# Patient Record
Sex: FEMALE | Race: WHITE | NOT HISPANIC OR LATINO | Employment: OTHER | URBAN - METROPOLITAN AREA
[De-identification: names, ages, dates, MRNs, and addresses within clinical notes are randomized per-mention and may not be internally consistent; named-entity substitution may affect disease eponyms.]

---

## 2023-07-24 LAB
ALANINE AMINOTRANSFERASE (SGPT) (U/L) IN SER/PLAS: 17 U/L (ref 7–45)
ALBUMIN (G/DL) IN SER/PLAS: 4.4 G/DL (ref 3.4–5)
ALKALINE PHOSPHATASE (U/L) IN SER/PLAS: 123 U/L (ref 33–136)
ANION GAP IN SER/PLAS: 15 MMOL/L (ref 10–20)
ASPARTATE AMINOTRANSFERASE (SGOT) (U/L) IN SER/PLAS: 30 U/L (ref 9–39)
BILIRUBIN TOTAL (MG/DL) IN SER/PLAS: 0.7 MG/DL (ref 0–1.2)
CALCIUM (MG/DL) IN SER/PLAS: 10.1 MG/DL (ref 8.6–10.6)
CARBON DIOXIDE, TOTAL (MMOL/L) IN SER/PLAS: 30 MMOL/L (ref 21–32)
CHLORIDE (MMOL/L) IN SER/PLAS: 87 MMOL/L (ref 98–107)
CREATININE (MG/DL) IN SER/PLAS: 0.77 MG/DL (ref 0.5–1.05)
ERYTHROCYTE DISTRIBUTION WIDTH (RATIO) BY AUTOMATED COUNT: 12.8 % (ref 11.5–14.5)
ERYTHROCYTE MEAN CORPUSCULAR HEMOGLOBIN CONCENTRATION (G/DL) BY AUTOMATED: 34 G/DL (ref 32–36)
ERYTHROCYTE MEAN CORPUSCULAR VOLUME (FL) BY AUTOMATED COUNT: 94 FL (ref 80–100)
ERYTHROCYTES (10*6/UL) IN BLOOD BY AUTOMATED COUNT: 4.26 X10E12/L (ref 4–5.2)
GFR FEMALE: 80 ML/MIN/1.73M2
GLUCOSE (MG/DL) IN SER/PLAS: 83 MG/DL (ref 74–99)
HEMATOCRIT (%) IN BLOOD BY AUTOMATED COUNT: 40 % (ref 36–46)
HEMOGLOBIN (G/DL) IN BLOOD: 13.6 G/DL (ref 12–16)
LEUKOCYTES (10*3/UL) IN BLOOD BY AUTOMATED COUNT: 10.2 X10E9/L (ref 4.4–11.3)
NRBC (PER 100 WBCS) BY AUTOMATED COUNT: 0 /100 WBC (ref 0–0)
PLATELETS (10*3/UL) IN BLOOD AUTOMATED COUNT: 249 X10E9/L (ref 150–450)
POTASSIUM (MMOL/L) IN SER/PLAS: 3.8 MMOL/L (ref 3.5–5.3)
PROTEIN TOTAL: 6.7 G/DL (ref 6.4–8.2)
SODIUM (MMOL/L) IN SER/PLAS: 128 MMOL/L (ref 136–145)
URATE (MG/DL) IN SER/PLAS: 3.9 MG/DL (ref 2.3–6.7)
UREA NITROGEN (MG/DL) IN SER/PLAS: 15 MG/DL (ref 6–23)

## 2023-10-18 DIAGNOSIS — E78.5 HYPERLIPIDEMIA, UNSPECIFIED HYPERLIPIDEMIA TYPE: ICD-10-CM

## 2023-10-19 PROBLEM — E78.5 HYPERLIPIDEMIA: Status: ACTIVE | Noted: 2023-10-19

## 2023-10-20 RX ORDER — SIMVASTATIN 40 MG/1
TABLET, FILM COATED ORAL
Qty: 90 TABLET | Refills: 0 | Status: SHIPPED | OUTPATIENT
Start: 2023-10-20 | End: 2024-01-04

## 2023-11-01 DIAGNOSIS — I15.9 SECONDARY HYPERTENSION: ICD-10-CM

## 2023-11-02 RX ORDER — ATENOLOL 50 MG/1
50 TABLET ORAL DAILY
Qty: 90 TABLET | Refills: 0 | Status: SHIPPED | OUTPATIENT
Start: 2023-11-02 | End: 2024-01-25 | Stop reason: SDUPTHER

## 2023-12-18 DIAGNOSIS — I10 HYPERTENSION, UNSPECIFIED TYPE: ICD-10-CM

## 2023-12-19 RX ORDER — TRIAMTERENE AND HYDROCHLOROTHIAZIDE 75; 50 MG/1; MG/1
TABLET ORAL
Qty: 90 TABLET | Refills: 1 | Status: SHIPPED | OUTPATIENT
Start: 2023-12-19 | End: 2024-01-25 | Stop reason: SDUPTHER

## 2023-12-31 DIAGNOSIS — E78.5 HYPERLIPIDEMIA, UNSPECIFIED HYPERLIPIDEMIA TYPE: ICD-10-CM

## 2024-01-04 RX ORDER — SIMVASTATIN 40 MG/1
TABLET, FILM COATED ORAL
Qty: 90 TABLET | Refills: 0 | Status: SHIPPED | OUTPATIENT
Start: 2024-01-04 | End: 2024-01-25 | Stop reason: SDUPTHER

## 2024-01-25 DIAGNOSIS — E78.5 HYPERLIPIDEMIA, UNSPECIFIED HYPERLIPIDEMIA TYPE: ICD-10-CM

## 2024-01-25 DIAGNOSIS — I10 HYPERTENSION, UNSPECIFIED TYPE: ICD-10-CM

## 2024-01-25 DIAGNOSIS — I15.9 SECONDARY HYPERTENSION: ICD-10-CM

## 2024-01-25 RX ORDER — TRIAMTERENE AND HYDROCHLOROTHIAZIDE 75; 50 MG/1; MG/1
1 TABLET ORAL DAILY
Qty: 90 TABLET | Refills: 0 | Status: SHIPPED | OUTPATIENT
Start: 2024-01-25 | End: 2024-06-11

## 2024-01-25 RX ORDER — ATENOLOL 50 MG/1
50 TABLET ORAL DAILY
Qty: 90 TABLET | Refills: 0 | Status: SHIPPED | OUTPATIENT
Start: 2024-01-25 | End: 2024-02-12 | Stop reason: SDUPTHER

## 2024-01-25 RX ORDER — SIMVASTATIN 40 MG/1
40 TABLET, FILM COATED ORAL NIGHTLY
Qty: 90 TABLET | Refills: 0 | Status: SHIPPED | OUTPATIENT
Start: 2024-01-25 | End: 2024-02-12 | Stop reason: SDUPTHER

## 2024-01-29 DIAGNOSIS — M10.9 GOUT, UNSPECIFIED CAUSE, UNSPECIFIED CHRONICITY, UNSPECIFIED SITE: Primary | ICD-10-CM

## 2024-01-29 RX ORDER — MELOXICAM 15 MG/1
15 TABLET ORAL DAILY
Qty: 90 TABLET | Refills: 1 | Status: SHIPPED | OUTPATIENT
Start: 2024-01-29

## 2024-01-29 RX ORDER — MELOXICAM 15 MG/1
15 TABLET ORAL DAILY
COMMUNITY
Start: 2013-10-08 | End: 2024-01-29 | Stop reason: SDUPTHER

## 2024-01-29 RX ORDER — ALLOPURINOL 300 MG/1
300 TABLET ORAL DAILY
Qty: 90 TABLET | Refills: 1 | Status: SHIPPED | OUTPATIENT
Start: 2024-01-29

## 2024-01-29 RX ORDER — ALLOPURINOL 300 MG/1
300 TABLET ORAL DAILY
COMMUNITY
Start: 2013-10-08 | End: 2024-01-29 | Stop reason: SDUPTHER

## 2024-01-30 DIAGNOSIS — I10 HYPERTENSION, UNSPECIFIED TYPE: ICD-10-CM

## 2024-01-30 DIAGNOSIS — I15.9 SECONDARY HYPERTENSION: ICD-10-CM

## 2024-01-30 DIAGNOSIS — E78.5 HYPERLIPIDEMIA, UNSPECIFIED HYPERLIPIDEMIA TYPE: ICD-10-CM

## 2024-01-30 RX ORDER — SIMVASTATIN 40 MG/1
40 TABLET, FILM COATED ORAL NIGHTLY
Qty: 90 TABLET | Refills: 0 | OUTPATIENT
Start: 2024-01-30 | End: 2024-04-29

## 2024-01-30 RX ORDER — TRIAMTERENE AND HYDROCHLOROTHIAZIDE 75; 50 MG/1; MG/1
1 TABLET ORAL DAILY
Qty: 90 TABLET | Refills: 0 | OUTPATIENT
Start: 2024-01-30 | End: 2024-04-29

## 2024-01-30 RX ORDER — ATENOLOL 50 MG/1
50 TABLET ORAL DAILY
Qty: 90 TABLET | Refills: 0 | OUTPATIENT
Start: 2024-01-30

## 2024-01-31 RX ORDER — SIMVASTATIN 40 MG/1
TABLET, FILM COATED ORAL
Refills: 0 | OUTPATIENT
Start: 2024-01-31

## 2024-01-31 RX ORDER — ATENOLOL 50 MG/1
TABLET ORAL
Refills: 0 | OUTPATIENT
Start: 2024-01-31

## 2024-02-12 DIAGNOSIS — I15.9 SECONDARY HYPERTENSION: ICD-10-CM

## 2024-02-12 DIAGNOSIS — E78.5 HYPERLIPIDEMIA, UNSPECIFIED HYPERLIPIDEMIA TYPE: ICD-10-CM

## 2024-02-12 RX ORDER — SIMVASTATIN 40 MG/1
40 TABLET, FILM COATED ORAL NIGHTLY
Qty: 90 TABLET | Refills: 0 | Status: SHIPPED | OUTPATIENT
Start: 2024-02-12 | End: 2024-04-22

## 2024-02-12 RX ORDER — ATENOLOL 50 MG/1
50 TABLET ORAL DAILY
Qty: 90 TABLET | Refills: 0 | Status: SHIPPED | OUTPATIENT
Start: 2024-02-12 | End: 2024-04-22

## 2024-04-22 DIAGNOSIS — E78.5 HYPERLIPIDEMIA, UNSPECIFIED HYPERLIPIDEMIA TYPE: Primary | ICD-10-CM

## 2024-04-22 DIAGNOSIS — M19.019 SHOULDER ARTHRITIS: ICD-10-CM

## 2024-04-22 DIAGNOSIS — I15.9 SECONDARY HYPERTENSION: ICD-10-CM

## 2024-04-22 PROBLEM — I10 ESSENTIAL HYPERTENSION: Status: ACTIVE | Noted: 2024-04-22

## 2024-04-22 RX ORDER — ATENOLOL 50 MG/1
50 TABLET ORAL DAILY
Qty: 90 TABLET | Refills: 1 | Status: SHIPPED | OUTPATIENT
Start: 2024-04-22 | End: 2024-05-14

## 2024-04-22 RX ORDER — GABAPENTIN 300 MG/1
300 CAPSULE ORAL DAILY
Qty: 90 CAPSULE | Refills: 1 | Status: SHIPPED | OUTPATIENT
Start: 2024-04-22

## 2024-04-22 RX ORDER — SIMVASTATIN 40 MG/1
40 TABLET, FILM COATED ORAL NIGHTLY
Qty: 90 TABLET | Refills: 1 | Status: SHIPPED | OUTPATIENT
Start: 2024-04-22 | End: 2024-05-14

## 2024-05-06 ENCOUNTER — OFFICE VISIT (OUTPATIENT)
Dept: ORTHOPEDIC SURGERY | Facility: CLINIC | Age: 74
End: 2024-05-06
Payer: MEDICARE

## 2024-05-06 ENCOUNTER — HOSPITAL ENCOUNTER (OUTPATIENT)
Dept: RADIOLOGY | Facility: CLINIC | Age: 74
Discharge: HOME | End: 2024-05-06
Payer: MEDICARE

## 2024-05-06 DIAGNOSIS — M25.511 BILATERAL SHOULDER PAIN, UNSPECIFIED CHRONICITY: ICD-10-CM

## 2024-05-06 DIAGNOSIS — M25.512 BILATERAL SHOULDER PAIN, UNSPECIFIED CHRONICITY: ICD-10-CM

## 2024-05-06 DIAGNOSIS — M19.012 PRIMARY OSTEOARTHRITIS OF SHOULDERS, BILATERAL: Primary | ICD-10-CM

## 2024-05-06 DIAGNOSIS — M19.011 PRIMARY OSTEOARTHRITIS OF SHOULDERS, BILATERAL: Primary | ICD-10-CM

## 2024-05-06 PROCEDURE — 1159F MED LIST DOCD IN RCRD: CPT | Performed by: ORTHOPAEDIC SURGERY

## 2024-05-06 PROCEDURE — 73030 X-RAY EXAM OF SHOULDER: CPT | Mod: 50

## 2024-05-06 PROCEDURE — 99203 OFFICE O/P NEW LOW 30 MIN: CPT | Performed by: ORTHOPAEDIC SURGERY

## 2024-05-06 PROCEDURE — 1160F RVW MEDS BY RX/DR IN RCRD: CPT | Performed by: ORTHOPAEDIC SURGERY

## 2024-05-06 PROCEDURE — 1157F ADVNC CARE PLAN IN RCRD: CPT | Performed by: ORTHOPAEDIC SURGERY

## 2024-05-06 PROCEDURE — 1125F AMNT PAIN NOTED PAIN PRSNT: CPT | Performed by: ORTHOPAEDIC SURGERY

## 2024-05-06 PROCEDURE — 73030 X-RAY EXAM OF SHOULDER: CPT | Mod: BILATERAL PROCEDURE | Performed by: RADIOLOGY

## 2024-05-06 PROCEDURE — 1036F TOBACCO NON-USER: CPT | Performed by: ORTHOPAEDIC SURGERY

## 2024-05-06 ASSESSMENT — ENCOUNTER SYMPTOMS
JOINT SWELLING: 1
EYE DISCHARGE: 0
SHORTNESS OF BREATH: 0
FEVER: 0
CHILLS: 0
WHEEZING: 0
TROUBLE SWALLOWING: 0

## 2024-05-06 ASSESSMENT — PAIN SCALES - GENERAL: PAINLEVEL_OUTOF10: 7

## 2024-05-06 ASSESSMENT — PAIN - FUNCTIONAL ASSESSMENT: PAIN_FUNCTIONAL_ASSESSMENT: 0-10

## 2024-05-06 NOTE — PROGRESS NOTES
Reason for Appointment  Chief Complaint   Patient presents with    Right Shoulder - Pain    Left Shoulder - Pain     History of Present Illness  New patient is a 73 y.o. female here today for evaluation of bilateral shoulder pain.  She has had chronic severe bilateral shoulder pain and poor function for many years.  She has a history of a previous left total knee replacement and has had multiple infections in the knee.  She uses a walker for weightbearing.  X-rays taken today of both shoulders are reviewed and show severe erosive glenohumeral joint arthritis left greater than right.  She has noticed a bump over the left AC joint as well.    Past Medical History:   Diagnosis Date    Elevation of levels of liver transaminase levels     ALT (SGPT) level raised    Other conditions influencing health status     Osteoarthritis    Personal history of other complications of pregnancy, childbirth and the puerperium     History of ectopic pregnancy    Personal history of other diseases of the circulatory system     History of hypertension    Personal history of other diseases of the musculoskeletal system and connective tissue     History of bursitis    Personal history of other diseases of the musculoskeletal system and connective tissue     Personal history of gout    Personal history of other diseases of the nervous system and sense organs     History of migraine headaches       Past Surgical History:   Procedure Laterality Date    CARPAL TUNNEL RELEASE  04/09/2013    Neuroplasty Median Nerve At Carpal Tunnel    CHOLECYSTECTOMY  04/09/2013    Cholecystectomy    KNEE ARTHROPLASTY  04/09/2013    Knee Arthroplasty       Medication Documentation Review Audit       Reviewed by Belinda Noel PA-C (Physician Assistant) on 05/06/24 at 1402      Medication Order Taking? Sig Documenting Provider Last Dose Status   allopurinol (Zyloprim) 300 mg tablet [id number] Yes Take 1 tablet (300 mg) by mouth once daily. Millie Hernandez,  MD Taking Active   atenolol (Tenormin) 50 mg tablet [id number] Yes Take 1 tablet (50 mg) by mouth once daily. Danielito Roach DO Taking Active   gabapentin (Neurontin) 300 mg capsule [id number] Yes Take 1 capsule (300 mg) by mouth once daily. Danielito Roach DO Taking Active   meloxicam (Mobic) 15 mg tablet [id number] Yes Take 1 tablet (15 mg) by mouth once daily. Millie Hernandez MD Taking Active   simvastatin (Zocor) 40 mg tablet [id number] Yes Take 1 tablet (40 mg) by mouth once daily at bedtime. Danielito Roach DO Taking Active   triamterene-hydrochlorothiazid (Maxzide) 75-50 mg tablet [id number]  Take 1 tablet by mouth once daily. Danielito Roach DO   [id number]                    Allergies   Allergen Reactions    Ciprofloxacin Anaphylaxis, Unknown and Swelling    Lisinopril Anaphylaxis, Angioedema, Swelling, Unknown and Other    Cefazolin Angioedema and Swelling       Review of Systems   Constitutional:  Negative for chills and fever.   HENT:  Negative for nosebleeds and trouble swallowing.    Eyes:  Negative for discharge.   Respiratory:  Negative for shortness of breath and wheezing.    Cardiovascular:  Negative for chest pain.   Musculoskeletal:  Positive for joint swelling.   Skin:  Negative for pallor and rash.   All other systems reviewed and are negative.    Exam   On exam patient is alert, awake, and in no acute distress.  Has no cephalic, no JVD, no auditory wheezes.  She has about 90 degrees of active forward flexion on the right side, 70 degrees of active forward flexion on the left.  Deltoids are functional bilaterally weakness with resisted external rotation.  She has a likely ganglion cyst over the left AC joint, this is compressible and minimally tender to palpation.  Decent elbow, wrist, and digital motion.  Mild DJD in the hands and CMC joints, but no atrophy.  Good pulses and sensation in the upper extremities.    Assessment   Encounter Diagnosis   Name Primary?     Bilateral shoulder pain, unspecified chronicity    Bilateral shoulder osteoarthritis    Plan   She has severe bilateral shoulder arthritis, with her history of a severe infection in her total knee joint, she is at a high risk for complications and infection in the shoulders and shoulder replacements are very high risk.  We did discuss possible occasional shots of cortisone for flareups but again with her history of infection, he would like to avoid those if possible.  She can follow-up with us as needed.    Written by Belinda Peters saw, evaluated, and treated the patient with the PA

## 2024-05-10 DIAGNOSIS — I15.9 SECONDARY HYPERTENSION: ICD-10-CM

## 2024-05-10 DIAGNOSIS — E78.5 HYPERLIPIDEMIA, UNSPECIFIED HYPERLIPIDEMIA TYPE: ICD-10-CM

## 2024-05-14 RX ORDER — SIMVASTATIN 40 MG/1
40 TABLET, FILM COATED ORAL NIGHTLY
Qty: 90 TABLET | Refills: 1 | Status: SHIPPED | OUTPATIENT
Start: 2024-05-14

## 2024-05-14 RX ORDER — ATENOLOL 50 MG/1
50 TABLET ORAL DAILY
Qty: 90 TABLET | Refills: 1 | Status: SHIPPED | OUTPATIENT
Start: 2024-05-14

## 2024-06-10 DIAGNOSIS — I10 HYPERTENSION, UNSPECIFIED TYPE: ICD-10-CM

## 2024-06-11 RX ORDER — TRIAMTERENE AND HYDROCHLOROTHIAZIDE 75; 50 MG/1; MG/1
TABLET ORAL
Qty: 90 TABLET | Refills: 3 | Status: SHIPPED | OUTPATIENT
Start: 2024-06-11

## 2024-06-19 DIAGNOSIS — M81.0 OSTEOPOROSIS, UNSPECIFIED OSTEOPOROSIS TYPE, UNSPECIFIED PATHOLOGICAL FRACTURE PRESENCE: Primary | ICD-10-CM

## 2024-07-05 ENCOUNTER — HOSPITAL ENCOUNTER (OUTPATIENT)
Dept: RADIOLOGY | Facility: CLINIC | Age: 74
Discharge: HOME | End: 2024-07-05
Payer: MEDICARE

## 2024-07-05 DIAGNOSIS — K21.9 GASTROESOPHAGEAL REFLUX DISEASE, UNSPECIFIED WHETHER ESOPHAGITIS PRESENT: ICD-10-CM

## 2024-07-05 DIAGNOSIS — M81.0 OSTEOPOROSIS, UNSPECIFIED OSTEOPOROSIS TYPE, UNSPECIFIED PATHOLOGICAL FRACTURE PRESENCE: ICD-10-CM

## 2024-07-05 PROCEDURE — 77080 DXA BONE DENSITY AXIAL: CPT | Performed by: RADIOLOGY

## 2024-07-05 PROCEDURE — 77080 DXA BONE DENSITY AXIAL: CPT

## 2024-07-05 RX ORDER — OMEPRAZOLE 20 MG/1
CAPSULE, DELAYED RELEASE ORAL
Qty: 90 CAPSULE | Refills: 0 | Status: SHIPPED | OUTPATIENT
Start: 2024-07-05

## 2024-07-05 ASSESSMENT — LIFESTYLE VARIABLES
3_OR_MORE_DRINKS_PER_DAY: N
CURRENT_SMOKER: N

## 2024-07-16 ENCOUNTER — DOCUMENTATION (OUTPATIENT)
Dept: RHEUMATOLOGY | Facility: CLINIC | Age: 74
End: 2024-07-16
Payer: MEDICARE

## 2024-07-17 DIAGNOSIS — M10.9 GOUT, UNSPECIFIED CAUSE, UNSPECIFIED CHRONICITY, UNSPECIFIED SITE: ICD-10-CM

## 2024-07-17 RX ORDER — MELOXICAM 15 MG/1
15 TABLET ORAL DAILY
Qty: 90 TABLET | Refills: 0 | Status: SHIPPED | OUTPATIENT
Start: 2024-07-17

## 2024-07-29 ENCOUNTER — OFFICE VISIT (OUTPATIENT)
Dept: RHEUMATOLOGY | Facility: CLINIC | Age: 74
End: 2024-07-29
Payer: MEDICARE

## 2024-07-29 ENCOUNTER — LAB (OUTPATIENT)
Dept: LAB | Facility: LAB | Age: 74
End: 2024-07-29
Payer: MEDICARE

## 2024-07-29 VITALS — BODY MASS INDEX: 44.79 KG/M2 | DIASTOLIC BLOOD PRESSURE: 86 MMHG | WEIGHT: 263 LBS | SYSTOLIC BLOOD PRESSURE: 136 MMHG

## 2024-07-29 DIAGNOSIS — M10.9 GOUT, UNSPECIFIED CAUSE, UNSPECIFIED CHRONICITY, UNSPECIFIED SITE: ICD-10-CM

## 2024-07-29 DIAGNOSIS — M15.9 OSTEOARTHRITIS OF MULTIPLE JOINTS, UNSPECIFIED OSTEOARTHRITIS TYPE: ICD-10-CM

## 2024-07-29 DIAGNOSIS — M15.9 OSTEOARTHRITIS OF MULTIPLE JOINTS, UNSPECIFIED OSTEOARTHRITIS TYPE: Primary | ICD-10-CM

## 2024-07-29 LAB
ALBUMIN SERPL BCP-MCNC: 4.3 G/DL (ref 3.4–5)
ALP SERPL-CCNC: 116 U/L (ref 33–136)
ALT SERPL W P-5'-P-CCNC: 15 U/L (ref 7–45)
ANION GAP SERPL CALC-SCNC: 16 MMOL/L
AST SERPL W P-5'-P-CCNC: 25 U/L (ref 9–39)
BASOPHILS # BLD AUTO: 0.04 X10*3/UL (ref 0–0.1)
BASOPHILS NFR BLD AUTO: 0.5 %
BILIRUB SERPL-MCNC: 0.6 MG/DL (ref 0–1.2)
BUN SERPL-MCNC: 26 MG/DL (ref 6–23)
CALCIUM SERPL-MCNC: 10 MG/DL (ref 8.6–10.6)
CHLORIDE SERPL-SCNC: 89 MMOL/L (ref 98–107)
CO2 SERPL-SCNC: 27 MMOL/L (ref 21–32)
CREAT SERPL-MCNC: 1.16 MG/DL (ref 0.5–1.05)
EGFRCR SERPLBLD CKD-EPI 2021: 49 ML/MIN/1.73M*2
EOSINOPHIL # BLD AUTO: 0.29 X10*3/UL (ref 0–0.4)
EOSINOPHIL NFR BLD AUTO: 3.5 %
ERYTHROCYTE [DISTWIDTH] IN BLOOD BY AUTOMATED COUNT: 12.5 % (ref 11.5–14.5)
GLUCOSE SERPL-MCNC: 78 MG/DL (ref 74–99)
HCT VFR BLD AUTO: 38.7 % (ref 36–46)
HGB BLD-MCNC: 13.1 G/DL (ref 12–16)
IMM GRANULOCYTES # BLD AUTO: 0.03 X10*3/UL (ref 0–0.5)
IMM GRANULOCYTES NFR BLD AUTO: 0.4 % (ref 0–0.9)
LYMPHOCYTES # BLD AUTO: 0.99 X10*3/UL (ref 0.8–3)
LYMPHOCYTES NFR BLD AUTO: 12 %
MCH RBC QN AUTO: 31.8 PG (ref 26–34)
MCHC RBC AUTO-ENTMCNC: 33.9 G/DL (ref 32–36)
MCV RBC AUTO: 94 FL (ref 80–100)
MONOCYTES # BLD AUTO: 0.65 X10*3/UL (ref 0.05–0.8)
MONOCYTES NFR BLD AUTO: 7.9 %
NEUTROPHILS # BLD AUTO: 6.25 X10*3/UL (ref 1.6–5.5)
NEUTROPHILS NFR BLD AUTO: 75.7 %
NRBC BLD-RTO: 0 /100 WBCS (ref 0–0)
PLATELET # BLD AUTO: 229 X10*3/UL (ref 150–450)
POTASSIUM SERPL-SCNC: 4.2 MMOL/L (ref 3.5–5.3)
PROT SERPL-MCNC: 6.7 G/DL (ref 6.4–8.2)
RBC # BLD AUTO: 4.12 X10*6/UL (ref 4–5.2)
SODIUM SERPL-SCNC: 128 MMOL/L (ref 136–145)
URATE SERPL-MCNC: 4.4 MG/DL (ref 2.3–6.7)
WBC # BLD AUTO: 8.3 X10*3/UL (ref 4.4–11.3)

## 2024-07-29 PROCEDURE — 3079F DIAST BP 80-89 MM HG: CPT | Performed by: INTERNAL MEDICINE

## 2024-07-29 PROCEDURE — 84550 ASSAY OF BLOOD/URIC ACID: CPT

## 2024-07-29 PROCEDURE — 99214 OFFICE O/P EST MOD 30 MIN: CPT | Performed by: INTERNAL MEDICINE

## 2024-07-29 PROCEDURE — 3075F SYST BP GE 130 - 139MM HG: CPT | Performed by: INTERNAL MEDICINE

## 2024-07-29 PROCEDURE — 1159F MED LIST DOCD IN RCRD: CPT | Performed by: INTERNAL MEDICINE

## 2024-07-29 PROCEDURE — 1157F ADVNC CARE PLAN IN RCRD: CPT | Performed by: INTERNAL MEDICINE

## 2024-07-29 PROCEDURE — 85025 COMPLETE CBC W/AUTO DIFF WBC: CPT

## 2024-07-29 PROCEDURE — 36415 COLL VENOUS BLD VENIPUNCTURE: CPT

## 2024-07-29 PROCEDURE — 80053 COMPREHEN METABOLIC PANEL: CPT

## 2024-07-29 NOTE — PROGRESS NOTES
"Recheck  OA  /  Gout   ? DEXA results  C/O  bilat hand pain along with multiple nodules on fingers.     HPI - she has worsening heberdons nodes and has hand pain.  She said that a friend told her she might have RA.  She gets some toe pain as well at times.  She has a cyst on her L shoulder - she saw shoulder ortho (x-ray showed severe OA with cystic changes).  They told her she shouldn't get injections.   She says that she gets gout flares throughout the year.  She gets \"pain\" but it hasn't been as bad as she used to get.  It's unclear if she has typical gout flare sx.    She has neuropathy in her ft as well.  No CP.  +SOB \"the pain and the heaviness\"   Intermittent GI upset and her stomach feels \"strained\" when she has a BM    PE  NAD  RRR no r/m/g  CTA  1+ BLE edema  No synovitis  +heberdons, some tender    A/P - OA and gout, no evidence of RA and ?if the pain that she thinks are little gout flares truly are flares vs OA and/or neuropathic pain  7/24 DEXA does not meet FRAX criteria  Offered to try a different NSAID - she wants to finish the meloxicam that she has a lot of but will call if she wants to change at next refill.  Check labs  Follow up yearly or sooner PRN  "

## 2024-08-13 ENCOUNTER — OFFICE VISIT (OUTPATIENT)
Dept: PRIMARY CARE | Facility: CLINIC | Age: 74
End: 2024-08-13
Payer: MEDICARE

## 2024-08-13 VITALS
SYSTOLIC BLOOD PRESSURE: 118 MMHG | RESPIRATION RATE: 18 BRPM | HEIGHT: 64 IN | TEMPERATURE: 97.2 F | DIASTOLIC BLOOD PRESSURE: 68 MMHG | BODY MASS INDEX: 45.07 KG/M2 | OXYGEN SATURATION: 97 % | WEIGHT: 264 LBS | HEART RATE: 55 BPM

## 2024-08-13 DIAGNOSIS — N28.9 KIDNEY FUNCTION ABNORMAL: Primary | ICD-10-CM

## 2024-08-13 PROBLEM — Q66.89 CLAW TOE: Status: ACTIVE | Noted: 2024-08-13

## 2024-08-13 PROBLEM — K59.00 CONSTIPATION: Status: ACTIVE | Noted: 2024-08-13

## 2024-08-13 PROBLEM — K21.9 GASTROESOPHAGEAL REFLUX DISEASE: Status: ACTIVE | Noted: 2024-08-13

## 2024-08-13 PROBLEM — K21.9 ESOPHAGEAL REFLUX: Status: ACTIVE | Noted: 2024-08-13

## 2024-08-13 PROBLEM — M1A.9XX0 CHRONIC GOUT WITHOUT TOPHUS: Status: ACTIVE | Noted: 2024-08-13

## 2024-08-13 PROBLEM — M10.9 GOUT: Status: ACTIVE | Noted: 2018-09-27

## 2024-08-13 PROBLEM — R11.0 NAUSEA: Status: ACTIVE | Noted: 2024-08-13

## 2024-08-13 PROBLEM — I82.409 DEEP VENOUS THROMBOSIS (MULTI): Status: ACTIVE | Noted: 2024-08-13

## 2024-08-13 PROBLEM — N85.00 ENDOMETRIAL HYPERPLASIA: Status: ACTIVE | Noted: 2024-08-13

## 2024-08-13 PROCEDURE — 1126F AMNT PAIN NOTED NONE PRSNT: CPT | Performed by: FAMILY MEDICINE

## 2024-08-13 PROCEDURE — 3078F DIAST BP <80 MM HG: CPT | Performed by: FAMILY MEDICINE

## 2024-08-13 PROCEDURE — 99213 OFFICE O/P EST LOW 20 MIN: CPT | Performed by: FAMILY MEDICINE

## 2024-08-13 PROCEDURE — 3074F SYST BP LT 130 MM HG: CPT | Performed by: FAMILY MEDICINE

## 2024-08-13 PROCEDURE — 1157F ADVNC CARE PLAN IN RCRD: CPT | Performed by: FAMILY MEDICINE

## 2024-08-13 PROCEDURE — 1036F TOBACCO NON-USER: CPT | Performed by: FAMILY MEDICINE

## 2024-08-13 PROCEDURE — 1159F MED LIST DOCD IN RCRD: CPT | Performed by: FAMILY MEDICINE

## 2024-08-13 ASSESSMENT — PATIENT HEALTH QUESTIONNAIRE - PHQ9
1. LITTLE INTEREST OR PLEASURE IN DOING THINGS: NOT AT ALL
SUM OF ALL RESPONSES TO PHQ9 QUESTIONS 1 AND 2: 0
2. FEELING DOWN, DEPRESSED OR HOPELESS: NOT AT ALL

## 2024-08-13 ASSESSMENT — PAIN SCALES - GENERAL: PAINLEVEL: 0-NO PAIN

## 2024-08-13 ASSESSMENT — LIFESTYLE VARIABLES
HOW OFTEN DO YOU HAVE SIX OR MORE DRINKS ON ONE OCCASION: NEVER
HOW MANY STANDARD DRINKS CONTAINING ALCOHOL DO YOU HAVE ON A TYPICAL DAY: PATIENT DOES NOT DRINK

## 2024-08-13 NOTE — PROGRESS NOTES
"Subjective   Patient ID: Angela Montelongo is a 74 y.o. female who presents for Follow-up (Pt here for follow up from Dr Hernandez's blood work) and upper abdominal pain (Pt c/o upper abdominal pain x 6 weeks).    HPI     Review of Systems    Objective   /68   Pulse 55   Temp 36.2 °C (97.2 °F)   Resp 18   Ht 1.632 m (5' 4.25\")   Wt 120 kg (264 lb) Comment: pt reported  SpO2 97%   BMI 44.96 kg/m²     Physical Exam  Constitutional:       General: She is not in acute distress.     Appearance: Normal appearance.   Cardiovascular:      Rate and Rhythm: Normal rate and regular rhythm.      Heart sounds: No murmur heard.  Pulmonary:      Breath sounds: Normal breath sounds. No wheezing.   Neurological:      Mental Status: She is alert.         Assessment/Plan   Problem List Items Addressed This Visit    None  Visit Diagnoses         Codes    Kidney function abnormal    -  Primary N28.9          Will redraw as pt was dehydrated at last draw. Will do in 2- 3 wks     "

## 2024-08-29 ENCOUNTER — APPOINTMENT (OUTPATIENT)
Dept: CARDIOLOGY | Facility: HOSPITAL | Age: 74
DRG: 641 | End: 2024-08-29
Payer: MEDICARE

## 2024-08-29 ENCOUNTER — HOSPITAL ENCOUNTER (INPATIENT)
Facility: HOSPITAL | Age: 74
DRG: 641 | End: 2024-08-29
Attending: STUDENT IN AN ORGANIZED HEALTH CARE EDUCATION/TRAINING PROGRAM | Admitting: INTERNAL MEDICINE
Payer: MEDICARE

## 2024-08-29 ENCOUNTER — APPOINTMENT (OUTPATIENT)
Dept: RADIOLOGY | Facility: HOSPITAL | Age: 74
DRG: 641 | End: 2024-08-29
Payer: MEDICARE

## 2024-08-29 DIAGNOSIS — M19.019 SHOULDER ARTHRITIS: ICD-10-CM

## 2024-08-29 DIAGNOSIS — M10.9 GOUT, UNSPECIFIED CAUSE, UNSPECIFIED CHRONICITY, UNSPECIFIED SITE: ICD-10-CM

## 2024-08-29 DIAGNOSIS — E87.1 HYPONATREMIA: Primary | ICD-10-CM

## 2024-08-29 DIAGNOSIS — I10 PRIMARY HYPERTENSION: ICD-10-CM

## 2024-08-29 DIAGNOSIS — N30.01 ACUTE CYSTITIS WITH HEMATURIA: ICD-10-CM

## 2024-08-29 LAB
ALBUMIN SERPL-MCNC: 3.5 G/DL (ref 3.5–5)
ALP BLD-CCNC: 126 U/L (ref 35–125)
ALT SERPL-CCNC: 20 U/L (ref 5–40)
ANION GAP SERPL CALC-SCNC: 10 MMOL/L
ANION GAP SERPL CALC-SCNC: 10 MMOL/L
APPEARANCE UR: ABNORMAL
AST SERPL-CCNC: 41 U/L (ref 5–40)
BACTERIA #/AREA URNS AUTO: ABNORMAL /HPF
BASOPHILS # BLD MANUAL: 0 X10*3/UL (ref 0–0.1)
BASOPHILS NFR BLD MANUAL: 0 %
BILIRUB SERPL-MCNC: 0.8 MG/DL (ref 0.1–1.2)
BILIRUB UR STRIP.AUTO-MCNC: NEGATIVE MG/DL
BUN SERPL-MCNC: 44 MG/DL (ref 8–25)
BUN SERPL-MCNC: 46 MG/DL (ref 8–25)
CALCIUM SERPL-MCNC: 9 MG/DL (ref 8.5–10.4)
CALCIUM SERPL-MCNC: 9.1 MG/DL (ref 8.5–10.4)
CHLORIDE SERPL-SCNC: 82 MMOL/L (ref 97–107)
CHLORIDE SERPL-SCNC: 84 MMOL/L (ref 97–107)
CO2 SERPL-SCNC: 25 MMOL/L (ref 24–31)
CO2 SERPL-SCNC: 26 MMOL/L (ref 24–31)
COLOR UR: YELLOW
CREAT SERPL-MCNC: 1.5 MG/DL (ref 0.4–1.6)
CREAT SERPL-MCNC: 1.7 MG/DL (ref 0.4–1.6)
EGFRCR SERPLBLD CKD-EPI 2021: 31 ML/MIN/1.73M*2
EGFRCR SERPLBLD CKD-EPI 2021: 36 ML/MIN/1.73M*2
EOSINOPHIL # BLD MANUAL: 0 X10*3/UL (ref 0–0.4)
EOSINOPHIL NFR BLD MANUAL: 0 %
ERYTHROCYTE [DISTWIDTH] IN BLOOD BY AUTOMATED COUNT: 13.2 % (ref 11.5–14.5)
GLUCOSE SERPL-MCNC: 80 MG/DL (ref 65–99)
GLUCOSE SERPL-MCNC: 81 MG/DL (ref 65–99)
GLUCOSE UR STRIP.AUTO-MCNC: NORMAL MG/DL
HCT VFR BLD AUTO: 36.8 % (ref 36–46)
HGB BLD-MCNC: 12.4 G/DL (ref 12–16)
IMM GRANULOCYTES # BLD AUTO: 0.31 X10*3/UL (ref 0–0.5)
IMM GRANULOCYTES NFR BLD AUTO: 1.5 % (ref 0–0.9)
KETONES UR STRIP.AUTO-MCNC: NEGATIVE MG/DL
LEUKOCYTE ESTERASE UR QL STRIP.AUTO: ABNORMAL
LYMPHOCYTES # BLD MANUAL: 0.21 X10*3/UL (ref 0.8–3)
LYMPHOCYTES NFR BLD MANUAL: 1 %
MAGNESIUM SERPL-MCNC: 1.4 MG/DL (ref 1.6–3.1)
MCH RBC QN AUTO: 31.4 PG (ref 26–34)
MCHC RBC AUTO-ENTMCNC: 33.7 G/DL (ref 32–36)
MCV RBC AUTO: 93 FL (ref 80–100)
MONOCYTES # BLD MANUAL: 1.66 X10*3/UL (ref 0.05–0.8)
MONOCYTES NFR BLD MANUAL: 8 %
MUCOUS THREADS #/AREA URNS AUTO: ABNORMAL /LPF
NEUTROPHILS # BLD MANUAL: 18.84 X10*3/UL (ref 1.6–5.5)
NEUTS BAND # BLD MANUAL: 0.83 X10*3/UL (ref 0–0.5)
NEUTS BAND NFR BLD MANUAL: 4 %
NEUTS SEG # BLD MANUAL: 18.01 X10*3/UL (ref 1.6–5)
NEUTS SEG NFR BLD MANUAL: 87 %
NITRITE UR QL STRIP.AUTO: NEGATIVE
NRBC BLD-RTO: 0 /100 WBCS (ref 0–0)
PH UR STRIP.AUTO: 6 [PH]
PLATELET # BLD AUTO: 100 X10*3/UL (ref 150–450)
POTASSIUM SERPL-SCNC: 3.8 MMOL/L (ref 3.4–5.1)
POTASSIUM SERPL-SCNC: 3.9 MMOL/L (ref 3.4–5.1)
PROT SERPL-MCNC: 6.2 G/DL (ref 5.9–7.9)
PROT UR STRIP.AUTO-MCNC: ABNORMAL MG/DL
RBC # BLD AUTO: 3.95 X10*6/UL (ref 4–5.2)
RBC # UR STRIP.AUTO: ABNORMAL /UL
RBC #/AREA URNS AUTO: ABNORMAL /HPF
RBC MORPH BLD: ABNORMAL
SARS-COV-2 RNA RESP QL NAA+PROBE: NOT DETECTED
SODIUM SERPL-SCNC: 118 MMOL/L (ref 133–145)
SODIUM SERPL-SCNC: 119 MMOL/L (ref 133–145)
SP GR UR STRIP.AUTO: 1.01
TOTAL CELLS COUNTED BLD: 100
TSH SERPL DL<=0.05 MIU/L-ACNC: 2.31 MIU/L (ref 0.27–4.2)
UROBILINOGEN UR STRIP.AUTO-MCNC: NORMAL MG/DL
WBC # BLD AUTO: 20.7 X10*3/UL (ref 4.4–11.3)
WBC #/AREA URNS AUTO: >50 /HPF
WBC CLUMPS #/AREA URNS AUTO: ABNORMAL /HPF

## 2024-08-29 PROCEDURE — 93010 ELECTROCARDIOGRAM REPORT: CPT | Performed by: INTERNAL MEDICINE

## 2024-08-29 PROCEDURE — 2500000004 HC RX 250 GENERAL PHARMACY W/ HCPCS (ALT 636 FOR OP/ED): Performed by: INTERNAL MEDICINE

## 2024-08-29 PROCEDURE — 96375 TX/PRO/DX INJ NEW DRUG ADDON: CPT

## 2024-08-29 PROCEDURE — 96361 HYDRATE IV INFUSION ADD-ON: CPT

## 2024-08-29 PROCEDURE — 85027 COMPLETE CBC AUTOMATED: CPT | Performed by: STUDENT IN AN ORGANIZED HEALTH CARE EDUCATION/TRAINING PROGRAM

## 2024-08-29 PROCEDURE — 80053 COMPREHEN METABOLIC PANEL: CPT | Performed by: STUDENT IN AN ORGANIZED HEALTH CARE EDUCATION/TRAINING PROGRAM

## 2024-08-29 PROCEDURE — 36415 COLL VENOUS BLD VENIPUNCTURE: CPT | Performed by: STUDENT IN AN ORGANIZED HEALTH CARE EDUCATION/TRAINING PROGRAM

## 2024-08-29 PROCEDURE — 80048 BASIC METABOLIC PNL TOTAL CA: CPT | Mod: CCI | Performed by: INTERNAL MEDICINE

## 2024-08-29 PROCEDURE — 96374 THER/PROPH/DIAG INJ IV PUSH: CPT | Mod: 59

## 2024-08-29 PROCEDURE — 36415 COLL VENOUS BLD VENIPUNCTURE: CPT | Performed by: INTERNAL MEDICINE

## 2024-08-29 PROCEDURE — 83735 ASSAY OF MAGNESIUM: CPT | Performed by: STUDENT IN AN ORGANIZED HEALTH CARE EDUCATION/TRAINING PROGRAM

## 2024-08-29 PROCEDURE — 81003 URINALYSIS AUTO W/O SCOPE: CPT | Performed by: STUDENT IN AN ORGANIZED HEALTH CARE EDUCATION/TRAINING PROGRAM

## 2024-08-29 PROCEDURE — 87635 SARS-COV-2 COVID-19 AMP PRB: CPT | Performed by: STUDENT IN AN ORGANIZED HEALTH CARE EDUCATION/TRAINING PROGRAM

## 2024-08-29 PROCEDURE — 2500000001 HC RX 250 WO HCPCS SELF ADMINISTERED DRUGS (ALT 637 FOR MEDICARE OP): Performed by: INTERNAL MEDICINE

## 2024-08-29 PROCEDURE — 83930 ASSAY OF BLOOD OSMOLALITY: CPT | Mod: TRILAB,WESLAB

## 2024-08-29 PROCEDURE — 99285 EMERGENCY DEPT VISIT HI MDM: CPT

## 2024-08-29 PROCEDURE — 2500000004 HC RX 250 GENERAL PHARMACY W/ HCPCS (ALT 636 FOR OP/ED): Performed by: STUDENT IN AN ORGANIZED HEALTH CARE EDUCATION/TRAINING PROGRAM

## 2024-08-29 PROCEDURE — 83935 ASSAY OF URINE OSMOLALITY: CPT | Mod: TRILAB,WESLAB

## 2024-08-29 PROCEDURE — 70450 CT HEAD/BRAIN W/O DYE: CPT

## 2024-08-29 PROCEDURE — 93005 ELECTROCARDIOGRAM TRACING: CPT

## 2024-08-29 PROCEDURE — 70450 CT HEAD/BRAIN W/O DYE: CPT | Performed by: RADIOLOGY

## 2024-08-29 PROCEDURE — 2020000001 HC ICU ROOM DAILY

## 2024-08-29 PROCEDURE — 84443 ASSAY THYROID STIM HORMONE: CPT | Performed by: STUDENT IN AN ORGANIZED HEALTH CARE EDUCATION/TRAINING PROGRAM

## 2024-08-29 PROCEDURE — 99223 1ST HOSP IP/OBS HIGH 75: CPT | Performed by: INTERNAL MEDICINE

## 2024-08-29 PROCEDURE — 85007 BL SMEAR W/DIFF WBC COUNT: CPT | Performed by: STUDENT IN AN ORGANIZED HEALTH CARE EDUCATION/TRAINING PROGRAM

## 2024-08-29 PROCEDURE — 87086 URINE CULTURE/COLONY COUNT: CPT | Mod: TRILAB,WESLAB | Performed by: STUDENT IN AN ORGANIZED HEALTH CARE EDUCATION/TRAINING PROGRAM

## 2024-08-29 PROCEDURE — 2500000002 HC RX 250 W HCPCS SELF ADMINISTERED DRUGS (ALT 637 FOR MEDICARE OP, ALT 636 FOR OP/ED): Performed by: INTERNAL MEDICINE

## 2024-08-29 PROCEDURE — 2500000004 HC RX 250 GENERAL PHARMACY W/ HCPCS (ALT 636 FOR OP/ED)

## 2024-08-29 RX ORDER — GABAPENTIN 300 MG/1
300 CAPSULE ORAL DAILY
Status: DISCONTINUED | OUTPATIENT
Start: 2024-08-29 | End: 2024-09-04 | Stop reason: HOSPADM

## 2024-08-29 RX ORDER — IBUPROFEN 400 MG/1
400 TABLET ORAL EVERY 6 HOURS PRN
Status: DISCONTINUED | OUTPATIENT
Start: 2024-08-29 | End: 2024-08-29

## 2024-08-29 RX ORDER — CEFTRIAXONE 1 G/50ML
1 INJECTION, SOLUTION INTRAVENOUS ONCE
Status: COMPLETED | OUTPATIENT
Start: 2024-08-29 | End: 2024-08-29

## 2024-08-29 RX ORDER — CEFTRIAXONE 1 G/50ML
1 INJECTION, SOLUTION INTRAVENOUS EVERY 24 HOURS
Status: COMPLETED | OUTPATIENT
Start: 2024-08-30 | End: 2024-09-03

## 2024-08-29 RX ORDER — ACETAMINOPHEN 325 MG/1
650 TABLET ORAL EVERY 8 HOURS PRN
Status: DISCONTINUED | OUTPATIENT
Start: 2024-08-29 | End: 2024-08-30

## 2024-08-29 RX ORDER — SIMVASTATIN 40 MG/1
40 TABLET, FILM COATED ORAL NIGHTLY
Status: DISCONTINUED | OUTPATIENT
Start: 2024-08-29 | End: 2024-09-04 | Stop reason: HOSPADM

## 2024-08-29 RX ORDER — PANTOPRAZOLE SODIUM 20 MG/1
20 TABLET, DELAYED RELEASE ORAL
Status: DISCONTINUED | OUTPATIENT
Start: 2024-08-30 | End: 2024-09-04 | Stop reason: HOSPADM

## 2024-08-29 RX ORDER — ATENOLOL 50 MG/1
50 TABLET ORAL DAILY
Status: DISCONTINUED | OUTPATIENT
Start: 2024-08-29 | End: 2024-09-04 | Stop reason: HOSPADM

## 2024-08-29 RX ORDER — MAGNESIUM SULFATE HEPTAHYDRATE 40 MG/ML
2 INJECTION, SOLUTION INTRAVENOUS ONCE
Status: COMPLETED | OUTPATIENT
Start: 2024-08-29 | End: 2024-08-29

## 2024-08-29 RX ORDER — HEPARIN SODIUM 5000 [USP'U]/ML
5000 INJECTION, SOLUTION INTRAVENOUS; SUBCUTANEOUS EVERY 12 HOURS
Status: DISCONTINUED | OUTPATIENT
Start: 2024-08-29 | End: 2024-09-04 | Stop reason: HOSPADM

## 2024-08-29 SDOH — SOCIAL STABILITY: SOCIAL INSECURITY: DO YOU FEEL UNSAFE GOING BACK TO THE PLACE WHERE YOU ARE LIVING?: NO

## 2024-08-29 SDOH — SOCIAL STABILITY: SOCIAL INSECURITY: HAVE YOU HAD THOUGHTS OF HARMING ANYONE ELSE?: NO

## 2024-08-29 SDOH — SOCIAL STABILITY: SOCIAL INSECURITY: DOES ANYONE TRY TO KEEP YOU FROM HAVING/CONTACTING OTHER FRIENDS OR DOING THINGS OUTSIDE YOUR HOME?: NO

## 2024-08-29 SDOH — SOCIAL STABILITY: SOCIAL INSECURITY: HAVE YOU HAD ANY THOUGHTS OF HARMING ANYONE ELSE?: NO

## 2024-08-29 SDOH — SOCIAL STABILITY: SOCIAL INSECURITY: DO YOU FEEL ANYONE HAS EXPLOITED OR TAKEN ADVANTAGE OF YOU FINANCIALLY OR OF YOUR PERSONAL PROPERTY?: NO

## 2024-08-29 SDOH — SOCIAL STABILITY: SOCIAL INSECURITY: ARE YOU OR HAVE YOU BEEN THREATENED OR ABUSED PHYSICALLY, EMOTIONALLY, OR SEXUALLY BY ANYONE?: NO

## 2024-08-29 SDOH — SOCIAL STABILITY: SOCIAL INSECURITY: ABUSE: ADULT

## 2024-08-29 SDOH — SOCIAL STABILITY: SOCIAL INSECURITY: ARE THERE ANY APPARENT SIGNS OF INJURIES/BEHAVIORS THAT COULD BE RELATED TO ABUSE/NEGLECT?: NO

## 2024-08-29 SDOH — SOCIAL STABILITY: SOCIAL INSECURITY: HAS ANYONE EVER THREATENED TO HURT YOUR FAMILY OR YOUR PETS?: NO

## 2024-08-29 SDOH — SOCIAL STABILITY: SOCIAL INSECURITY: WERE YOU ABLE TO COMPLETE ALL THE BEHAVIORAL HEALTH SCREENINGS?: YES

## 2024-08-29 ASSESSMENT — COGNITIVE AND FUNCTIONAL STATUS - GENERAL
DAILY ACTIVITIY SCORE: 24
PATIENT BASELINE BEDBOUND: NO
WALKING IN HOSPITAL ROOM: A LITTLE
CLIMB 3 TO 5 STEPS WITH RAILING: A LOT
MOBILITY SCORE: 21

## 2024-08-29 ASSESSMENT — PAIN SCALES - GENERAL
PAINLEVEL_OUTOF10: 0 - NO PAIN
PAINLEVEL_OUTOF10: 0 - NO PAIN
PAINLEVEL_OUTOF10: 7
PAINLEVEL_OUTOF10: 0 - NO PAIN

## 2024-08-29 ASSESSMENT — LIFESTYLE VARIABLES
HOW MANY STANDARD DRINKS CONTAINING ALCOHOL DO YOU HAVE ON A TYPICAL DAY: 1 OR 2
PRESCIPTION_ABUSE_PAST_12_MONTHS: NO
SKIP TO QUESTIONS 9-10: 1
AUDIT-C TOTAL SCORE: 2
AUDIT-C TOTAL SCORE: 2
HOW OFTEN DO YOU HAVE 6 OR MORE DRINKS ON ONE OCCASION: NEVER
HOW OFTEN DO YOU HAVE A DRINK CONTAINING ALCOHOL: 2-4 TIMES A MONTH
SUBSTANCE_ABUSE_PAST_12_MONTHS: NO

## 2024-08-29 ASSESSMENT — ACTIVITIES OF DAILY LIVING (ADL)
DRESSING YOURSELF: INDEPENDENT
WALKS IN HOME: NEEDS ASSISTANCE
BATHING: INDEPENDENT
HEARING - RIGHT EAR: FUNCTIONAL
FEEDING YOURSELF: INDEPENDENT
LACK_OF_TRANSPORTATION: NO
TOILETING: INDEPENDENT
HEARING - LEFT EAR: FUNCTIONAL
GROOMING: INDEPENDENT
ADEQUATE_TO_COMPLETE_ADL: YES
PATIENT'S MEMORY ADEQUATE TO SAFELY COMPLETE DAILY ACTIVITIES?: YES
JUDGMENT_ADEQUATE_SAFELY_COMPLETE_DAILY_ACTIVITIES: YES
ASSISTIVE_DEVICE: WALKER

## 2024-08-29 ASSESSMENT — PAIN SCALES - PAIN ASSESSMENT IN ADVANCED DEMENTIA (PAINAD): BREATHING: NORMAL

## 2024-08-29 ASSESSMENT — COLUMBIA-SUICIDE SEVERITY RATING SCALE - C-SSRS
6. HAVE YOU EVER DONE ANYTHING, STARTED TO DO ANYTHING, OR PREPARED TO DO ANYTHING TO END YOUR LIFE?: NO
1. IN THE PAST MONTH, HAVE YOU WISHED YOU WERE DEAD OR WISHED YOU COULD GO TO SLEEP AND NOT WAKE UP?: NO
2. HAVE YOU ACTUALLY HAD ANY THOUGHTS OF KILLING YOURSELF?: NO

## 2024-08-29 ASSESSMENT — PATIENT HEALTH QUESTIONNAIRE - PHQ9
2. FEELING DOWN, DEPRESSED OR HOPELESS: SEVERAL DAYS
SUM OF ALL RESPONSES TO PHQ9 QUESTIONS 1 & 2: 1
1. LITTLE INTEREST OR PLEASURE IN DOING THINGS: NOT AT ALL

## 2024-08-29 ASSESSMENT — PAIN - FUNCTIONAL ASSESSMENT
PAIN_FUNCTIONAL_ASSESSMENT: 0-10

## 2024-08-29 ASSESSMENT — PAIN DESCRIPTION - LOCATION: LOCATION: BACK

## 2024-08-29 ASSESSMENT — PAIN DESCRIPTION - DESCRIPTORS
DESCRIPTORS: ACHING;POUNDING
DESCRIPTORS: ACHING;POUNDING

## 2024-08-29 NOTE — ED PROVIDER NOTES
HPI   Chief Complaint   Patient presents with    Weakness, Gen     Pt presents to the ED via ems for c/o generalized weakness.  Pt states over the past few days she has felt shaky and unsteady on her feet.  Pt states she fell today as well.       Patient is a 74-year-old female presenting to the emergency department for evaluation of generalized weakness.  Patient states over the past few days she has been falling more and has been feeling unsteady on her feet.  She states she will take a few steps and then fall to the ground.  She states today she fell and hit her head.  She states she did not lose consciousness and is not on any blood thinners.  She has had no recent medication changes.  She does admit to being on a diuretic.  She states she has been drinking a lot of water however has not had much of an appetite over the past few days.  She denies chest pain, shortness of breath, fevers, chills, nausea, vomiting, abdominal pain, cough, congestion, headaches, numbness, tingling, hematuria, dysuria, constipation, diarrhea.              Patient History   Past Medical History:   Diagnosis Date    Elevation of levels of liver transaminase levels     ALT (SGPT) level raised    Other conditions influencing health status     Osteoarthritis    Personal history of other complications of pregnancy, childbirth and the puerperium     History of ectopic pregnancy    Personal history of other diseases of the circulatory system     History of hypertension    Personal history of other diseases of the musculoskeletal system and connective tissue     History of bursitis    Personal history of other diseases of the musculoskeletal system and connective tissue     Personal history of gout    Personal history of other diseases of the nervous system and sense organs     History of migraine headaches     Past Surgical History:   Procedure Laterality Date    CARPAL TUNNEL RELEASE  04/09/2013    Neuroplasty Median Nerve At Carpal Tunnel     CHOLECYSTECTOMY  04/09/2013    Cholecystectomy    KNEE ARTHROPLASTY  04/09/2013    Knee Arthroplasty     No family history on file.  Social History     Tobacco Use    Smoking status: Never    Smokeless tobacco: Never   Substance Use Topics    Alcohol use: Not Currently    Drug use: Defer       Physical Exam   ED Triage Vitals [08/29/24 1451]   Temperature Heart Rate Respirations BP   36.6 °C (97.9 °F) 74 18 127/57      Pulse Ox Temp src Heart Rate Source Patient Position   96 % -- -- --      BP Location FiO2 (%)     -- --       Physical Exam  Vitals and nursing note reviewed.   Constitutional:       General: She is not in acute distress.     Appearance: Normal appearance. She is not ill-appearing or toxic-appearing.   HENT:      Head: Normocephalic and atraumatic.      Nose: Nose normal.   Eyes:      Extraocular Movements: Extraocular movements intact.      Pupils: Pupils are equal, round, and reactive to light.   Cardiovascular:      Rate and Rhythm: Normal rate and regular rhythm.      Pulses: Normal pulses.      Heart sounds: Normal heart sounds. No murmur heard.     No friction rub. No gallop.   Pulmonary:      Effort: Pulmonary effort is normal.      Breath sounds: Normal breath sounds. No wheezing, rhonchi or rales.   Abdominal:      Palpations: Abdomen is soft.      Tenderness: There is no abdominal tenderness. There is no guarding or rebound.   Musculoskeletal:         General: Normal range of motion.      Cervical back: Normal range of motion.   Skin:     General: Skin is warm and dry.   Neurological:      General: No focal deficit present.      Mental Status: She is alert and oriented to person, place, and time.      Sensory: No sensory deficit.      Motor: No weakness.   Psychiatric:         Mood and Affect: Mood normal.         Behavior: Behavior normal.           ED Course & MDM   ED Course as of 08/29/24 1827   Thu Aug 29, 2024   1452 EKG interpretation shows sinus rhythm with PACs present, rate is  69 beats minute.  Left axis deviation.  There is right bundle branch block.  QTc is 443 ms, .  There is no ST elevation or depression, no acute ischemic pain.  No STEMI. [NT]      ED Course User Index  [NT] Giovanni ASHLEY DO Hien         Diagnoses as of 08/29/24 1827   Hyponatremia   Acute cystitis with hematuria                 No data recorded     [place] Coma Scale Score: 15 (08/29/24 1454 : Steve Benites RN)                           Medical Decision Making  **Disclaimer parts of this chart have been completed using voice recognition software. Please excuse any errors of transcription.     Patient seen in conjunction with attending physician .     HPI: Detailed above.    Exam: A medically appropriate exam performed, outlined above, given the known history and presentation.    History obtained from: Patient    EKG: Reviewed and interpreted by my attending physician    Labs/Diagnostics:  Labs Reviewed   CBC WITH AUTO DIFFERENTIAL - Abnormal       Result Value    WBC 20.7 (*)     nRBC 0.0      RBC 3.95 (*)     Hemoglobin 12.4      Hematocrit 36.8      MCV 93      MCH 31.4      MCHC 33.7      RDW 13.2      Platelets 100 (*)     Immature Granulocytes %, Automated 1.5 (*)     Immature Granulocytes Absolute, Automated 0.31     COMPREHENSIVE METABOLIC PANEL - Abnormal    Glucose 80      Sodium 118 (*)     Potassium 3.9      Chloride 82 (*)     Bicarbonate 26      Urea Nitrogen 46 (*)     Creatinine 1.70 (*)     eGFR 31 (*)     Calcium 9.1      Albumin 3.5      Alkaline Phosphatase 126 (*)     Total Protein 6.2      AST 41 (*)     Bilirubin, Total 0.8      ALT 20      Anion Gap 10     MAGNESIUM - Abnormal    Magnesium 1.40 (*)    URINALYSIS WITH REFLEX CULTURE AND MICROSCOPIC - Abnormal    Color, Urine Yellow      Appearance, Urine Turbid (*)     Specific Gravity, Urine 1.013      pH, Urine 6.0      Protein, Urine 100 (2+) (*)     Glucose, Urine Normal      Blood, Urine 0.2 (2+) (*)     Ketones,  Urine NEGATIVE      Bilirubin, Urine NEGATIVE      Urobilinogen, Urine Normal      Nitrite, Urine NEGATIVE      Leukocyte Esterase, Urine 500 Chanel/µL (*)    MICROSCOPIC ONLY, URINE - Abnormal    WBC, Urine >50 (*)     WBC Clumps, Urine MANY      RBC, Urine 3-5      Bacteria, Urine 4+ (*)     Mucus, Urine FEW     MANUAL DIFFERENTIAL - Abnormal    Neutrophils %, Manual 87.0      Bands %, Manual 4.0      Lymphocytes %, Manual 1.0      Monocytes %, Manual 8.0      Eosinophils %, Manual 0.0      Basophils %, Manual 0.0      Seg Neutrophils Absolute, Manual 18.01 (*)     Bands Absolute, Manual 0.83 (*)     Lymphocytes Absolute, Manual 0.21 (*)     Monocytes Absolute, Manual 1.66 (*)     Eosinophils Absolute, Manual 0.00      Basophils Absolute, Manual 0.00      Total Cells Counted 100      Neutrophils Absolute, Manual 18.84 (*)     RBC Morphology No significant RBC morphology present     TSH WITH REFLEX TO FREE T4 IF ABNORMAL - Normal    Thyroid Stimulating Hormone 2.31      Narrative:     TSH testing is performed using different testing methodology at [facility] than at other [facility]. Direct result comparisons should only be made within the same method.     SARS-COV-2 PCR - Normal    Coronavirus 2019, PCR Not Detected      Narrative:     This assay has received FDA Emergency Use Authorization (EUA) and is only authorized for the duration of time that circumstances exist to justify the authorization of the emergency use of in vitro diagnostic tests for the detection of SARS-CoV-2 virus and/or diagnosis of COVID-19 infection under section 564(b)(1) of the Act, 21 U.S.C. 360bbb-3(b)(1). This assay is an in vitro diagnostic nucleic acid amplification test for the qualitative detection of SARS-CoV-2 from nasopharyngeal specimens and has been validated for use at [facility]. Negative results do not preclude COVID-19 infections and should not be used as the sole basis for diagnosis,  treatment, or other management decisions.     URINE CULTURE   URINALYSIS WITH REFLEX CULTURE AND MICROSCOPIC    Narrative:     The following orders were created for panel order Urinalysis with Reflex Culture and Microscopic.  Procedure                               Abnormality         Status                     ---------                               -----------         ------                     Urinalysis with Reflex C...[[id number]]  Abnormal            Final result               Extra Urine Gray Tube[[id number]]                                                         Please view results for these tests on the individual orders.   EXTRA URINE GRAY TUBE   OSMOLALITY   OSMOLALITY, URINE   BASIC METABOLIC PANEL     CT head wo IV contrast   Final Result   No acute intracranial pathology.        MACRO:   None        Signed by: Lacie Ibanez 8/29/2024 4:08 PM   Dictation workstation:   [id number]        EMERGENCY DEPARTMENT COURSE and DIFFERENTIAL DIAGNOSIS/MDM:  Patient is a 74-year-old female presenting to the emergency department for evaluation of generalized weakness and fall from standing and head injury.  On physical exam vital signs remarkable for systolic hypertension but otherwise stable and patient is in no acute distress.  Patient's lung sounds clear to auscultation bilaterally.  Physical exam relatively benign.  Diagnostic labs and imaging ordered.  CT of the head without IV contrast showed no acute intracranial pathology.  Urine showed evidence of infection with 500 leukocyte esterase and 4+ bacteria.  CMP remarkable for a sodium of 118, chloride of 82, and an CELINA with a creatinine of 1.7 and EGFR of 31 with patient's baseline being a creatinine of 1.  Patient on IV fluids and therefore these fluids were stopped once the sodium results came back.  Patient received about a quarter of the liter of fluids.  TSH normal.  CBC remarkable for leukocytosis of 20.7 but no anemia.  Patient does negative for COVID.   "Magnesium decreased at 1.4.  At this time suspect patient's sodium could be low due to dehydration versus the hydrochlorothiazide.  Patient started on ceftriaxone for her UTI.  She was admitted to ICU for further management of hyponatremia.  I spoke with hospitalist Dr. Hernandez who agreed to admission to ICU.    Patient had a high probability of life-threatening deterioration due to injuries or symptoms concerning for hyponatremia requiring my direct attention, intervention and management. I spent 31 minutes of critical care time with this patient involving bedside care, chart review, interpreting labs and diagnostics, and consultations.  Excluding separately billable procedures.    Vitals:    Vitals:    08/29/24 1451 08/29/24 1736 08/29/24 1803   BP: 127/57  153/78   Pulse: 74 87    Resp: 18 18    Temp: 36.6 °C (97.9 °F) 36.6 °C (97.9 °F)    SpO2: 96% 95%    Weight: 125 kg (275 lb)     Height: 1.61 m (5' 3.39\")       History Limited by:    None    Independent history obtained from:    None    External records reviewed:    None    Diagnostics interpreted by me:    CT Scan(s) see MDM    Discussions with other clinicians:    Hospitalist/Admitting Team Dr. Hernandez    Chronic conditions impacting care:    None    Social determinants of health affecting care:    None    Diagnostic tests considered but not performed: None    ED Medications managed:    Medications   magnesium sulfate 2 g in sterile water for injection 50 mL (2 g intravenous New Bag 8/29/24 1750)   cefTRIAXone (Rocephin) 1 g in dextrose (iso) IV 50 mL (has no administration in time range)   sodium chloride 0.9 % bolus 1,000 mL (0 mL intravenous Stopped 8/29/24 1731)   cefTRIAXone (Rocephin) 1 g in dextrose (iso) IV 50 mL (0 g intravenous Stopped 8/29/24 1803)       Prescription drugs considered:    None    Screenings:              Procedure  Procedures     Meseret Mike PA-C  08/29/24 1827    "

## 2024-08-29 NOTE — H&P
History Of Present Illness  Angela Montelongo is a 74 y.o. female presenting with weakness over the past several days and most recently a fall the ground.  She was brought to the ED by EMS.  She was evaluated Emergency Department found to be hyponatremic with a UTI.  Patient endorses weakness, no chest pain or shortness of breath, no fever.  States she has been urinating a little bit more frequently.  She does get Botox her bladder she had a recent Botox bladder shot a couple weeks ago.  She sees Dr. Lindo for this    No presyncopal symptoms, no palpitations.  Denies fever.  Otherwise she has been in her usual state of health.      Past Medical History  She has a past medical history of Elevation of levels of liver transaminase levels, Other conditions influencing health status, Personal history of other complications of pregnancy, childbirth and the puerperium, Personal history of other diseases of the circulatory system, Personal history of other diseases of the musculoskeletal system and connective tissue, Personal history of other diseases of the musculoskeletal system and connective tissue, and Personal history of other diseases of the nervous system and sense organs.    Surgical History  She has a past surgical history that includes Carpal tunnel release (04/09/2013); Knee Arthroplasty (04/09/2013); and Cholecystectomy (04/09/2013).     Social History  She reports that she has never smoked. She has never used smokeless tobacco. She reports that she does not currently use alcohol. Drug use questions deferred to the physician.    Family History  No family history on file.     Allergies  Ciprofloxacin; Lisinopril; Cefazolin; Cephalexin; and Tetanus toxoid, adsorbed    Review of Systems  As per HPI otherwise 14 point review of systems unremarkable  Physical Exam  General No acute distress  HEENT PERRL EOMI  Cardiovascular S1-S2 regular rate rhythm  Lungs anterior clear but diminished  Abdomen obese nontender bowel  sounds present  Extremities no clubbing cyanosis or edema  Last Recorded Vitals  /78   Pulse 87   Temp 36.6 °C (97.9 °F)   Resp 18   Wt 125 kg (275 lb)   SpO2 95%     Relevant Results  Scheduled medications  [START ON 8/30/2024] cefTRIAXone, 1 g, intravenous, q24h  magnesium sulfate, 2 g, intravenous, Once      Continuous medications     PRN medications    Results for orders placed or performed during the hospital encounter of 08/29/24 (from the past 24 hour(s))   CBC and Auto Differential   Result Value Ref Range    WBC 20.7 (H) 4.4 - 11.3 x10*3/uL    nRBC 0.0 0.0 - 0.0 /100 WBCs    RBC 3.95 (L) 4.00 - 5.20 x10*6/uL    Hemoglobin 12.4 12.0 - 16.0 g/dL    Hematocrit 36.8 36.0 - 46.0 %    MCV 93 80 - 100 fL    MCH 31.4 26.0 - 34.0 pg    MCHC 33.7 32.0 - 36.0 g/dL    RDW 13.2 11.5 - 14.5 %    Platelets 100 (L) 150 - 450 x10*3/uL    Immature Granulocytes %, Automated 1.5 (H) 0.0 - 0.9 %    Immature Granulocytes Absolute, Automated 0.31 0.00 - 0.50 x10*3/uL   Comprehensive metabolic panel   Result Value Ref Range    Glucose 80 65 - 99 mg/dL    Sodium 118 (LL) 133 - 145 mmol/L    Potassium 3.9 3.4 - 5.1 mmol/L    Chloride 82 (L) 97 - 107 mmol/L    Bicarbonate 26 24 - 31 mmol/L    Urea Nitrogen 46 (H) 8 - 25 mg/dL    Creatinine 1.70 (H) 0.40 - 1.60 mg/dL    eGFR 31 (L) >60 mL/min/1.73m*2    Calcium 9.1 8.5 - 10.4 mg/dL    Albumin 3.5 3.5 - 5.0 g/dL    Alkaline Phosphatase 126 (H) 35 - 125 U/L    Total Protein 6.2 5.9 - 7.9 g/dL    AST 41 (H) 5 - 40 U/L    Bilirubin, Total 0.8 0.1 - 1.2 mg/dL    ALT 20 5 - 40 U/L    Anion Gap 10 <=19 mmol/L   Magnesium   Result Value Ref Range    Magnesium 1.40 (L) 1.60 - 3.10 mg/dL   TSH with reflex to Free T4 if abnormal   Result Value Ref Range    Thyroid Stimulating Hormone 2.31 0.27 - 4.20 mIU/L   Manual Differential   Result Value Ref Range    Neutrophils %, Manual 87.0 40.0 - 80.0 %    Bands %, Manual 4.0 0.0 - 5.0 %    Lymphocytes %, Manual 1.0 13.0 - 44.0 %    Monocytes  %, Manual 8.0 2.0 - 10.0 %    Eosinophils %, Manual 0.0 0.0 - 6.0 %    Basophils %, Manual 0.0 0.0 - 2.0 %    Seg Neutrophils Absolute, Manual 18.01 (H) 1.60 - 5.00 x10*3/uL    Bands Absolute, Manual 0.83 (H) 0.00 - 0.50 x10*3/uL    Lymphocytes Absolute, Manual 0.21 (L) 0.80 - 3.00 x10*3/uL    Monocytes Absolute, Manual 1.66 (H) 0.05 - 0.80 x10*3/uL    Eosinophils Absolute, Manual 0.00 0.00 - 0.40 x10*3/uL    Basophils Absolute, Manual 0.00 0.00 - 0.10 x10*3/uL    Total Cells Counted 100     Neutrophils Absolute, Manual 18.84 (H) 1.60 - 5.50 x10*3/uL    RBC Morphology No significant RBC morphology present    Sars-CoV-2 PCR   Result Value Ref Range    Coronavirus 2019, PCR Not Detected Not Detected   Urinalysis with Reflex Culture and Microscopic   Result Value Ref Range    Color, Urine Yellow Light-Yellow, Yellow, Dark-Yellow    Appearance, Urine Turbid (N) Clear    Specific Gravity, Urine 1.013 1.005 - 1.035    pH, Urine 6.0 5.0, 5.5, 6.0, 6.5, 7.0, 7.5, 8.0    Protein, Urine 100 (2+) (A) NEGATIVE, 10 (TRACE), 20 (TRACE) mg/dL    Glucose, Urine Normal Normal mg/dL    Blood, Urine 0.2 (2+) (A) NEGATIVE    Ketones, Urine NEGATIVE NEGATIVE mg/dL    Bilirubin, Urine NEGATIVE NEGATIVE    Urobilinogen, Urine Normal Normal mg/dL    Nitrite, Urine NEGATIVE NEGATIVE    Leukocyte Esterase, Urine 500 Chanel/µL (A) NEGATIVE   Microscopic Only, Urine   Result Value Ref Range    WBC, Urine >50 (A) 1-5, NONE /HPF    WBC Clumps, Urine MANY Reference range not established. /HPF    RBC, Urine 3-5 NONE, 1-2, 3-5 /HPF    Bacteria, Urine 4+ (A) NONE SEEN /HPF    Mucus, Urine FEW Reference range not established. /LPF     Impression: 74-year-old woman being admitted with hyponatremia and urinary tract infection.    Plan:    1.  Hyponatremia and acute renal failure  -Fluid restriction for now but consider IV fluids if creatinine continues to to rise, serum osmolality urine osmolality all pending at this time, nephrology consulted.  Every 4  BMPs, holding triamterene-HCTZ.    2.  UTI  -Urine cultures and blood cultures pending  -Continue with ceftriaxone    3.  Hypertension  -Continue beta-blocker, holding triamterene-HCTZ    DVT prophylaxis  Full code         Assessment/Plan   Assessment & Plan  Hyponatremia             Oscar Hernandez MD

## 2024-08-30 ENCOUNTER — APPOINTMENT (OUTPATIENT)
Dept: CARDIOLOGY | Facility: HOSPITAL | Age: 74
DRG: 641 | End: 2024-08-30
Payer: MEDICARE

## 2024-08-30 LAB
ALBUMIN SERPL-MCNC: 2.7 G/DL (ref 3.5–5)
ANION GAP SERPL CALC-SCNC: 10 MMOL/L
ANION GAP SERPL CALC-SCNC: 11 MMOL/L
ANION GAP SERPL CALC-SCNC: 12 MMOL/L
ANION GAP SERPL CALC-SCNC: 12 MMOL/L
ANION GAP SERPL CALC-SCNC: 13 MMOL/L
ANION GAP SERPL CALC-SCNC: 13 MMOL/L
BUN SERPL-MCNC: 46 MG/DL (ref 8–25)
BUN SERPL-MCNC: 48 MG/DL (ref 8–25)
BUN SERPL-MCNC: 49 MG/DL (ref 8–25)
BUN SERPL-MCNC: 54 MG/DL (ref 8–25)
BUN SERPL-MCNC: 54 MG/DL (ref 8–25)
BUN SERPL-MCNC: 60 MG/DL (ref 8–25)
CALCIUM SERPL-MCNC: 8.3 MG/DL (ref 8.5–10.4)
CALCIUM SERPL-MCNC: 8.6 MG/DL (ref 8.5–10.4)
CALCIUM SERPL-MCNC: 8.6 MG/DL (ref 8.5–10.4)
CALCIUM SERPL-MCNC: 8.8 MG/DL (ref 8.5–10.4)
CALCIUM SERPL-MCNC: 8.9 MG/DL (ref 8.5–10.4)
CALCIUM SERPL-MCNC: 9 MG/DL (ref 8.5–10.4)
CHLORIDE SERPL-SCNC: 86 MMOL/L (ref 97–107)
CHLORIDE SERPL-SCNC: 86 MMOL/L (ref 97–107)
CHLORIDE SERPL-SCNC: 87 MMOL/L (ref 97–107)
CHLORIDE SERPL-SCNC: 88 MMOL/L (ref 97–107)
CHLORIDE SERPL-SCNC: 88 MMOL/L (ref 97–107)
CHLORIDE SERPL-SCNC: 89 MMOL/L (ref 97–107)
CO2 SERPL-SCNC: 22 MMOL/L (ref 24–31)
CO2 SERPL-SCNC: 23 MMOL/L (ref 24–31)
CO2 SERPL-SCNC: 24 MMOL/L (ref 24–31)
CO2 SERPL-SCNC: 24 MMOL/L (ref 24–31)
CREAT SERPL-MCNC: 1.8 MG/DL (ref 0.4–1.6)
CREAT SERPL-MCNC: 1.8 MG/DL (ref 0.4–1.6)
CREAT SERPL-MCNC: 1.9 MG/DL (ref 0.4–1.6)
CREAT SERPL-MCNC: 2 MG/DL (ref 0.4–1.6)
CREAT SERPL-MCNC: 2.1 MG/DL (ref 0.4–1.6)
CREAT SERPL-MCNC: 2.6 MG/DL (ref 0.4–1.6)
EGFRCR SERPLBLD CKD-EPI 2021: 19 ML/MIN/1.73M*2
EGFRCR SERPLBLD CKD-EPI 2021: 24 ML/MIN/1.73M*2
EGFRCR SERPLBLD CKD-EPI 2021: 25 ML/MIN/1.73M*2
EGFRCR SERPLBLD CKD-EPI 2021: 27 ML/MIN/1.73M*2
EGFRCR SERPLBLD CKD-EPI 2021: 29 ML/MIN/1.73M*2
EGFRCR SERPLBLD CKD-EPI 2021: 29 ML/MIN/1.73M*2
GLUCOSE SERPL-MCNC: 101 MG/DL (ref 65–99)
GLUCOSE SERPL-MCNC: 103 MG/DL (ref 65–99)
GLUCOSE SERPL-MCNC: 116 MG/DL (ref 65–99)
GLUCOSE SERPL-MCNC: 86 MG/DL (ref 65–99)
GLUCOSE SERPL-MCNC: 87 MG/DL (ref 65–99)
GLUCOSE SERPL-MCNC: 89 MG/DL (ref 65–99)
HOLD SPECIMEN: NORMAL
MAGNESIUM SERPL-MCNC: 1.8 MG/DL (ref 1.6–3.1)
OSMOLALITY SERPL: 261 MOSM/KG (ref 280–300)
OSMOLALITY UR: 382 MOSM/KG (ref 200–1200)
PHOSPHATE SERPL-MCNC: 2.2 MG/DL (ref 2.5–4.5)
POTASSIUM SERPL-SCNC: 3.5 MMOL/L (ref 3.4–5.1)
POTASSIUM SERPL-SCNC: 3.5 MMOL/L (ref 3.4–5.1)
POTASSIUM SERPL-SCNC: 3.6 MMOL/L (ref 3.4–5.1)
POTASSIUM SERPL-SCNC: 3.6 MMOL/L (ref 3.4–5.1)
POTASSIUM SERPL-SCNC: 3.7 MMOL/L (ref 3.4–5.1)
POTASSIUM SERPL-SCNC: 3.7 MMOL/L (ref 3.4–5.1)
SODIUM SERPL-SCNC: 121 MMOL/L (ref 133–145)
SODIUM SERPL-SCNC: 122 MMOL/L (ref 133–145)
SODIUM SERPL-SCNC: 122 MMOL/L (ref 133–145)
SODIUM SERPL-SCNC: 123 MMOL/L (ref 133–145)

## 2024-08-30 PROCEDURE — 2500000001 HC RX 250 WO HCPCS SELF ADMINISTERED DRUGS (ALT 637 FOR MEDICARE OP): Performed by: INTERNAL MEDICINE

## 2024-08-30 PROCEDURE — 80048 BASIC METABOLIC PNL TOTAL CA: CPT | Mod: CCI | Performed by: INTERNAL MEDICINE

## 2024-08-30 PROCEDURE — 80048 BASIC METABOLIC PNL TOTAL CA: CPT | Performed by: INTERNAL MEDICINE

## 2024-08-30 PROCEDURE — 2500000002 HC RX 250 W HCPCS SELF ADMINISTERED DRUGS (ALT 637 FOR MEDICARE OP, ALT 636 FOR OP/ED): Performed by: INTERNAL MEDICINE

## 2024-08-30 PROCEDURE — 93005 ELECTROCARDIOGRAM TRACING: CPT

## 2024-08-30 PROCEDURE — 36415 COLL VENOUS BLD VENIPUNCTURE: CPT | Performed by: INTERNAL MEDICINE

## 2024-08-30 PROCEDURE — 83735 ASSAY OF MAGNESIUM: CPT | Performed by: INTERNAL MEDICINE

## 2024-08-30 PROCEDURE — 80069 RENAL FUNCTION PANEL: CPT | Performed by: INTERNAL MEDICINE

## 2024-08-30 PROCEDURE — 2020000001 HC ICU ROOM DAILY

## 2024-08-30 PROCEDURE — 2500000004 HC RX 250 GENERAL PHARMACY W/ HCPCS (ALT 636 FOR OP/ED): Performed by: INTERNAL MEDICINE

## 2024-08-30 RX ORDER — ONDANSETRON HYDROCHLORIDE 2 MG/ML
4 INJECTION, SOLUTION INTRAVENOUS EVERY 6 HOURS PRN
Status: DISCONTINUED | OUTPATIENT
Start: 2024-08-30 | End: 2024-09-04 | Stop reason: HOSPADM

## 2024-08-30 RX ORDER — ACETAMINOPHEN 325 MG/1
650 TABLET ORAL EVERY 6 HOURS PRN
Status: DISCONTINUED | OUTPATIENT
Start: 2024-08-30 | End: 2024-09-04 | Stop reason: HOSPADM

## 2024-08-30 RX ORDER — SODIUM CHLORIDE 9 MG/ML
75 INJECTION, SOLUTION INTRAVENOUS CONTINUOUS
Status: DISCONTINUED | OUTPATIENT
Start: 2024-08-30 | End: 2024-09-01

## 2024-08-30 ASSESSMENT — PAIN DESCRIPTION - LOCATION
LOCATION: BACK
LOCATION: HEAD

## 2024-08-30 ASSESSMENT — PAIN - FUNCTIONAL ASSESSMENT
PAIN_FUNCTIONAL_ASSESSMENT: 0-10
PAIN_FUNCTIONAL_ASSESSMENT: 0-10

## 2024-08-30 ASSESSMENT — PAIN SCALES - GENERAL
PAINLEVEL_OUTOF10: 0 - NO PAIN
PAINLEVEL_OUTOF10: 0 - NO PAIN
PAINLEVEL_OUTOF10: 3
PAINLEVEL_OUTOF10: 0 - NO PAIN
PAINLEVEL_OUTOF10: 0 - NO PAIN
PAINLEVEL_OUTOF10: 3

## 2024-08-30 NOTE — CARE PLAN
The patient's goals for the shift include get some sleep    The clinical goals for the shift include monitor safety, vitals, labs    Over the shift, the patient did not make progress toward the following goals. Barriers to progression include . Recommendations to address these barriers include   Problem: Skin  Goal: Participates in plan/prevention/treatment measures  Outcome: Progressing  Flowsheets (Taken 8/30/2024 0119)  Participates in plan/prevention/treatment measures:   Elevate heels   Increase activity/out of bed for meals     Problem: Pain - Adult  Goal: Verbalizes/displays adequate comfort level or baseline comfort level  Outcome: Progressing     Problem: Safety - Adult  Goal: Free from fall injury  Outcome: Progressing     Problem: Discharge Planning  Goal: Discharge to home or other facility with appropriate resources  Outcome: Progressing     Problem: Chronic Conditions and Co-morbidities  Goal: Patient's chronic conditions and co-morbidity symptoms are monitored and maintained or improved  Outcome: Progressing

## 2024-08-30 NOTE — PROGRESS NOTES
Patient with 2nd degree AV block. Dr JOO Tesfaye looked at ECG and recommended stopping B blocker and monitor

## 2024-08-30 NOTE — CARE PLAN
The patient's goals for the shift include get some sleep    The clinical goals for the shift include monitor safety, vitals, labs    Over the shift, the patient did not make progress toward the following goals. Barriers to progression include  Recommendations to address these barriers include  Problem: Skin  Goal: Participates in plan/prevention/treatment measures  Outcome: Progressing  Flowsheets (Taken 8/30/2024 0119)  Participates in plan/prevention/treatment measures:   Elevate heels   Increase activity/out of bed for meals     Problem: Pain - Adult  Goal: Verbalizes/displays adequate comfort level or baseline comfort level  Outcome: Progressing     Problem: Safety - Adult  Goal: Free from fall injury  Outcome: Progressing     Problem: Discharge Planning  Goal: Discharge to home or other facility with appropriate resources  Outcome: Progressing     Problem: Chronic Conditions and Co-morbidities  Goal: Patient's chronic conditions and co-morbidity symptoms are monitored and maintained or improved  Outcome: Progressing   .

## 2024-08-30 NOTE — CONSULTS
CONSULT: Nephrology SERVICE    SERVICE DATE: 8/30/2024   SERVICE TIME:  12:11 PM    REASON FOR CONSULT: Hyponatremia and acute renal failure  REQUESTING PHYSICIAN: Dr. Hernandez  PRIMARY CARE PHYSICIAN: Danielito Roach, DO    ASSESSMENT AND PLAN  74-year-old woman with gout and osteoarthritis admitted with hyponatremia and acute renal failure.  1.  Acute renal failure  2.  Hyponatremia  3.  Hypokalemia  4.  Essential hypertension    I think the acute renal failure from a combination of triamterene, hydrochlorothiazide, and meloxicam.  I would hold all of these medicines.  She is currently on IV fluids, but we will need to see what the sodium is doing with the IV fluids.  The hyponatremia is from hydrochlorothiazide.  This is an acute on chronic picture.  I would have her permanently discontinue hydrochlorothiazide in the outpatient setting.  It sounds like she had angioedema from lisinopril.  We can manage her blood pressure with a calcium channel blocker or beta-blocker if needed.  Right now her blood pressure is stable.  The potassium borders on being low and she may need replacement.  She requires daily labs and supportive care.  I will follow this patient.  Thank you for the consultation.   Case discussed with Dr. Hernandez.    Addendum: An ECG was done while I was evaluating her which demonstrated a high degree AV block.  I discussed this with the hospitalist.  She is hemodynamically stable.    SUBJECTIVE  Ms. Montelongo is a 74 y.o. woman with a medical history significant for gout and osteoarthritis admitted with weakness and falls, consulted for hyponatremia and acute renal failure.  This patient has a baseline serum creatinine around 0.8.  The creatinine was elevated to 1.2 about 1 month ago.  She came into the hospital with a creatinine of 1.7 and a sodium of 118.  It appears that her sodium chronically runs low; I reviewed several years of laboratory data.  Her most recent outpatient sodium was 128.  She  chronically maintains on triamterene and hydrochlorothiazide.  There was no dose adjustment to this medicine.  She also chronically maintains on meloxicam, also no recent dose adjustment.  She takes meloxicam for gout and osteoarthritis.  She drinks between 60 and 70 ounces of fluid in a day, mostly water.  She has been fairly inactive lately, gets around with a walker, increased falls, increased weakness.  She has been nauseated, but no specific vomiting.  She has loose stools if she takes fiber supplements, but no diarrhea.  She does not get much swelling in her legs and states no major changes in her weight.    PAST MEDICAL HISTORY:   Past Medical History:   Diagnosis Date    Elevation of levels of liver transaminase levels     ALT (SGPT) level raised    Other conditions influencing health status     Osteoarthritis    Personal history of other complications of pregnancy, childbirth and the puerperium     History of ectopic pregnancy    Personal history of other diseases of the circulatory system     History of hypertension    Personal history of other diseases of the musculoskeletal system and connective tissue     History of bursitis    Personal history of other diseases of the musculoskeletal system and connective tissue     Personal history of gout    Personal history of other diseases of the nervous system and sense organs     History of migraine headaches     PAST SURGICAL HISTORY:   Past Surgical History:   Procedure Laterality Date    CARPAL TUNNEL RELEASE  04/09/2013    Neuroplasty Median Nerve At Carpal Tunnel    CHOLECYSTECTOMY  04/09/2013    Cholecystectomy    KNEE ARTHROPLASTY  04/09/2013    Knee Arthroplasty     FAMILY HISTORY: No family history on file.  SOCIAL HISTORY:   Social History     Tobacco Use    Smoking status: Never    Smokeless tobacco: Never   Substance Use Topics    Alcohol use: Not Currently    Drug use: Defer     MEDICATIONS:  atenolol, 50 mg, oral, Daily  cefTRIAXone, 1 g,  "intravenous, q24h  gabapentin, 300 mg, oral, Daily  heparin, 5,000 Units, subcutaneous, q12h  pantoprazole, 20 mg, oral, Daily before breakfast  simvastatin, 40 mg, oral, Nightly       sodium chloride 0.9%, 75 mL/hr, Last Rate: 75 mL/hr (08/30/24 1028)       PRN medications: acetaminophen, ondansetron   CURRENT ALLERGIES:   Allergies as of 08/29/2024 - Reviewed 08/29/2024   Allergen Reaction Noted    Ciprofloxacin Anaphylaxis, Unknown, and Swelling 04/05/2023    Lisinopril Anaphylaxis, Angioedema, Swelling, Unknown, and Other 09/27/2018    Cefazolin Angioedema and Swelling 05/06/2024    Cephalexin Unknown 08/13/2024    Tetanus toxoid, adsorbed Unknown 08/13/2024       COMPLETE REVIEW OF SYSTEMS:    Full ROS was negative unless mentioned above    OBJECTIVE  PHYSICAL EXAM  Heart Rate:  [69-87]   Temp:  [36.6 °C (97.9 °F)-38.2 °C (100.8 °F)]   Resp:  [15-28]   BP: (111-153)/(36-78)   Height:  [161 cm (5' 3.39\")]   Weight:  [120 kg (265 lb)-125 kg (275 lb 9.2 oz)]   SpO2:  [90 %-97 %]    Body mass index is 46.37 kg/m².  This is a morbidly obese white woman who appears in no acute distress  No obvious skin rashes  Hearing intact  Phonation intact  Moist mucosa  Irregular heart rate  Lungs are clear to auscultation bilaterally  Abdomen is soft, nondistended, nontender, positive bowel sounds  No Dee catheter in place, no suprapubic tenderness to palpation  No extremity edema  Moves 4 limbs spontaneously  No obvious joint deformities  No lymphadenopathy    DATA:   Labs:  Results for orders placed or performed during the hospital encounter of 08/29/24 (from the past 96 hour(s))   CBC and Auto Differential   Result Value Ref Range    WBC 20.7 (H) 4.4 - 11.3 x10*3/uL    nRBC 0.0 0.0 - 0.0 /100 WBCs    RBC 3.95 (L) 4.00 - 5.20 x10*6/uL    Hemoglobin 12.4 12.0 - 16.0 g/dL    Hematocrit 36.8 36.0 - 46.0 %    MCV 93 80 - 100 fL    MCH 31.4 26.0 - 34.0 pg    MCHC 33.7 32.0 - 36.0 g/dL    RDW 13.2 11.5 - 14.5 %    Platelets 100 " (L) 150 - 450 x10*3/uL    Immature Granulocytes %, Automated 1.5 (H) 0.0 - 0.9 %    Immature Granulocytes Absolute, Automated 0.31 0.00 - 0.50 x10*3/uL   Comprehensive metabolic panel   Result Value Ref Range    Glucose 80 65 - 99 mg/dL    Sodium 118 (LL) 133 - 145 mmol/L    Potassium 3.9 3.4 - 5.1 mmol/L    Chloride 82 (L) 97 - 107 mmol/L    Bicarbonate 26 24 - 31 mmol/L    Urea Nitrogen 46 (H) 8 - 25 mg/dL    Creatinine 1.70 (H) 0.40 - 1.60 mg/dL    eGFR 31 (L) >60 mL/min/1.73m*2    Calcium 9.1 8.5 - 10.4 mg/dL    Albumin 3.5 3.5 - 5.0 g/dL    Alkaline Phosphatase 126 (H) 35 - 125 U/L    Total Protein 6.2 5.9 - 7.9 g/dL    AST 41 (H) 5 - 40 U/L    Bilirubin, Total 0.8 0.1 - 1.2 mg/dL    ALT 20 5 - 40 U/L    Anion Gap 10 <=19 mmol/L   Magnesium   Result Value Ref Range    Magnesium 1.40 (L) 1.60 - 3.10 mg/dL   TSH with reflex to Free T4 if abnormal   Result Value Ref Range    Thyroid Stimulating Hormone 2.31 0.27 - 4.20 mIU/L   Manual Differential   Result Value Ref Range    Neutrophils %, Manual 87.0 40.0 - 80.0 %    Bands %, Manual 4.0 0.0 - 5.0 %    Lymphocytes %, Manual 1.0 13.0 - 44.0 %    Monocytes %, Manual 8.0 2.0 - 10.0 %    Eosinophils %, Manual 0.0 0.0 - 6.0 %    Basophils %, Manual 0.0 0.0 - 2.0 %    Seg Neutrophils Absolute, Manual 18.01 (H) 1.60 - 5.00 x10*3/uL    Bands Absolute, Manual 0.83 (H) 0.00 - 0.50 x10*3/uL    Lymphocytes Absolute, Manual 0.21 (L) 0.80 - 3.00 x10*3/uL    Monocytes Absolute, Manual 1.66 (H) 0.05 - 0.80 x10*3/uL    Eosinophils Absolute, Manual 0.00 0.00 - 0.40 x10*3/uL    Basophils Absolute, Manual 0.00 0.00 - 0.10 x10*3/uL    Total Cells Counted 100     Neutrophils Absolute, Manual 18.84 (H) 1.60 - 5.50 x10*3/uL    RBC Morphology No significant RBC morphology present    Osmolality   Result Value Ref Range    Osmolality, Serum 261 (L) 280 - 300 mOsm/kg   Sars-CoV-2 PCR   Result Value Ref Range    Coronavirus 2019, PCR Not Detected Not Detected   Urinalysis with Reflex Culture  and Microscopic   Result Value Ref Range    Color, Urine Yellow Light-Yellow, Yellow, Dark-Yellow    Appearance, Urine Turbid (N) Clear    Specific Gravity, Urine 1.013 1.005 - 1.035    pH, Urine 6.0 5.0, 5.5, 6.0, 6.5, 7.0, 7.5, 8.0    Protein, Urine 100 (2+) (A) NEGATIVE, 10 (TRACE), 20 (TRACE) mg/dL    Glucose, Urine Normal Normal mg/dL    Blood, Urine 0.2 (2+) (A) NEGATIVE    Ketones, Urine NEGATIVE NEGATIVE mg/dL    Bilirubin, Urine NEGATIVE NEGATIVE    Urobilinogen, Urine Normal Normal mg/dL    Nitrite, Urine NEGATIVE NEGATIVE    Leukocyte Esterase, Urine 500 Chanel/µL (A) NEGATIVE   Microscopic Only, Urine   Result Value Ref Range    WBC, Urine >50 (A) 1-5, NONE /HPF    WBC Clumps, Urine MANY Reference range not established. /HPF    RBC, Urine 3-5 NONE, 1-2, 3-5 /HPF    Bacteria, Urine 4+ (A) NONE SEEN /HPF    Mucus, Urine FEW Reference range not established. /LPF   Osmolality, urine   Result Value Ref Range    Osmolality, Urine Random 382 200 - 1,200 mOsm/kg   Basic Metabolic Panel   Result Value Ref Range    Glucose 81 65 - 99 mg/dL    Sodium 119 (LL) 133 - 145 mmol/L    Potassium 3.8 3.4 - 5.1 mmol/L    Chloride 84 (L) 97 - 107 mmol/L    Bicarbonate 25 24 - 31 mmol/L    Urea Nitrogen 44 (H) 8 - 25 mg/dL    Creatinine 1.50 0.40 - 1.60 mg/dL    eGFR 36 (L) >60 mL/min/1.73m*2    Calcium 9.0 8.5 - 10.4 mg/dL    Anion Gap 10 <=19 mmol/L   Basic Metabolic Panel   Result Value Ref Range    Glucose 89 65 - 99 mg/dL    Sodium 121 (L) 133 - 145 mmol/L    Potassium 3.6 3.4 - 5.1 mmol/L    Chloride 86 (L) 97 - 107 mmol/L    Bicarbonate 23 (L) 24 - 31 mmol/L    Urea Nitrogen 46 (H) 8 - 25 mg/dL    Creatinine 1.80 (H) 0.40 - 1.60 mg/dL    eGFR 29 (L) >60 mL/min/1.73m*2    Calcium 8.6 8.5 - 10.4 mg/dL    Anion Gap 12 <=19 mmol/L   Basic Metabolic Panel   Result Value Ref Range    Glucose 86 65 - 99 mg/dL    Sodium 123 (L) 133 - 145 mmol/L    Potassium 3.7 3.4 - 5.1 mmol/L    Chloride 88 (L) 97 - 107 mmol/L    Bicarbonate 22  (L) 24 - 31 mmol/L    Urea Nitrogen 48 (H) 8 - 25 mg/dL    Creatinine 1.90 (H) 0.40 - 1.60 mg/dL    eGFR 27 (L) >60 mL/min/1.73m*2    Calcium 9.0 8.5 - 10.4 mg/dL    Anion Gap 13 <=19 mmol/L   Basic Metabolic Panel   Result Value Ref Range    Glucose 87 65 - 99 mg/dL    Sodium 122 (L) 133 - 145 mmol/L    Potassium 3.7 3.4 - 5.1 mmol/L    Chloride 86 (L) 97 - 107 mmol/L    Bicarbonate 23 (L) 24 - 31 mmol/L    Urea Nitrogen 49 (H) 8 - 25 mg/dL    Creatinine 1.80 (H) 0.40 - 1.60 mg/dL    eGFR 29 (L) >60 mL/min/1.73m*2    Calcium 8.9 8.5 - 10.4 mg/dL    Anion Gap 13 <=19 mmol/L       SIGNATURE: Gab Pompa MD PATIENT NAME: Angela Montelongo   DATE: August 30, 2024 MRN: [id number]   TIME: 12:11 PM PAGER: [phone]

## 2024-08-30 NOTE — PROGRESS NOTES
Angela Montelongo is a 74 y.o. female on day 1 of admission presenting with Hyponatremia.      Subjective   Patient feeling tired today.  Otherwise no complaints overnight.       Objective     Last Recorded Vitals  /53   Pulse 76   Temp (!) 38.2 °C (100.8 °F) (Temporal)   Resp 21   Wt 125 kg (275 lb 9.2 oz)   SpO2 93%   Intake/Output last 3 Shifts:    Intake/Output Summary (Last 24 hours) at 8/30/2024 0822  Last data filed at 8/30/2024 0125  Gross per 24 hour   Intake 50 ml   Output --   Net 50 ml       Admission Weight  Weight: 125 kg (275 lb) (08/29/24 1451)    Daily Weight  08/29/24 : 125 kg (275 lb 9.2 oz)    Image Results  CT head wo IV contrast  Narrative: Interpreted By:  Lacie Ibanez,   STUDY:  CT HEAD WO IV CONTRAST;  8/29/2024 3:48 pm      INDICATION:  Signs/Symptoms:fall, hit head.      COMPARISON:  11/18/2008      ACCESSION NUMBER(S):  [id number]      ORDERING CLINICIAN:  MARIANO ANTONY      TECHNIQUE:  Examination was performed in the axial plane with sagittal and  coronal reconstructions. Bone and soft tissue algorithms were  performed.      FINDINGS:  INTRACRANIAL:  There is prominence of the ventricular system and cerebral sulci  consistent with cerebral atrophy, age-appropriate. In particular,  there is bifrontal symmetrical cerebral atrophy. No mass or mass  effect is identified. There is no hemorrhage or subdural fluid  collection. There is no acute infarct.  There is no fracture of the calvarium.  There is an empty sella, normal variant.      EXTRACRANIAL:  Visualized paranasal sinuses and mastoids are clear.      Impression: No acute intracranial pathology.      MACRO:  None      Signed by: Lacie Ibanez 8/29/2024 4:08 PM  Dictation workstation:   [id number]      Physical Exam  Generally no acute distress  HEENT PERRL EOMI  Cardiovascular S1-S2 regular rate rhythm  Lungs anterior clear but slightly diminished  Abdomen bowel sounds present nontender  Extremities no clubbing  cyanosis or pitting edema  Relevant Results  Scheduled medications  atenolol, 50 mg, oral, Daily  cefTRIAXone, 1 g, intravenous, q24h  gabapentin, 300 mg, oral, Daily  heparin, 5,000 Units, subcutaneous, q12h  pantoprazole, 20 mg, oral, Daily before breakfast  simvastatin, 40 mg, oral, Nightly      Continuous medications     PRN medications  PRN medications: acetaminophen  Results for orders placed or performed during the hospital encounter of 08/29/24 (from the past 24 hour(s))   CBC and Auto Differential   Result Value Ref Range    WBC 20.7 (H) 4.4 - 11.3 x10*3/uL    nRBC 0.0 0.0 - 0.0 /100 WBCs    RBC 3.95 (L) 4.00 - 5.20 x10*6/uL    Hemoglobin 12.4 12.0 - 16.0 g/dL    Hematocrit 36.8 36.0 - 46.0 %    MCV 93 80 - 100 fL    MCH 31.4 26.0 - 34.0 pg    MCHC 33.7 32.0 - 36.0 g/dL    RDW 13.2 11.5 - 14.5 %    Platelets 100 (L) 150 - 450 x10*3/uL    Immature Granulocytes %, Automated 1.5 (H) 0.0 - 0.9 %    Immature Granulocytes Absolute, Automated 0.31 0.00 - 0.50 x10*3/uL   Comprehensive metabolic panel   Result Value Ref Range    Glucose 80 65 - 99 mg/dL    Sodium 118 (LL) 133 - 145 mmol/L    Potassium 3.9 3.4 - 5.1 mmol/L    Chloride 82 (L) 97 - 107 mmol/L    Bicarbonate 26 24 - 31 mmol/L    Urea Nitrogen 46 (H) 8 - 25 mg/dL    Creatinine 1.70 (H) 0.40 - 1.60 mg/dL    eGFR 31 (L) >60 mL/min/1.73m*2    Calcium 9.1 8.5 - 10.4 mg/dL    Albumin 3.5 3.5 - 5.0 g/dL    Alkaline Phosphatase 126 (H) 35 - 125 U/L    Total Protein 6.2 5.9 - 7.9 g/dL    AST 41 (H) 5 - 40 U/L    Bilirubin, Total 0.8 0.1 - 1.2 mg/dL    ALT 20 5 - 40 U/L    Anion Gap 10 <=19 mmol/L   Magnesium   Result Value Ref Range    Magnesium 1.40 (L) 1.60 - 3.10 mg/dL   TSH with reflex to Free T4 if abnormal   Result Value Ref Range    Thyroid Stimulating Hormone 2.31 0.27 - 4.20 mIU/L   Manual Differential   Result Value Ref Range    Neutrophils %, Manual 87.0 40.0 - 80.0 %    Bands %, Manual 4.0 0.0 - 5.0 %    Lymphocytes %, Manual 1.0 13.0 - 44.0 %     Monocytes %, Manual 8.0 2.0 - 10.0 %    Eosinophils %, Manual 0.0 0.0 - 6.0 %    Basophils %, Manual 0.0 0.0 - 2.0 %    Seg Neutrophils Absolute, Manual 18.01 (H) 1.60 - 5.00 x10*3/uL    Bands Absolute, Manual 0.83 (H) 0.00 - 0.50 x10*3/uL    Lymphocytes Absolute, Manual 0.21 (L) 0.80 - 3.00 x10*3/uL    Monocytes Absolute, Manual 1.66 (H) 0.05 - 0.80 x10*3/uL    Eosinophils Absolute, Manual 0.00 0.00 - 0.40 x10*3/uL    Basophils Absolute, Manual 0.00 0.00 - 0.10 x10*3/uL    Total Cells Counted 100     Neutrophils Absolute, Manual 18.84 (H) 1.60 - 5.50 x10*3/uL    RBC Morphology No significant RBC morphology present    Osmolality   Result Value Ref Range    Osmolality, Serum 261 (L) 280 - 300 mOsm/kg   Sars-CoV-2 PCR   Result Value Ref Range    Coronavirus 2019, PCR Not Detected Not Detected   Urinalysis with Reflex Culture and Microscopic   Result Value Ref Range    Color, Urine Yellow Light-Yellow, Yellow, Dark-Yellow    Appearance, Urine Turbid (N) Clear    Specific Gravity, Urine 1.013 1.005 - 1.035    pH, Urine 6.0 5.0, 5.5, 6.0, 6.5, 7.0, 7.5, 8.0    Protein, Urine 100 (2+) (A) NEGATIVE, 10 (TRACE), 20 (TRACE) mg/dL    Glucose, Urine Normal Normal mg/dL    Blood, Urine 0.2 (2+) (A) NEGATIVE    Ketones, Urine NEGATIVE NEGATIVE mg/dL    Bilirubin, Urine NEGATIVE NEGATIVE    Urobilinogen, Urine Normal Normal mg/dL    Nitrite, Urine NEGATIVE NEGATIVE    Leukocyte Esterase, Urine 500 Chanel/µL (A) NEGATIVE   Microscopic Only, Urine   Result Value Ref Range    WBC, Urine >50 (A) 1-5, NONE /HPF    WBC Clumps, Urine MANY Reference range not established. /HPF    RBC, Urine 3-5 NONE, 1-2, 3-5 /HPF    Bacteria, Urine 4+ (A) NONE SEEN /HPF    Mucus, Urine FEW Reference range not established. /LPF   Osmolality, urine   Result Value Ref Range    Osmolality, Urine Random 382 200 - 1,200 mOsm/kg   Basic Metabolic Panel   Result Value Ref Range    Glucose 81 65 - 99 mg/dL    Sodium 119 (LL) 133 - 145 mmol/L    Potassium 3.8 3.4 -  5.1 mmol/L    Chloride 84 (L) 97 - 107 mmol/L    Bicarbonate 25 24 - 31 mmol/L    Urea Nitrogen 44 (H) 8 - 25 mg/dL    Creatinine 1.50 0.40 - 1.60 mg/dL    eGFR 36 (L) >60 mL/min/1.73m*2    Calcium 9.0 8.5 - 10.4 mg/dL    Anion Gap 10 <=19 mmol/L   Basic Metabolic Panel   Result Value Ref Range    Glucose 89 65 - 99 mg/dL    Sodium 121 (L) 133 - 145 mmol/L    Potassium 3.6 3.4 - 5.1 mmol/L    Chloride 86 (L) 97 - 107 mmol/L    Bicarbonate 23 (L) 24 - 31 mmol/L    Urea Nitrogen 46 (H) 8 - 25 mg/dL    Creatinine 1.80 (H) 0.40 - 1.60 mg/dL    eGFR 29 (L) >60 mL/min/1.73m*2    Calcium 8.6 8.5 - 10.4 mg/dL    Anion Gap 12 <=19 mmol/L   Basic Metabolic Panel   Result Value Ref Range    Glucose 86 65 - 99 mg/dL    Sodium 123 (L) 133 - 145 mmol/L    Potassium 3.7 3.4 - 5.1 mmol/L    Chloride 88 (L) 97 - 107 mmol/L    Bicarbonate 22 (L) 24 - 31 mmol/L    Urea Nitrogen 48 (H) 8 - 25 mg/dL    Creatinine 1.90 (H) 0.40 - 1.60 mg/dL    eGFR 27 (L) >60 mL/min/1.73m*2    Calcium 9.0 8.5 - 10.4 mg/dL    Anion Gap 13 <=19 mmol/L   Basic Metabolic Panel   Result Value Ref Range    Glucose 87 65 - 99 mg/dL    Sodium 122 (L) 133 - 145 mmol/L    Potassium 3.7 3.4 - 5.1 mmol/L    Chloride 86 (L) 97 - 107 mmol/L    Bicarbonate 23 (L) 24 - 31 mmol/L    Urea Nitrogen 49 (H) 8 - 25 mg/dL    Creatinine 1.80 (H) 0.40 - 1.60 mg/dL    eGFR 29 (L) >60 mL/min/1.73m*2    Calcium 8.9 8.5 - 10.4 mg/dL    Anion Gap 13 <=19 mmol/L     Impression: 74-year-old woman being admitted with hyponatremia and urinary tract infection.     Plan:     1.  Hyponatremia and acute renal failure  -Patient's urine and serum osmolalities seem more consistent with a hypervolemic state or euvolemic state, urine specific gravity also equivocal.  After patient received fluids yesterday the sodium level decreased but the creatinine improved.  Once fluid restriction was initiated creatinine increased and sodium started returned to baseline.  Will be interested to see what  nephrology thinks.  Continue current management for now, although I am considering starting IV fluids.    2.  UTI  -Urine cultures and blood cultures pending  -Continue with ceftriaxone     3.  Hypertension  -Continue beta-blocker, holding triamterene-HCTZ     DVT prophylaxis  Full code           Assessment/Plan                  Assessment & Plan  Hyponatremia                  Oscar Hernandez MD

## 2024-08-31 ENCOUNTER — APPOINTMENT (OUTPATIENT)
Dept: RADIOLOGY | Facility: HOSPITAL | Age: 74
DRG: 641 | End: 2024-08-31
Payer: MEDICARE

## 2024-08-31 LAB
ALBUMIN SERPL-MCNC: 2.6 G/DL (ref 3.5–5)
ALBUMIN SERPL-MCNC: 2.7 G/DL (ref 3.5–5)
ALBUMIN SERPL-MCNC: 2.8 G/DL (ref 3.5–5)
ALBUMIN SERPL-MCNC: 2.8 G/DL (ref 3.5–5)
ANION GAP SERPL CALC-SCNC: 10 MMOL/L
ANION GAP SERPL CALC-SCNC: 10 MMOL/L
ANION GAP SERPL CALC-SCNC: 11 MMOL/L
ANION GAP SERPL CALC-SCNC: 9 MMOL/L
BUN SERPL-MCNC: 63 MG/DL (ref 8–25)
BUN SERPL-MCNC: 65 MG/DL (ref 8–25)
BUN SERPL-MCNC: 65 MG/DL (ref 8–25)
BUN SERPL-MCNC: 66 MG/DL (ref 8–25)
CALCIUM SERPL-MCNC: 8.4 MG/DL (ref 8.5–10.4)
CALCIUM SERPL-MCNC: 8.6 MG/DL (ref 8.5–10.4)
CALCIUM SERPL-MCNC: 8.7 MG/DL (ref 8.5–10.4)
CALCIUM SERPL-MCNC: 8.7 MG/DL (ref 8.5–10.4)
CHLORIDE SERPL-SCNC: 89 MMOL/L (ref 97–107)
CHLORIDE SERPL-SCNC: 90 MMOL/L (ref 97–107)
CHLORIDE SERPL-SCNC: 91 MMOL/L (ref 97–107)
CHLORIDE SERPL-SCNC: 92 MMOL/L (ref 97–107)
CO2 SERPL-SCNC: 22 MMOL/L (ref 24–31)
CO2 SERPL-SCNC: 23 MMOL/L (ref 24–31)
CO2 SERPL-SCNC: 24 MMOL/L (ref 24–31)
CO2 SERPL-SCNC: 26 MMOL/L (ref 24–31)
CREAT SERPL-MCNC: 2.6 MG/DL (ref 0.4–1.6)
CREAT SERPL-MCNC: 2.7 MG/DL (ref 0.4–1.6)
CREAT SERPL-MCNC: 2.7 MG/DL (ref 0.4–1.6)
CREAT SERPL-MCNC: 2.8 MG/DL (ref 0.4–1.6)
EGFRCR SERPLBLD CKD-EPI 2021: 17 ML/MIN/1.73M*2
EGFRCR SERPLBLD CKD-EPI 2021: 18 ML/MIN/1.73M*2
EGFRCR SERPLBLD CKD-EPI 2021: 18 ML/MIN/1.73M*2
EGFRCR SERPLBLD CKD-EPI 2021: 19 ML/MIN/1.73M*2
GLUCOSE SERPL-MCNC: 113 MG/DL (ref 65–99)
GLUCOSE SERPL-MCNC: 119 MG/DL (ref 65–99)
GLUCOSE SERPL-MCNC: 128 MG/DL (ref 65–99)
GLUCOSE SERPL-MCNC: 142 MG/DL (ref 65–99)
PHOSPHATE SERPL-MCNC: 2.2 MG/DL (ref 2.5–4.5)
PHOSPHATE SERPL-MCNC: 2.3 MG/DL (ref 2.5–4.5)
PHOSPHATE SERPL-MCNC: 2.6 MG/DL (ref 2.5–4.5)
PHOSPHATE SERPL-MCNC: 2.6 MG/DL (ref 2.5–4.5)
POTASSIUM SERPL-SCNC: 3.4 MMOL/L (ref 3.4–5.1)
POTASSIUM SERPL-SCNC: 3.6 MMOL/L (ref 3.4–5.1)
POTASSIUM SERPL-SCNC: 3.8 MMOL/L (ref 3.4–5.1)
POTASSIUM SERPL-SCNC: 3.8 MMOL/L (ref 3.4–5.1)
SODIUM SERPL-SCNC: 123 MMOL/L (ref 133–145)
SODIUM SERPL-SCNC: 124 MMOL/L (ref 133–145)
SODIUM SERPL-SCNC: 125 MMOL/L (ref 133–145)
SODIUM SERPL-SCNC: 125 MMOL/L (ref 133–145)

## 2024-08-31 PROCEDURE — 36415 COLL VENOUS BLD VENIPUNCTURE: CPT | Performed by: INTERNAL MEDICINE

## 2024-08-31 PROCEDURE — 76770 US EXAM ABDO BACK WALL COMP: CPT

## 2024-08-31 PROCEDURE — 80069 RENAL FUNCTION PANEL: CPT | Performed by: INTERNAL MEDICINE

## 2024-08-31 PROCEDURE — 99233 SBSQ HOSP IP/OBS HIGH 50: CPT | Performed by: INTERNAL MEDICINE

## 2024-08-31 PROCEDURE — 74176 CT ABD & PELVIS W/O CONTRAST: CPT

## 2024-08-31 PROCEDURE — 76770 US EXAM ABDO BACK WALL COMP: CPT | Performed by: RADIOLOGY

## 2024-08-31 PROCEDURE — 2500000005 HC RX 250 GENERAL PHARMACY W/O HCPCS: Performed by: INTERNAL MEDICINE

## 2024-08-31 PROCEDURE — 2500000001 HC RX 250 WO HCPCS SELF ADMINISTERED DRUGS (ALT 637 FOR MEDICARE OP): Performed by: INTERNAL MEDICINE

## 2024-08-31 PROCEDURE — 2500000004 HC RX 250 GENERAL PHARMACY W/ HCPCS (ALT 636 FOR OP/ED): Performed by: INTERNAL MEDICINE

## 2024-08-31 PROCEDURE — 84100 ASSAY OF PHOSPHORUS: CPT | Performed by: INTERNAL MEDICINE

## 2024-08-31 PROCEDURE — 2500000002 HC RX 250 W HCPCS SELF ADMINISTERED DRUGS (ALT 637 FOR MEDICARE OP, ALT 636 FOR OP/ED): Performed by: INTERNAL MEDICINE

## 2024-08-31 PROCEDURE — 99221 1ST HOSP IP/OBS SF/LOW 40: CPT | Performed by: INTERNAL MEDICINE

## 2024-08-31 PROCEDURE — 2060000001 HC INTERMEDIATE ICU ROOM DAILY

## 2024-08-31 RX ORDER — LIDOCAINE 560 MG/1
1 PATCH PERCUTANEOUS; TOPICAL; TRANSDERMAL DAILY
Status: DISCONTINUED | OUTPATIENT
Start: 2024-08-31 | End: 2024-09-04 | Stop reason: HOSPADM

## 2024-08-31 RX ORDER — ALUMINUM HYDROXIDE, MAGNESIUM HYDROXIDE, AND SIMETHICONE 1200; 120; 1200 MG/30ML; MG/30ML; MG/30ML
30 SUSPENSION ORAL ONCE
Status: COMPLETED | OUTPATIENT
Start: 2024-08-31 | End: 2024-08-31

## 2024-08-31 ASSESSMENT — PAIN - FUNCTIONAL ASSESSMENT
PAIN_FUNCTIONAL_ASSESSMENT: 0-10

## 2024-08-31 ASSESSMENT — PAIN SCALES - GENERAL
PAINLEVEL_OUTOF10: 7
PAINLEVEL_OUTOF10: 3
PAINLEVEL_OUTOF10: 0 - NO PAIN
PAINLEVEL_OUTOF10: 6
PAINLEVEL_OUTOF10: 0 - NO PAIN
PAINLEVEL_OUTOF10: 6
PAINLEVEL_OUTOF10: 5 - MODERATE PAIN

## 2024-08-31 ASSESSMENT — PAIN DESCRIPTION - LOCATION
LOCATION: BACK
LOCATION: BACK

## 2024-08-31 ASSESSMENT — PAIN SCALES - PAIN ASSESSMENT IN ADVANCED DEMENTIA (PAINAD): BREATHING: NORMAL

## 2024-08-31 NOTE — CONSULTS
Inpatient consult to Cardiology  Consult performed by: Kavin Deluca DO  Consult ordered by: Oscar Hernandez MD  Reason for consult: Cardiac arrhythmia        History Of Present Illness:    Angela Montelongo is a 74 y.o. female presenting with weakness fatigue and acute renal failure.  This patient has a history of hypertension hyperlipidemia and recently diagnosed kidney disease.  Over the past week she has had progressively worsening weakness and malaise with a resultant fall.  She was brought to the emergency department which showed to be significantly hyponatremic with a worsening of her kidney function.  She was placed on telemetry and had an arrhythmia and a question of a Mobitz 2 second-degree heart block.  Asked to see the patient in consultation.  She has had no prior history of cardiac disease.  She describes no chest pain or anginal symptoms.  She has had no unusual shortness of breath.  She has had some mild lightheadedness over the last week but no syncopal episodes.     Last Recorded Vitals:  Vitals:    08/31/24 0300 08/31/24 0400 08/31/24 0500 08/31/24 0800   BP: 114/52 130/60 145/54    BP Location:  Left arm     Patient Position:  Lying     Pulse: 59 62 84    Resp: 13 17 16    Temp:  36.5 °C (97.7 °F)  36.2 °C (97.2 °F)   TempSrc:  Temporal     SpO2:  93%     Weight:  123 kg (270 lb 11.6 oz)     Height:           Last Labs:  CBC - 8/29/2024:  4:31 PM  20.7 12.4 100    36.8      CMP - 8/31/2024:  8:44 AM  8.7 6.2 41 --- 0.8   2.6 2.7 20 126      PTT - No results in last year.  _   _ _     Hemoglobin A1C   Date/Time Value Ref Range Status   02/01/2019 07:03 PM 5.3 4.0 - 6.0 % Final     Comment:     Hemoglobin A1C levels are related to mean blood glucose during the   preceding 2-3 months. The relationship table below may be used as a   general guide. Each 1% increase in HGB A1C is a reflection of an   increase in mean glucose of approximately 30 mg/dl.   Reference: [employer], volume 29,  "supplement 1 Jan. 2006                        HGB A1C ................. Approx. Mean Glucose   _______________________________________________   6%   ...............................  120 mg/dl   7%   ...............................  150 mg/dl   8%   ...............................  180 mg/dl   9%   ...............................  210 mg/dl   10%  ...............................  240 mg/dl  Performed at [address] [address]       LDL Calculated   Date/Time Value Ref Range Status   06/07/2022 03:01 PM 81 65 - 130 MG/DL Final   09/22/2020 03:00 PM 88 65 - 130 MG/DL Final   02/02/2019 05:20 AM 44 (L) 65 - 130 MG/DL Final      Last I/O:  I/O last 3 completed shifts:  In: 100 (0.8 mL/kg) [I.V.:50 (0.4 mL/kg); IV Piggyback:50]  Out: 150 (1.2 mL/kg) [Urine:150 (0 mL/kg/hr)]  Weight: 122.8 kg     Past Cardiology Tests (Last 3 Years):  EKG:  No results found for this or any previous visit from the past 1095 days.    Echo:  No results found for this or any previous visit from the past 1095 days.    Ejection Fractions:  No results found for: \"EF\"  Cath:  No results found for this or any previous visit from the past 1095 days.    Stress Test:  No results found for this or any previous visit from the past 1095 days.    Cardiac Imaging:  No results found for this or any previous visit from the past 1095 days.      Past Medical History:  She has a past medical history of Elevation of levels of liver transaminase levels, Other conditions influencing health status, Personal history of other complications of pregnancy, childbirth and the puerperium, Personal history of other diseases of the circulatory system, Personal history of other diseases of the musculoskeletal system and connective tissue, Personal history of other diseases of the musculoskeletal system and connective tissue, and Personal history of other diseases of the nervous system and sense organs.    Past Surgical History:  She has a past " surgical history that includes Carpal tunnel release (04/09/2013); Knee Arthroplasty (04/09/2013); and Cholecystectomy (04/09/2013).      Social History:  She reports that she has never smoked. She has never used smokeless tobacco. She reports that she does not currently use alcohol. Drug use questions deferred to the physician.    Family History:  No family history on file.     Allergies:  Ciprofloxacin; Lisinopril; Cefazolin; Cephalexin; and Tetanus toxoid, adsorbed    Inpatient Medications:  Scheduled medications   Medication Dose Route Frequency    [Held by provider] atenolol  50 mg oral Daily    cefTRIAXone  1 g intravenous q24h    gabapentin  300 mg oral Daily    heparin  5,000 Units subcutaneous q12h    lidocaine  1 patch transdermal Daily    pantoprazole  20 mg oral Daily before breakfast    simvastatin  40 mg oral Nightly     PRN medications   Medication    acetaminophen    ondansetron     Continuous Medications   Medication Dose Last Rate    sodium chloride 0.9%  75 mL/hr 75 mL/hr (08/30/24 9147)     Outpatient Medications:  Current Outpatient Medications   Medication Instructions    allopurinol (ZYLOPRIM) 300 mg, oral, Daily    atenolol (TENORMIN) 50 mg, oral, Daily    gabapentin (NEURONTIN) 300 mg, oral, Daily    meloxicam (MOBIC) 15 mg, oral, Daily    omeprazole (PriLOSEC) 20 mg DR capsule TAKE 1 CAPSULE ONCE A DAY    simvastatin (ZOCOR) 40 mg, oral, Nightly    triamterene-hydrochlorothiazid (Maxzide) 75-50 mg tablet TAKE 1 TABLET ONCE DAILY IN THE MORNING       Physical Exam:  Constitutional:       Appearance: Not in distress.   Eyes:      Conjunctiva/sclera: Conjunctivae normal.   HENT:    Mouth/Throat:      Pharynx: Oropharynx is clear.   Neck:      Vascular: No carotid bruit. JVD normal.   Pulmonary:      Breath sounds: Normal breath sounds. No wheezing. No rales.   Cardiovascular:      Regular rhythm.      Murmurs: There is no murmur.      No gallop.  No click. No rub.   Abdominal:      Palpations:  Abdomen is soft.      Tenderness: There is no abdominal tenderness.   Musculoskeletal:         General: No deformity. Neurological:      General: No focal deficit present.           Assessment/Plan     Cardiac arrhythmia  Acute on chronic kidney failure  Primary hypertension  Hyponatremia  Hyperlipidemia    8/31: Reviewed telemetry and EKGs and the patient does have frequent PACs but I do not see evidence of a Mobitz 2 heart block.  At this point we will discontinue the atenolol and monitor her cardiac rhythm on telemetry.  Dr. Faisal Shah is seeing the patient from a nephrology standpoint and is placed her diuretic therapy on hold and hopefully her serum sodium will begin to improve.  Thank for consultation we will follow with you    Peripheral IV 08/29/24 20 G Distal;Right;Upper;Anterior Arm (Active)   Site Assessment Clean;Dry;Intact 08/31/24 0813   Dressing Status Clean;Dry;Occlusive 08/31/24 0813   Number of days: 2       Code Status:  Full Code    I spent 45 minutes in the professional and overall care of this patient.        Kavin Deluca, DO

## 2024-08-31 NOTE — PROGRESS NOTES
CONSULT PROGRESS NOTES    SERVICE DATE: 8/31/2024   SERVICE TIME: 8:49 AM    CONSULTING SERVICE: Nephrology    ASSESSMENT AND PLAN   74-year-old woman with gout and osteoarthritis admitted with hyponatremia and acute renal failure.  1.  Acute renal failure  2.  Hyponatremia  3.  Hypokalemia  4.  Essential hypertension     Initially believed the acute renal failure from a combination of triamterene, hydrochlorothiazide, and meloxicam, but deterioration overnight with holding of these medicines.  She needs renal imaging.  We are continuing to hold all of these medicines.  She is currently on IV fluids, which should be continued.  The hyponatremia is from hydrochlorothiazide.  This is an acute on chronic picture.  I would have her permanently discontinue hydrochlorothiazide in the outpatient setting.  The sodium is not improving.  It sounds like she had angioedema from lisinopril.  We can manage her blood pressure with a calcium channel blocker if needed.  Right now her blood pressure is stable.  The potassium borders on being low and she may need replacement.  She requires daily labs and supportive care.  I will follow this patient.        Case discussed with Dr. Hernandez.  Renal imaging, consider cardiology consult for her AV block.    SUBJECTIVE  INTERVAL HPI: She has considerable back pain.  She says it is spasmodic.  Her urine output has declined, but it is not accurately recorded.  Per nursing, she has made 1 urination overnight, but a decent amount in the bedpan.  She does not have much of an appetite.  She is considerably uncomfortable.  Remains hemodynamically stable, but with the highly irregular heart rate on the monitor.    MEDICATIONS:  [Held by provider] atenolol, 50 mg, oral, Daily  cefTRIAXone, 1 g, intravenous, q24h  gabapentin, 300 mg, oral, Daily  heparin, 5,000 Units, subcutaneous, q12h  lidocaine, 1 patch, transdermal, Daily  pantoprazole, 20 mg, oral, Daily before breakfast  simvastatin, 40 mg,  oral, Nightly       sodium chloride 0.9%, 75 mL/hr, Last Rate: 75 mL/hr (08/30/24 2339)       PRN medications: acetaminophen, ondansetron     OBJECTIVE  PHYSICAL EXAM:   Heart Rate:  [59-85]   Temp:  [36 °C (96.8 °F)-36.9 °C (98.4 °F)]   Resp:  [13-31]   BP: ()/(41-98)   Weight:  [120 kg (265 lb)-123 kg (270 lb 11.6 oz)]   SpO2:  [81 %-98 %]   Body mass index is 47.37 kg/m².  This is a morbidly obese white woman who appears in no acute distress  No obvious skin rashes  Hearing intact  Phonation intact  Moist mucosa  Irregular heart rate  Lungs are clear to auscultation bilaterally  Abdomen is soft, nondistended, nontender, positive bowel sounds  No Dee catheter in place, no suprapubic tenderness to palpation  No extremity edema  Moves 4 limbs spontaneously  No obvious joint deformities  No lymphadenopathy    DATA:   Labs:  Results for orders placed or performed during the hospital encounter of 08/29/24 (from the past 96 hour(s))   CBC and Auto Differential   Result Value Ref Range    WBC 20.7 (H) 4.4 - 11.3 x10*3/uL    nRBC 0.0 0.0 - 0.0 /100 WBCs    RBC 3.95 (L) 4.00 - 5.20 x10*6/uL    Hemoglobin 12.4 12.0 - 16.0 g/dL    Hematocrit 36.8 36.0 - 46.0 %    MCV 93 80 - 100 fL    MCH 31.4 26.0 - 34.0 pg    MCHC 33.7 32.0 - 36.0 g/dL    RDW 13.2 11.5 - 14.5 %    Platelets 100 (L) 150 - 450 x10*3/uL    Immature Granulocytes %, Automated 1.5 (H) 0.0 - 0.9 %    Immature Granulocytes Absolute, Automated 0.31 0.00 - 0.50 x10*3/uL   Comprehensive metabolic panel   Result Value Ref Range    Glucose 80 65 - 99 mg/dL    Sodium 118 (LL) 133 - 145 mmol/L    Potassium 3.9 3.4 - 5.1 mmol/L    Chloride 82 (L) 97 - 107 mmol/L    Bicarbonate 26 24 - 31 mmol/L    Urea Nitrogen 46 (H) 8 - 25 mg/dL    Creatinine 1.70 (H) 0.40 - 1.60 mg/dL    eGFR 31 (L) >60 mL/min/1.73m*2    Calcium 9.1 8.5 - 10.4 mg/dL    Albumin 3.5 3.5 - 5.0 g/dL    Alkaline Phosphatase 126 (H) 35 - 125 U/L    Total Protein 6.2 5.9 - 7.9 g/dL    AST 41 (H) 5 -  40 U/L    Bilirubin, Total 0.8 0.1 - 1.2 mg/dL    ALT 20 5 - 40 U/L    Anion Gap 10 <=19 mmol/L   Magnesium   Result Value Ref Range    Magnesium 1.40 (L) 1.60 - 3.10 mg/dL   TSH with reflex to Free T4 if abnormal   Result Value Ref Range    Thyroid Stimulating Hormone 2.31 0.27 - 4.20 mIU/L   Manual Differential   Result Value Ref Range    Neutrophils %, Manual 87.0 40.0 - 80.0 %    Bands %, Manual 4.0 0.0 - 5.0 %    Lymphocytes %, Manual 1.0 13.0 - 44.0 %    Monocytes %, Manual 8.0 2.0 - 10.0 %    Eosinophils %, Manual 0.0 0.0 - 6.0 %    Basophils %, Manual 0.0 0.0 - 2.0 %    Seg Neutrophils Absolute, Manual 18.01 (H) 1.60 - 5.00 x10*3/uL    Bands Absolute, Manual 0.83 (H) 0.00 - 0.50 x10*3/uL    Lymphocytes Absolute, Manual 0.21 (L) 0.80 - 3.00 x10*3/uL    Monocytes Absolute, Manual 1.66 (H) 0.05 - 0.80 x10*3/uL    Eosinophils Absolute, Manual 0.00 0.00 - 0.40 x10*3/uL    Basophils Absolute, Manual 0.00 0.00 - 0.10 x10*3/uL    Total Cells Counted 100     Neutrophils Absolute, Manual 18.84 (H) 1.60 - 5.50 x10*3/uL    RBC Morphology No significant RBC morphology present    Osmolality   Result Value Ref Range    Osmolality, Serum 261 (L) 280 - 300 mOsm/kg   Sars-CoV-2 PCR   Result Value Ref Range    Coronavirus 2019, PCR Not Detected Not Detected   Urinalysis with Reflex Culture and Microscopic   Result Value Ref Range    Color, Urine Yellow Light-Yellow, Yellow, Dark-Yellow    Appearance, Urine Turbid (N) Clear    Specific Gravity, Urine 1.013 1.005 - 1.035    pH, Urine 6.0 5.0, 5.5, 6.0, 6.5, 7.0, 7.5, 8.0    Protein, Urine 100 (2+) (A) NEGATIVE, 10 (TRACE), 20 (TRACE) mg/dL    Glucose, Urine Normal Normal mg/dL    Blood, Urine 0.2 (2+) (A) NEGATIVE    Ketones, Urine NEGATIVE NEGATIVE mg/dL    Bilirubin, Urine NEGATIVE NEGATIVE    Urobilinogen, Urine Normal Normal mg/dL    Nitrite, Urine NEGATIVE NEGATIVE    Leukocyte Esterase, Urine 500 Chanel/µL (A) NEGATIVE   Microscopic Only, Urine   Result Value Ref Range     WBC, Urine >50 (A) 1-5, NONE /HPF    WBC Clumps, Urine MANY Reference range not established. /HPF    RBC, Urine 3-5 NONE, 1-2, 3-5 /HPF    Bacteria, Urine 4+ (A) NONE SEEN /HPF    Mucus, Urine FEW Reference range not established. /LPF   Urine Culture    Specimen: Clean Catch/Voided; Urine   Result Value Ref Range    Urine Culture >100,000 Enteric bacilli (A)    Osmolality, urine   Result Value Ref Range    Osmolality, Urine Random 382 200 - 1,200 mOsm/kg   Basic Metabolic Panel   Result Value Ref Range    Glucose 81 65 - 99 mg/dL    Sodium 119 (LL) 133 - 145 mmol/L    Potassium 3.8 3.4 - 5.1 mmol/L    Chloride 84 (L) 97 - 107 mmol/L    Bicarbonate 25 24 - 31 mmol/L    Urea Nitrogen 44 (H) 8 - 25 mg/dL    Creatinine 1.50 0.40 - 1.60 mg/dL    eGFR 36 (L) >60 mL/min/1.73m*2    Calcium 9.0 8.5 - 10.4 mg/dL    Anion Gap 10 <=19 mmol/L   Basic Metabolic Panel   Result Value Ref Range    Glucose 89 65 - 99 mg/dL    Sodium 121 (L) 133 - 145 mmol/L    Potassium 3.6 3.4 - 5.1 mmol/L    Chloride 86 (L) 97 - 107 mmol/L    Bicarbonate 23 (L) 24 - 31 mmol/L    Urea Nitrogen 46 (H) 8 - 25 mg/dL    Creatinine 1.80 (H) 0.40 - 1.60 mg/dL    eGFR 29 (L) >60 mL/min/1.73m*2    Calcium 8.6 8.5 - 10.4 mg/dL    Anion Gap 12 <=19 mmol/L   Basic Metabolic Panel   Result Value Ref Range    Glucose 86 65 - 99 mg/dL    Sodium 123 (L) 133 - 145 mmol/L    Potassium 3.7 3.4 - 5.1 mmol/L    Chloride 88 (L) 97 - 107 mmol/L    Bicarbonate 22 (L) 24 - 31 mmol/L    Urea Nitrogen 48 (H) 8 - 25 mg/dL    Creatinine 1.90 (H) 0.40 - 1.60 mg/dL    eGFR 27 (L) >60 mL/min/1.73m*2    Calcium 9.0 8.5 - 10.4 mg/dL    Anion Gap 13 <=19 mmol/L   Basic Metabolic Panel   Result Value Ref Range    Glucose 87 65 - 99 mg/dL    Sodium 122 (L) 133 - 145 mmol/L    Potassium 3.7 3.4 - 5.1 mmol/L    Chloride 86 (L) 97 - 107 mmol/L    Bicarbonate 23 (L) 24 - 31 mmol/L    Urea Nitrogen 49 (H) 8 - 25 mg/dL    Creatinine 1.80 (H) 0.40 - 1.60 mg/dL    eGFR 29 (L) >60  mL/min/1.73m*2    Calcium 8.9 8.5 - 10.4 mg/dL    Anion Gap 13 <=19 mmol/L   Basic Metabolic Panel   Result Value Ref Range    Glucose 101 (H) 65 - 99 mg/dL    Sodium 123 (L) 133 - 145 mmol/L    Potassium 3.6 3.4 - 5.1 mmol/L    Chloride 88 (L) 97 - 107 mmol/L    Bicarbonate 23 (L) 24 - 31 mmol/L    Urea Nitrogen 54 (H) 8 - 25 mg/dL    Creatinine 2.00 (H) 0.40 - 1.60 mg/dL    eGFR 25 (L) >60 mL/min/1.73m*2    Calcium 8.8 8.5 - 10.4 mg/dL    Anion Gap 12 <=19 mmol/L   Magnesium   Result Value Ref Range    Magnesium 1.80 1.60 - 3.10 mg/dL   PST Top   Result Value Ref Range    Extra Tube Hold for add-ons.    Basic Metabolic Panel   Result Value Ref Range    Glucose 103 (H) 65 - 99 mg/dL    Sodium 122 (L) 133 - 145 mmol/L    Potassium 3.5 3.4 - 5.1 mmol/L    Chloride 87 (L) 97 - 107 mmol/L    Bicarbonate 24 24 - 31 mmol/L    Urea Nitrogen 54 (H) 8 - 25 mg/dL    Creatinine 2.10 (H) 0.40 - 1.60 mg/dL    eGFR 24 (L) >60 mL/min/1.73m*2    Calcium 8.6 8.5 - 10.4 mg/dL    Anion Gap 11 <=19 mmol/L   Renal function panel   Result Value Ref Range    Glucose 116 (H) 65 - 99 mg/dL    Sodium 123 (L) 133 - 145 mmol/L    Potassium 3.5 3.4 - 5.1 mmol/L    Chloride 89 (L) 97 - 107 mmol/L    Bicarbonate 24 24 - 31 mmol/L    Urea Nitrogen 60 (H) 8 - 25 mg/dL    Creatinine 2.60 (H) 0.40 - 1.60 mg/dL    eGFR 19 (L) >60 mL/min/1.73m*2    Calcium 8.3 (L) 8.5 - 10.4 mg/dL    Phosphorus 2.2 (L) 2.5 - 4.5 mg/dL    Albumin 2.7 (L) 3.5 - 5.0 g/dL    Anion Gap 10 <=19 mmol/L   Renal function panel   Result Value Ref Range    Glucose 128 (H) 65 - 99 mg/dL    Sodium 125 (L) 133 - 145 mmol/L    Potassium 3.6 3.4 - 5.1 mmol/L    Chloride 89 (L) 97 - 107 mmol/L    Bicarbonate 26 24 - 31 mmol/L    Urea Nitrogen 63 (H) 8 - 25 mg/dL    Creatinine 2.70 (H) 0.40 - 1.60 mg/dL    eGFR 18 (L) >60 mL/min/1.73m*2    Calcium 8.7 8.5 - 10.4 mg/dL    Phosphorus 2.3 (L) 2.5 - 4.5 mg/dL    Albumin 2.8 (L) 3.5 - 5.0 g/dL    Anion Gap 10 <=19 mmol/L   Renal function  panel   Result Value Ref Range    Glucose 119 (H) 65 - 99 mg/dL    Sodium 123 (L) 133 - 145 mmol/L    Potassium 3.8 3.4 - 5.1 mmol/L    Chloride 90 (L) 97 - 107 mmol/L    Bicarbonate 24 24 - 31 mmol/L    Urea Nitrogen 65 (H) 8 - 25 mg/dL    Creatinine 2.80 (H) 0.40 - 1.60 mg/dL    eGFR 17 (L) >60 mL/min/1.73m*2    Calcium 8.4 (L) 8.5 - 10.4 mg/dL    Phosphorus 2.6 2.5 - 4.5 mg/dL    Albumin 2.8 (L) 3.5 - 5.0 g/dL    Anion Gap 9 <=19 mmol/L         SIGNATURE: Gab Pompa MD PATIENT NAME: Angela Montelongo   DATE: August 31, 2024 MRN: [id number]   TIME: 8:49 AM PAGER: [phone]

## 2024-08-31 NOTE — PROGRESS NOTES
Angela Montelongo is a 74 y.o. female on day 2 of admission presenting with Hyponatremia.      Subjective   Patient is looking much better and feeling much better today.  No events overnight.    Yesterday patient was found to be in an abnormal rhythm.  EKG was done which showed a Mobitz type II block.       Objective     Last Recorded Vitals  /54   Pulse 84   Temp 36.2 °C (97.2 °F)   Resp 16   Wt 123 kg (270 lb 11.6 oz)   SpO2 93%   Intake/Output last 3 Shifts:    Intake/Output Summary (Last 24 hours) at 8/31/2024 1002  Last data filed at 8/31/2024 0000  Gross per 24 hour   Intake 50 ml   Output 150 ml   Net -100 ml       Admission Weight  Weight: 125 kg (275 lb) (08/29/24 1451)    Daily Weight  08/31/24 : 123 kg (270 lb 11.6 oz)    Image Results  CT head wo IV contrast  Narrative: Interpreted By:  Lacie Ibanez,   STUDY:  CT HEAD WO IV CONTRAST;  8/29/2024 3:48 pm      INDICATION:  Signs/Symptoms:fall, hit head.      COMPARISON:  11/18/2008      ACCESSION NUMBER(S):  [id number]      ORDERING CLINICIAN:  MARIANO ANTONY      TECHNIQUE:  Examination was performed in the axial plane with sagittal and  coronal reconstructions. Bone and soft tissue algorithms were  performed.      FINDINGS:  INTRACRANIAL:  There is prominence of the ventricular system and cerebral sulci  consistent with cerebral atrophy, age-appropriate. In particular,  there is bifrontal symmetrical cerebral atrophy. No mass or mass  effect is identified. There is no hemorrhage or subdural fluid  collection. There is no acute infarct.  There is no fracture of the calvarium.  There is an empty sella, normal variant.      EXTRACRANIAL:  Visualized paranasal sinuses and mastoids are clear.      Impression: No acute intracranial pathology.      MACRO:  None      Signed by: Lacie Ibanez 8/29/2024 4:08 PM  Dictation workstation:   [id number]      Physical Exam  Generally no acute distress, complexion looks much better, patient sitting up in  bed eating breakfast interacting much better today  HEENT PERRL EOMI  Cardiovascular S1-S2 regular rate rhythm  Lungs anterior clear but slightly diminished  Abdomen bowel sounds present nontender  Extremities no clubbing cyanosis or pitting edema  Relevant Results  Scheduled medications  [Held by provider] atenolol, 50 mg, oral, Daily  cefTRIAXone, 1 g, intravenous, q24h  gabapentin, 300 mg, oral, Daily  heparin, 5,000 Units, subcutaneous, q12h  lidocaine, 1 patch, transdermal, Daily  pantoprazole, 20 mg, oral, Daily before breakfast  simvastatin, 40 mg, oral, Nightly      Continuous medications  sodium chloride 0.9%, 75 mL/hr, Last Rate: 75 mL/hr (08/30/24 8676)      PRN medications  PRN medications: acetaminophen, ondansetron  Results for orders placed or performed during the hospital encounter of 08/29/24 (from the past 24 hour(s))   Basic Metabolic Panel   Result Value Ref Range    Glucose 101 (H) 65 - 99 mg/dL    Sodium 123 (L) 133 - 145 mmol/L    Potassium 3.6 3.4 - 5.1 mmol/L    Chloride 88 (L) 97 - 107 mmol/L    Bicarbonate 23 (L) 24 - 31 mmol/L    Urea Nitrogen 54 (H) 8 - 25 mg/dL    Creatinine 2.00 (H) 0.40 - 1.60 mg/dL    eGFR 25 (L) >60 mL/min/1.73m*2    Calcium 8.8 8.5 - 10.4 mg/dL    Anion Gap 12 <=19 mmol/L   Magnesium   Result Value Ref Range    Magnesium 1.80 1.60 - 3.10 mg/dL   PST Top   Result Value Ref Range    Extra Tube Hold for add-ons.    Basic Metabolic Panel   Result Value Ref Range    Glucose 103 (H) 65 - 99 mg/dL    Sodium 122 (L) 133 - 145 mmol/L    Potassium 3.5 3.4 - 5.1 mmol/L    Chloride 87 (L) 97 - 107 mmol/L    Bicarbonate 24 24 - 31 mmol/L    Urea Nitrogen 54 (H) 8 - 25 mg/dL    Creatinine 2.10 (H) 0.40 - 1.60 mg/dL    eGFR 24 (L) >60 mL/min/1.73m*2    Calcium 8.6 8.5 - 10.4 mg/dL    Anion Gap 11 <=19 mmol/L   Renal function panel   Result Value Ref Range    Glucose 116 (H) 65 - 99 mg/dL    Sodium 123 (L) 133 - 145 mmol/L    Potassium 3.5 3.4 - 5.1 mmol/L    Chloride 89 (L) 97 -  107 mmol/L    Bicarbonate 24 24 - 31 mmol/L    Urea Nitrogen 60 (H) 8 - 25 mg/dL    Creatinine 2.60 (H) 0.40 - 1.60 mg/dL    eGFR 19 (L) >60 mL/min/1.73m*2    Calcium 8.3 (L) 8.5 - 10.4 mg/dL    Phosphorus 2.2 (L) 2.5 - 4.5 mg/dL    Albumin 2.7 (L) 3.5 - 5.0 g/dL    Anion Gap 10 <=19 mmol/L   Renal function panel   Result Value Ref Range    Glucose 128 (H) 65 - 99 mg/dL    Sodium 125 (L) 133 - 145 mmol/L    Potassium 3.6 3.4 - 5.1 mmol/L    Chloride 89 (L) 97 - 107 mmol/L    Bicarbonate 26 24 - 31 mmol/L    Urea Nitrogen 63 (H) 8 - 25 mg/dL    Creatinine 2.70 (H) 0.40 - 1.60 mg/dL    eGFR 18 (L) >60 mL/min/1.73m*2    Calcium 8.7 8.5 - 10.4 mg/dL    Phosphorus 2.3 (L) 2.5 - 4.5 mg/dL    Albumin 2.8 (L) 3.5 - 5.0 g/dL    Anion Gap 10 <=19 mmol/L   Renal function panel   Result Value Ref Range    Glucose 119 (H) 65 - 99 mg/dL    Sodium 123 (L) 133 - 145 mmol/L    Potassium 3.8 3.4 - 5.1 mmol/L    Chloride 90 (L) 97 - 107 mmol/L    Bicarbonate 24 24 - 31 mmol/L    Urea Nitrogen 65 (H) 8 - 25 mg/dL    Creatinine 2.80 (H) 0.40 - 1.60 mg/dL    eGFR 17 (L) >60 mL/min/1.73m*2    Calcium 8.4 (L) 8.5 - 10.4 mg/dL    Phosphorus 2.6 2.5 - 4.5 mg/dL    Albumin 2.8 (L) 3.5 - 5.0 g/dL    Anion Gap 9 <=19 mmol/L   Renal function panel   Result Value Ref Range    Glucose 113 (H) 65 - 99 mg/dL    Sodium 125 (L) 133 - 145 mmol/L    Potassium 3.8 3.4 - 5.1 mmol/L    Chloride 92 (L) 97 - 107 mmol/L    Bicarbonate 22 (L) 24 - 31 mmol/L    Urea Nitrogen 65 (H) 8 - 25 mg/dL    Creatinine 2.60 (H) 0.40 - 1.60 mg/dL    eGFR 19 (L) >60 mL/min/1.73m*2    Calcium 8.7 8.5 - 10.4 mg/dL    Phosphorus 2.6 2.5 - 4.5 mg/dL    Albumin 2.7 (L) 3.5 - 5.0 g/dL    Anion Gap 11 <=19 mmol/L     Impression: 74-year-old woman being admitted with hyponatremia and urinary tract infection.     Plan:     1.  Hyponatremia and acute renal failure  -Still with hyponatremia, asymptomatic  -Patient with worsening creatinine today, discussed with nephrology, will order  a renal ultrasound as well as CT abdomen pelvis.  Unclear if there is an obstructive uropathy.  Otherwise continue with recommendations.    2.  UTI  -Urine cultures showing greater than 100,000 enteric bacilli  -Continue with ceftriaxone     3.  Hypertension  -Holding blood pressure meds due to #4    4.  Mobitz type II  -EKGs reviewed, appears to be 2-1 Mobitz type II block.  Discontinued beta-blocker.  Consulted cardiology     DVT prophylaxis  Full code           Assessment/Plan                  Assessment & Plan  Hyponatremia                  Oscar Hernandez MD

## 2024-08-31 NOTE — CARE PLAN
The patient's goals for the shift include safety    The clinical goals for the shift include maintain hemodynamic stability    Over the shift, the patient did not make progress toward the following goals. Barriers to progression include none. Recommendations to address these barriers include n/a.

## 2024-09-01 VITALS
DIASTOLIC BLOOD PRESSURE: 50 MMHG | BODY MASS INDEX: 47.38 KG/M2 | TEMPERATURE: 98.2 F | HEIGHT: 63 IN | SYSTOLIC BLOOD PRESSURE: 130 MMHG | WEIGHT: 267.42 LBS | RESPIRATION RATE: 20 BRPM | HEART RATE: 52 BPM | OXYGEN SATURATION: 95 %

## 2024-09-01 LAB
ANION GAP SERPL CALC-SCNC: 10 MMOL/L
BACTERIA UR CULT: ABNORMAL
BUN SERPL-MCNC: 72 MG/DL (ref 8–25)
CALCIUM SERPL-MCNC: 8.9 MG/DL (ref 8.5–10.4)
CHLORIDE SERPL-SCNC: 94 MMOL/L (ref 97–107)
CO2 SERPL-SCNC: 23 MMOL/L (ref 24–31)
CREAT SERPL-MCNC: 2.4 MG/DL (ref 0.4–1.6)
EGFRCR SERPLBLD CKD-EPI 2021: 20 ML/MIN/1.73M*2
ERYTHROCYTE [DISTWIDTH] IN BLOOD BY AUTOMATED COUNT: 13.2 % (ref 11.5–14.5)
GLUCOSE SERPL-MCNC: 108 MG/DL (ref 65–99)
HCT VFR BLD AUTO: 32.4 % (ref 36–46)
HGB BLD-MCNC: 11.1 G/DL (ref 12–16)
MCH RBC QN AUTO: 31.4 PG (ref 26–34)
MCHC RBC AUTO-ENTMCNC: 34.3 G/DL (ref 32–36)
MCV RBC AUTO: 92 FL (ref 80–100)
NRBC BLD-RTO: 0 /100 WBCS (ref 0–0)
PLATELET # BLD AUTO: 93 X10*3/UL (ref 150–450)
POTASSIUM SERPL-SCNC: 3.8 MMOL/L (ref 3.4–5.1)
RBC # BLD AUTO: 3.54 X10*6/UL (ref 4–5.2)
SODIUM SERPL-SCNC: 127 MMOL/L (ref 133–145)
WBC # BLD AUTO: 13.3 X10*3/UL (ref 4.4–11.3)

## 2024-09-01 PROCEDURE — 2500000004 HC RX 250 GENERAL PHARMACY W/ HCPCS (ALT 636 FOR OP/ED): Performed by: INTERNAL MEDICINE

## 2024-09-01 PROCEDURE — 99232 SBSQ HOSP IP/OBS MODERATE 35: CPT | Performed by: INTERNAL MEDICINE

## 2024-09-01 PROCEDURE — 2500000001 HC RX 250 WO HCPCS SELF ADMINISTERED DRUGS (ALT 637 FOR MEDICARE OP): Performed by: INTERNAL MEDICINE

## 2024-09-01 PROCEDURE — 2500000002 HC RX 250 W HCPCS SELF ADMINISTERED DRUGS (ALT 637 FOR MEDICARE OP, ALT 636 FOR OP/ED): Performed by: INTERNAL MEDICINE

## 2024-09-01 PROCEDURE — 36415 COLL VENOUS BLD VENIPUNCTURE: CPT | Performed by: INTERNAL MEDICINE

## 2024-09-01 PROCEDURE — 80048 BASIC METABOLIC PNL TOTAL CA: CPT | Performed by: INTERNAL MEDICINE

## 2024-09-01 PROCEDURE — 85027 COMPLETE CBC AUTOMATED: CPT | Performed by: INTERNAL MEDICINE

## 2024-09-01 PROCEDURE — 1100000001 HC PRIVATE ROOM DAILY

## 2024-09-01 RX ORDER — TIZANIDINE 4 MG/1
4 TABLET ORAL EVERY 8 HOURS PRN
Status: DISCONTINUED | OUTPATIENT
Start: 2024-09-01 | End: 2024-09-04 | Stop reason: HOSPADM

## 2024-09-01 ASSESSMENT — PAIN DESCRIPTION - ORIENTATION: ORIENTATION: LOWER

## 2024-09-01 ASSESSMENT — COGNITIVE AND FUNCTIONAL STATUS - GENERAL
WALKING IN HOSPITAL ROOM: TOTAL
WALKING IN HOSPITAL ROOM: A LITTLE
CLIMB 3 TO 5 STEPS WITH RAILING: TOTAL
MOBILITY SCORE: 10
DRESSING REGULAR UPPER BODY CLOTHING: A LITTLE
CLIMB 3 TO 5 STEPS WITH RAILING: A LOT
DAILY ACTIVITIY SCORE: 24
STANDING UP FROM CHAIR USING ARMS: A LOT
MOVING TO AND FROM BED TO CHAIR: A LOT
TOILETING: A LOT
HELP NEEDED FOR BATHING: A LITTLE
DRESSING REGULAR LOWER BODY CLOTHING: A LITTLE
MOBILITY SCORE: 21
TURNING FROM BACK TO SIDE WHILE IN FLAT BAD: A LOT
DAILY ACTIVITIY SCORE: 19
MOVING FROM LYING ON BACK TO SITTING ON SIDE OF FLAT BED WITH BEDRAILS: A LOT

## 2024-09-01 ASSESSMENT — PAIN SCALES - PAIN ASSESSMENT IN ADVANCED DEMENTIA (PAINAD): TOTALSCORE: MEDICATION (SEE MAR)

## 2024-09-01 ASSESSMENT — PAIN DESCRIPTION - LOCATION: LOCATION: BACK

## 2024-09-01 ASSESSMENT — PAIN SCALES - GENERAL
PAINLEVEL_OUTOF10: 0 - NO PAIN
PAINLEVEL_OUTOF10: 5 - MODERATE PAIN

## 2024-09-01 ASSESSMENT — PAIN - FUNCTIONAL ASSESSMENT: PAIN_FUNCTIONAL_ASSESSMENT: UNABLE TO SELF-REPORT

## 2024-09-01 NOTE — CARE PLAN
The patient's goals for the shift include get some sleep    The clinical goals for the shift include remain hemodynamically stable and patient safety      Problem: Skin  Goal: Participates in plan/prevention/treatment measures  Outcome: Progressing     Problem: Pain - Adult  Goal: Verbalizes/displays adequate comfort level or baseline comfort level  Outcome: Progressing     Problem: Safety - Adult  Goal: Free from fall injury  Outcome: Progressing     Problem: Discharge Planning  Goal: Discharge to home or other facility with appropriate resources  Outcome: Progressing     Problem: Chronic Conditions and Co-morbidities  Goal: Patient's chronic conditions and co-morbidity symptoms are monitored and maintained or improved  Outcome: Progressing

## 2024-09-01 NOTE — PROGRESS NOTES
CONSULT PROGRESS NOTES    SERVICE DATE: 9/1/2024   SERVICE TIME: 10:22 AM    CONSULTING SERVICE: Nephrology    ASSESSMENT AND PLAN   74-year-old woman with gout and osteoarthritis admitted with hyponatremia and acute renal failure.  1.  Acute renal failure  2.  Hyponatremia  3.  Hypokalemia  4.  Essential hypertension     Initially believed the acute renal failure from a combination of triamterene, hydrochlorothiazide, and meloxicam.  Serum creatinine hopefully has turned the corner at this point.  I do not see any further benefit from IV fluids.  I will discontinue them.  She had largely unremarkable renal imaging.    The hyponatremia is from hydrochlorothiazide.  This is an acute on chronic picture.  I would have her permanently discontinue hydrochlorothiazide in the outpatient setting.  The sodium is now improving.  It sounds like she had angioedema from lisinopril.  We can manage her blood pressure with a calcium channel blocker if needed.  Right now her blood pressure is stable.  As needed potassium replacement.  She requires daily labs and supportive care.  I will follow this patient.        Case discussed with Dr. Hernandez.  I would consider de-escalation of her antibiotic therapy at this point.    SUBJECTIVE  INTERVAL HPI: She remains with back pain.  She is overly concerned about her presumed urinary tract infection.  She remains with an irregular heart rate.  She has no chest discomfort or dyspnea.    MEDICATIONS:  [Held by provider] atenolol, 50 mg, oral, Daily  cefTRIAXone, 1 g, intravenous, q24h  gabapentin, 300 mg, oral, Daily  heparin, 5,000 Units, subcutaneous, q12h  lidocaine, 1 patch, transdermal, Daily  pantoprazole, 20 mg, oral, Daily before breakfast  simvastatin, 40 mg, oral, Nightly             PRN medications: acetaminophen, ondansetron     OBJECTIVE  PHYSICAL EXAM:   Heart Rate:  [57-76]   Temp:  [35.9 °C (96.6 °F)-37 °C (98.6 °F)]   Resp:  [15-19]   BP: (115-141)/(40-57)   Weight:  [121 kg  (267 lb 6.7 oz)]   Body mass index is 46.8 kg/m².  This is a morbidly obese white woman who appears in no acute distress  No obvious skin rashes  Hearing intact  Phonation intact  Moist mucosa  Irregular heart rate  Lungs are clear to auscultation bilaterally  Abdomen is soft, nondistended, nontender, positive bowel sounds  No Dee catheter in place, no suprapubic tenderness to palpation  No extremity edema  Moves 4 limbs spontaneously  No obvious joint deformities  No lymphadenopathy    DATA:   Labs:  Results for orders placed or performed during the hospital encounter of 08/29/24 (from the past 96 hour(s))   CBC and Auto Differential   Result Value Ref Range    WBC 20.7 (H) 4.4 - 11.3 x10*3/uL    nRBC 0.0 0.0 - 0.0 /100 WBCs    RBC 3.95 (L) 4.00 - 5.20 x10*6/uL    Hemoglobin 12.4 12.0 - 16.0 g/dL    Hematocrit 36.8 36.0 - 46.0 %    MCV 93 80 - 100 fL    MCH 31.4 26.0 - 34.0 pg    MCHC 33.7 32.0 - 36.0 g/dL    RDW 13.2 11.5 - 14.5 %    Platelets 100 (L) 150 - 450 x10*3/uL    Immature Granulocytes %, Automated 1.5 (H) 0.0 - 0.9 %    Immature Granulocytes Absolute, Automated 0.31 0.00 - 0.50 x10*3/uL   Comprehensive metabolic panel   Result Value Ref Range    Glucose 80 65 - 99 mg/dL    Sodium 118 (LL) 133 - 145 mmol/L    Potassium 3.9 3.4 - 5.1 mmol/L    Chloride 82 (L) 97 - 107 mmol/L    Bicarbonate 26 24 - 31 mmol/L    Urea Nitrogen 46 (H) 8 - 25 mg/dL    Creatinine 1.70 (H) 0.40 - 1.60 mg/dL    eGFR 31 (L) >60 mL/min/1.73m*2    Calcium 9.1 8.5 - 10.4 mg/dL    Albumin 3.5 3.5 - 5.0 g/dL    Alkaline Phosphatase 126 (H) 35 - 125 U/L    Total Protein 6.2 5.9 - 7.9 g/dL    AST 41 (H) 5 - 40 U/L    Bilirubin, Total 0.8 0.1 - 1.2 mg/dL    ALT 20 5 - 40 U/L    Anion Gap 10 <=19 mmol/L   Magnesium   Result Value Ref Range    Magnesium 1.40 (L) 1.60 - 3.10 mg/dL   TSH with reflex to Free T4 if abnormal   Result Value Ref Range    Thyroid Stimulating Hormone 2.31 0.27 - 4.20 mIU/L   Manual Differential   Result Value Ref  Range    Neutrophils %, Manual 87.0 40.0 - 80.0 %    Bands %, Manual 4.0 0.0 - 5.0 %    Lymphocytes %, Manual 1.0 13.0 - 44.0 %    Monocytes %, Manual 8.0 2.0 - 10.0 %    Eosinophils %, Manual 0.0 0.0 - 6.0 %    Basophils %, Manual 0.0 0.0 - 2.0 %    Seg Neutrophils Absolute, Manual 18.01 (H) 1.60 - 5.00 x10*3/uL    Bands Absolute, Manual 0.83 (H) 0.00 - 0.50 x10*3/uL    Lymphocytes Absolute, Manual 0.21 (L) 0.80 - 3.00 x10*3/uL    Monocytes Absolute, Manual 1.66 (H) 0.05 - 0.80 x10*3/uL    Eosinophils Absolute, Manual 0.00 0.00 - 0.40 x10*3/uL    Basophils Absolute, Manual 0.00 0.00 - 0.10 x10*3/uL    Total Cells Counted 100     Neutrophils Absolute, Manual 18.84 (H) 1.60 - 5.50 x10*3/uL    RBC Morphology No significant RBC morphology present    Osmolality   Result Value Ref Range    Osmolality, Serum 261 (L) 280 - 300 mOsm/kg   Sars-CoV-2 PCR   Result Value Ref Range    Coronavirus 2019, PCR Not Detected Not Detected   Urinalysis with Reflex Culture and Microscopic   Result Value Ref Range    Color, Urine Yellow Light-Yellow, Yellow, Dark-Yellow    Appearance, Urine Turbid (N) Clear    Specific Gravity, Urine 1.013 1.005 - 1.035    pH, Urine 6.0 5.0, 5.5, 6.0, 6.5, 7.0, 7.5, 8.0    Protein, Urine 100 (2+) (A) NEGATIVE, 10 (TRACE), 20 (TRACE) mg/dL    Glucose, Urine Normal Normal mg/dL    Blood, Urine 0.2 (2+) (A) NEGATIVE    Ketones, Urine NEGATIVE NEGATIVE mg/dL    Bilirubin, Urine NEGATIVE NEGATIVE    Urobilinogen, Urine Normal Normal mg/dL    Nitrite, Urine NEGATIVE NEGATIVE    Leukocyte Esterase, Urine 500 Chanel/µL (A) NEGATIVE   Microscopic Only, Urine   Result Value Ref Range    WBC, Urine >50 (A) 1-5, NONE /HPF    WBC Clumps, Urine MANY Reference range not established. /HPF    RBC, Urine 3-5 NONE, 1-2, 3-5 /HPF    Bacteria, Urine 4+ (A) NONE SEEN /HPF    Mucus, Urine FEW Reference range not established. /LPF   Urine Culture    Specimen: Clean Catch/Voided; Urine   Result Value Ref Range    Urine Culture  >100,000 Enteric bacilli (A)    Osmolality, urine   Result Value Ref Range    Osmolality, Urine Random 382 200 - 1,200 mOsm/kg   Basic Metabolic Panel   Result Value Ref Range    Glucose 81 65 - 99 mg/dL    Sodium 119 (LL) 133 - 145 mmol/L    Potassium 3.8 3.4 - 5.1 mmol/L    Chloride 84 (L) 97 - 107 mmol/L    Bicarbonate 25 24 - 31 mmol/L    Urea Nitrogen 44 (H) 8 - 25 mg/dL    Creatinine 1.50 0.40 - 1.60 mg/dL    eGFR 36 (L) >60 mL/min/1.73m*2    Calcium 9.0 8.5 - 10.4 mg/dL    Anion Gap 10 <=19 mmol/L   Basic Metabolic Panel   Result Value Ref Range    Glucose 89 65 - 99 mg/dL    Sodium 121 (L) 133 - 145 mmol/L    Potassium 3.6 3.4 - 5.1 mmol/L    Chloride 86 (L) 97 - 107 mmol/L    Bicarbonate 23 (L) 24 - 31 mmol/L    Urea Nitrogen 46 (H) 8 - 25 mg/dL    Creatinine 1.80 (H) 0.40 - 1.60 mg/dL    eGFR 29 (L) >60 mL/min/1.73m*2    Calcium 8.6 8.5 - 10.4 mg/dL    Anion Gap 12 <=19 mmol/L   Basic Metabolic Panel   Result Value Ref Range    Glucose 86 65 - 99 mg/dL    Sodium 123 (L) 133 - 145 mmol/L    Potassium 3.7 3.4 - 5.1 mmol/L    Chloride 88 (L) 97 - 107 mmol/L    Bicarbonate 22 (L) 24 - 31 mmol/L    Urea Nitrogen 48 (H) 8 - 25 mg/dL    Creatinine 1.90 (H) 0.40 - 1.60 mg/dL    eGFR 27 (L) >60 mL/min/1.73m*2    Calcium 9.0 8.5 - 10.4 mg/dL    Anion Gap 13 <=19 mmol/L   Basic Metabolic Panel   Result Value Ref Range    Glucose 87 65 - 99 mg/dL    Sodium 122 (L) 133 - 145 mmol/L    Potassium 3.7 3.4 - 5.1 mmol/L    Chloride 86 (L) 97 - 107 mmol/L    Bicarbonate 23 (L) 24 - 31 mmol/L    Urea Nitrogen 49 (H) 8 - 25 mg/dL    Creatinine 1.80 (H) 0.40 - 1.60 mg/dL    eGFR 29 (L) >60 mL/min/1.73m*2    Calcium 8.9 8.5 - 10.4 mg/dL    Anion Gap 13 <=19 mmol/L   Basic Metabolic Panel   Result Value Ref Range    Glucose 101 (H) 65 - 99 mg/dL    Sodium 123 (L) 133 - 145 mmol/L    Potassium 3.6 3.4 - 5.1 mmol/L    Chloride 88 (L) 97 - 107 mmol/L    Bicarbonate 23 (L) 24 - 31 mmol/L    Urea Nitrogen 54 (H) 8 - 25 mg/dL     Creatinine 2.00 (H) 0.40 - 1.60 mg/dL    eGFR 25 (L) >60 mL/min/1.73m*2    Calcium 8.8 8.5 - 10.4 mg/dL    Anion Gap 12 <=19 mmol/L   Magnesium   Result Value Ref Range    Magnesium 1.80 1.60 - 3.10 mg/dL   PST Top   Result Value Ref Range    Extra Tube Hold for add-ons.    Basic Metabolic Panel   Result Value Ref Range    Glucose 103 (H) 65 - 99 mg/dL    Sodium 122 (L) 133 - 145 mmol/L    Potassium 3.5 3.4 - 5.1 mmol/L    Chloride 87 (L) 97 - 107 mmol/L    Bicarbonate 24 24 - 31 mmol/L    Urea Nitrogen 54 (H) 8 - 25 mg/dL    Creatinine 2.10 (H) 0.40 - 1.60 mg/dL    eGFR 24 (L) >60 mL/min/1.73m*2    Calcium 8.6 8.5 - 10.4 mg/dL    Anion Gap 11 <=19 mmol/L   Renal function panel   Result Value Ref Range    Glucose 116 (H) 65 - 99 mg/dL    Sodium 123 (L) 133 - 145 mmol/L    Potassium 3.5 3.4 - 5.1 mmol/L    Chloride 89 (L) 97 - 107 mmol/L    Bicarbonate 24 24 - 31 mmol/L    Urea Nitrogen 60 (H) 8 - 25 mg/dL    Creatinine 2.60 (H) 0.40 - 1.60 mg/dL    eGFR 19 (L) >60 mL/min/1.73m*2    Calcium 8.3 (L) 8.5 - 10.4 mg/dL    Phosphorus 2.2 (L) 2.5 - 4.5 mg/dL    Albumin 2.7 (L) 3.5 - 5.0 g/dL    Anion Gap 10 <=19 mmol/L   Renal function panel   Result Value Ref Range    Glucose 128 (H) 65 - 99 mg/dL    Sodium 125 (L) 133 - 145 mmol/L    Potassium 3.6 3.4 - 5.1 mmol/L    Chloride 89 (L) 97 - 107 mmol/L    Bicarbonate 26 24 - 31 mmol/L    Urea Nitrogen 63 (H) 8 - 25 mg/dL    Creatinine 2.70 (H) 0.40 - 1.60 mg/dL    eGFR 18 (L) >60 mL/min/1.73m*2    Calcium 8.7 8.5 - 10.4 mg/dL    Phosphorus 2.3 (L) 2.5 - 4.5 mg/dL    Albumin 2.8 (L) 3.5 - 5.0 g/dL    Anion Gap 10 <=19 mmol/L   Renal function panel   Result Value Ref Range    Glucose 119 (H) 65 - 99 mg/dL    Sodium 123 (L) 133 - 145 mmol/L    Potassium 3.8 3.4 - 5.1 mmol/L    Chloride 90 (L) 97 - 107 mmol/L    Bicarbonate 24 24 - 31 mmol/L    Urea Nitrogen 65 (H) 8 - 25 mg/dL    Creatinine 2.80 (H) 0.40 - 1.60 mg/dL    eGFR 17 (L) >60 mL/min/1.73m*2    Calcium 8.4 (L) 8.5 -  10.4 mg/dL    Phosphorus 2.6 2.5 - 4.5 mg/dL    Albumin 2.8 (L) 3.5 - 5.0 g/dL    Anion Gap 9 <=19 mmol/L   Renal function panel   Result Value Ref Range    Glucose 113 (H) 65 - 99 mg/dL    Sodium 125 (L) 133 - 145 mmol/L    Potassium 3.8 3.4 - 5.1 mmol/L    Chloride 92 (L) 97 - 107 mmol/L    Bicarbonate 22 (L) 24 - 31 mmol/L    Urea Nitrogen 65 (H) 8 - 25 mg/dL    Creatinine 2.60 (H) 0.40 - 1.60 mg/dL    eGFR 19 (L) >60 mL/min/1.73m*2    Calcium 8.7 8.5 - 10.4 mg/dL    Phosphorus 2.6 2.5 - 4.5 mg/dL    Albumin 2.7 (L) 3.5 - 5.0 g/dL    Anion Gap 11 <=19 mmol/L   Renal function panel   Result Value Ref Range    Glucose 142 (H) 65 - 99 mg/dL    Sodium 124 (L) 133 - 145 mmol/L    Potassium 3.4 3.4 - 5.1 mmol/L    Chloride 91 (L) 97 - 107 mmol/L    Bicarbonate 23 (L) 24 - 31 mmol/L    Urea Nitrogen 66 (H) 8 - 25 mg/dL    Creatinine 2.70 (H) 0.40 - 1.60 mg/dL    eGFR 18 (L) >60 mL/min/1.73m*2    Calcium 8.6 8.5 - 10.4 mg/dL    Phosphorus 2.2 (L) 2.5 - 4.5 mg/dL    Albumin 2.6 (L) 3.5 - 5.0 g/dL    Anion Gap 10 <=19 mmol/L   Basic metabolic panel   Result Value Ref Range    Glucose 108 (H) 65 - 99 mg/dL    Sodium 127 (L) 133 - 145 mmol/L    Potassium 3.8 3.4 - 5.1 mmol/L    Chloride 94 (L) 97 - 107 mmol/L    Bicarbonate 23 (L) 24 - 31 mmol/L    Urea Nitrogen 72 (H) 8 - 25 mg/dL    Creatinine 2.40 (H) 0.40 - 1.60 mg/dL    eGFR 20 (L) >60 mL/min/1.73m*2    Calcium 8.9 8.5 - 10.4 mg/dL    Anion Gap 10 <=19 mmol/L   CBC   Result Value Ref Range    WBC 13.3 (H) 4.4 - 11.3 x10*3/uL    nRBC 0.0 0.0 - 0.0 /100 WBCs    RBC 3.54 (L) 4.00 - 5.20 x10*6/uL    Hemoglobin 11.1 (L) 12.0 - 16.0 g/dL    Hematocrit 32.4 (L) 36.0 - 46.0 %    MCV 92 80 - 100 fL    MCH 31.4 26.0 - 34.0 pg    MCHC 34.3 32.0 - 36.0 g/dL    RDW 13.2 11.5 - 14.5 %    Platelets 93 (L) 150 - 450 x10*3/uL         SIGNATURE: Gab Pompa MD PATIENT NAME: Angela Montelongo   DATE: September 1, 2024 MRN: [id number]   TIME: 10:22 AM PAGER: [phone]

## 2024-09-01 NOTE — PROGRESS NOTES
"Angela Montelongo is a 74 y.o. female on day 3 of admission presenting with Hyponatremia.    Subjective   Patient complaining of some back pain this morning.  No palpitations.       Objective     Physical Exam  Eyes:      Conjunctiva/sclera: Conjunctivae normal.   Cardiovascular:      Rate and Rhythm: Normal rate and regular rhythm.      Heart sounds:      No gallop.   Pulmonary:      Breath sounds: Normal breath sounds. No wheezing, rhonchi or rales.   Abdominal:      Palpations: Abdomen is soft.   Neurological:      General: No focal deficit present.      Mental Status: She is alert.       Constitutional:       Appearance: Not in distress.   Eyes:      Conjunctiva/sclera: Conjunctivae normal.   Neck:      Vascular: JVD normal.   Pulmonary:      Breath sounds: Normal breath sounds. No wheezing. No rhonchi. No rales.   Cardiovascular:      Normal rate. Regular rhythm.      Murmurs: There is no murmur.      No gallop.  No click. No rub.   Abdominal:      Palpations: Abdomen is soft.   Neurological:      General: No focal deficit present.      Mental Status: Alert.        Last Recorded Vitals  Blood pressure 141/57, pulse 76, temperature 36.2 °C (97.2 °F), temperature source Temporal, resp. rate 19, height 1.61 m (5' 3.39\"), weight 121 kg (267 lb 6.7 oz), SpO2 93%.  Intake/Output last 3 Shifts:  I/O last 3 completed shifts:  In: 2415 (19.9 mL/kg) [I.V.:2315 (19.1 mL/kg); IV Piggyback:100]  Out: 150 (1.2 mL/kg) [Urine:150 (0 mL/kg/hr)]  Weight: 121.3 kg     Relevant Results                   Results for orders placed or performed during the hospital encounter of 08/29/24 (from the past 24 hour(s))   Renal function panel   Result Value Ref Range    Glucose 113 (H) 65 - 99 mg/dL    Sodium 125 (L) 133 - 145 mmol/L    Potassium 3.8 3.4 - 5.1 mmol/L    Chloride 92 (L) 97 - 107 mmol/L    Bicarbonate 22 (L) 24 - 31 mmol/L    Urea Nitrogen 65 (H) 8 - 25 mg/dL    Creatinine 2.60 (H) 0.40 - 1.60 mg/dL    eGFR 19 (L) >60 " mL/min/1.73m*2    Calcium 8.7 8.5 - 10.4 mg/dL    Phosphorus 2.6 2.5 - 4.5 mg/dL    Albumin 2.7 (L) 3.5 - 5.0 g/dL    Anion Gap 11 <=19 mmol/L   Renal function panel   Result Value Ref Range    Glucose 142 (H) 65 - 99 mg/dL    Sodium 124 (L) 133 - 145 mmol/L    Potassium 3.4 3.4 - 5.1 mmol/L    Chloride 91 (L) 97 - 107 mmol/L    Bicarbonate 23 (L) 24 - 31 mmol/L    Urea Nitrogen 66 (H) 8 - 25 mg/dL    Creatinine 2.70 (H) 0.40 - 1.60 mg/dL    eGFR 18 (L) >60 mL/min/1.73m*2    Calcium 8.6 8.5 - 10.4 mg/dL    Phosphorus 2.2 (L) 2.5 - 4.5 mg/dL    Albumin 2.6 (L) 3.5 - 5.0 g/dL    Anion Gap 10 <=19 mmol/L               Assessment/Plan   Assessment & Plan  Hyponatremia    Cardiac arrhythmia  Acute on chronic kidney failure  Primary hypertension  Hyponatremia  Hyperlipidemia     8/31: Reviewed telemetry and EKGs and the patient does have frequent PACs but I do not see evidence of a Mobitz 2 heart block.  At this point we will discontinue the atenolol and monitor her cardiac rhythm on telemetry.  Dr. Faisal Shah is seeing the patient from a nephrology standpoint and is placed her diuretic therapy on hold and hopefully her serum sodium will begin to improve.  Thank for consultation we will follow with you    9/1: Patient clinically stable.  No palpitations or anginal type chest discomfort.  Reviewed telemetry once again and see frequent PACs but no evidence of an AV block no significant dysrhythmia.  She does have frequent PACs but remains asymptomatic thus would not add any therapy.  Sodium remains decreased at 124 on morning labs.  Dr. Juares is following.  At this point no active cardiac issues.  We will sign off and follow peripherally.  Please call us back with any cardiology questions or concerns.       I spent 18 minutes in the professional and overall care of this patient.      Kavin Deluca,

## 2024-09-01 NOTE — CARE PLAN
Problem: Skin  Goal: Participates in plan/prevention/treatment measures  Outcome: Progressing  Flowsheets (Taken 8/31/2024 2032)  Participates in plan/prevention/treatment measures:   Discuss with provider PT/OT consult   Elevate heels   Increase activity/out of bed for meals     Problem: Pain - Adult  Goal: Verbalizes/displays adequate comfort level or baseline comfort level  Outcome: Progressing     Problem: Safety - Adult  Goal: Free from fall injury  Outcome: Progressing     Problem: Discharge Planning  Goal: Discharge to home or other facility with appropriate resources  Outcome: Progressing     Problem: Chronic Conditions and Co-morbidities  Goal: Patient's chronic conditions and co-morbidity symptoms are monitored and maintained or improved  Outcome: Progressing   The patient's goals for the shift include get some sleep    The clinical goals for the shift include remain hemodynamically stable and patient safety    Over the shift, the patient did not make progress toward the following goals. Barriers to progression include . Recommendations to address these barriers include .

## 2024-09-01 NOTE — PROGRESS NOTES
Angela Montelongo is a 74 y.o. female on day 3 of admission presenting with Hyponatremia.      Subjective   Doing well today except for discomfort in the back again.  No events overnight.       Objective     Last Recorded Vitals  /57   Pulse 76   Temp 36.2 °C (97.2 °F) (Temporal)   Resp 19   Wt 121 kg (267 lb 6.7 oz)   SpO2 93%   Intake/Output last 3 Shifts:    Intake/Output Summary (Last 24 hours) at 9/1/2024 1126  Last data filed at 8/31/2024 1720  Gross per 24 hour   Intake 525 ml   Output --   Net 525 ml       Admission Weight  Weight: 125 kg (275 lb) (08/29/24 1451)    Daily Weight  08/31/24 : 121 kg (267 lb 6.7 oz)    Image Results  US renal complete  Narrative: Interpreted By:  Ruben Morfin,   STUDY:  US RENAL COMPLETE; 8/31/2024 5:09 pm      INDICATION:  Signs/Symptoms:lisa.      COMPARISON:  None      ACCESSION NUMBER(S):  [id number]      ORDERING CLINICIAN:  ALVINO HUMPHRIES      TECHNIQUE:  Grayscale and color Doppler imaging of the kidneys      FINDINGS:  COMMENTS: Exam is technically limited due to the patient's body  habitus and lack of mobility.      The right kidney measures 10.3 cm  .  The left kidney measures 10.2 cm  .          No renal stones are identified.  The renal cortical thickness and  echogenicity is normal.  No hydronephrosis is identified.      Urinary bladder is nonspecific in appearance with no stones or masses  identified.      Incidental note is made of a 1.5 cm cyst involving the caudal aspect  of the right lobe of the liver noted on CT imaging as well.      Impression: Non specific appearance of the kidneys as described.      MACRO:  none      Signed by: Ruben Morfin 9/1/2024 10:22 AM  Dictation workstation:   [id number]      Physical Exam  Generally no acute distress, complexion looks much better, patient sitting up in bed eating breakfast interacting much better today  HEENT PERRL EOMI  Cardiovascular S1-S2 regular rate rhythm  Lungs anterior clear but slightly  diminished  Abdomen bowel sounds present nontender  Extremities no clubbing cyanosis or pitting edema  Relevant Results  Scheduled medications  [Held by provider] atenolol, 50 mg, oral, Daily  cefTRIAXone, 1 g, intravenous, q24h  gabapentin, 300 mg, oral, Daily  heparin, 5,000 Units, subcutaneous, q12h  lidocaine, 1 patch, transdermal, Daily  pantoprazole, 20 mg, oral, Daily before breakfast  simvastatin, 40 mg, oral, Nightly      Continuous medications       PRN medications  PRN medications: acetaminophen, ondansetron, tiZANidine  Results for orders placed or performed during the hospital encounter of 08/29/24 (from the past 24 hour(s))   Renal function panel   Result Value Ref Range    Glucose 142 (H) 65 - 99 mg/dL    Sodium 124 (L) 133 - 145 mmol/L    Potassium 3.4 3.4 - 5.1 mmol/L    Chloride 91 (L) 97 - 107 mmol/L    Bicarbonate 23 (L) 24 - 31 mmol/L    Urea Nitrogen 66 (H) 8 - 25 mg/dL    Creatinine 2.70 (H) 0.40 - 1.60 mg/dL    eGFR 18 (L) >60 mL/min/1.73m*2    Calcium 8.6 8.5 - 10.4 mg/dL    Phosphorus 2.2 (L) 2.5 - 4.5 mg/dL    Albumin 2.6 (L) 3.5 - 5.0 g/dL    Anion Gap 10 <=19 mmol/L   Basic metabolic panel   Result Value Ref Range    Glucose 108 (H) 65 - 99 mg/dL    Sodium 127 (L) 133 - 145 mmol/L    Potassium 3.8 3.4 - 5.1 mmol/L    Chloride 94 (L) 97 - 107 mmol/L    Bicarbonate 23 (L) 24 - 31 mmol/L    Urea Nitrogen 72 (H) 8 - 25 mg/dL    Creatinine 2.40 (H) 0.40 - 1.60 mg/dL    eGFR 20 (L) >60 mL/min/1.73m*2    Calcium 8.9 8.5 - 10.4 mg/dL    Anion Gap 10 <=19 mmol/L   CBC   Result Value Ref Range    WBC 13.3 (H) 4.4 - 11.3 x10*3/uL    nRBC 0.0 0.0 - 0.0 /100 WBCs    RBC 3.54 (L) 4.00 - 5.20 x10*6/uL    Hemoglobin 11.1 (L) 12.0 - 16.0 g/dL    Hematocrit 32.4 (L) 36.0 - 46.0 %    MCV 92 80 - 100 fL    MCH 31.4 26.0 - 34.0 pg    MCHC 34.3 32.0 - 36.0 g/dL    RDW 13.2 11.5 - 14.5 %    Platelets 93 (L) 150 - 450 x10*3/uL     Impression: 74-year-old woman being admitted with hyponatremia and urinary tract  infection.     Plan:     1.  Hyponatremia and acute renal failure  -Improving on both fronts, appreciate nephrology input today.    2.  UTI  -Urine cultures showing greater than 100,000 enteric bacilli, still pending on speciation  -Continue with ceftriaxone     3.  Hypertension  -Holding blood pressure meds due to #4, #1, may need to start Norvasc, continue to monitor.    4.  Arrhythmia  -Appreciate cardiology input, patient with PACs not Mobitz type II.  Continue holding beta-blocker.    Transfer to regular floor today.     DVT prophylaxis  Full code           Assessment/Plan                  Assessment & Plan  Hyponatremia                  Oscar Hernandez MD

## 2024-09-02 PROBLEM — R53.1 WEAKNESS: Status: ACTIVE | Noted: 2024-09-02

## 2024-09-02 PROBLEM — R00.1 BRADYCARDIA: Status: ACTIVE | Noted: 2024-09-02

## 2024-09-02 PROBLEM — N30.00 ACUTE CYSTITIS WITHOUT HEMATURIA: Status: ACTIVE | Noted: 2024-09-02

## 2024-09-02 PROBLEM — N17.9 AKI (ACUTE KIDNEY INJURY) (CMS-HCC): Status: ACTIVE | Noted: 2024-09-02

## 2024-09-02 LAB
ANION GAP SERPL CALC-SCNC: 10 MMOL/L
BUN SERPL-MCNC: 71 MG/DL (ref 8–25)
CALCIUM SERPL-MCNC: 9 MG/DL (ref 8.5–10.4)
CHLORIDE SERPL-SCNC: 96 MMOL/L (ref 97–107)
CO2 SERPL-SCNC: 25 MMOL/L (ref 24–31)
CREAT SERPL-MCNC: 2.2 MG/DL (ref 0.4–1.6)
EGFRCR SERPLBLD CKD-EPI 2021: 23 ML/MIN/1.73M*2
GLUCOSE SERPL-MCNC: 114 MG/DL (ref 65–99)
POTASSIUM SERPL-SCNC: 4 MMOL/L (ref 3.4–5.1)
SODIUM SERPL-SCNC: 131 MMOL/L (ref 133–145)

## 2024-09-02 PROCEDURE — 1100000001 HC PRIVATE ROOM DAILY

## 2024-09-02 PROCEDURE — 2500000004 HC RX 250 GENERAL PHARMACY W/ HCPCS (ALT 636 FOR OP/ED): Performed by: HOSPITALIST

## 2024-09-02 PROCEDURE — 2500000004 HC RX 250 GENERAL PHARMACY W/ HCPCS (ALT 636 FOR OP/ED): Performed by: INTERNAL MEDICINE

## 2024-09-02 PROCEDURE — 2500000001 HC RX 250 WO HCPCS SELF ADMINISTERED DRUGS (ALT 637 FOR MEDICARE OP): Performed by: INTERNAL MEDICINE

## 2024-09-02 PROCEDURE — 36415 COLL VENOUS BLD VENIPUNCTURE: CPT | Performed by: INTERNAL MEDICINE

## 2024-09-02 PROCEDURE — 2500000001 HC RX 250 WO HCPCS SELF ADMINISTERED DRUGS (ALT 637 FOR MEDICARE OP): Performed by: HOSPITALIST

## 2024-09-02 PROCEDURE — 2500000002 HC RX 250 W HCPCS SELF ADMINISTERED DRUGS (ALT 637 FOR MEDICARE OP, ALT 636 FOR OP/ED): Performed by: INTERNAL MEDICINE

## 2024-09-02 PROCEDURE — 80048 BASIC METABOLIC PNL TOTAL CA: CPT | Performed by: INTERNAL MEDICINE

## 2024-09-02 RX ORDER — PETROLATUM 420 MG/G
OINTMENT TOPICAL
Status: DISCONTINUED | OUTPATIENT
Start: 2024-09-02 | End: 2024-09-04 | Stop reason: HOSPADM

## 2024-09-02 ASSESSMENT — COGNITIVE AND FUNCTIONAL STATUS - GENERAL
WALKING IN HOSPITAL ROOM: TOTAL
HELP NEEDED FOR BATHING: A LITTLE
MOVING TO AND FROM BED TO CHAIR: A LOT
DAILY ACTIVITIY SCORE: 19
TOILETING: A LOT
DRESSING REGULAR LOWER BODY CLOTHING: A LITTLE
CLIMB 3 TO 5 STEPS WITH RAILING: TOTAL
TURNING FROM BACK TO SIDE WHILE IN FLAT BAD: A LOT
STANDING UP FROM CHAIR USING ARMS: A LOT
MOBILITY SCORE: 10
MOVING FROM LYING ON BACK TO SITTING ON SIDE OF FLAT BED WITH BEDRAILS: A LOT
DRESSING REGULAR UPPER BODY CLOTHING: A LITTLE

## 2024-09-02 ASSESSMENT — PAIN - FUNCTIONAL ASSESSMENT: PAIN_FUNCTIONAL_ASSESSMENT: UNABLE TO SELF-REPORT

## 2024-09-02 ASSESSMENT — PAIN DESCRIPTION - ORIENTATION
ORIENTATION: LOWER
ORIENTATION: LOWER

## 2024-09-02 ASSESSMENT — PAIN SCALES - GENERAL
PAINLEVEL_OUTOF10: 8
PAINLEVEL_OUTOF10: 0 - NO PAIN
PAINLEVEL_OUTOF10: 3

## 2024-09-02 ASSESSMENT — PAIN DESCRIPTION - LOCATION
LOCATION: BACK
LOCATION: BACK

## 2024-09-02 NOTE — CARE PLAN
The patient's goals for the shift include get some sleep    The clinical goals for the shift include maintain fluid restriction of 1200ml /24hrs, remain free from falls      Problem: Skin  Goal: Participates in plan/prevention/treatment measures  Outcome: Progressing  Flowsheets (Taken 9/1/2024 2108)  Participates in plan/prevention/treatment measures:   Elevate heels   Increase activity/out of bed for meals     Problem: Pain - Adult  Goal: Verbalizes/displays adequate comfort level or baseline comfort level  Outcome: Progressing     Problem: Safety - Adult  Goal: Free from fall injury  Outcome: Progressing     Problem: Discharge Planning  Goal: Discharge to home or other facility with appropriate resources  Outcome: Progressing     Problem: Chronic Conditions and Co-morbidities  Goal: Patient's chronic conditions and co-morbidity symptoms are monitored and maintained or improved  Outcome: Progressing

## 2024-09-02 NOTE — PROGRESS NOTES
Angela Montelongo is a 74 y.o. female on day 4 of admission presenting with Hyponatremia.      Subjective   Patient reports she feels okay. Denies any pain other than her buttocks. Says she is extremely weak. She is happy that she is now on regular diet without fluid restriction.       Objective     Last Recorded Vitals  /87 (BP Location: Right arm, Patient Position: Lying)   Pulse 75   Temp 36.5 °C (97.7 °F) (Temporal)   Resp 17   Wt 121 kg (267 lb 6.7 oz)   SpO2 94%   Intake/Output last 3 Shifts:    Intake/Output Summary (Last 24 hours) at 9/2/2024 1202  Last data filed at 9/2/2024 1026  Gross per 24 hour   Intake 850 ml   Output 700 ml   Net 150 ml       Admission Weight  Weight: 125 kg (275 lb) (08/29/24 1451)    Daily Weight  08/31/24 : 121 kg (267 lb 6.7 oz)    Image Results  US renal complete  Narrative: Interpreted By:  Ruben oMrfin,   STUDY:  US RENAL COMPLETE; 8/31/2024 5:09 pm      INDICATION:  Signs/Symptoms:lisa.      COMPARISON:  None      ACCESSION NUMBER(S):  [id number]      ORDERING CLINICIAN:  ALVINO HUMPHRIES      TECHNIQUE:  Grayscale and color Doppler imaging of the kidneys      FINDINGS:  COMMENTS: Exam is technically limited due to the patient's body  habitus and lack of mobility.      The right kidney measures 10.3 cm  .  The left kidney measures 10.2 cm  .          No renal stones are identified.  The renal cortical thickness and  echogenicity is normal.  No hydronephrosis is identified.      Urinary bladder is nonspecific in appearance with no stones or masses  identified.      Incidental note is made of a 1.5 cm cyst involving the caudal aspect  of the right lobe of the liver noted on CT imaging as well.      Impression: Non specific appearance of the kidneys as described.      MACRO:  none      Signed by: Ruben Morfin 9/1/2024 10:22 AM  Dictation workstation:   [id number]      Physical Exam  General: alert, no diaphoresis   HENT: mucous membranes moist, external ears  normal, no rhinorrhea   Eyes: no icterus or injection, no discharge   Lungs: CTA BL   Heart: RRR, no LE edema BL   GI: abdomen soft, nontender, nondistended, BS present   MSK: no joint effusion or deformity   Skin: no rashes, erythema, or ecchymosis   Neuro: grossly normal cognition, motor strength, sensation      Relevant Results               Assessment/Plan                  Assessment & Plan  Hyponatremia  resolved  CELINA (acute kidney injury) (CMS-[facility])  Improving  Dr. Pompa following  Acute cystitis without hematuria  2 doses of rocephin left- she is sensitive to it  Weakness  Profound weakness, took 3 person assist to get in chair today. Consult PT/OT  Discharge planning, case management  Bradycardia  Intermittent bradycardia-- saw and signed off      DVT ppx            Tia Kim DO

## 2024-09-02 NOTE — CARE PLAN
Problem: Skin  Goal: Participates in plan/prevention/treatment measures  9/2/2024 1118 by Amy Zavaleta, TIMOTHY  Outcome: Progressing  9/2/2024 1117 by Amy Zavaleta RN  Outcome: Progressing  9/2/2024 1112 by Amy Zavaleta RN  Outcome: Progressing  9/2/2024 1108 by Amy Zavaleta, TIMOTHY  Outcome: Progressing   The patient's goals for the shift include get some sleep    The clinical goals for the shift include safety, monitor vs and labs

## 2024-09-02 NOTE — PROGRESS NOTES
CONSULT PROGRESS NOTES    SERVICE DATE: 9/2/2024   SERVICE TIME: 10:06 AM    CONSULTING SERVICE: Nephrology    ASSESSMENT AND PLAN   74-year-old woman with gout and osteoarthritis admitted with hyponatremia and acute renal failure.  1.  Acute renal failure  2.  Hyponatremia  3.  Hypokalemia  4.  Essential hypertension     Initially believed the acute renal failure from a combination of triamterene, hydrochlorothiazide, and meloxicam.  Serum creatinine now has turned the corner at this point.  No need for IV fluids.  She had largely unremarkable renal imaging.    The hyponatremia is from hydrochlorothiazide.  This is an acute on chronic picture.  I would have her permanently discontinue hydrochlorothiazide in the outpatient setting.  The sodium is now improving.  I do not think she needs a fluid restriction.  It sounds like she had angioedema from lisinopril.  We can manage her blood pressure with a calcium channel blocker if needed.  Right now her blood pressure is stable.  As needed potassium replacement.  She requires daily labs and supportive care.  I will follow this patient.        Case discussed with Dr. Kim.  I would consider de-escalation of her antibiotic therapy at this point.    SUBJECTIVE  INTERVAL HPI: She has a host of complaints.  She complains about nursing not lifting her up and she is essentially bedbound.  She has not had physical or Occupational Therapy evaluate her.  She would like to be moved to restore blood flow in her legs.  She complains of continued back pain.  She is anxious about her muscle relaxer dosing.  She denies any chest pain.  She says she is forgetting to breathe.  She is fatigued all the time.  She is anxious also about her fluid restriction.    MEDICATIONS:  [Held by provider] atenolol, 50 mg, oral, Daily  cefTRIAXone, 1 g, intravenous, q24h  gabapentin, 300 mg, oral, Daily  heparin, 5,000 Units, subcutaneous, q12h  lidocaine, 1 patch, transdermal, Daily  pantoprazole,  20 mg, oral, Daily before breakfast  simvastatin, 40 mg, oral, Nightly             PRN medications: acetaminophen, ondansetron, tiZANidine     OBJECTIVE  PHYSICAL EXAM:   Heart Rate:  []   Temp:  [35.8 °C (96.4 °F)-37.1 °C (98.8 °F)]   Resp:  [16-20]   BP: ()/(40-90)   SpO2:  [92 %-95 %]   Body mass index is 46.8 kg/m².  This is a morbidly obese white woman who appears in no acute distress  No obvious skin rashes  Hearing intact  Phonation intact  Moist mucosa  Irregular heart rate  Lungs are clear to auscultation bilaterally  Abdomen is soft, nondistended, nontender, positive bowel sounds  No Dee catheter in place, no suprapubic tenderness to palpation  No extremity edema  Moves 4 limbs spontaneously  No obvious joint deformities  No lymphadenopathy    DATA:   Labs:  Results for orders placed or performed during the hospital encounter of 08/29/24 (from the past 96 hour(s))   CBC and Auto Differential   Result Value Ref Range    WBC 20.7 (H) 4.4 - 11.3 x10*3/uL    nRBC 0.0 0.0 - 0.0 /100 WBCs    RBC 3.95 (L) 4.00 - 5.20 x10*6/uL    Hemoglobin 12.4 12.0 - 16.0 g/dL    Hematocrit 36.8 36.0 - 46.0 %    MCV 93 80 - 100 fL    MCH 31.4 26.0 - 34.0 pg    MCHC 33.7 32.0 - 36.0 g/dL    RDW 13.2 11.5 - 14.5 %    Platelets 100 (L) 150 - 450 x10*3/uL    Immature Granulocytes %, Automated 1.5 (H) 0.0 - 0.9 %    Immature Granulocytes Absolute, Automated 0.31 0.00 - 0.50 x10*3/uL   Comprehensive metabolic panel   Result Value Ref Range    Glucose 80 65 - 99 mg/dL    Sodium 118 (LL) 133 - 145 mmol/L    Potassium 3.9 3.4 - 5.1 mmol/L    Chloride 82 (L) 97 - 107 mmol/L    Bicarbonate 26 24 - 31 mmol/L    Urea Nitrogen 46 (H) 8 - 25 mg/dL    Creatinine 1.70 (H) 0.40 - 1.60 mg/dL    eGFR 31 (L) >60 mL/min/1.73m*2    Calcium 9.1 8.5 - 10.4 mg/dL    Albumin 3.5 3.5 - 5.0 g/dL    Alkaline Phosphatase 126 (H) 35 - 125 U/L    Total Protein 6.2 5.9 - 7.9 g/dL    AST 41 (H) 5 - 40 U/L    Bilirubin, Total 0.8 0.1 - 1.2 mg/dL     ALT 20 5 - 40 U/L    Anion Gap 10 <=19 mmol/L   Magnesium   Result Value Ref Range    Magnesium 1.40 (L) 1.60 - 3.10 mg/dL   TSH with reflex to Free T4 if abnormal   Result Value Ref Range    Thyroid Stimulating Hormone 2.31 0.27 - 4.20 mIU/L   Manual Differential   Result Value Ref Range    Neutrophils %, Manual 87.0 40.0 - 80.0 %    Bands %, Manual 4.0 0.0 - 5.0 %    Lymphocytes %, Manual 1.0 13.0 - 44.0 %    Monocytes %, Manual 8.0 2.0 - 10.0 %    Eosinophils %, Manual 0.0 0.0 - 6.0 %    Basophils %, Manual 0.0 0.0 - 2.0 %    Seg Neutrophils Absolute, Manual 18.01 (H) 1.60 - 5.00 x10*3/uL    Bands Absolute, Manual 0.83 (H) 0.00 - 0.50 x10*3/uL    Lymphocytes Absolute, Manual 0.21 (L) 0.80 - 3.00 x10*3/uL    Monocytes Absolute, Manual 1.66 (H) 0.05 - 0.80 x10*3/uL    Eosinophils Absolute, Manual 0.00 0.00 - 0.40 x10*3/uL    Basophils Absolute, Manual 0.00 0.00 - 0.10 x10*3/uL    Total Cells Counted 100     Neutrophils Absolute, Manual 18.84 (H) 1.60 - 5.50 x10*3/uL    RBC Morphology No significant RBC morphology present    Osmolality   Result Value Ref Range    Osmolality, Serum 261 (L) 280 - 300 mOsm/kg   Sars-CoV-2 PCR   Result Value Ref Range    Coronavirus 2019, PCR Not Detected Not Detected   Urinalysis with Reflex Culture and Microscopic   Result Value Ref Range    Color, Urine Yellow Light-Yellow, Yellow, Dark-Yellow    Appearance, Urine Turbid (N) Clear    Specific Gravity, Urine 1.013 1.005 - 1.035    pH, Urine 6.0 5.0, 5.5, 6.0, 6.5, 7.0, 7.5, 8.0    Protein, Urine 100 (2+) (A) NEGATIVE, 10 (TRACE), 20 (TRACE) mg/dL    Glucose, Urine Normal Normal mg/dL    Blood, Urine 0.2 (2+) (A) NEGATIVE    Ketones, Urine NEGATIVE NEGATIVE mg/dL    Bilirubin, Urine NEGATIVE NEGATIVE    Urobilinogen, Urine Normal Normal mg/dL    Nitrite, Urine NEGATIVE NEGATIVE    Leukocyte Esterase, Urine 500 Chanel/µL (A) NEGATIVE   Microscopic Only, Urine   Result Value Ref Range    WBC, Urine >50 (A) 1-5, NONE /HPF    WBC Clumps,  Urine MANY Reference range not established. /HPF    RBC, Urine 3-5 NONE, 1-2, 3-5 /HPF    Bacteria, Urine 4+ (A) NONE SEEN /HPF    Mucus, Urine FEW Reference range not established. /LPF   Urine Culture    Specimen: Clean Catch/Voided; Urine   Result Value Ref Range    Urine Culture >100,000 Escherichia coli (A)        Susceptibility    Escherichia coli - MICROSCAN     Ampicillin  Susceptible ug/mL     Cefazolin  Susceptible ug/mL     Cefazolin (uncomplicated UTIs only)  Susceptible ug/mL     Ciprofloxacin  Susceptible ug/mL     Gentamicin  Susceptible ug/mL     Nitrofurantoin  Susceptible ug/mL     Piperacillin/Tazobactam  Susceptible ug/mL     Trimethoprim/Sulfamethoxazole  Susceptible ug/mL   Osmolality, urine   Result Value Ref Range    Osmolality, Urine Random 382 200 - 1,200 mOsm/kg   Basic Metabolic Panel   Result Value Ref Range    Glucose 81 65 - 99 mg/dL    Sodium 119 (LL) 133 - 145 mmol/L    Potassium 3.8 3.4 - 5.1 mmol/L    Chloride 84 (L) 97 - 107 mmol/L    Bicarbonate 25 24 - 31 mmol/L    Urea Nitrogen 44 (H) 8 - 25 mg/dL    Creatinine 1.50 0.40 - 1.60 mg/dL    eGFR 36 (L) >60 mL/min/1.73m*2    Calcium 9.0 8.5 - 10.4 mg/dL    Anion Gap 10 <=19 mmol/L   Basic Metabolic Panel   Result Value Ref Range    Glucose 89 65 - 99 mg/dL    Sodium 121 (L) 133 - 145 mmol/L    Potassium 3.6 3.4 - 5.1 mmol/L    Chloride 86 (L) 97 - 107 mmol/L    Bicarbonate 23 (L) 24 - 31 mmol/L    Urea Nitrogen 46 (H) 8 - 25 mg/dL    Creatinine 1.80 (H) 0.40 - 1.60 mg/dL    eGFR 29 (L) >60 mL/min/1.73m*2    Calcium 8.6 8.5 - 10.4 mg/dL    Anion Gap 12 <=19 mmol/L   Basic Metabolic Panel   Result Value Ref Range    Glucose 86 65 - 99 mg/dL    Sodium 123 (L) 133 - 145 mmol/L    Potassium 3.7 3.4 - 5.1 mmol/L    Chloride 88 (L) 97 - 107 mmol/L    Bicarbonate 22 (L) 24 - 31 mmol/L    Urea Nitrogen 48 (H) 8 - 25 mg/dL    Creatinine 1.90 (H) 0.40 - 1.60 mg/dL    eGFR 27 (L) >60 mL/min/1.73m*2    Calcium 9.0 8.5 - 10.4 mg/dL    Anion Gap 13  <=19 mmol/L   Basic Metabolic Panel   Result Value Ref Range    Glucose 87 65 - 99 mg/dL    Sodium 122 (L) 133 - 145 mmol/L    Potassium 3.7 3.4 - 5.1 mmol/L    Chloride 86 (L) 97 - 107 mmol/L    Bicarbonate 23 (L) 24 - 31 mmol/L    Urea Nitrogen 49 (H) 8 - 25 mg/dL    Creatinine 1.80 (H) 0.40 - 1.60 mg/dL    eGFR 29 (L) >60 mL/min/1.73m*2    Calcium 8.9 8.5 - 10.4 mg/dL    Anion Gap 13 <=19 mmol/L   Basic Metabolic Panel   Result Value Ref Range    Glucose 101 (H) 65 - 99 mg/dL    Sodium 123 (L) 133 - 145 mmol/L    Potassium 3.6 3.4 - 5.1 mmol/L    Chloride 88 (L) 97 - 107 mmol/L    Bicarbonate 23 (L) 24 - 31 mmol/L    Urea Nitrogen 54 (H) 8 - 25 mg/dL    Creatinine 2.00 (H) 0.40 - 1.60 mg/dL    eGFR 25 (L) >60 mL/min/1.73m*2    Calcium 8.8 8.5 - 10.4 mg/dL    Anion Gap 12 <=19 mmol/L   Magnesium   Result Value Ref Range    Magnesium 1.80 1.60 - 3.10 mg/dL   PST Top   Result Value Ref Range    Extra Tube Hold for add-ons.    Basic Metabolic Panel   Result Value Ref Range    Glucose 103 (H) 65 - 99 mg/dL    Sodium 122 (L) 133 - 145 mmol/L    Potassium 3.5 3.4 - 5.1 mmol/L    Chloride 87 (L) 97 - 107 mmol/L    Bicarbonate 24 24 - 31 mmol/L    Urea Nitrogen 54 (H) 8 - 25 mg/dL    Creatinine 2.10 (H) 0.40 - 1.60 mg/dL    eGFR 24 (L) >60 mL/min/1.73m*2    Calcium 8.6 8.5 - 10.4 mg/dL    Anion Gap 11 <=19 mmol/L   Renal function panel   Result Value Ref Range    Glucose 116 (H) 65 - 99 mg/dL    Sodium 123 (L) 133 - 145 mmol/L    Potassium 3.5 3.4 - 5.1 mmol/L    Chloride 89 (L) 97 - 107 mmol/L    Bicarbonate 24 24 - 31 mmol/L    Urea Nitrogen 60 (H) 8 - 25 mg/dL    Creatinine 2.60 (H) 0.40 - 1.60 mg/dL    eGFR 19 (L) >60 mL/min/1.73m*2    Calcium 8.3 (L) 8.5 - 10.4 mg/dL    Phosphorus 2.2 (L) 2.5 - 4.5 mg/dL    Albumin 2.7 (L) 3.5 - 5.0 g/dL    Anion Gap 10 <=19 mmol/L   Renal function panel   Result Value Ref Range    Glucose 128 (H) 65 - 99 mg/dL    Sodium 125 (L) 133 - 145 mmol/L    Potassium 3.6 3.4 - 5.1 mmol/L     Chloride 89 (L) 97 - 107 mmol/L    Bicarbonate 26 24 - 31 mmol/L    Urea Nitrogen 63 (H) 8 - 25 mg/dL    Creatinine 2.70 (H) 0.40 - 1.60 mg/dL    eGFR 18 (L) >60 mL/min/1.73m*2    Calcium 8.7 8.5 - 10.4 mg/dL    Phosphorus 2.3 (L) 2.5 - 4.5 mg/dL    Albumin 2.8 (L) 3.5 - 5.0 g/dL    Anion Gap 10 <=19 mmol/L   Renal function panel   Result Value Ref Range    Glucose 119 (H) 65 - 99 mg/dL    Sodium 123 (L) 133 - 145 mmol/L    Potassium 3.8 3.4 - 5.1 mmol/L    Chloride 90 (L) 97 - 107 mmol/L    Bicarbonate 24 24 - 31 mmol/L    Urea Nitrogen 65 (H) 8 - 25 mg/dL    Creatinine 2.80 (H) 0.40 - 1.60 mg/dL    eGFR 17 (L) >60 mL/min/1.73m*2    Calcium 8.4 (L) 8.5 - 10.4 mg/dL    Phosphorus 2.6 2.5 - 4.5 mg/dL    Albumin 2.8 (L) 3.5 - 5.0 g/dL    Anion Gap 9 <=19 mmol/L   Renal function panel   Result Value Ref Range    Glucose 113 (H) 65 - 99 mg/dL    Sodium 125 (L) 133 - 145 mmol/L    Potassium 3.8 3.4 - 5.1 mmol/L    Chloride 92 (L) 97 - 107 mmol/L    Bicarbonate 22 (L) 24 - 31 mmol/L    Urea Nitrogen 65 (H) 8 - 25 mg/dL    Creatinine 2.60 (H) 0.40 - 1.60 mg/dL    eGFR 19 (L) >60 mL/min/1.73m*2    Calcium 8.7 8.5 - 10.4 mg/dL    Phosphorus 2.6 2.5 - 4.5 mg/dL    Albumin 2.7 (L) 3.5 - 5.0 g/dL    Anion Gap 11 <=19 mmol/L   Renal function panel   Result Value Ref Range    Glucose 142 (H) 65 - 99 mg/dL    Sodium 124 (L) 133 - 145 mmol/L    Potassium 3.4 3.4 - 5.1 mmol/L    Chloride 91 (L) 97 - 107 mmol/L    Bicarbonate 23 (L) 24 - 31 mmol/L    Urea Nitrogen 66 (H) 8 - 25 mg/dL    Creatinine 2.70 (H) 0.40 - 1.60 mg/dL    eGFR 18 (L) >60 mL/min/1.73m*2    Calcium 8.6 8.5 - 10.4 mg/dL    Phosphorus 2.2 (L) 2.5 - 4.5 mg/dL    Albumin 2.6 (L) 3.5 - 5.0 g/dL    Anion Gap 10 <=19 mmol/L   Basic metabolic panel   Result Value Ref Range    Glucose 108 (H) 65 - 99 mg/dL    Sodium 127 (L) 133 - 145 mmol/L    Potassium 3.8 3.4 - 5.1 mmol/L    Chloride 94 (L) 97 - 107 mmol/L    Bicarbonate 23 (L) 24 - 31 mmol/L    Urea Nitrogen 72 (H) 8 -  25 mg/dL    Creatinine 2.40 (H) 0.40 - 1.60 mg/dL    eGFR 20 (L) >60 mL/min/1.73m*2    Calcium 8.9 8.5 - 10.4 mg/dL    Anion Gap 10 <=19 mmol/L   CBC   Result Value Ref Range    WBC 13.3 (H) 4.4 - 11.3 x10*3/uL    nRBC 0.0 0.0 - 0.0 /100 WBCs    RBC 3.54 (L) 4.00 - 5.20 x10*6/uL    Hemoglobin 11.1 (L) 12.0 - 16.0 g/dL    Hematocrit 32.4 (L) 36.0 - 46.0 %    MCV 92 80 - 100 fL    MCH 31.4 26.0 - 34.0 pg    MCHC 34.3 32.0 - 36.0 g/dL    RDW 13.2 11.5 - 14.5 %    Platelets 93 (L) 150 - 450 x10*3/uL   Basic Metabolic Panel   Result Value Ref Range    Glucose 114 (H) 65 - 99 mg/dL    Sodium 131 (L) 133 - 145 mmol/L    Potassium 4.0 3.4 - 5.1 mmol/L    Chloride 96 (L) 97 - 107 mmol/L    Bicarbonate 25 24 - 31 mmol/L    Urea Nitrogen 71 (H) 8 - 25 mg/dL    Creatinine 2.20 (H) 0.40 - 1.60 mg/dL    eGFR 23 (L) >60 mL/min/1.73m*2    Calcium 9.0 8.5 - 10.4 mg/dL    Anion Gap 10 <=19 mmol/L         SIGNATURE: Gab Pompa MD PATIENT NAME: Angela Montelongo   DATE: September 2, 2024 MRN: [id number]   TIME: 10:06 AM PAGER: [phone]

## 2024-09-02 NOTE — CARE PLAN
Problem: Skin  Goal: Participates in plan/prevention/treatment measures  Outcome: Progressing   The patient's goals for the shift include get some sleep    The clinical goals for the shift include maintain fluid restriction of 1200ml /24hrs, remain free from falls

## 2024-09-02 NOTE — CARE PLAN
Problem: Skin  Goal: Participates in plan/prevention/treatment measures  9/2/2024 1112 by Amy Zavaleta, RN  Outcome: Progressing  9/2/2024 1108 by Amy Zavaleta, RN  Outcome: Progressing   The patient's goals for the shift include get some sleep    The clinical goals for the shift include safety, monitor vs and labs

## 2024-09-02 NOTE — ASSESSMENT & PLAN NOTE
Profound weakness, took 3 person assist to get in chair today. Consult PT/OT  Discharge planning, case management

## 2024-09-02 NOTE — CARE PLAN
Problem: Skin  Goal: Participates in plan/prevention/treatment measures  9/2/2024 1117 by Amy Zavaleta RN  Outcome: Progressing  9/2/2024 1112 by Amy Zavaleta RN  Outcome: Progressing  9/2/2024 1108 by Amy Zavaleta, TIMOTHY  Outcome: Progressing   The patient's goals for the shift include get some sleep    The clinical goals for the shift include safety, monitor vs and labs

## 2024-09-03 LAB
ANION GAP SERPL CALC-SCNC: 10 MMOL/L
ATRIAL RATE: 69 BPM
BUN SERPL-MCNC: 58 MG/DL (ref 8–25)
CALCIUM SERPL-MCNC: 9.3 MG/DL (ref 8.5–10.4)
CHLORIDE SERPL-SCNC: 94 MMOL/L (ref 97–107)
CO2 SERPL-SCNC: 23 MMOL/L (ref 24–31)
CREAT SERPL-MCNC: 1.7 MG/DL (ref 0.4–1.6)
EGFRCR SERPLBLD CKD-EPI 2021: 31 ML/MIN/1.73M*2
GLUCOSE SERPL-MCNC: 140 MG/DL (ref 65–99)
P AXIS: 28 DEGREES
P OFFSET: 155 MS
P ONSET: 91 MS
POTASSIUM SERPL-SCNC: 3.5 MMOL/L (ref 3.4–5.1)
PR INTERVAL: 192 MS
Q ONSET: 187 MS
QRS COUNT: 12 BEATS
QRS DURATION: 156 MS
QT INTERVAL: 414 MS
QTC CALCULATION(BAZETT): 443 MS
QTC FREDERICIA: 434 MS
R AXIS: -57 DEGREES
SODIUM SERPL-SCNC: 127 MMOL/L (ref 133–145)
T AXIS: -6 DEGREES
T OFFSET: 394 MS
VENTRICULAR RATE: 69 BPM

## 2024-09-03 PROCEDURE — 2500000002 HC RX 250 W HCPCS SELF ADMINISTERED DRUGS (ALT 637 FOR MEDICARE OP, ALT 636 FOR OP/ED): Performed by: INTERNAL MEDICINE

## 2024-09-03 PROCEDURE — 97162 PT EVAL MOD COMPLEX 30 MIN: CPT | Mod: GP

## 2024-09-03 PROCEDURE — 97166 OT EVAL MOD COMPLEX 45 MIN: CPT | Mod: GO

## 2024-09-03 PROCEDURE — 2500000001 HC RX 250 WO HCPCS SELF ADMINISTERED DRUGS (ALT 637 FOR MEDICARE OP): Performed by: INTERNAL MEDICINE

## 2024-09-03 PROCEDURE — 80048 BASIC METABOLIC PNL TOTAL CA: CPT | Performed by: INTERNAL MEDICINE

## 2024-09-03 PROCEDURE — 97530 THERAPEUTIC ACTIVITIES: CPT | Mod: GO

## 2024-09-03 PROCEDURE — 2500000004 HC RX 250 GENERAL PHARMACY W/ HCPCS (ALT 636 FOR OP/ED): Performed by: INTERNAL MEDICINE

## 2024-09-03 PROCEDURE — 2500000004 HC RX 250 GENERAL PHARMACY W/ HCPCS (ALT 636 FOR OP/ED): Performed by: HOSPITALIST

## 2024-09-03 PROCEDURE — 1100000001 HC PRIVATE ROOM DAILY

## 2024-09-03 PROCEDURE — 36415 COLL VENOUS BLD VENIPUNCTURE: CPT | Performed by: INTERNAL MEDICINE

## 2024-09-03 ASSESSMENT — PAIN - FUNCTIONAL ASSESSMENT
PAIN_FUNCTIONAL_ASSESSMENT: 0-10
PAIN_FUNCTIONAL_ASSESSMENT: UNABLE TO SELF-REPORT
PAIN_FUNCTIONAL_ASSESSMENT: 0-10
PAIN_FUNCTIONAL_ASSESSMENT: 0-10
PAIN_FUNCTIONAL_ASSESSMENT: UNABLE TO SELF-REPORT
PAIN_FUNCTIONAL_ASSESSMENT: 0-10

## 2024-09-03 ASSESSMENT — COGNITIVE AND FUNCTIONAL STATUS - GENERAL
TOILETING: A LOT
CLIMB 3 TO 5 STEPS WITH RAILING: TOTAL
PERSONAL GROOMING: A LOT
STANDING UP FROM CHAIR USING ARMS: A LOT
TURNING FROM BACK TO SIDE WHILE IN FLAT BAD: A LOT
TURNING FROM BACK TO SIDE WHILE IN FLAT BAD: A LOT
DRESSING REGULAR LOWER BODY CLOTHING: TOTAL
MOVING FROM LYING ON BACK TO SITTING ON SIDE OF FLAT BED WITH BEDRAILS: A LOT
HELP NEEDED FOR BATHING: A LOT
MOVING TO AND FROM BED TO CHAIR: A LOT
MOVING FROM LYING ON BACK TO SITTING ON SIDE OF FLAT BED WITH BEDRAILS: A LOT
DAILY ACTIVITIY SCORE: 11
WALKING IN HOSPITAL ROOM: A LOT
CLIMB 3 TO 5 STEPS WITH RAILING: TOTAL
MOVING TO AND FROM BED TO CHAIR: A LOT
DRESSING REGULAR UPPER BODY CLOTHING: A LITTLE
TURNING FROM BACK TO SIDE WHILE IN FLAT BAD: A LOT
TOILETING: TOTAL
TOILETING: A LOT
DRESSING REGULAR UPPER BODY CLOTHING: A LOT
DAILY ACTIVITIY SCORE: 19
WALKING IN HOSPITAL ROOM: TOTAL
HELP NEEDED FOR BATHING: A LITTLE
MOBILITY SCORE: 11
MOVING TO AND FROM BED TO CHAIR: A LOT
DRESSING REGULAR LOWER BODY CLOTHING: A LITTLE
STANDING UP FROM CHAIR USING ARMS: A LOT
MOBILITY SCORE: 10
HELP NEEDED FOR BATHING: A LITTLE
EATING MEALS: A LITTLE
CLIMB 3 TO 5 STEPS WITH RAILING: TOTAL
STANDING UP FROM CHAIR USING ARMS: A LOT
DRESSING REGULAR UPPER BODY CLOTHING: A LITTLE
DAILY ACTIVITIY SCORE: 19
WALKING IN HOSPITAL ROOM: TOTAL
DRESSING REGULAR LOWER BODY CLOTHING: A LITTLE

## 2024-09-03 ASSESSMENT — PAIN DESCRIPTION - ORIENTATION
ORIENTATION: LOWER

## 2024-09-03 ASSESSMENT — PAIN DESCRIPTION - LOCATION
LOCATION: BACK

## 2024-09-03 ASSESSMENT — PAIN SCALES - GENERAL
PAINLEVEL_OUTOF10: 6
PAINLEVEL_OUTOF10: 3
PAINLEVEL_OUTOF10: 8
PAINLEVEL_OUTOF10: 8
PAINLEVEL_OUTOF10: 2
PAINLEVEL_OUTOF10: 0 - NO PAIN
PAINLEVEL_OUTOF10: 6
PAINLEVEL_OUTOF10: 2
PAINLEVEL_OUTOF10: 0 - NO PAIN
PAINLEVEL_OUTOF10: 3

## 2024-09-03 ASSESSMENT — ACTIVITIES OF DAILY LIVING (ADL)
ADL_ASSISTANCE: INDEPENDENT
ADL_ASSISTANCE: INDEPENDENT
BATHING_ASSISTANCE: MAXIMAL

## 2024-09-03 NOTE — PROGRESS NOTES
Angela Montelongo is a 74 y.o. female on day 5 of admission presenting with Hyponatremia.      Subjective   Patient reports she feels okay.  Her biggest complaint is weakness.  She denies any new pain but has continued pain in her buttocks and legs.  Reports she is not eating much because the food is not very good.  Currently in a recliner.       Objective     Last Recorded Vitals  /64 (BP Location: Left arm, Patient Position: Lying)   Pulse 58   Temp 36.3 °C (97.3 °F) (Temporal)   Resp 15   Wt 121 kg (267 lb 6.7 oz)   SpO2 94%   Intake/Output last 3 Shifts:    Intake/Output Summary (Last 24 hours) at 9/3/2024 1210  Last data filed at 9/3/2024 0600  Gross per 24 hour   Intake 950 ml   Output 800 ml   Net 150 ml       Admission Weight  Weight: 125 kg (275 lb) (08/29/24 1451)    Daily Weight  08/31/24 : 121 kg (267 lb 6.7 oz)    Image Results  ECG 12 lead  Sinus rhythm with Premature supraventricular complexes and  Right bundle branch block  Left anterior fascicular block   Bifascicular block   Septal infarct (cited on or before 03-OCT-2014)  Abnormal ECG  When compared with ECG of 03-OCT-2014 11:42,  Premature supraventricular complexes are now Present  Questionable change in initial forces of Septal leads  Confirmed by Oziel Porter ([id number]) on 9/3/2024 9:34:50 AM      Physical Exam  General: alert, no diaphoresis   HENT: mucous membranes moist, external ears normal, no rhinorrhea   Eyes: no icterus or injection, no discharge   Lungs: CTA BL   Heart: RRR, no LE edema BL   GI: abdomen soft, nontender, nondistended, BS present   MSK: no joint effusion or deformity   Skin: no rashes, erythema, or ecchymosis   Neuro: grossly normal cognition, motor strength, sensation      Relevant Results               Assessment/Plan                  Assessment & Plan  Hyponatremia  Sodium a little lower again today.  Discussed with Dr. Pompa.  Will continue to monitor  CELINA (acute kidney injury) (CMS-[facility])  Improving    [place] following  Acute cystitis without hematuria  2 doses of rocephin left- she is sensitive to it  Weakness  Profound weakness, took 3 person assist to get in chair today. Consult PT/OT  Discharge planning, case management  Bradycardia  Intermittent bradycardia--cardiology saw and signed off      DVT ppx     Disposition: Patient nearing discharge.  Anticipate she will need skilled nursing facility on discharge.  Likely ready in 1 to 2 days for discharge.  Case management is aware and working on discharge location.       Tia Kim, DO

## 2024-09-03 NOTE — PROGRESS NOTES
CONSULT PROGRESS NOTES    SERVICE DATE: 9/3/2024   SERVICE TIME: 8:39 AM    CONSULTING SERVICE: Nephrology    ASSESSMENT AND PLAN   74-year-old woman with gout and osteoarthritis admitted with hyponatremia and acute renal failure.  1.  Acute renal failure  2.  Hyponatremia  3.  Hypokalemia  4.  Essential hypertension     Initially believed the acute renal failure from a combination of triamterene, hydrochlorothiazide, and meloxicam.  Serum creatinine has improved several days in a row without IV fluids.  She had largely unremarkable renal imaging.    The hyponatremia is from hydrochlorothiazide.  This is an acute on chronic picture.  I would have her permanently discontinue hydrochlorothiazide in the outpatient setting.  The sodium worsened overnight, unclear why, but I did remove the fluid restriction.  Check bladder scan.  She reports a history of angioedema from lisinopril.  We can manage her blood pressure with a calcium channel blocker if needed.  Defer beta-blockade given her arrhythmia for now.  Right now her blood pressure is stable.  As needed potassium replacement.  daily labs and supportive care.  I will follow this patient.        Hopefully getting close to discharge.    SUBJECTIVE  INTERVAL HPI: She has no new complaints today, other than difficulty getting comfortable.  She is about to work with physical therapy.  The blood pressure has been stable.  There is no nausea, vomiting, diarrhea.  She does not think she is drinking that much fluid.    MEDICATIONS:  [Held by provider] atenolol, 50 mg, oral, Daily  cefTRIAXone, 1 g, intravenous, q24h  gabapentin, 300 mg, oral, Daily  heparin, 5,000 Units, subcutaneous, q12h  lidocaine, 1 patch, transdermal, Daily  pantoprazole, 20 mg, oral, Daily before breakfast  simvastatin, 40 mg, oral, Nightly             PRN medications: acetaminophen, ondansetron, tiZANidine, white petrolatum     OBJECTIVE  PHYSICAL EXAM:   Heart Rate:  [54-69]   Temp:  [35.9 °C (96.6  °F)-36.8 °C (98.2 °F)]   Resp:  [15-18]   BP: (141-142)/(59-66)   SpO2:  [94 %]   Body mass index is 46.8 kg/m².  This is a morbidly obese white woman who appears in no acute distress  No obvious skin rashes  Hearing intact  Phonation intact  Moist mucosa  Irregular heart rate  Lungs are clear to auscultation bilaterally  Abdomen is soft, nondistended, nontender, positive bowel sounds  External urinary collection device in place, no suprapubic tenderness to palpation  No extremity edema  Moves 4 limbs spontaneously  No obvious joint deformities  No lymphadenopathy    DATA:   Labs:  Results for orders placed or performed during the hospital encounter of 08/29/24 (from the past 96 hour(s))   Basic Metabolic Panel   Result Value Ref Range    Glucose 101 (H) 65 - 99 mg/dL    Sodium 123 (L) 133 - 145 mmol/L    Potassium 3.6 3.4 - 5.1 mmol/L    Chloride 88 (L) 97 - 107 mmol/L    Bicarbonate 23 (L) 24 - 31 mmol/L    Urea Nitrogen 54 (H) 8 - 25 mg/dL    Creatinine 2.00 (H) 0.40 - 1.60 mg/dL    eGFR 25 (L) >60 mL/min/1.73m*2    Calcium 8.8 8.5 - 10.4 mg/dL    Anion Gap 12 <=19 mmol/L   Magnesium   Result Value Ref Range    Magnesium 1.80 1.60 - 3.10 mg/dL   PST Top   Result Value Ref Range    Extra Tube Hold for add-ons.    Basic Metabolic Panel   Result Value Ref Range    Glucose 103 (H) 65 - 99 mg/dL    Sodium 122 (L) 133 - 145 mmol/L    Potassium 3.5 3.4 - 5.1 mmol/L    Chloride 87 (L) 97 - 107 mmol/L    Bicarbonate 24 24 - 31 mmol/L    Urea Nitrogen 54 (H) 8 - 25 mg/dL    Creatinine 2.10 (H) 0.40 - 1.60 mg/dL    eGFR 24 (L) >60 mL/min/1.73m*2    Calcium 8.6 8.5 - 10.4 mg/dL    Anion Gap 11 <=19 mmol/L   Renal function panel   Result Value Ref Range    Glucose 116 (H) 65 - 99 mg/dL    Sodium 123 (L) 133 - 145 mmol/L    Potassium 3.5 3.4 - 5.1 mmol/L    Chloride 89 (L) 97 - 107 mmol/L    Bicarbonate 24 24 - 31 mmol/L    Urea Nitrogen 60 (H) 8 - 25 mg/dL    Creatinine 2.60 (H) 0.40 - 1.60 mg/dL    eGFR 19 (L) >60  mL/min/1.73m*2    Calcium 8.3 (L) 8.5 - 10.4 mg/dL    Phosphorus 2.2 (L) 2.5 - 4.5 mg/dL    Albumin 2.7 (L) 3.5 - 5.0 g/dL    Anion Gap 10 <=19 mmol/L   Renal function panel   Result Value Ref Range    Glucose 128 (H) 65 - 99 mg/dL    Sodium 125 (L) 133 - 145 mmol/L    Potassium 3.6 3.4 - 5.1 mmol/L    Chloride 89 (L) 97 - 107 mmol/L    Bicarbonate 26 24 - 31 mmol/L    Urea Nitrogen 63 (H) 8 - 25 mg/dL    Creatinine 2.70 (H) 0.40 - 1.60 mg/dL    eGFR 18 (L) >60 mL/min/1.73m*2    Calcium 8.7 8.5 - 10.4 mg/dL    Phosphorus 2.3 (L) 2.5 - 4.5 mg/dL    Albumin 2.8 (L) 3.5 - 5.0 g/dL    Anion Gap 10 <=19 mmol/L   Renal function panel   Result Value Ref Range    Glucose 119 (H) 65 - 99 mg/dL    Sodium 123 (L) 133 - 145 mmol/L    Potassium 3.8 3.4 - 5.1 mmol/L    Chloride 90 (L) 97 - 107 mmol/L    Bicarbonate 24 24 - 31 mmol/L    Urea Nitrogen 65 (H) 8 - 25 mg/dL    Creatinine 2.80 (H) 0.40 - 1.60 mg/dL    eGFR 17 (L) >60 mL/min/1.73m*2    Calcium 8.4 (L) 8.5 - 10.4 mg/dL    Phosphorus 2.6 2.5 - 4.5 mg/dL    Albumin 2.8 (L) 3.5 - 5.0 g/dL    Anion Gap 9 <=19 mmol/L   Renal function panel   Result Value Ref Range    Glucose 113 (H) 65 - 99 mg/dL    Sodium 125 (L) 133 - 145 mmol/L    Potassium 3.8 3.4 - 5.1 mmol/L    Chloride 92 (L) 97 - 107 mmol/L    Bicarbonate 22 (L) 24 - 31 mmol/L    Urea Nitrogen 65 (H) 8 - 25 mg/dL    Creatinine 2.60 (H) 0.40 - 1.60 mg/dL    eGFR 19 (L) >60 mL/min/1.73m*2    Calcium 8.7 8.5 - 10.4 mg/dL    Phosphorus 2.6 2.5 - 4.5 mg/dL    Albumin 2.7 (L) 3.5 - 5.0 g/dL    Anion Gap 11 <=19 mmol/L   Renal function panel   Result Value Ref Range    Glucose 142 (H) 65 - 99 mg/dL    Sodium 124 (L) 133 - 145 mmol/L    Potassium 3.4 3.4 - 5.1 mmol/L    Chloride 91 (L) 97 - 107 mmol/L    Bicarbonate 23 (L) 24 - 31 mmol/L    Urea Nitrogen 66 (H) 8 - 25 mg/dL    Creatinine 2.70 (H) 0.40 - 1.60 mg/dL    eGFR 18 (L) >60 mL/min/1.73m*2    Calcium 8.6 8.5 - 10.4 mg/dL    Phosphorus 2.2 (L) 2.5 - 4.5 mg/dL     Albumin 2.6 (L) 3.5 - 5.0 g/dL    Anion Gap 10 <=19 mmol/L   Basic metabolic panel   Result Value Ref Range    Glucose 108 (H) 65 - 99 mg/dL    Sodium 127 (L) 133 - 145 mmol/L    Potassium 3.8 3.4 - 5.1 mmol/L    Chloride 94 (L) 97 - 107 mmol/L    Bicarbonate 23 (L) 24 - 31 mmol/L    Urea Nitrogen 72 (H) 8 - 25 mg/dL    Creatinine 2.40 (H) 0.40 - 1.60 mg/dL    eGFR 20 (L) >60 mL/min/1.73m*2    Calcium 8.9 8.5 - 10.4 mg/dL    Anion Gap 10 <=19 mmol/L   CBC   Result Value Ref Range    WBC 13.3 (H) 4.4 - 11.3 x10*3/uL    nRBC 0.0 0.0 - 0.0 /100 WBCs    RBC 3.54 (L) 4.00 - 5.20 x10*6/uL    Hemoglobin 11.1 (L) 12.0 - 16.0 g/dL    Hematocrit 32.4 (L) 36.0 - 46.0 %    MCV 92 80 - 100 fL    MCH 31.4 26.0 - 34.0 pg    MCHC 34.3 32.0 - 36.0 g/dL    RDW 13.2 11.5 - 14.5 %    Platelets 93 (L) 150 - 450 x10*3/uL   Basic Metabolic Panel   Result Value Ref Range    Glucose 114 (H) 65 - 99 mg/dL    Sodium 131 (L) 133 - 145 mmol/L    Potassium 4.0 3.4 - 5.1 mmol/L    Chloride 96 (L) 97 - 107 mmol/L    Bicarbonate 25 24 - 31 mmol/L    Urea Nitrogen 71 (H) 8 - 25 mg/dL    Creatinine 2.20 (H) 0.40 - 1.60 mg/dL    eGFR 23 (L) >60 mL/min/1.73m*2    Calcium 9.0 8.5 - 10.4 mg/dL    Anion Gap 10 <=19 mmol/L   Basic Metabolic Panel   Result Value Ref Range    Glucose 140 (H) 65 - 99 mg/dL    Sodium 127 (L) 133 - 145 mmol/L    Potassium 3.5 3.4 - 5.1 mmol/L    Chloride 94 (L) 97 - 107 mmol/L    Bicarbonate 23 (L) 24 - 31 mmol/L    Urea Nitrogen 58 (H) 8 - 25 mg/dL    Creatinine 1.70 (H) 0.40 - 1.60 mg/dL    eGFR 31 (L) >60 mL/min/1.73m*2    Calcium 9.3 8.5 - 10.4 mg/dL    Anion Gap 10 <=19 mmol/L         SIGNATURE: Gab Pompa MD PATIENT NAME: Angela Montelongo   DATE: September 3, 2024 MRN: [id number]   TIME: 8:39 AM PAGER: [phone]

## 2024-09-03 NOTE — CARE PLAN
The patient's goals for the shift include get some sleep    The clinical goals for the shift include manage pain, remain free from falls    Problem: Skin  Goal: Participates in plan/prevention/treatment measures  Outcome: Progressing  Flowsheets (Taken 9/2/2024 2129)  Participates in plan/prevention/treatment measures:   Elevate heels   Discuss with provider PT/OT consult     Problem: Pain - Adult  Goal: Verbalizes/displays adequate comfort level or baseline comfort level  Outcome: Progressing     Problem: Safety - Adult  Goal: Free from fall injury  Outcome: Progressing     Problem: Discharge Planning  Goal: Discharge to home or other facility with appropriate resources  Outcome: Progressing     Problem: Chronic Conditions and Co-morbidities  Goal: Patient's chronic conditions and co-morbidity symptoms are monitored and maintained or improved  Outcome: Progressing

## 2024-09-03 NOTE — PROGRESS NOTES
Physical Therapy    Physical Therapy Evaluation    Patient Name: Angela Montelongo  MRN: [id number]  Today's Date: 9/3/2024   Time Calculation  Start Time: 0835  Stop Time: 0857  Time Calculation (min): 22 min    Assessment/Plan   PT Assessment  PT Assessment Results: Decreased strength, Decreased range of motion, Decreased endurance, Impaired balance, Decreased mobility, Decreased coordination, Decreased cognition, Impaired judgement, Decreased safety awareness, Impaired sensation, Obesity, Pain  Rehab Prognosis: Good  Barriers to Discharge: level of assist required, increased weakness, decreased mobility, high fall risk  Evaluation/Treatment Tolerance: Patient limited by pain, Patient limited by fatigue  Medical Staff Made Aware: Yes  Strengths: Premorbid level of function  Barriers to Participation: Comorbidities  End of Session Communication: Bedside nurse  Assessment Comment: Pt w/ high chronic pain level, h/o falls, now even weaker than baseline, mild confusion, high fall risk. Pt gives effort as able, would benefit from continued PT services to promote safe functional mobility toward baseline PLOF.  End of Session Patient Position: Up in chair, Alarm on (recliner, needs in reach)  IP OR SWING BED PT PLAN  Inpatient or Swing Bed: Inpatient  PT Plan  Treatment/Interventions: Bed mobility, Transfer training, Gait training, Balance training, Strengthening, Range of motion, Endurance training, Therapeutic exercise, Therapeutic activity  PT Plan: Ongoing PT  PT Frequency: 3 times per week  PT Discharge Recommendations: Moderate intensity level of continued care  Equipment Recommended upon Discharge: Wheeled walker  PT Recommended Transfer Status: Assist x2, Assistive device  PT - OK to Discharge: Yes      Subjective   General Visit Information:  General  Reason for Referral: impaired mobility, hyponatremia, UTI  Referred By: Dr. Kim  Past Medical History Relevant to Rehab: HLD, shoulder arthritis, essential HTN,  DVT, GERD, claw toe  Family/Caregiver Present: No  Co-Treatment: OT  Co-Treatment Reason: patient requires 2 skilled therapists for safety with functional txfers  Prior to Session Communication: Bedside nurse  Patient Position Received: Bed, 3 rail up, Alarm on  Preferred Learning Style: verbal, visual  General Comment: Patient is a 74 year old female adm due to weakness and a fall. Pt was dx w/ hyponatremia, UTI. Pt agreeable to PT eval.  Home Living:  Home Living  Type of Home: House  Lives With: Adult children, Grandchildren (Dtr, S-I-L, grandkids)  Home Adaptive Equipment: Walker rolling or standard, Reacher, Other (Comment) (HD 2WW, rollator, adjustable bed)  Home Layout: Able to live on main level with bedroom/bathroom  Home Access: Stairs to enter with rails  Entrance Stairs-Rails: Both  Entrance Stairs-Number of Steps: 7  Bathroom Shower/Tub: Walk-in shower  Bathroom Equipment: Grab bars in shower, Shower chair with back  Bathroom Accessibility: on main  Prior Level of Function:  Prior Function Per Pt/Caregiver Report  Level of [place]: Independent with ADLs and functional transfers, Independent with homemaking with ambulation  Receives Help From: Family  ADL Assistance: Independent  Homemaking Assistance: Independent  Ambulatory Assistance: Independent (w/ HDRW in house, rollator in community)  Prior Function Comments: Pt reports using [employer], reports multiple falls due to decreased balance. Reports sleeps in an  adjustable bed.  Precautions:  Precautions  Medical Precautions: Fall precautions    Objective   Pain:  Pain Assessment  Pain Assessment: 0-10  0-10 (Numeric) Pain Score: 8  Pain Type: Chronic pain  Pain Location: Back  Pain Orientation: Lower  Pain Interventions:  (nurse in to give pain med at beginnign of session)  Cognition:  Cognition  Overall Cognitive Status: Impaired  Orientation Level: Disoriented to time  Cognition Comments: questionable historian, decreased  memory  Safety/Judgement:  (decreased)  Insight: Mild  Impulsive: Mildly  Processing Speed: Delayed    General Assessments:  Activity Tolerance  Endurance: Other (Comment) (decreased in general)  Activity Tolerance Comments: decreased due to high pain level, increased effort w/ activity    Sensation  Sensation Comment: pt reports numbness at LEs    Strength  Strength Comments: bilat ankles, knees 3/5 or >, hip flexion Rt 3-/5, Lt 2+/5 (limited functional use of bilat shoulders)  Coordination  Coordination Comment: decreased speed and accuracy due to weakness and pain    Postural Control  Posture Comment: morbidly obese    Dynamic Standing Balance  Dynamic Standing-Balance Support: Bilateral upper extremity supported (pt's own HDRW)  Dynamic Standing-Level of Assistance: Minimum assistance  Dynamic Standing-Balance: Turning (steps at bedside)  Dynamic Standing-Comments: Fair-/Poor+  Functional Assessments:  Bed Mobility  Bed Mobility: Yes  Bed Mobility 1  Bed Mobility 1: Supine to sitting  Level of Assistance 1: Moderate assistance  Bed Mobility Comments 1: to Rt EOB w/o rail, HOB flat, assist for trunk up    Transfers  Transfer: Yes  Transfer 1  Technique 1: Sit to stand, Stand to sit  Transfer Device 1: Walker  Transfer Level of Assistance 1: Minimum assistance, +2  Trials/Comments 1: from EOB, to recliner w/ arms, pt pulling up on stabilized RW, cues for safe hand placement w/ return to sit.    Ambulation/Gait Training  Ambulation/Gait Training Performed: Yes  Ambulation/Gait Training 1  Surface 1: Level tile  Device 1: Rolling walker (pt's own HDRW)  Assistance 1: Moderate assistance, Moderate verbal cues (+2)  Comments/Distance (ft) 1: Pt able to amb ~3' to bedside recliner, assist for balance and walker management. Limited tolerance to activity, notable crepitis at shoulders.    Stairs  Stairs: No  Extremity/Trunk Assessments:  RLE   RLE :  (grossly, decreased hip flexion, decreased strength in general)  LLE    LLE :  (significant scarring at knee, decreased LE strength as compared to Rt.)  Outcome Measures:  [facility] Basic Mobility  Turning from your back to your side while in a flat bed without using bedrails: A lot  Moving from lying on your back to sitting on the side of a flat bed without using bedrails: A lot  Moving to and from bed to chair (including a wheelchair): A lot  Standing up from a chair using your arms (e.g. wheelchair or bedside chair): A lot  To walk in hospital room: A lot  Climbing 3-5 steps with railing: Total  Basic Mobility - Total Score: 11    Encounter Problems       Encounter Problems (Active)       Balance       LTG - Patient will maintain balance to allow for safe mobility (Progressing)       Start:  09/03/24    Expected End:  09/13/24               Mobility       STG - Patient will ambulate 25' w/ min assist and HDRW (Progressing)       Start:  09/03/24    Expected End:  09/13/24            Pt will tolerate BLE exercises consistently to promote functional strength, endurance and balance (Progressing)       Start:  09/03/24    Expected End:  09/13/24               PT Transfers       STG - Patient to transfer to and from sit to supine w/ close supervision and safe technique (Progressing)       Start:  09/03/24    Expected End:  09/13/24            STG - Patient will transfer sit to and from stand w/ close supervision and safe technique (Progressing)       Start:  09/03/24    Expected End:  09/13/24                   Education Documentation  Precautions, taught by Angela Dodson PT at 9/3/2024  9:58 AM.  Learner: Patient  Readiness: Acceptance  Method: Explanation, Demonstration  Response: Needs Reinforcement  Comment: safety and technique    Mobility Training, taught by Angela Dodson PT at 9/3/2024  9:58 AM.  Learner: Patient  Readiness: Acceptance  Method: Explanation, Demonstration  Response: Needs Reinforcement  Comment: safety and technique    Education Comments  No comments  found.

## 2024-09-03 NOTE — PROGRESS NOTES
09/03/24 1139   Discharge Planning   Expected Discharge Disposition SNF   Does the patient need discharge transport arranged? Yes   [facility] coordination needed? Yes   Has discharge transport been arranged? No     MSW met with patient and spoke with her regarding discharge planning. She is now agreeable to SNF. Provided freedom of choice and she is requesting 1) [facility], 2) [facility], or 3) [facility].     Referrals submitted to the same.     12:29 PM  [facility] is reviewing. [facility] is able to accept. [facility] declined as they have no beds available at this time.     12:46 PM  [facility] has accepted the patient. Patient can go when medically appropriate.     Patient will need [id number] prior to discharge. Care Transitions will follow.

## 2024-09-03 NOTE — PROGRESS NOTES
Occupational Therapy    Evaluation/Treatment    Patient Name: Angela Montelongo  MRN: [id number]  : 1950  Today's Date: 24  Time Calculation  Start Time: 820  Stop Time: 858  Time Calculation (min): 38 min     Assessment:  OT Assessment: Patient is a 74 year old female admitted s/p fall with hyponatremia, UTI, and acute renal failure. Patient is presenting below baseline level with a decline in strength, balance, activity tolerance, and function, resulting in an increased need for assist with ADL/IADL tasks. Skilled OT recommended to address the above deficits and increase patient's safety and independence with daily tasks.  Prognosis: Good  Barriers to Discharge: Other (Comment) (assist of 2 txfers, decreased activity tolerance, high fall risk, assist with ADLs)  Evaluation/Treatment Tolerance: Patient limited by fatigue  End of Session Communication: Bedside nurse  End of Session Patient Position: Up in chair, Alarm on (in recliner)  OT Assessment Results: Decreased ADL status, Decreased upper extremity strength, Decreased upper extremity range of motion, Decreased safe judgment during ADL, Decreased cognition, Decreased endurance, Decreased sensation, Decreased functional mobility, Decreased gross motor control, Decreased IADLs  Prognosis: Good  Barriers to Discharge: Other (Comment) (assist of 2 txfers, decreased activity tolerance, high fall risk, assist with ADLs)  Evaluation/Treatment Tolerance: Patient limited by fatigue  Strengths: Premorbid level of function  Barriers to Participation: Comorbidities  Plan:  Treatment Interventions: ADL retraining, Functional transfer training, UE strengthening/ROM, Cognitive reorientation, Endurance training, Patient/family training, Equipment evaluation/education, Neuromuscular reeducation, Compensatory technique education  OT Frequency: 3 times per week  OT Discharge Recommendations: Moderate intensity level of continued care  Equipment Recommended upon  Discharge: Wheeled walker  OT Recommended Transfer Status: Assist of 2  OT - OK to Discharge: Yes  Treatment Interventions: ADL retraining, Functional transfer training, UE strengthening/ROM, Cognitive reorientation, Endurance training, Patient/family training, Equipment evaluation/education, Neuromuscular reeducation, Compensatory technique education    Subjective   Current Problem:  1. Hyponatremia        2. Acute cystitis with hematuria          General:   OT Received On: 09/03/24  General  Reason for Referral: decline in ADLs, hyponatremia  Referred By: Dr. Kim  Past Medical History Relevant to Rehab: HLD, shoulder arthritis, essential HTN, DVT, GERD, claw toe  Family/Caregiver Present: No  Co-Treatment: PT  Co-Treatment Reason: patient requires 2 skilled therapists for safety with functional txfers  Prior to Session Communication: Bedside nurse  Patient Position Received: Bed, 3 rail up, Alarm on  Preferred Learning Style: verbal, visual  General Comment: Patient is a 74 year old female who presented to the ED with c/o weakness, unsteadiness on her feet, and a fall. Patient is admitted with hyponatremia, UTI, acute renal failure. She is cleared by nursing for therapy. Patient in bed upon arrival and agreeable to participate.  Precautions:  Medical Precautions: Fall precautions    Pain:  Pain Assessment  Pain Assessment: 0-10  0-10 (Numeric) Pain Score: 8  Pain Type: Chronic pain  Pain Location: Back  Pain Orientation: Lower  Pain Interventions: Repositioned, Ambulation/increased activity (RN aware)    Objective   Cognition:  Overall Cognitive Status: Impaired  Orientation Level: Disoriented to time  Cognition Comments: patient is very talkative, requires cues to stay on task  Safety/Judgement:  (decreased)  Insight: Moderate  Impulsive: Mildly  Processing Speed: Delayed     Home Living:  Type of Home: House  Lives With: Adult children, Grandchildren (daughter, son in law, granddaughter)  Home Adaptive  "Equipment: Walker rolling or standard, Reacher (HD walker, rollator, adjustable bed without rails)  Home Layout: Able to live on main level with bedroom/bathroom  Home Access: Stairs to enter with rails  Entrance Stairs-Rails: Both  Entrance Stairs-Number of Steps: 7  Bathroom Shower/Tub: Walk-in shower  Bathroom Toilet: Standard  Bathroom Equipment: Grab bars in shower, Shower chair with back  Home Living Comments: patient reports hx of \"a lot of falls\" in the last 6 months, usually has to call EMS  Prior Function:  Level of [place]: Independent with ADLs and functional transfers, Independent with homemaking with ambulation  Receives Help From: Family  ADL Assistance: Independent  Homemaking Assistance: Independent  Ambulatory Assistance: Independent (HD walker in home, rollator in community)  Prior Function Comments: patient is a questionable historian. states her family does not assist with ADLs/IADLs  IADL History:  Homemaking Responsibilities: Yes  Mode of Transportation: Bus  ADL:  Eating Assistance: Stand by  Eating Deficit: Setup (items within reach)  Grooming Assistance: Moderate  Grooming Deficit: Setup, Supervision/safety, Increased time to complete, Brushing hair (seated, per clinical judgement)  Bathing Assistance: Maximal  Bathing Deficit: Setup, Supervision/safety, Increased time to complete , Right arm, Left arm, Perineal area, Buttocks, Right upper leg, Left upper leg, Right lower leg including foot, Left lower leg including foot, Use of adaptive equipment (per clinical judgement)  UE Dressing Assistance: Maximal  UE Dressing Deficit: Thread RUE, Thread LUE, Pull over head, Pull around back, Pull down in back (per clinical judgement)  LE Dressing Assistance: Total  LE Dressing Deficit: Don/doff R sock, Don/doff L sock  Toileting Assistance with Device: Total  Toileting Deficit: Incontinent (purewick)    Activity Tolerance:  Endurance: Decreased tolerance for upright activites  Activity " Tolerance Comments: fair- tolerance    Bed Mobility/Transfers: Bed Mobility  Bed Mobility: Yes  Bed Mobility 1  Bed Mobility 1: Supine to sitting  Level of Assistance 1: Moderate assistance  Bed Mobility Comments 1: bed flat, assist for trunk up. patient able to move B LE towards R side of bed  Bed Mobility 2  Bed Mobility  2: Scooting  Level of Assistance 2: Moderate assistance  Bed Mobility Comments 2: with use of draw sheet to scoot hips towards EOB    Transfers  Transfer: Yes  Transfer 1  Transfer From 1: Bed to  Transfer to 1: Stand  Technique 1: Sit to stand  Transfer Device 1: Walker  Transfer Level of Assistance 1: Minimum assistance, +2  Trials/Comments 1: 2 person for safety, cues for proper hand placement  Transfers 2  Transfer From 2: Stand to  Transfer to 2: Chair with arms  Technique 2: Stand pivot  Transfer Device 2: Walker  Transfer Level of Assistance 2: Moderate assistance, +2  Trials/Comments 2: assistance to manage walker, Mod Ax2 to turn from EOB to the chair, assist of 2 for safe, controlled descent to chair    Sitting Balance:  Static Sitting Balance  Static Sitting-Balance Support: Feet supported, Bilateral upper extremity supported  Static Sitting-Level of Assistance: Close supervision  Standing Balance:  Static Standing Balance  Static Standing-Balance Support: Bilateral upper extremity supported  Static Standing-Level of Assistance: Minimum assistance (x2)    Therapy/Activity: Therapeutic Activity  Therapeutic Activity Performed: Yes  Therapeutic Activity 1: patient txfers from supine to EOB with Mod A with assist for trunk up. She requires Mod A to scoot hips foward. Patient sits EOB with fair balance with CLose Supervision. She requirs Min Ax2 for sit to stand txfer and Mod Ax2 to turn to be seated in the chair. education on safety and walker management. education on proper positioning of walker provided. patient is repositioned in the recliner for optimal alignment and maximal  comfort    Sensation:  Sensation Comment: patient reports numbness in B LE  Strength:  Strength Comments: B UE 2/5 shoulders, 3/5 distal  Coordination:  Movements are Fluid and Coordinated: No  Upper Body Coordination: decreased, impaired at bilateral shoulders  Coordination Comment: slower rate overall     Extremities: RUE   RUE : Exceptions to WFL (impaired at shoulders, generalized weakness) and LUE   LUE: Exceptions to WFL (impaired at shoulders, generalized weakness)    Outcome Measures: [facility] Daily Activity  Putting on and taking off regular lower body clothing: Total  Bathing (including washing, rinsing, drying): A lot  Putting on and taking off regular upper body clothing: A lot  Toileting, which includes using toilet, bedpan or urinal: Total  Taking care of personal grooming such as brushing teeth: A lot  Eating Meals: A little  Daily Activity - Total Score: 11    Education Documentation  Body Mechanics, taught by Diamond Sanford OT at 9/3/2024 10:23 AM.  Learner: Patient  Readiness: Acceptance  Method: Explanation, Demonstration  Response: Needs Reinforcement    Precautions, taught by Diamond Sanford OT at 9/3/2024 10:23 AM.  Learner: Patient  Readiness: Acceptance  Method: Explanation, Demonstration  Response: Needs Reinforcement    ADL Training, taught by Diamond Sanford OT at 9/3/2024 10:23 AM.  Learner: Patient  Readiness: Acceptance  Method: Explanation, Demonstration  Response: Needs Reinforcement    Education Comments  No comments found.        OP EDUCATION:       Goals:  Encounter Problems       Encounter Problems (Active)       OT Goals       ADLs (Progressing)       Start:  09/03/24    Expected End:  09/24/24       Patient will complete ADL tasks, with supervision using AE need in order to increase patient's safety and independence with self-care tasks.          Functional Transfers (Progressing)       Start:  09/03/24    Expected End:  09/24/24       Patient will complete functional transfers  with supervision using least restrictive device, in order to increase patient's safety and independence with daily tasks.          Standing Tolerance (Progressing)       Start:  09/03/24    Expected End:  09/24/24       Patient will demonstrate the ability to stand at least >/= 3 minutes with Fair+ balance to increase safety and independence with daily tasks.         B UE Strengthening (Progressing)       Start:  09/03/24    Expected End:  09/24/24       Patient will increase B UE strength to 4-/5 for functional transfers.

## 2024-09-03 NOTE — CARE PLAN
The patient's goals for the shift include get some sleep    The clinical goals for the shift include manage pain, remain free from falls      Problem: Skin  Goal: Participates in plan/prevention/treatment measures  Outcome: Progressing     Problem: Pain - Adult  Goal: Verbalizes/displays adequate comfort level or baseline comfort level  Outcome: Progressing     Problem: Safety - Adult  Goal: Free from fall injury  Outcome: Progressing     Problem: Discharge Planning  Goal: Discharge to home or other facility with appropriate resources  Outcome: Progressing     Problem: Chronic Conditions and Co-morbidities  Goal: Patient's chronic conditions and co-morbidity symptoms are monitored and maintained or improved  Outcome: Progressing

## 2024-09-04 VITALS
HEIGHT: 63 IN | DIASTOLIC BLOOD PRESSURE: 83 MMHG | OXYGEN SATURATION: 95 % | RESPIRATION RATE: 17 BRPM | TEMPERATURE: 96.8 F | WEIGHT: 267.42 LBS | SYSTOLIC BLOOD PRESSURE: 147 MMHG | HEART RATE: 63 BPM | BODY MASS INDEX: 47.38 KG/M2

## 2024-09-04 LAB
ANION GAP SERPL CALC-SCNC: 9 MMOL/L
BUN SERPL-MCNC: 48 MG/DL (ref 8–25)
CALCIUM SERPL-MCNC: 9.2 MG/DL (ref 8.5–10.4)
CHLORIDE SERPL-SCNC: 96 MMOL/L (ref 97–107)
CO2 SERPL-SCNC: 27 MMOL/L (ref 24–31)
CREAT SERPL-MCNC: 1.3 MG/DL (ref 0.4–1.6)
EGFRCR SERPLBLD CKD-EPI 2021: 43 ML/MIN/1.73M*2
GLUCOSE SERPL-MCNC: 108 MG/DL (ref 65–99)
POTASSIUM SERPL-SCNC: 3.6 MMOL/L (ref 3.4–5.1)
SODIUM SERPL-SCNC: 132 MMOL/L (ref 133–145)

## 2024-09-04 PROCEDURE — 2500000001 HC RX 250 WO HCPCS SELF ADMINISTERED DRUGS (ALT 637 FOR MEDICARE OP): Performed by: INTERNAL MEDICINE

## 2024-09-04 PROCEDURE — 97110 THERAPEUTIC EXERCISES: CPT | Mod: GP,CQ

## 2024-09-04 PROCEDURE — 2500000004 HC RX 250 GENERAL PHARMACY W/ HCPCS (ALT 636 FOR OP/ED): Performed by: INTERNAL MEDICINE

## 2024-09-04 PROCEDURE — 97530 THERAPEUTIC ACTIVITIES: CPT | Mod: GP,CQ

## 2024-09-04 PROCEDURE — 97116 GAIT TRAINING THERAPY: CPT | Mod: GP,CQ

## 2024-09-04 PROCEDURE — 2500000002 HC RX 250 W HCPCS SELF ADMINISTERED DRUGS (ALT 637 FOR MEDICARE OP, ALT 636 FOR OP/ED): Performed by: INTERNAL MEDICINE

## 2024-09-04 PROCEDURE — 36415 COLL VENOUS BLD VENIPUNCTURE: CPT | Performed by: INTERNAL MEDICINE

## 2024-09-04 PROCEDURE — 80048 BASIC METABOLIC PNL TOTAL CA: CPT | Performed by: INTERNAL MEDICINE

## 2024-09-04 RX ORDER — TIZANIDINE 4 MG/1
4 TABLET ORAL EVERY 8 HOURS PRN
Qty: 30 TABLET | Refills: 0 | Status: SHIPPED | OUTPATIENT
Start: 2024-09-04

## 2024-09-04 RX ORDER — TRAMADOL HYDROCHLORIDE 50 MG/1
50 TABLET ORAL EVERY 8 HOURS PRN
Status: DISCONTINUED | OUTPATIENT
Start: 2024-09-04 | End: 2024-09-04 | Stop reason: HOSPADM

## 2024-09-04 RX ORDER — TRAMADOL HYDROCHLORIDE 50 MG/1
50 TABLET ORAL EVERY 8 HOURS PRN
Qty: 12 TABLET | Refills: 0 | Status: SHIPPED | OUTPATIENT
Start: 2024-09-04

## 2024-09-04 RX ORDER — ALLOPURINOL 100 MG/1
100 TABLET ORAL DAILY
Start: 2024-09-04

## 2024-09-04 RX ORDER — AMLODIPINE BESYLATE 5 MG/1
5 TABLET ORAL DAILY
Start: 2024-09-04

## 2024-09-04 ASSESSMENT — COGNITIVE AND FUNCTIONAL STATUS - GENERAL
HELP NEEDED FOR BATHING: A LITTLE
MOVING TO AND FROM BED TO CHAIR: A LOT
MOVING TO AND FROM BED TO CHAIR: A LOT
MOVING FROM LYING ON BACK TO SITTING ON SIDE OF FLAT BED WITH BEDRAILS: A LOT
MOVING FROM LYING ON BACK TO SITTING ON SIDE OF FLAT BED WITH BEDRAILS: A LOT
STANDING UP FROM CHAIR USING ARMS: A LOT
STANDING UP FROM CHAIR USING ARMS: A LOT
CLIMB 3 TO 5 STEPS WITH RAILING: TOTAL
WALKING IN HOSPITAL ROOM: TOTAL
DRESSING REGULAR LOWER BODY CLOTHING: A LITTLE
TURNING FROM BACK TO SIDE WHILE IN FLAT BAD: A LOT
TOILETING: A LOT
DRESSING REGULAR UPPER BODY CLOTHING: A LITTLE
TURNING FROM BACK TO SIDE WHILE IN FLAT BAD: A LOT
DAILY ACTIVITIY SCORE: 19
MOBILITY SCORE: 10
WALKING IN HOSPITAL ROOM: A LOT
MOBILITY SCORE: 11
CLIMB 3 TO 5 STEPS WITH RAILING: TOTAL

## 2024-09-04 ASSESSMENT — PAIN - FUNCTIONAL ASSESSMENT
PAIN_FUNCTIONAL_ASSESSMENT: UNABLE TO SELF-REPORT
PAIN_FUNCTIONAL_ASSESSMENT: 0-10

## 2024-09-04 ASSESSMENT — PAIN SCALES - GENERAL
PAINLEVEL_OUTOF10: 5 - MODERATE PAIN
PAINLEVEL_OUTOF10: 0 - NO PAIN
PAINLEVEL_OUTOF10: 2
PAINLEVEL_OUTOF10: 9
PAINLEVEL_OUTOF10: 3

## 2024-09-04 ASSESSMENT — PAIN DESCRIPTION - LOCATION
LOCATION: BACK
LOCATION: BACK

## 2024-09-04 ASSESSMENT — PAIN DESCRIPTION - ORIENTATION: ORIENTATION: LOWER

## 2024-09-04 ASSESSMENT — PAIN DESCRIPTION - DESCRIPTORS: DESCRIPTORS: ACHING;POUNDING

## 2024-09-04 NOTE — NURSING NOTE
Patient repositioned in bed. Patient medicated with Zanaflex and Tylenol per prn order for pain. Call light within reach. Nursing to monitor.

## 2024-09-04 NOTE — PROGRESS NOTES
Physical Therapy    Physical Therapy Treatment    Patient Name: Angela Montelongo  MRN: [id number]  Today's Date: 9/4/2024  Time Calculation  Start Time: 0908  Stop Time: 0944  Time Calculation (min): 36 min         Assessment/Plan   PT Assessment  Barriers to Discharge: Mopd A x 2 with transfers/ gait Increased pain with mobility  End of Session Communication: Bedside nurse  Assessment Comment: High chronic pain with mobility for safety  Increased weakness High risk for falls  End of Session Patient Position: Up in chair, Alarm on (Needs at reach)     PT Plan  Treatment/Interventions: Bed mobility, Transfer training, Gait training, Balance training, Strengthening, Endurance training, Range of motion, Therapeutic exercise, Therapeutic activity  PT Plan: Ongoing PT  PT Frequency: 3 times per week  PT Discharge Recommendations: Moderate intensity level of continued care  Equipment Recommended upon Discharge: Wheeled walker  PT Recommended Transfer Status: Assist x2, Assistive device  PT - OK to Discharge: Yes      General Visit Information:   PT  Visit  PT Received On: 09/04/24  General  Reason for Referral: impaired mobility, hyponatremia, UTI  Referred By: Dr. Kim  Past Medical History Relevant to Rehab: HLD, shoulder arthritis, essential HTN, DVT, GERD, claw toe  Prior to Session Communication: Bedside nurse  Patient Position Received: Bed, 3 rail up, On cart  Preferred Learning Style: verbal, visual  General Comment: Cleared by nurse to see. Patient agreeable to therapy    Subjective   Precautions:  Precautions  Medical Precautions: Fall precautions    Vital Signs (Past 2hrs)                 Objective   Pain:  Pain Assessment  Pain Assessment: 0-10  0-10 (Numeric) Pain Score: 5 - Moderate pain (10/10 with mobility)  Pain Type: Chronic pain  Pain Location: Back  Pain Orientation: Lower  Pain Descriptors: Aching, Pounding  Pain Frequency: Intermittent  Pain Onset: Ongoing  Cognition:  Cognition  Insight:  Mild  Impulsive: Mildly  Processing Speed: Delayed  Coordination:     Postural Control:  Postural Control  Posture Comment: morbidly obese  Dynamic Standing Balance  Dynamic Standing-Balance Support: Bilateral upper extremity supported (Patient own HDRW)  Dynamic Standing-Level of Assistance: Minimum assistance (x2 for safety)  Dynamic Standing-Balance: Turning  Dynamic Standing-Comments: Steps to recliner Mod A x 2  Extremity/Trunk Assessments:    Activity Tolerance:  Activity Tolerance  Endurance: Decreased tolerance for upright activites  Activity Tolerance Comments: Decreased dueto increased pain with mobility Increased effort with activitiy  Treatments:  Therapeutic Exercise  Therapeutic Exercise Performed: Yes  Therapeutic Exercise Activity 1: B LE ther ex: ankle pumps. quad,gluteal sets x 15 Extra time needed,heelslides, SAQs, hip abduction/adduction x 10 Extra time needed    Bed Mobility  Bed Mobility: Yes  Bed Mobility 1  Bed Mobility 1: Supine to sitting  Level of Assistance 1: Moderate assistance  Bed Mobility Comments 1: To right EOB with slightly elevated HOB, Mod A for trunk up Extra time needed    Ambulation/Gait Training  Ambulation/Gait Training Performed: Yes  Ambulation/Gait Training 1  Surface 1: Level tile  Device 1: Rolling walker (Patients own HDRW)  Assistance 1: Moderate assistance (x 2 for safety)  Quality of Gait 1: Wide base of support, Shuffling gait  Comments/Distance (ft) 1: 3' x 1 small shuffled WBOS with HDRW with Mod A x 2 for safety with assist for walker management Increased creptis heard B shoulders Extra time needed  Transfers  Transfer: Yes  Transfer 1  Transfer From 1: Bed to  Transfer to 1: Stand  Technique 1: Sit to stand  Transfer Device 1: Walker  Transfer Level of Assistance 1: Moderate assistance, +2 (for safety)  Trials/Comments 1: x 1 trial STS from EOB to HDRW with Mod A x 2 Extra time needed  Transfers 2  Transfer From 2: Stand to  Transfer to 2: Sit, Chair with arms  (Bariatric recliner)  Technique 2: Stand to sit  Transfer Device 2: Walker  Transfer Level of Assistance 2: Moderate assistance, +2  Trials/Comments 2: Mod A x 2 for safety STS to recliner controlled descend to recliner Extra time needed         Outcome Measures:  [facility] Basic Mobility  Turning from your back to your side while in a flat bed without using bedrails: A lot  Moving from lying on your back to sitting on the side of a flat bed without using bedrails: A lot  Moving to and from bed to chair (including a wheelchair): A lot  Standing up from a chair using your arms (e.g. wheelchair or bedside chair): A lot  To walk in hospital room: A lot  Climbing 3-5 steps with railing: Total  Basic Mobility - Total Score: 11    Education Documentation  Precautions, taught by Yakelin Beaver PTA at 9/4/2024 10:06 AM.  Learner: Patient  Readiness: Acceptance  Method: Explanation, Teach-back  Response: Verbalizes Understanding, Needs Reinforcement    Home Exercise Program, taught by Yakelin Beaver PTA at 9/4/2024 10:06 AM.  Learner: Patient  Readiness: Acceptance  Method: Explanation, Teach-back  Response: Verbalizes Understanding, Needs Reinforcement    Mobility Training, taught by Yakelin Beaver PTA at 9/4/2024 10:06 AM.  Learner: Patient  Readiness: Acceptance  Method: Explanation, Teach-back  Response: Verbalizes Understanding, Needs Reinforcement    Education Comments  No comments found.        OP EDUCATION:       Encounter Problems       Encounter Problems (Active)       Balance       LTG - Patient will maintain balance to allow for safe mobility (Progressing)       Start:  09/03/24    Expected End:  09/13/24               Mobility       STG - Patient will ambulate 25' w/ min assist and HDRW (Progressing)       Start:  09/03/24    Expected End:  09/13/24            Pt will tolerate BLE exercises consistently to promote functional strength, endurance and balance (Progressing)       Start:  09/03/24    Expected  End:  09/13/24               PT Transfers       STG - Patient to transfer to and from sit to supine w/ close supervision and safe technique (Progressing)       Start:  09/03/24    Expected End:  09/13/24            STG - Patient will transfer sit to and from stand w/ close supervision and safe technique (Progressing)       Start:  09/03/24    Expected End:  09/13/24

## 2024-09-04 NOTE — PROGRESS NOTES
09/04/24 0933   Discharge Planning   Expected Discharge Disposition SNF   Does the patient need discharge transport arranged? Yes   RoundTrip coordination needed? Yes   Has discharge transport been arranged? No     Patient is accepted at [facility] and can go when medically appropriate.     11:33 AM  Patient is being discharged to [facility]. Ketan castro and KIYA sent to them for their records. [id number] submitted via [employer]. Transportation arranged for 1:00 PM pick-up. Nurse and facility updated.

## 2024-09-04 NOTE — CARE PLAN
The patient's goals for the shift include get some sleep    The clinical goals for the shift include manage pain, remain free from falls      Problem: Skin  Goal: Participates in plan/prevention/treatment measures  Outcome: Progressing  Flowsheets (Taken 9/4/2024 0010)  Participates in plan/prevention/treatment measures:   Elevate heels   Discuss with provider PT/OT consult     Problem: Safety - Adult  Goal: Free from fall injury  Outcome: Progressing     Problem: Discharge Planning  Goal: Discharge to home or other facility with appropriate resources  Outcome: Progressing     Problem: Chronic Conditions and Co-morbidities  Goal: Patient's chronic conditions and co-morbidity symptoms are monitored and maintained or improved  Outcome: Progressing

## 2024-09-04 NOTE — CARE PLAN
The patient's goals for the shift include get some sleep, discharge planning    The clinical goals for the shift include manage pain, remain free from falls    Problem: Skin  Goal: Participates in plan/prevention/treatment measures  Outcome: Progressing     Problem: Safety - Adult  Goal: Free from fall injury  Outcome: Progressing     Problem: Discharge Planning  Goal: Discharge to home or other facility with appropriate resources  Outcome: Progressing     Problem: Chronic Conditions and Co-morbidities  Goal: Patient's chronic conditions and co-morbidity symptoms are monitored and maintained or improved  Outcome: Progressing

## 2024-09-04 NOTE — PROGRESS NOTES
CONSULT PROGRESS NOTES    SERVICE DATE: 9/4/2024   SERVICE TIME: 11:53 AM    CONSULTING SERVICE: Nephrology    ASSESSMENT AND PLAN   74-year-old woman with gout and osteoarthritis admitted with hyponatremia and acute renal failure.  1.  Acute renal failure  2.  Hyponatremia  3.  Hypokalemia  4.  Essential hypertension     The acute renal failure was from a combination of triamterene, hydrochlorothiazide, and meloxicam.  Serum creatinine has essentially returned to baseline.  She had largely unremarkable renal imaging.    The hyponatremia is from hydrochlorothiazide.  This is an acute on chronic picture.  I would have her permanently discontinue hydrochlorothiazide in the outpatient setting.  I believe her fall was in connection with hyponatremia.  The sodium improved overnight and is close to a normal range.  She reports a history of angioedema from lisinopril.  We can manage her blood pressure with a calcium channel blocker if needed, begin amlodipine 5 mg for discharge.  Defer beta-blockade given her arrhythmia for now.  Right now her blood pressure is stable.  As needed potassium replacement.  daily labs and supportive care.  Discharge per Dr. Kim.  Probably no need for renal follow-up in the outpatient setting.    SUBJECTIVE  INTERVAL HPI: She feels okay and is slated for discharge.  She has continued back pain, no other new complaints.    MEDICATIONS:  [Held by provider] atenolol, 50 mg, oral, Daily  gabapentin, 300 mg, oral, Daily  heparin, 5,000 Units, subcutaneous, q12h  lidocaine, 1 patch, transdermal, Daily  pantoprazole, 20 mg, oral, Daily before breakfast  simvastatin, 40 mg, oral, Nightly             PRN medications: acetaminophen, ondansetron, tiZANidine, traMADol, white petrolatum     OBJECTIVE  PHYSICAL EXAM:   Heart Rate:  [55-86]   Temp:  [36 °C (96.8 °F)-36.7 °C (98.1 °F)]   Resp:  [15-17]   BP: (132-149)/(62-83)   SpO2:  [94 %-95 %]   Body mass index is 46.8 kg/m².  This is a morbidly  obese white woman who appears in no acute distress  No obvious skin rashes  Hearing intact  Phonation intact  Moist mucosa  Irregular heart rate  Lungs are clear to auscultation bilaterally  Abdomen is soft, nondistended, nontender, positive bowel sounds  External urinary collection device in place, no suprapubic tenderness to palpation  No extremity edema  Moves 4 limbs spontaneously  No obvious joint deformities  No lymphadenopathy    DATA:   Labs:  Results for orders placed or performed during the hospital encounter of 08/29/24 (from the past 96 hour(s))   Renal function panel   Result Value Ref Range    Glucose 142 (H) 65 - 99 mg/dL    Sodium 124 (L) 133 - 145 mmol/L    Potassium 3.4 3.4 - 5.1 mmol/L    Chloride 91 (L) 97 - 107 mmol/L    Bicarbonate 23 (L) 24 - 31 mmol/L    Urea Nitrogen 66 (H) 8 - 25 mg/dL    Creatinine 2.70 (H) 0.40 - 1.60 mg/dL    eGFR 18 (L) >60 mL/min/1.73m*2    Calcium 8.6 8.5 - 10.4 mg/dL    Phosphorus 2.2 (L) 2.5 - 4.5 mg/dL    Albumin 2.6 (L) 3.5 - 5.0 g/dL    Anion Gap 10 <=19 mmol/L   Basic metabolic panel   Result Value Ref Range    Glucose 108 (H) 65 - 99 mg/dL    Sodium 127 (L) 133 - 145 mmol/L    Potassium 3.8 3.4 - 5.1 mmol/L    Chloride 94 (L) 97 - 107 mmol/L    Bicarbonate 23 (L) 24 - 31 mmol/L    Urea Nitrogen 72 (H) 8 - 25 mg/dL    Creatinine 2.40 (H) 0.40 - 1.60 mg/dL    eGFR 20 (L) >60 mL/min/1.73m*2    Calcium 8.9 8.5 - 10.4 mg/dL    Anion Gap 10 <=19 mmol/L   CBC   Result Value Ref Range    WBC 13.3 (H) 4.4 - 11.3 x10*3/uL    nRBC 0.0 0.0 - 0.0 /100 WBCs    RBC 3.54 (L) 4.00 - 5.20 x10*6/uL    Hemoglobin 11.1 (L) 12.0 - 16.0 g/dL    Hematocrit 32.4 (L) 36.0 - 46.0 %    MCV 92 80 - 100 fL    MCH 31.4 26.0 - 34.0 pg    MCHC 34.3 32.0 - 36.0 g/dL    RDW 13.2 11.5 - 14.5 %    Platelets 93 (L) 150 - 450 x10*3/uL   Basic Metabolic Panel   Result Value Ref Range    Glucose 114 (H) 65 - 99 mg/dL    Sodium 131 (L) 133 - 145 mmol/L    Potassium 4.0 3.4 - 5.1 mmol/L    Chloride 96  (L) 97 - 107 mmol/L    Bicarbonate 25 24 - 31 mmol/L    Urea Nitrogen 71 (H) 8 - 25 mg/dL    Creatinine 2.20 (H) 0.40 - 1.60 mg/dL    eGFR 23 (L) >60 mL/min/1.73m*2    Calcium 9.0 8.5 - 10.4 mg/dL    Anion Gap 10 <=19 mmol/L   Basic Metabolic Panel   Result Value Ref Range    Glucose 140 (H) 65 - 99 mg/dL    Sodium 127 (L) 133 - 145 mmol/L    Potassium 3.5 3.4 - 5.1 mmol/L    Chloride 94 (L) 97 - 107 mmol/L    Bicarbonate 23 (L) 24 - 31 mmol/L    Urea Nitrogen 58 (H) 8 - 25 mg/dL    Creatinine 1.70 (H) 0.40 - 1.60 mg/dL    eGFR 31 (L) >60 mL/min/1.73m*2    Calcium 9.3 8.5 - 10.4 mg/dL    Anion Gap 10 <=19 mmol/L   Basic Metabolic Panel   Result Value Ref Range    Glucose 108 (H) 65 - 99 mg/dL    Sodium 132 (L) 133 - 145 mmol/L    Potassium 3.6 3.4 - 5.1 mmol/L    Chloride 96 (L) 97 - 107 mmol/L    Bicarbonate 27 24 - 31 mmol/L    Urea Nitrogen 48 (H) 8 - 25 mg/dL    Creatinine 1.30 0.40 - 1.60 mg/dL    eGFR 43 (L) >60 mL/min/1.73m*2    Calcium 9.2 8.5 - 10.4 mg/dL    Anion Gap 9 <=19 mmol/L         SIGNATURE: Gab Pompa MD PATIENT NAME: Angela Montelongo   DATE: September 4, 2024 MRN: [id number]   TIME: 11:53 AM PAGER: [phone]

## 2024-09-04 NOTE — DISCHARGE SUMMARY
Discharge Diagnosis  Hyponatremia    Issues Requiring Follow-Up  Follow up with PCP for management of arthritis pain, follow up of sodium and renal function    Discharge Meds     Medication List      START taking these medications     amLODIPine 5 mg tablet; Commonly known as: Norvasc; Take 1 tablet (5 mg)   by mouth once daily.   tiZANidine 4 mg tablet; Commonly known as: Zanaflex; Take 1 tablet (4   mg) by mouth every 8 hours if needed for muscle spasms.   traMADol 50 mg tablet; Commonly known as: Ultram; Take 1 tablet (50 mg)   by mouth every 8 hours if needed for moderate pain (4 - 6).     CHANGE how you take these medications     allopurinol 100 mg tablet; Commonly known as: Zyloprim; Take 1 tablet   (100 mg) by mouth once daily.; What changed: medication strength, how much   to take     CONTINUE taking these medications     gabapentin 300 mg capsule; Commonly known as: Neurontin; Take 1 capsule   (300 mg) by mouth once daily.   omeprazole 20 mg DR capsule; Commonly known as: PriLOSEC; TAKE 1 CAPSULE   ONCE A DAY   simvastatin 40 mg tablet; Commonly known as: Zocor; TAKE 1 TABLET (40   MG) BY MOUTH ONCE DAILY AT BEDTIME.     STOP taking these medications     atenolol 50 mg tablet; Commonly known as: Tenormin   meloxicam 15 mg tablet; Commonly known as: Mobic   triamterene-hydrochlorothiazid 75-50 mg tablet; Commonly known as:   Maxzide       Test Results Pending At Discharge  Pending Labs       Order Current Status    Extra Urine Gray Tube Collected (08/29/24 0496)    Urinalysis with Reflex Culture and Microscopic In process            Hospital Course   This is a 74-year-old woman with history of hypertension, urinary incontinence getting Botox injections, arthritis who presented to the emergency room with generalized weakness and a fall.  She was found to have hyponatremia, acute kidney failure, UTI.  She was treated with antibiotics.  Her sodium improved with fluid restriction.  Her home antihypertensives  were held.  Of note she was taking thiazide at home which she should remain off permanently.  She was also on atenolol and was having issues with bradycardia and some arrhythmia while in the hospital.  Her atenolol will remain on hold permanently.  She was started on amlodipine for blood pressure management.  Her renal failure improved during her stay.  She should not take NSAIDs permanently.  She was started on tramadol for her back pain.  She is weak and needs to go to skilled nursing facility for rehab.  She is discharged to [facility].    Pertinent Physical Exam At Time of Discharge  Physical Exam  General: alert, no diaphoresis   HENT: mucous membranes moist, external ears normal, no rhinorrhea   Eyes: no icterus or injection, no discharge   Lungs: CTA BL   Heart: RRR,no LE edema BL   GI: abdomen soft, nontender, nondistended, BS present   MSK: no joint effusion or deformity   Skin: no rashes, erythema, or ecchymosis   Neuro: grossly normal cognition, motor strength, sensation  Outpatient Follow-Up  Future Appointments   Date Time Provider Department Center   7/30/2025  1:15 PM Millie Hernandez MD [id number] [facility]     Discharge time spent: 35 min    Tia Kim DO

## 2024-09-05 ENCOUNTER — NURSING HOME VISIT (OUTPATIENT)
Dept: POST ACUTE CARE | Facility: EXTERNAL LOCATION | Age: 74
End: 2024-09-05
Payer: MEDICARE

## 2024-09-05 VITALS
WEIGHT: 266.8 LBS | TEMPERATURE: 98 F | SYSTOLIC BLOOD PRESSURE: 140 MMHG | OXYGEN SATURATION: 96 % | RESPIRATION RATE: 18 BRPM | BODY MASS INDEX: 46.69 KG/M2 | HEART RATE: 74 BPM | DIASTOLIC BLOOD PRESSURE: 70 MMHG

## 2024-09-05 DIAGNOSIS — N17.9 AKI (ACUTE KIDNEY INJURY) (CMS-HCC): Primary | ICD-10-CM

## 2024-09-05 DIAGNOSIS — R11.0 NAUSEA: ICD-10-CM

## 2024-09-05 DIAGNOSIS — G89.29 CHRONIC LOW BACK PAIN, UNSPECIFIED BACK PAIN LATERALITY, UNSPECIFIED WHETHER SCIATICA PRESENT: ICD-10-CM

## 2024-09-05 DIAGNOSIS — E87.1 HYPONATREMIA: ICD-10-CM

## 2024-09-05 DIAGNOSIS — R00.1 BRADYCARDIA: ICD-10-CM

## 2024-09-05 DIAGNOSIS — M54.50 CHRONIC LOW BACK PAIN, UNSPECIFIED BACK PAIN LATERALITY, UNSPECIFIED WHETHER SCIATICA PRESENT: ICD-10-CM

## 2024-09-05 DIAGNOSIS — N30.00 ACUTE CYSTITIS WITHOUT HEMATURIA: ICD-10-CM

## 2024-09-05 DIAGNOSIS — I10 ESSENTIAL HYPERTENSION: ICD-10-CM

## 2024-09-05 PROCEDURE — 99309 SBSQ NF CARE MODERATE MDM 30: CPT | Performed by: PHYSICIAN ASSISTANT

## 2024-09-05 ASSESSMENT — ENCOUNTER SYMPTOMS
ABDOMINAL PAIN: 0
VOMITING: 1
CHILLS: 0
COUGH: 0
DYSURIA: 0
CONSTIPATION: 1
HEADACHES: 0
WEAKNESS: 0
WHEEZING: 0
BACK PAIN: 1
FEVER: 0
FREQUENCY: 0
APPETITE CHANGE: 0
NERVOUS/ANXIOUS: 0
CONFUSION: 0
NAUSEA: 1
DIZZINESS: 0
SHORTNESS OF BREATH: 0
TREMORS: 0
HEMATURIA: 0

## 2024-09-05 NOTE — PROGRESS NOTES
Subjective   [id number] : Angela Montelongo is a 74 y.o. female admitted to [facility] for rehab. No chief complaint on file..  HPI  Pt with h/o HTN, OA, urinary incontinence and Gerd treated for weakness and fall.  She was found have hyponatremia and CELINA.  She had been taking maxzide which was stopped.  She was seen by nephrology and started on a fluid restriction.  Sodium has been improving.  She was treated for acute UTI with course of ceftriaxone which she completed in the hospital.  She also had some bradycardia and atenolol was stopped.  She is complaining of nausea and had an emesis this morning.  She denies any abdominal pain.  She reports that she has not had a bowel movement in several days.  Recommended that she take a laxative which she refused to do today.  Her appetite and po intake is poor.   She has a h/o GERD and says she does not think she has been getting her omeprazole.   She denies any shortness of breath or cough.  She has no lower leg edema.   She has a h/o back pain and takes tylenol, tramadol and tizanidine prn for her pain management.   Review of Systems   Constitutional:  Negative for appetite change, chills and fever.   Respiratory:  Negative for cough, shortness of breath and wheezing.    Cardiovascular:  Negative for chest pain and leg swelling.   Gastrointestinal:  Positive for constipation, nausea and vomiting. Negative for abdominal pain.   Genitourinary:  Negative for dysuria, frequency and hematuria.   Musculoskeletal:  Positive for back pain.   Neurological:  Negative for dizziness, tremors, weakness and headaches.   Psychiatric/Behavioral:  Negative for confusion. The patient is not nervous/anxious.    All other systems reviewed and are negative.      Objective   /70   Pulse 74   Temp 36.7 °C (98 °F)   Resp 18   Wt 121 kg (266 lb 12.8 oz)   SpO2 96%   BMI 46.69 kg/m²    Physical Exam  Constitutional:       General: She is not in acute distress.  Eyes:       "Conjunctiva/sclera: Conjunctivae normal.      Pupils: Pupils are equal, round, and reactive to light.   Cardiovascular:      Rate and Rhythm: Normal rate and regular rhythm.      Heart sounds: No murmur heard.  Pulmonary:      Effort: Pulmonary effort is normal.      Breath sounds: No wheezing, rhonchi or rales.   Abdominal:      General: Abdomen is flat. Bowel sounds are normal. There is no distension.      Palpations: Abdomen is soft. There is no mass.      Tenderness: There is no abdominal tenderness.   Musculoskeletal:         General: No swelling. Normal range of motion.   Skin:     General: Skin is warm and dry.      Findings: No rash.   Neurological:      General: No focal deficit present.      Mental Status: She is alert and oriented to person, place, and time. Mental status is at baseline.     No results found for this or any previous visit (from the past 24 hour(s)).   No lab exists for component: \"CBC BMP\"  Assessment/Plan   Problem List Items Addressed This Visit             ICD-10-CM    Essential hypertension I10     Continue norvasc.  Monitor blood pressures and adjust meds as needed.         Nausea R11.0     Zofran prn for nausea.  Possible due to constipation.  Add colace prn.  Bmp and cbc in am.   Consider KUB if symptoms persist.         Hyponatremia E87.1     Treated with fluid restriction. Sodium 132 on yesterdays labs         CELINA (acute kidney injury) (CMS-HCC) - Primary N17.9     Maxzide was stopped and labs improved         Acute cystitis without hematuria N30.00     Treated with course of ceftriaxone in the hospital.         Bradycardia R00.1     Atenolol was stopped.          Chronic low back pain M54.50, G89.29     Continue pain management with gabapentin, tizanidine and tramadol prn.                 Time spent: 30 min in review of chart, labs and orders, consultation with pt and documentation.   "

## 2024-09-05 NOTE — ASSESSMENT & PLAN NOTE
Zofran prn for nausea.  Possible due to constipation.  Add colace prn.  Bmp and cbc in am.   Consider KUB if symptoms persist.

## 2024-09-05 NOTE — LETTER
Patient: Angela Montelongo  : 1950    Encounter Date: 2024      Subjective  [id number] : Angela Montelongo is a 74 y.o. female admitted to [facility] for rehab. No chief complaint on file..  HPI  Pt with h/o HTN, OA, urinary incontinence and Gerd treated for weakness and fall.  She was found have hyponatremia and CELINA.  She had been taking maxzide which was stopped.  She was seen by nephrology and started on a fluid restriction.  Sodium has been improving.  She was treated for acute UTI with course of ceftriaxone which she completed in the hospital.  She also had some bradycardia and atenolol was stopped.  She is complaining of nausea and had an emesis this morning.  She denies any abdominal pain.  She reports that she has not had a bowel movement in several days.  Recommended that she take a laxative which she refused to do today.  Her appetite and po intake is poor.   She has a h/o GERD and says she does not think she has been getting her omeprazole.   She denies any shortness of breath or cough.  She has no lower leg edema.   She has a h/o back pain and takes tylenol, tramadol and tizanidine prn for her pain management.   Review of Systems   Constitutional:  Negative for appetite change, chills and fever.   Respiratory:  Negative for cough, shortness of breath and wheezing.    Cardiovascular:  Negative for chest pain and leg swelling.   Gastrointestinal:  Positive for constipation, nausea and vomiting. Negative for abdominal pain.   Genitourinary:  Negative for dysuria, frequency and hematuria.   Musculoskeletal:  Positive for back pain.   Neurological:  Negative for dizziness, tremors, weakness and headaches.   Psychiatric/Behavioral:  Negative for confusion. The patient is not nervous/anxious.    All other systems reviewed and are negative.      Objective  /70   Pulse 74   Temp 36.7 °C (98 °F)   Resp 18   Wt 121 kg (266 lb 12.8 oz)   SpO2 96%   BMI 46.69 kg/m²    Physical  "Exam  Constitutional:       General: She is not in acute distress.  Eyes:      Conjunctiva/sclera: Conjunctivae normal.      Pupils: Pupils are equal, round, and reactive to light.   Cardiovascular:      Rate and Rhythm: Normal rate and regular rhythm.      Heart sounds: No murmur heard.  Pulmonary:      Effort: Pulmonary effort is normal.      Breath sounds: No wheezing, rhonchi or rales.   Abdominal:      General: Abdomen is flat. Bowel sounds are normal. There is no distension.      Palpations: Abdomen is soft. There is no mass.      Tenderness: There is no abdominal tenderness.   Musculoskeletal:         General: No swelling. Normal range of motion.   Skin:     General: Skin is warm and dry.      Findings: No rash.   Neurological:      General: No focal deficit present.      Mental Status: She is alert and oriented to person, place, and time. Mental status is at baseline.     No results found for this or any previous visit (from the past 24 hour(s)).   No lab exists for component: \"CBC BMP\"  Assessment/Plan  Problem List Items Addressed This Visit             ICD-10-CM    Essential hypertension I10     Continue norvasc.  Monitor blood pressures and adjust meds as needed.         Nausea R11.0     Zofran prn for nausea.  Possible due to constipation.  Add colace prn.  Bmp and cbc in am.   Consider KUB if symptoms persist.         Hyponatremia E87.1     Treated with fluid restriction. Sodium 132 on yesterdays labs         CELINA (acute kidney injury) (CMS-HCC) - Primary N17.9     Maxzide was stopped and labs improved         Acute cystitis without hematuria N30.00     Treated with course of ceftriaxone in the hospital.         Bradycardia R00.1     Atenolol was stopped.          Chronic low back pain M54.50, G89.29     Continue pain management with gabapentin, tizanidine and tramadol prn.                 Time spent: 30 min in review of chart, labs and orders, consultation with pt and documentation. "       Electronically Signed By: Corrie Delarosa PA-C   9/5/24  9:05 PM

## 2024-09-06 ENCOUNTER — NURSING HOME VISIT (OUTPATIENT)
Dept: POST ACUTE CARE | Facility: EXTERNAL LOCATION | Age: 74
End: 2024-09-06
Payer: MEDICARE

## 2024-09-06 VITALS
TEMPERATURE: 97.7 F | WEIGHT: 266.8 LBS | DIASTOLIC BLOOD PRESSURE: 60 MMHG | BODY MASS INDEX: 46.69 KG/M2 | HEART RATE: 67 BPM | RESPIRATION RATE: 20 BRPM | SYSTOLIC BLOOD PRESSURE: 153 MMHG | OXYGEN SATURATION: 94 %

## 2024-09-06 DIAGNOSIS — N30.00 ACUTE CYSTITIS WITHOUT HEMATURIA: ICD-10-CM

## 2024-09-06 DIAGNOSIS — G89.29 CHRONIC LOW BACK PAIN, UNSPECIFIED BACK PAIN LATERALITY, UNSPECIFIED WHETHER SCIATICA PRESENT: ICD-10-CM

## 2024-09-06 DIAGNOSIS — R00.1 BRADYCARDIA: ICD-10-CM

## 2024-09-06 DIAGNOSIS — N17.9 AKI (ACUTE KIDNEY INJURY) (CMS-HCC): Primary | ICD-10-CM

## 2024-09-06 DIAGNOSIS — M54.50 CHRONIC LOW BACK PAIN, UNSPECIFIED BACK PAIN LATERALITY, UNSPECIFIED WHETHER SCIATICA PRESENT: ICD-10-CM

## 2024-09-06 DIAGNOSIS — E87.1 HYPONATREMIA: ICD-10-CM

## 2024-09-06 DIAGNOSIS — R11.0 NAUSEA: ICD-10-CM

## 2024-09-06 DIAGNOSIS — I10 ESSENTIAL HYPERTENSION: ICD-10-CM

## 2024-09-06 PROCEDURE — 99309 SBSQ NF CARE MODERATE MDM 30: CPT | Performed by: PHYSICIAN ASSISTANT

## 2024-09-06 ASSESSMENT — ENCOUNTER SYMPTOMS
SHORTNESS OF BREATH: 0
CONSTIPATION: 1
VOMITING: 0
TREMORS: 0
APPETITE CHANGE: 0
FREQUENCY: 0
WHEEZING: 0
HEADACHES: 0
CHILLS: 0
DIZZINESS: 0
HEMATURIA: 0
BACK PAIN: 1
NERVOUS/ANXIOUS: 0
CONFUSION: 0
ABDOMINAL PAIN: 0
WEAKNESS: 0
COUGH: 0
NAUSEA: 1
DYSURIA: 0
FEVER: 0

## 2024-09-06 NOTE — PROGRESS NOTES
Subjective   [id number] : Angela Montelongo is a 74 y.o. female admitted to [facility] for rehab. No chief complaint on file..  HPI  Pt with h/o HTN, OA, urinary incontinence and Gerd with weakness and fall treated for hyponatremia and CELINA.   Asked to see pt this morning due to elevated blood pressure.  Pt denied any chest pain, headache or dizziness.  Bp was 194/94.  She did report her usual back pain and some mild nausea.  She was given zofran and tramdol with her morning medication which included norvasc.  Bp recheck one your later was 153/60 and pulse of 67.   She had been on atenolol previously which was stopped in the hospital.  She continues to have some nausea and indigestion symptoms.  Frequent belching.  She is getting her omeprazole.  She has not yet moved her bowels.  She denies any abdominal pain.  She has declined prn bowel meds.  Labs were drawn today and reviewed.  Sodium and kidney function has improved. WBC is trending down.   She denies any shortness of breath or cough.  She has no lower leg edema.     Review of Systems   Constitutional:  Negative for appetite change, chills and fever.   Respiratory:  Negative for cough, shortness of breath and wheezing.    Cardiovascular:  Negative for chest pain and leg swelling.   Gastrointestinal:  Positive for constipation and nausea. Negative for abdominal pain and vomiting.   Genitourinary:  Negative for dysuria, frequency and hematuria.   Musculoskeletal:  Positive for back pain.   Neurological:  Negative for dizziness, tremors, weakness and headaches.   Psychiatric/Behavioral:  Negative for confusion. The patient is not nervous/anxious.    All other systems reviewed and are negative.      Objective   /60   Pulse 67   Temp 36.5 °C (97.7 °F)   Resp 20   Wt 121 kg (266 lb 12.8 oz)   SpO2 94%   BMI 46.69 kg/m²    Physical Exam  Constitutional:       General: She is not in acute distress.  Eyes:      Conjunctiva/sclera: Conjunctivae normal.       "Pupils: Pupils are equal, round, and reactive to light.   Cardiovascular:      Rate and Rhythm: Normal rate and regular rhythm.      Heart sounds: No murmur heard.  Pulmonary:      Effort: Pulmonary effort is normal.      Breath sounds: No wheezing, rhonchi or rales.   Abdominal:      General: Abdomen is flat. Bowel sounds are normal. There is no distension.      Palpations: Abdomen is soft. There is no mass.      Tenderness: There is no abdominal tenderness.   Musculoskeletal:         General: No swelling. Normal range of motion.   Skin:     General: Skin is warm and dry.      Findings: No rash.   Neurological:      General: No focal deficit present.      Mental Status: She is alert and oriented to person, place, and time. Mental status is at baseline.       Results for orders placed or performed in visit on 09/06/24 (from the past 24 hour(s))   Basic Metabolic Panel   Result Value Ref Range    Glucose 112 (H) 65 - 99 mg/dL    Sodium 139 133 - 145 mmol/L    Potassium 3.8 3.4 - 5.1 mmol/L    Chloride 96 (L) 97 - 107 mmol/L    Bicarbonate 32 (H) 24 - 31 mmol/L    Urea Nitrogen 19 8 - 25 mg/dL    Creatinine 0.80 0.40 - 1.60 mg/dL    eGFR 76 >60 mL/min/1.73m*2    Calcium 9.8 8.5 - 10.4 mg/dL    Anion Gap 11 <=19 mmol/L   CBC   Result Value Ref Range    WBC 12.5 (H) 4.4 - 11.3 x10*3/uL    nRBC 0.0 0.0 - 0.0 /100 WBCs    RBC 3.79 (L) 4.00 - 5.20 x10*6/uL    Hemoglobin 11.6 (L) 12.0 - 16.0 g/dL    Hematocrit 34.3 (L) 36.0 - 46.0 %    MCV 91 80 - 100 fL    MCH 30.6 26.0 - 34.0 pg    MCHC 33.8 32.0 - 36.0 g/dL    RDW 14.4 11.5 - 14.5 %    Platelets 328 150 - 450 x10*3/uL      No lab exists for component: \"CBC BMP\"  Assessment/Plan   Essential hypertension I10        Continue norvasc.  levated bp likely due to pain and nausea. Improved after bp  meds and symptoms management. Monitor blood pressures and adjust meds as needed.           Nausea R11.0       Zofran prn for nausea.  Possible due to constipation.  Add sennakot at " bedtime.  KUB ordered.             Hyponatremia E87.1       Treated with fluid restriction. Sodium 139 on todays labs           CELINA (acute kidney injury) (CMS-HCC) - Primary N17.9       Maxzide was stopped and labs improved           Acute cystitis without hematuria N30.00       Treated with course of ceftriaxone in the hospital.           Bradycardia R00.1       Atenolol was stopped.            Chronic low back pain M54.50, G89.29       Continue pain management with gabapentin, tizanidine and tramadol prn.          GERD:  on omeprazole.  Having frequent belching - add famotidine 20mg PRN for indigestion.           Time spent: 30 min in review of chart, labs and orders, consultation with pt and documentation.

## 2024-09-06 NOTE — LETTER
Patient: Angela Montelongo  : 1950    Encounter Date: 2024      Subjective  [id number] : Angela Montelongo is a 74 y.o. female admitted to [facility] for rehab. No chief complaint on file..  HPI  Pt with h/o HTN, OA, urinary incontinence and Gerd with weakness and fall treated for hyponatremia and CELINA.   Asked to see pt this morning due to elevated blood pressure.  Pt denied any chest pain, headache or dizziness.  Bp was 194/94.  She did report her usual back pain and some mild nausea.  She was given zofran and tramdol with her morning medication which included norvasc.  Bp recheck one your later was 153/60 and pulse of 67.   She had been on atenolol previously which was stopped in the hospital.  She continues to have some nausea and indigestion symptoms.  Frequent belching.  She is getting her omeprazole.  She has not yet moved her bowels.  She denies any abdominal pain.  She has declined prn bowel meds.  Labs were drawn today and reviewed.  Sodium and kidney function has improved. WBC is trending down.   She denies any shortness of breath or cough.  She has no lower leg edema.     Review of Systems   Constitutional:  Negative for appetite change, chills and fever.   Respiratory:  Negative for cough, shortness of breath and wheezing.    Cardiovascular:  Negative for chest pain and leg swelling.   Gastrointestinal:  Positive for constipation and nausea. Negative for abdominal pain and vomiting.   Genitourinary:  Negative for dysuria, frequency and hematuria.   Musculoskeletal:  Positive for back pain.   Neurological:  Negative for dizziness, tremors, weakness and headaches.   Psychiatric/Behavioral:  Negative for confusion. The patient is not nervous/anxious.    All other systems reviewed and are negative.      Objective  /60   Pulse 67   Temp 36.5 °C (97.7 °F)   Resp 20   Wt 121 kg (266 lb 12.8 oz)   SpO2 94%   BMI 46.69 kg/m²    Physical Exam  Constitutional:       General: She is not in acute  "distress.  Eyes:      Conjunctiva/sclera: Conjunctivae normal.      Pupils: Pupils are equal, round, and reactive to light.   Cardiovascular:      Rate and Rhythm: Normal rate and regular rhythm.      Heart sounds: No murmur heard.  Pulmonary:      Effort: Pulmonary effort is normal.      Breath sounds: No wheezing, rhonchi or rales.   Abdominal:      General: Abdomen is flat. Bowel sounds are normal. There is no distension.      Palpations: Abdomen is soft. There is no mass.      Tenderness: There is no abdominal tenderness.   Musculoskeletal:         General: No swelling. Normal range of motion.   Skin:     General: Skin is warm and dry.      Findings: No rash.   Neurological:      General: No focal deficit present.      Mental Status: She is alert and oriented to person, place, and time. Mental status is at baseline.       Results for orders placed or performed in visit on 09/06/24 (from the past 24 hour(s))   Basic Metabolic Panel   Result Value Ref Range    Glucose 112 (H) 65 - 99 mg/dL    Sodium 139 133 - 145 mmol/L    Potassium 3.8 3.4 - 5.1 mmol/L    Chloride 96 (L) 97 - 107 mmol/L    Bicarbonate 32 (H) 24 - 31 mmol/L    Urea Nitrogen 19 8 - 25 mg/dL    Creatinine 0.80 0.40 - 1.60 mg/dL    eGFR 76 >60 mL/min/1.73m*2    Calcium 9.8 8.5 - 10.4 mg/dL    Anion Gap 11 <=19 mmol/L   CBC   Result Value Ref Range    WBC 12.5 (H) 4.4 - 11.3 x10*3/uL    nRBC 0.0 0.0 - 0.0 /100 WBCs    RBC 3.79 (L) 4.00 - 5.20 x10*6/uL    Hemoglobin 11.6 (L) 12.0 - 16.0 g/dL    Hematocrit 34.3 (L) 36.0 - 46.0 %    MCV 91 80 - 100 fL    MCH 30.6 26.0 - 34.0 pg    MCHC 33.8 32.0 - 36.0 g/dL    RDW 14.4 11.5 - 14.5 %    Platelets 328 150 - 450 x10*3/uL      No lab exists for component: \"CBC BMP\"  Assessment/Plan  Essential hypertension I10        Continue norvasc.  levated bp likely due to pain and nausea. Improved after bp  meds and symptoms management. Monitor blood pressures and adjust meds as needed.           Nausea R11.0       " Zofran prn for nausea.  Possible due to constipation.  Add sennakot at bedtime.  KUB ordered.             Hyponatremia E87.1       Treated with fluid restriction. Sodium 139 on todays labs           CELINA (acute kidney injury) (CMS-HCC) - Primary N17.9       Maxzide was stopped and labs improved           Acute cystitis without hematuria N30.00       Treated with course of ceftriaxone in the hospital.           Bradycardia R00.1       Atenolol was stopped.            Chronic low back pain M54.50, G89.29       Continue pain management with gabapentin, tizanidine and tramadol prn.          GERD:  on omeprazole.  Having frequent belching - add famotidine 20mg PRN for indigestion.           Time spent: 30 min in review of chart, labs and orders, consultation with pt and documentation.       Electronically Signed By: Corrie Delarosa PA-C   9/6/24  5:01 PM

## 2024-09-09 ENCOUNTER — NURSING HOME VISIT (OUTPATIENT)
Dept: POST ACUTE CARE | Facility: EXTERNAL LOCATION | Age: 74
End: 2024-09-09
Payer: MEDICARE

## 2024-09-09 VITALS
HEART RATE: 98 BPM | SYSTOLIC BLOOD PRESSURE: 145 MMHG | WEIGHT: 266.8 LBS | OXYGEN SATURATION: 95 % | DIASTOLIC BLOOD PRESSURE: 69 MMHG | RESPIRATION RATE: 18 BRPM | BODY MASS INDEX: 46.69 KG/M2 | TEMPERATURE: 97.5 F

## 2024-09-09 DIAGNOSIS — G89.29 CHRONIC LOW BACK PAIN, UNSPECIFIED BACK PAIN LATERALITY, UNSPECIFIED WHETHER SCIATICA PRESENT: ICD-10-CM

## 2024-09-09 DIAGNOSIS — K59.00 CONSTIPATION, UNSPECIFIED CONSTIPATION TYPE: ICD-10-CM

## 2024-09-09 DIAGNOSIS — I10 ESSENTIAL HYPERTENSION: ICD-10-CM

## 2024-09-09 DIAGNOSIS — N30.00 ACUTE CYSTITIS WITHOUT HEMATURIA: ICD-10-CM

## 2024-09-09 DIAGNOSIS — N17.9 AKI (ACUTE KIDNEY INJURY) (CMS-HCC): Primary | ICD-10-CM

## 2024-09-09 DIAGNOSIS — E87.1 HYPONATREMIA: ICD-10-CM

## 2024-09-09 DIAGNOSIS — M54.50 CHRONIC LOW BACK PAIN, UNSPECIFIED BACK PAIN LATERALITY, UNSPECIFIED WHETHER SCIATICA PRESENT: ICD-10-CM

## 2024-09-09 DIAGNOSIS — R00.1 BRADYCARDIA: ICD-10-CM

## 2024-09-09 DIAGNOSIS — R11.0 NAUSEA: ICD-10-CM

## 2024-09-09 PROCEDURE — 99309 SBSQ NF CARE MODERATE MDM 30: CPT | Performed by: PHYSICIAN ASSISTANT

## 2024-09-09 ASSESSMENT — ENCOUNTER SYMPTOMS
APPETITE CHANGE: 0
NAUSEA: 0
HEMATURIA: 0
BACK PAIN: 1
CONSTIPATION: 1
TREMORS: 0
DYSURIA: 0
VOMITING: 0
SHORTNESS OF BREATH: 0
DIZZINESS: 0
CHILLS: 0
HEADACHES: 0
COUGH: 1
CONFUSION: 0
WHEEZING: 0
NERVOUS/ANXIOUS: 0
FREQUENCY: 0
ABDOMINAL PAIN: 0
FEVER: 0
WEAKNESS: 0

## 2024-09-09 NOTE — PROGRESS NOTES
Subjective   [id number] : Angela Montelongo is a 74 y.o. female admitted to [facility] for rehab. No chief complaint on file..  HPI  Pt with h/o HTN, OA, urinary incontinence and Gerd with weakness and fall treated for hyponatremia and CELINA.  Pt seen while resting in bed.  She had KUB which showed constipation.  She was given enema over the weekend which was effective.  She is eating better.  Nausea appears to be improved.   No abdominal pain.   She is complaining of cough.  No shortness of breath.   No fevers chills or hypoxia.  Covid swab negative.   She is complain of her back pain.  She has some mild lower leg edema.  She had routine labs this morning.     Review of Systems   Constitutional:  Negative for appetite change, chills and fever.   Respiratory:  Positive for cough. Negative for shortness of breath and wheezing.    Cardiovascular:  Negative for chest pain and leg swelling.   Gastrointestinal:  Positive for constipation. Negative for abdominal pain, nausea and vomiting.   Genitourinary:  Negative for dysuria, frequency and hematuria.   Musculoskeletal:  Positive for back pain.   Neurological:  Negative for dizziness, tremors, weakness and headaches.   Psychiatric/Behavioral:  Negative for confusion. The patient is not nervous/anxious.    All other systems reviewed and are negative.      Objective   /69   Pulse 98   Temp 36.4 °C (97.5 °F)   Resp 18   Wt 121 kg (266 lb 12.8 oz)   SpO2 95%   BMI 46.69 kg/m²    Physical Exam  Constitutional:       General: She is not in acute distress.  Eyes:      Conjunctiva/sclera: Conjunctivae normal.      Pupils: Pupils are equal, round, and reactive to light.   Cardiovascular:      Rate and Rhythm: Normal rate and regular rhythm.      Heart sounds: No murmur heard.  Pulmonary:      Effort: Pulmonary effort is normal.      Breath sounds: No wheezing, rhonchi or rales.   Abdominal:      General: Abdomen is flat. Bowel sounds are normal. There is no  "distension.      Palpations: Abdomen is soft. There is no mass.      Tenderness: There is no abdominal tenderness.   Musculoskeletal:         General: Normal range of motion.      Right lower leg: Edema present.      Left lower leg: Edema present.      Comments: Trace edema to bilateral lower legs   Skin:     General: Skin is warm and dry.      Findings: No rash.   Neurological:      General: No focal deficit present.      Mental Status: She is alert and oriented to person, place, and time. Mental status is at baseline.       Results for orders placed or performed in visit on 09/09/24 (from the past 24 hour(s))   Basic Metabolic Panel   Result Value Ref Range    Glucose 95 65 - 99 mg/dL    Sodium 135 133 - 145 mmol/L    Potassium 4.1 3.4 - 5.1 mmol/L    Chloride 90 (L) 97 - 107 mmol/L    Bicarbonate 34 (H) 24 - 31 mmol/L    Urea Nitrogen 15 8 - 25 mg/dL    Creatinine 0.80 0.40 - 1.60 mg/dL    eGFR 76 >60 mL/min/1.73m*2    Calcium 9.3 8.5 - 10.4 mg/dL    Anion Gap 11 <=19 mmol/L   CBC   Result Value Ref Range    WBC 13.5 (H) 4.4 - 11.3 x10*3/uL    nRBC 0.0 0.0 - 0.0 /100 WBCs    RBC 4.01 4.00 - 5.20 x10*6/uL    Hemoglobin 12.4 12.0 - 16.0 g/dL    Hematocrit 37.6 36.0 - 46.0 %    MCV 94 80 - 100 fL    MCH 30.9 26.0 - 34.0 pg    MCHC 33.0 32.0 - 36.0 g/dL    RDW 14.7 (H) 11.5 - 14.5 %    Platelets 287 150 - 450 x10*3/uL      No lab exists for component: \"CBC BMP\"  Assessment/Plan   Essential hypertension I10        Continue norvasc.   Monitor blood pressures and adjust meds as needed.           Nausea R11.0       Zofran prn for nausea.   improved with routine bowel meds.  She is now eating better.        Hyponatremia E87.1       Treated with fluid restriction. Sodium 135 on todays labs           CELINA (acute kidney injury) (CMS-HCC) - Primary N17.9       Maxzide was stopped and labs improved           Acute cystitis without hematuria N30.00       Treated with course of ceftriaxone in the hospital.  Mild leukocytosis.  " Repeat UA C&S and CXR ordered.            Bradycardia R00.1       Atenolol was stopped.            Chronic low back pain M54.50, G89.29       Continue pain management with gabapentin, tizanidine and tramadol prn.  PM&R following.  Gabapentin increased to bid and tylenol changed to routine.          GERD:  on omeprazole.  Having frequent belching - add famotidine 20mg PRN for indigestion.     Constipation:  enema given over the weekend which was effective.  Continue sennakot at bedtime routine.        Time spent: 30 min in review of chart, labs and orders, consultation with pt and documentation.

## 2024-09-09 NOTE — LETTER
Patient: Angela Montelongo  : 1950    Encounter Date: 2024      Subjective  [id number] : Angela Montelongo is a 74 y.o. female admitted to [facility] for rehab. No chief complaint on file..  HPI  Pt with h/o HTN, OA, urinary incontinence and Gerd with weakness and fall treated for hyponatremia and CELINA.  Pt seen while resting in bed.  She had KUB which showed constipation.  She was given enema over the weekend which was effective.  She is eating better.  Nausea appears to be improved.   No abdominal pain.   She is complaining of cough.  No shortness of breath.   No fevers chills or hypoxia.  Covid swab negative.   She is complain of her back pain.  She has some mild lower leg edema.  She had routine labs this morning.     Review of Systems   Constitutional:  Negative for appetite change, chills and fever.   Respiratory:  Positive for cough. Negative for shortness of breath and wheezing.    Cardiovascular:  Negative for chest pain and leg swelling.   Gastrointestinal:  Positive for constipation. Negative for abdominal pain, nausea and vomiting.   Genitourinary:  Negative for dysuria, frequency and hematuria.   Musculoskeletal:  Positive for back pain.   Neurological:  Negative for dizziness, tremors, weakness and headaches.   Psychiatric/Behavioral:  Negative for confusion. The patient is not nervous/anxious.    All other systems reviewed and are negative.      Objective  /69   Pulse 98   Temp 36.4 °C (97.5 °F)   Resp 18   Wt 121 kg (266 lb 12.8 oz)   SpO2 95%   BMI 46.69 kg/m²    Physical Exam  Constitutional:       General: She is not in acute distress.  Eyes:      Conjunctiva/sclera: Conjunctivae normal.      Pupils: Pupils are equal, round, and reactive to light.   Cardiovascular:      Rate and Rhythm: Normal rate and regular rhythm.      Heart sounds: No murmur heard.  Pulmonary:      Effort: Pulmonary effort is normal.      Breath sounds: No wheezing, rhonchi or rales.   Abdominal:       "General: Abdomen is flat. Bowel sounds are normal. There is no distension.      Palpations: Abdomen is soft. There is no mass.      Tenderness: There is no abdominal tenderness.   Musculoskeletal:         General: Normal range of motion.      Right lower leg: Edema present.      Left lower leg: Edema present.      Comments: Trace edema to bilateral lower legs   Skin:     General: Skin is warm and dry.      Findings: No rash.   Neurological:      General: No focal deficit present.      Mental Status: She is alert and oriented to person, place, and time. Mental status is at baseline.       Results for orders placed or performed in visit on 09/09/24 (from the past 24 hour(s))   Basic Metabolic Panel   Result Value Ref Range    Glucose 95 65 - 99 mg/dL    Sodium 135 133 - 145 mmol/L    Potassium 4.1 3.4 - 5.1 mmol/L    Chloride 90 (L) 97 - 107 mmol/L    Bicarbonate 34 (H) 24 - 31 mmol/L    Urea Nitrogen 15 8 - 25 mg/dL    Creatinine 0.80 0.40 - 1.60 mg/dL    eGFR 76 >60 mL/min/1.73m*2    Calcium 9.3 8.5 - 10.4 mg/dL    Anion Gap 11 <=19 mmol/L   CBC   Result Value Ref Range    WBC 13.5 (H) 4.4 - 11.3 x10*3/uL    nRBC 0.0 0.0 - 0.0 /100 WBCs    RBC 4.01 4.00 - 5.20 x10*6/uL    Hemoglobin 12.4 12.0 - 16.0 g/dL    Hematocrit 37.6 36.0 - 46.0 %    MCV 94 80 - 100 fL    MCH 30.9 26.0 - 34.0 pg    MCHC 33.0 32.0 - 36.0 g/dL    RDW 14.7 (H) 11.5 - 14.5 %    Platelets 287 150 - 450 x10*3/uL      No lab exists for component: \"CBC BMP\"  Assessment/Plan  Essential hypertension I10        Continue norvasc.   Monitor blood pressures and adjust meds as needed.           Nausea R11.0       Zofran prn for nausea.   improved with routine bowel meds.  She is now eating better.        Hyponatremia E87.1       Treated with fluid restriction. Sodium 135 on todays labs           CELINA (acute kidney injury) (CMS-HCC) - Primary N17.9       Maxzide was stopped and labs improved           Acute cystitis without hematuria N30.00       Treated with " course of ceftriaxone in the hospital.  Mild leukocytosis.  Repeat UA C&S and CXR ordered.            Bradycardia R00.1       Atenolol was stopped.            Chronic low back pain M54.50, G89.29       Continue pain management with gabapentin, tizanidine and tramadol prn.  PM&R following.  Gabapentin increased to bid and tylenol changed to routine.          GERD:  on omeprazole.  Having frequent belching - add famotidine 20mg PRN for indigestion.     Constipation:  enema given over the weekend which was effective.  Continue sennakot at bedtime routine.        Time spent: 30 min in review of chart, labs and orders, consultation with pt and documentation.       Electronically Signed By: Corrie Delarosa PA-C   9/9/24  6:07 PM

## 2024-09-11 ENCOUNTER — NURSING HOME VISIT (OUTPATIENT)
Dept: POST ACUTE CARE | Facility: EXTERNAL LOCATION | Age: 74
End: 2024-09-11
Payer: MEDICARE

## 2024-09-11 DIAGNOSIS — M54.50 CHRONIC LOW BACK PAIN, UNSPECIFIED BACK PAIN LATERALITY, UNSPECIFIED WHETHER SCIATICA PRESENT: ICD-10-CM

## 2024-09-11 DIAGNOSIS — N17.9 AKI (ACUTE KIDNEY INJURY) (CMS-HCC): Primary | ICD-10-CM

## 2024-09-11 DIAGNOSIS — K59.00 CONSTIPATION, UNSPECIFIED CONSTIPATION TYPE: ICD-10-CM

## 2024-09-11 DIAGNOSIS — R11.0 NAUSEA: ICD-10-CM

## 2024-09-11 DIAGNOSIS — E87.1 HYPONATREMIA: ICD-10-CM

## 2024-09-11 DIAGNOSIS — N30.00 ACUTE CYSTITIS WITHOUT HEMATURIA: ICD-10-CM

## 2024-09-11 DIAGNOSIS — G89.29 CHRONIC LOW BACK PAIN, UNSPECIFIED BACK PAIN LATERALITY, UNSPECIFIED WHETHER SCIATICA PRESENT: ICD-10-CM

## 2024-09-11 PROBLEM — I82.409 DEEP VENOUS THROMBOSIS (MULTI): Status: RESOLVED | Noted: 2024-08-13 | Resolved: 2024-09-11

## 2024-09-11 PROCEDURE — 99306 1ST NF CARE HIGH MDM 50: CPT | Performed by: INTERNAL MEDICINE

## 2024-09-11 ASSESSMENT — ENCOUNTER SYMPTOMS
NAUSEA: 1
FEVER: 0
ABDOMINAL PAIN: 0
SHORTNESS OF BREATH: 0

## 2024-09-11 NOTE — LETTER
Patient: Angela Montelongo  : 1950    Encounter Date: 2024    HISTORY AND PHYSICAL    History of present illness:    Angela Montelongo is a 74 y.o. female admitted to SNF after hospitalization for weakness, hyponatremia and acute kidney injury.  She was treated for urinary tract infection.  Sodium improved with fluid restriction.  Home antihypertensives held, meloxicam held.  She has chronic low back pain.  She is having a hard time getting comfortable in bed this morning.  She has been nauseous.  She was having constipation and had an enema over the weekend which was helpful although again feels nauseous this morning.  She was having some coughing yesterday and had a chest x-ray completed as well.    Past Medical History:   Diagnosis Date   • Elevation of levels of liver transaminase levels     ALT (SGPT) level raised   • Other conditions influencing health status     Osteoarthritis   • Personal history of other complications of pregnancy, childbirth and the puerperium     History of ectopic pregnancy   • Personal history of other diseases of the circulatory system     History of hypertension   • Personal history of other diseases of the musculoskeletal system and connective tissue     History of bursitis   • Personal history of other diseases of the musculoskeletal system and connective tissue     Personal history of gout   • Personal history of other diseases of the nervous system and sense organs     History of migraine headaches        Past Surgical History:   Procedure Laterality Date   • CARPAL TUNNEL RELEASE  2013    Neuroplasty Median Nerve At Carpal Tunnel   • CHOLECYSTECTOMY  2013    Cholecystectomy   • KNEE ARTHROPLASTY  2013    Knee Arthroplasty        No family history on file.    Social History     Socioeconomic History   • Marital status:      Spouse name: Not on file   • Number of children: Not on file   • Years of education: Not on file   • Highest education level:  Not on file   Occupational History   • Not on file   Tobacco Use   • Smoking status: Never   • Smokeless tobacco: Never   Substance and Sexual Activity   • Alcohol use: Not Currently   • Drug use: Defer   • Sexual activity: Defer   Other Topics Concern   • Not on file   Social History Narrative   • Not on file     Social Determinants of Health     Financial Resource Strain: Medium Risk (8/29/2024)    Overall Financial Resource Strain (CARDIA)    • Difficulty of Paying Living Expenses: Somewhat hard   Food Insecurity: Not on file   Transportation Needs: No Transportation Needs (8/29/2024)    PRAPARE - Transportation    • Lack of Transportation (Medical): No    • Lack of Transportation (Non-Medical): No   Physical Activity: Not on file   Stress: Not on file   Social Connections: Not on file   Intimate Partner Violence: Not on file   Housing Stability: Low Risk  (8/29/2024)    Housing Stability Vital Sign    • Unable to Pay for Housing in the Last Year: No    • Number of Times Moved in the Last Year: 0    • Homeless in the Last Year: No       Review of Systems   Constitutional:  Negative for fever.   Respiratory:  Negative for shortness of breath.    Cardiovascular:  Negative for chest pain.   Gastrointestinal:  Positive for nausea. Negative for abdominal pain.   All other systems reviewed and are negative.       Objective  Vital signs reviewed in [facility].    Physical Exam  Vitals reviewed.   Constitutional:       General: She is not in acute distress.     Appearance: She is not toxic-appearing.   HENT:      Head: Normocephalic and atraumatic.      Mouth/Throat:      Mouth: Mucous membranes are moist.   Eyes:      Pupils: Pupils are equal, round, and reactive to light.   Cardiovascular:      Rate and Rhythm: Normal rate and regular rhythm.      Heart sounds: No murmur heard.  Pulmonary:      Breath sounds: Normal breath sounds. No wheezing, rhonchi or rales.   Abdominal:      General: There is no distension.       Palpations: Abdomen is soft.   Musculoskeletal:      Right lower leg: Edema present.      Left lower leg: Edema present.   Neurological:      General: No focal deficit present.      Mental Status: She is alert and oriented to person, place, and time.          Assessment/Plan  Diagnoses and all orders for this visit:  CELINA (acute kidney injury) (CMS-[facility]) (Primary)  Acute cystitis without hematuria  Hyponatremia  Nausea  Constipation, unspecified constipation type  Chronic low back pain, unspecified back pain laterality, unspecified whether sciatica present    Main issue is her nausea this morning.  May be from constipation.  Repeat urinalysis was negative.  I do notice some bilirubin and we will see if we can add liver function tests to the blood in the lab or at least have a CMP drawn tomorrow.  She may need another enema.  Potassium is low and we will supplement.  Continues on the amlodipine for hypertension at this point.  Tramadol and tizanidine are ordered as needed for the back pain.  Discussed with staff that she feels her bed is not operating correctly and it is part of the problem and that she is unable to get comfortable and she will probably need a new bed.    The essential elements of the hospital History and Physical as well as the Discharge Summary have been confirmed to the best of my ability by means of interviewing the patient plus a review of the hospital records. Please note that this entry was created in a shared electronic medical record.     All available hospital and outpatient records have been reviewed. Will continue other current drug therapies as ordered on the continuation of care documents. Physical and Occupational Therapy will assess and treat per POC. Analgesia for identified pain symptoms. Continue the various medicines for bowel and bladder symptoms as well as the vitamins and supplements per hospital instructions. Will assess and provide local care to abnormalities of skin  integrity. Drug to drug interaction data as noted by the pharmacy has been reviewed. The patient's condition warrants the continuation of these drugs as prescribed by the medical specialists. Discharge planning will be coordinated through the  department. I have reviewed any advanced directive documentation that is contained in the admission packet as directives indicating whether a surrogate decision maker has been identified.       Electronically Signed By: Giovanni Medina MD   9/11/24 10:00 AM

## 2024-09-11 NOTE — PROGRESS NOTES
HISTORY AND PHYSICAL    History of present illness:    Angela Montelongo is a 74 y.o. female admitted to SNF after hospitalization for weakness, hyponatremia and acute kidney injury.  She was treated for urinary tract infection.  Sodium improved with fluid restriction.  Home antihypertensives held, meloxicam held.  She has chronic low back pain.  She is having a hard time getting comfortable in bed this morning.  She has been nauseous.  She was having constipation and had an enema over the weekend which was helpful although again feels nauseous this morning.  She was having some coughing yesterday and had a chest x-ray completed as well.    Past Medical History:   Diagnosis Date    Elevation of levels of liver transaminase levels     ALT (SGPT) level raised    Other conditions influencing health status     Osteoarthritis    Personal history of other complications of pregnancy, childbirth and the puerperium     History of ectopic pregnancy    Personal history of other diseases of the circulatory system     History of hypertension    Personal history of other diseases of the musculoskeletal system and connective tissue     History of bursitis    Personal history of other diseases of the musculoskeletal system and connective tissue     Personal history of gout    Personal history of other diseases of the nervous system and sense organs     History of migraine headaches        Past Surgical History:   Procedure Laterality Date    CARPAL TUNNEL RELEASE  04/09/2013    Neuroplasty Median Nerve At Carpal Tunnel    CHOLECYSTECTOMY  04/09/2013    Cholecystectomy    KNEE ARTHROPLASTY  04/09/2013    Knee Arthroplasty        No family history on file.    Social History     Socioeconomic History    Marital status:      Spouse name: Not on file    Number of children: Not on file    Years of education: Not on file    Highest education level: Not on file   Occupational History    Not on file   Tobacco Use    Smoking status:  Never    Smokeless tobacco: Never   Substance and Sexual Activity    Alcohol use: Not Currently    Drug use: Defer    Sexual activity: Defer   Other Topics Concern    Not on file   Social History Narrative    Not on file     Social Determinants of Health     Financial Resource Strain: Medium Risk (8/29/2024)    Overall Financial Resource Strain (CARDIA)     Difficulty of Paying Living Expenses: Somewhat hard   Food Insecurity: Not on file   Transportation Needs: No Transportation Needs (8/29/2024)    PRAPARE - Transportation     Lack of Transportation (Medical): No     Lack of Transportation (Non-Medical): No   Physical Activity: Not on file   Stress: Not on file   Social Connections: Not on file   Intimate Partner Violence: Not on file   Housing Stability: Low Risk  (8/29/2024)    Housing Stability Vital Sign     Unable to Pay for Housing in the Last Year: No     Number of Times Moved in the Last Year: 0     Homeless in the Last Year: No       Review of Systems   Constitutional:  Negative for fever.   Respiratory:  Negative for shortness of breath.    Cardiovascular:  Negative for chest pain.   Gastrointestinal:  Positive for nausea. Negative for abdominal pain.   All other systems reviewed and are negative.       Objective   Vital signs reviewed in [facility].    Physical Exam  Vitals reviewed.   Constitutional:       General: She is not in acute distress.     Appearance: She is not toxic-appearing.   HENT:      Head: Normocephalic and atraumatic.      Mouth/Throat:      Mouth: Mucous membranes are moist.   Eyes:      Pupils: Pupils are equal, round, and reactive to light.   Cardiovascular:      Rate and Rhythm: Normal rate and regular rhythm.      Heart sounds: No murmur heard.  Pulmonary:      Breath sounds: Normal breath sounds. No wheezing, rhonchi or rales.   Abdominal:      General: There is no distension.      Palpations: Abdomen is soft.   Musculoskeletal:      Right lower leg: Edema present.       Left lower leg: Edema present.   Neurological:      General: No focal deficit present.      Mental Status: She is alert and oriented to person, place, and time.          Assessment/Plan   Diagnoses and all orders for this visit:  CELINA (acute kidney injury) (CMS-[facility]) (Primary)  Acute cystitis without hematuria  Hyponatremia  Nausea  Constipation, unspecified constipation type  Chronic low back pain, unspecified back pain laterality, unspecified whether sciatica present    Main issue is her nausea this morning.  May be from constipation.  Repeat urinalysis was negative.  I do notice some bilirubin and we will see if we can add liver function tests to the blood in the lab or at least have a CMP drawn tomorrow.  She may need another enema.  Potassium is low and we will supplement.  Continues on the amlodipine for hypertension at this point.  Tramadol and tizanidine are ordered as needed for the back pain.  Discussed with staff that she feels her bed is not operating correctly and it is part of the problem and that she is unable to get comfortable and she will probably need a new bed.    The essential elements of the hospital History and Physical as well as the Discharge Summary have been confirmed to the best of my ability by means of interviewing the patient plus a review of the hospital records. Please note that this entry was created in a shared electronic medical record.     All available hospital and outpatient records have been reviewed. Will continue other current drug therapies as ordered on the continuation of care documents. Physical and Occupational Therapy will assess and treat per POC. Analgesia for identified pain symptoms. Continue the various medicines for bowel and bladder symptoms as well as the vitamins and supplements per hospital instructions. Will assess and provide local care to abnormalities of skin integrity. Drug to drug interaction data as noted by the pharmacy has been reviewed. The patient's  condition warrants the continuation of these drugs as prescribed by the medical specialists. Discharge planning will be coordinated through the  department. I have reviewed any advanced directive documentation that is contained in the admission packet as directives indicating whether a surrogate decision maker has been identified.

## 2024-09-13 ENCOUNTER — NURSING HOME VISIT (OUTPATIENT)
Dept: POST ACUTE CARE | Facility: EXTERNAL LOCATION | Age: 74
End: 2024-09-13
Payer: MEDICARE

## 2024-09-13 VITALS
BODY MASS INDEX: 46.27 KG/M2 | TEMPERATURE: 98.2 F | HEART RATE: 73 BPM | DIASTOLIC BLOOD PRESSURE: 94 MMHG | OXYGEN SATURATION: 96 % | SYSTOLIC BLOOD PRESSURE: 157 MMHG | WEIGHT: 264.4 LBS | RESPIRATION RATE: 16 BRPM

## 2024-09-13 DIAGNOSIS — M54.50 CHRONIC LOW BACK PAIN, UNSPECIFIED BACK PAIN LATERALITY, UNSPECIFIED WHETHER SCIATICA PRESENT: ICD-10-CM

## 2024-09-13 DIAGNOSIS — G89.29 CHRONIC LOW BACK PAIN, UNSPECIFIED BACK PAIN LATERALITY, UNSPECIFIED WHETHER SCIATICA PRESENT: ICD-10-CM

## 2024-09-13 DIAGNOSIS — N17.9 AKI (ACUTE KIDNEY INJURY) (CMS-HCC): Primary | ICD-10-CM

## 2024-09-13 DIAGNOSIS — K59.00 CONSTIPATION, UNSPECIFIED CONSTIPATION TYPE: ICD-10-CM

## 2024-09-13 DIAGNOSIS — N30.00 ACUTE CYSTITIS WITHOUT HEMATURIA: ICD-10-CM

## 2024-09-13 DIAGNOSIS — E87.1 HYPONATREMIA: ICD-10-CM

## 2024-09-13 DIAGNOSIS — I10 ESSENTIAL HYPERTENSION: ICD-10-CM

## 2024-09-13 DIAGNOSIS — N32.81 OVERACTIVE BLADDER: ICD-10-CM

## 2024-09-13 DIAGNOSIS — R11.0 NAUSEA: ICD-10-CM

## 2024-09-13 DIAGNOSIS — R00.1 BRADYCARDIA: ICD-10-CM

## 2024-09-13 PROCEDURE — 99309 SBSQ NF CARE MODERATE MDM 30: CPT | Performed by: PHYSICIAN ASSISTANT

## 2024-09-13 ASSESSMENT — ENCOUNTER SYMPTOMS
BACK PAIN: 1
FREQUENCY: 0
COUGH: 0
HEADACHES: 0
SHORTNESS OF BREATH: 0
CONSTIPATION: 1
DIZZINESS: 0
FEVER: 0
HEMATURIA: 0
APPETITE CHANGE: 0
WHEEZING: 0
ABDOMINAL PAIN: 0
NERVOUS/ANXIOUS: 0
TREMORS: 0
CHILLS: 0
VOMITING: 0
WEAKNESS: 0
NAUSEA: 0
CONFUSION: 0
DYSURIA: 0

## 2024-09-13 NOTE — LETTER
Patient: Angela Montelongo  : 1950    Encounter Date: 2024      Subjective  [id number] : Angela Montelongo is a 74 y.o. female admitted to [facility] for rehab. No chief complaint on file..  HPI  Pt with h/o HTN, OA, urinary incontinence and Gerd with weakness and fall treated for hyponatremia and CELINA.  Pt seen while resting in bed.  Pt reports that her back has improved with medication adjustments.   Her po intake has improved.  She reports that nausea has resolved.  She is still having some issues with constipation and does not feel that the senna is enough.   She is also asking that her oxybutynin be restarted  she taking this bid at home.   She still seems confused about medication changed done at the hospital though I have gone over these with her multiple times.   Pt had mild leukocytosis this week.  No fevers or chills.   UA and chest x-ray were done and are negative.       Review of Systems   Constitutional:  Negative for appetite change, chills and fever.   Respiratory:  Negative for cough, shortness of breath and wheezing.    Cardiovascular:  Negative for chest pain and leg swelling.   Gastrointestinal:  Positive for constipation. Negative for abdominal pain, nausea and vomiting.   Genitourinary:  Positive for urgency. Negative for dysuria, frequency and hematuria.   Musculoskeletal:  Positive for back pain.   Neurological:  Negative for dizziness, tremors, weakness and headaches.   Psychiatric/Behavioral:  Negative for confusion. The patient is not nervous/anxious.    All other systems reviewed and are negative.      Objective  BP (!) 157/94   Pulse 73   Temp 36.8 °C (98.2 °F)   Resp 16   Wt 120 kg (264 lb 6.4 oz)   SpO2 96%   BMI 46.27 kg/m²    Physical Exam  Constitutional:       General: She is not in acute distress.  Eyes:      Conjunctiva/sclera: Conjunctivae normal.      Pupils: Pupils are equal, round, and reactive to light.   Cardiovascular:      Rate and Rhythm: Normal rate and  "regular rhythm.      Heart sounds: No murmur heard.  Pulmonary:      Effort: Pulmonary effort is normal.      Breath sounds: No wheezing, rhonchi or rales.   Abdominal:      General: Abdomen is flat. Bowel sounds are normal. There is no distension.      Palpations: Abdomen is soft. There is no mass.      Tenderness: There is no abdominal tenderness.   Musculoskeletal:         General: Normal range of motion.      Right lower leg: Edema present.      Left lower leg: Edema present.      Comments: Trace edema to bilateral lower legs   Skin:     General: Skin is warm and dry.      Findings: No rash.   Neurological:      General: No focal deficit present.      Mental Status: She is alert and oriented to person, place, and time. Mental status is at baseline.       No results found for this or any previous visit (from the past 24 hour(s)).     No lab exists for component: \"CBC BMP\"  Assessment/Plan  Essential hypertension I10        Continue norvasc.  Some elevated blood pressures -  Increase norvasc to 10mg daily Monitor blood pressures and adjust meds as needed.           Nausea R11.0       Zofran prn for nausea.   improved with routine bowel meds.  She is now eating better.        Hyponatremia E87.1       Treated with fluid restriction. Sodium 135 on last labs           CELINA (acute kidney injury) (CMS-HCC) - Primary N17.9       Maxzide and meloxicam stopped and labs improved.  Monitor weekly labs.            Acute cystitis without hematuria N30.00       Treated with course of ceftriaxone in the hospital.  Mild leukocytosis.  Repeat UA - negative.   CXR also negative            Bradycardia R00.1       Atenolol was stopped.            Chronic low back pain M54.50, G89.29       Continue pain management with gabapentin, tizanidine and tramadol prn.  PM&R following.  Gabapentin increased to bid and tylenol changed to routine.          GERD:  on omeprazole.   add famotidine 20mg PRN for indigestion.     Constipation:  Continue " sennakot at bedtime routine.  Add miralax daily routine    Overactive bladder:  restart home med - oxybutynin 5mg BID.         Time spent: 30 min in review of chart, labs and orders, consultation with pt and documentation.       Electronically Signed By: Corrie Delarosa PA-C   9/13/24  1:17 PM

## 2024-09-13 NOTE — PROGRESS NOTES
Subjective   [id number] : Angela Montelongo is a 74 y.o. female admitted to [facility] for rehab. No chief complaint on file..  HPI  Pt with h/o HTN, OA, urinary incontinence and Gerd with weakness and fall treated for hyponatremia and CELINA.  Pt seen while resting in bed.  Pt reports that her back has improved with medication adjustments.   Her po intake has improved.  She reports that nausea has resolved.  She is still having some issues with constipation and does not feel that the senna is enough.   She is also asking that her oxybutynin be restarted  she taking this bid at home.   She still seems confused about medication changed done at the hospital though I have gone over these with her multiple times.   Pt had mild leukocytosis this week.  No fevers or chills.   UA and chest x-ray were done and are negative.       Review of Systems   Constitutional:  Negative for appetite change, chills and fever.   Respiratory:  Negative for cough, shortness of breath and wheezing.    Cardiovascular:  Negative for chest pain and leg swelling.   Gastrointestinal:  Positive for constipation. Negative for abdominal pain, nausea and vomiting.   Genitourinary:  Positive for urgency. Negative for dysuria, frequency and hematuria.   Musculoskeletal:  Positive for back pain.   Neurological:  Negative for dizziness, tremors, weakness and headaches.   Psychiatric/Behavioral:  Negative for confusion. The patient is not nervous/anxious.    All other systems reviewed and are negative.      Objective   BP (!) 157/94   Pulse 73   Temp 36.8 °C (98.2 °F)   Resp 16   Wt 120 kg (264 lb 6.4 oz)   SpO2 96%   BMI 46.27 kg/m²    Physical Exam  Constitutional:       General: She is not in acute distress.  Eyes:      Conjunctiva/sclera: Conjunctivae normal.      Pupils: Pupils are equal, round, and reactive to light.   Cardiovascular:      Rate and Rhythm: Normal rate and regular rhythm.      Heart sounds: No murmur heard.  Pulmonary:       "Effort: Pulmonary effort is normal.      Breath sounds: No wheezing, rhonchi or rales.   Abdominal:      General: Abdomen is flat. Bowel sounds are normal. There is no distension.      Palpations: Abdomen is soft. There is no mass.      Tenderness: There is no abdominal tenderness.   Musculoskeletal:         General: Normal range of motion.      Right lower leg: Edema present.      Left lower leg: Edema present.      Comments: Trace edema to bilateral lower legs   Skin:     General: Skin is warm and dry.      Findings: No rash.   Neurological:      General: No focal deficit present.      Mental Status: She is alert and oriented to person, place, and time. Mental status is at baseline.       No results found for this or any previous visit (from the past 24 hour(s)).     No lab exists for component: \"CBC BMP\"  Assessment/Plan   Essential hypertension I10        Continue norvasc.  Some elevated blood pressures -  Increase norvasc to 10mg daily Monitor blood pressures and adjust meds as needed.           Nausea R11.0       Zofran prn for nausea.   improved with routine bowel meds.  She is now eating better.        Hyponatremia E87.1       Treated with fluid restriction. Sodium 135 on last labs           CELINA (acute kidney injury) (CMS-HCC) - Primary N17.9       Maxzide and meloxicam stopped and labs improved.  Monitor weekly labs.            Acute cystitis without hematuria N30.00       Treated with course of ceftriaxone in the hospital.  Mild leukocytosis.  Repeat UA - negative.   CXR also negative            Bradycardia R00.1       Atenolol was stopped.            Chronic low back pain M54.50, G89.29       Continue pain management with gabapentin, tizanidine and tramadol prn.  PM&R following.  Gabapentin increased to bid and tylenol changed to routine.          GERD:  on omeprazole.   add famotidine 20mg PRN for indigestion.     Constipation:  Continue sennakot at bedtime routine.  Add miralax daily " routine    Overactive bladder:  restart home med - oxybutynin 5mg BID.         Time spent: 30 min in review of chart, labs and orders, consultation with pt and documentation.

## 2024-09-16 ENCOUNTER — NURSING HOME VISIT (OUTPATIENT)
Dept: POST ACUTE CARE | Facility: EXTERNAL LOCATION | Age: 74
End: 2024-09-16
Payer: MEDICARE

## 2024-09-16 VITALS
RESPIRATION RATE: 18 BRPM | SYSTOLIC BLOOD PRESSURE: 126 MMHG | OXYGEN SATURATION: 98 % | BODY MASS INDEX: 46.27 KG/M2 | TEMPERATURE: 98 F | HEART RATE: 70 BPM | DIASTOLIC BLOOD PRESSURE: 70 MMHG | WEIGHT: 264.4 LBS

## 2024-09-16 DIAGNOSIS — E87.1 HYPONATREMIA: ICD-10-CM

## 2024-09-16 DIAGNOSIS — G89.29 CHRONIC LOW BACK PAIN, UNSPECIFIED BACK PAIN LATERALITY, UNSPECIFIED WHETHER SCIATICA PRESENT: ICD-10-CM

## 2024-09-16 DIAGNOSIS — R11.0 NAUSEA: ICD-10-CM

## 2024-09-16 DIAGNOSIS — K59.00 CONSTIPATION, UNSPECIFIED CONSTIPATION TYPE: ICD-10-CM

## 2024-09-16 DIAGNOSIS — N32.81 OVERACTIVE BLADDER: ICD-10-CM

## 2024-09-16 DIAGNOSIS — R00.1 BRADYCARDIA: ICD-10-CM

## 2024-09-16 DIAGNOSIS — N30.00 ACUTE CYSTITIS WITHOUT HEMATURIA: ICD-10-CM

## 2024-09-16 DIAGNOSIS — M54.50 CHRONIC LOW BACK PAIN, UNSPECIFIED BACK PAIN LATERALITY, UNSPECIFIED WHETHER SCIATICA PRESENT: ICD-10-CM

## 2024-09-16 DIAGNOSIS — N17.9 AKI (ACUTE KIDNEY INJURY) (CMS-HCC): Primary | ICD-10-CM

## 2024-09-16 DIAGNOSIS — I10 ESSENTIAL HYPERTENSION: ICD-10-CM

## 2024-09-16 PROCEDURE — 99309 SBSQ NF CARE MODERATE MDM 30: CPT | Performed by: PHYSICIAN ASSISTANT

## 2024-09-16 ASSESSMENT — ENCOUNTER SYMPTOMS
TREMORS: 0
SHORTNESS OF BREATH: 0
HEMATURIA: 0
NAUSEA: 0
CHILLS: 0
NERVOUS/ANXIOUS: 0
COUGH: 0
APPETITE CHANGE: 0
FREQUENCY: 0
CONFUSION: 0
WHEEZING: 0
HEADACHES: 0
CONSTIPATION: 0
DYSURIA: 0
FEVER: 0
WEAKNESS: 0
DIZZINESS: 0
BACK PAIN: 1
VOMITING: 0
ABDOMINAL PAIN: 0

## 2024-09-16 NOTE — PROGRESS NOTES
Subjective   [id number] : Angela Montelongo is a 74 y.o. female admitted to [facility] for rehab. No chief complaint on file..  HPI  Pt with h/o HTN, OA, urinary incontinence and Gerd with weakness and fall treated for hyponatremia and CELINA.  Pt seen while resting in bed.  Pt reports that she is feeling betted.   She reports that she has been having issues with forgetfulness and issues with memory.  Discussed with nursing and will have speech work with pt on cognition.   She is repetitive and does not recall that we have discussed her blood pressures and the medication changes done in the hospital multiple times.   She is on norvasc.  Blood pressures were much improved over the weekend.  Her po intake has improved.   Having regular bowel movements.   She denies any abdominal pain.  NO n/v/d/c.  No shortness of breath or cough.  Her back pain is improved with current pain meds.   She had routine labs today which were reviewed and are stable.  Review of Systems   Constitutional:  Negative for appetite change, chills and fever.   Respiratory:  Negative for cough, shortness of breath and wheezing.    Cardiovascular:  Negative for chest pain and leg swelling.   Gastrointestinal:  Negative for abdominal pain, constipation, nausea and vomiting.   Genitourinary:  Negative for dysuria, frequency, hematuria and urgency.   Musculoskeletal:  Positive for back pain.   Neurological:  Negative for dizziness, tremors, weakness and headaches.   Psychiatric/Behavioral:  Negative for confusion. The patient is not nervous/anxious.    All other systems reviewed and are negative.      Objective   /70   Pulse 70   Temp 36.7 °C (98 °F)   Resp 18   Wt 120 kg (264 lb 6.4 oz)   SpO2 98%   BMI 46.27 kg/m²    Physical Exam  Constitutional:       General: She is not in acute distress.  Eyes:      Conjunctiva/sclera: Conjunctivae normal.      Pupils: Pupils are equal, round, and reactive to light.   Cardiovascular:      Rate and  "Rhythm: Normal rate and regular rhythm.      Heart sounds: No murmur heard.  Pulmonary:      Effort: Pulmonary effort is normal.      Breath sounds: No wheezing, rhonchi or rales.   Abdominal:      General: Abdomen is flat. Bowel sounds are normal. There is no distension.      Palpations: Abdomen is soft. There is no mass.      Tenderness: There is no abdominal tenderness.   Musculoskeletal:         General: Normal range of motion.      Right lower leg: Edema present.      Left lower leg: Edema present.      Comments: Trace edema to bilateral lower legs   Skin:     General: Skin is warm and dry.      Findings: No rash.   Neurological:      General: No focal deficit present.      Mental Status: She is alert and oriented to person, place, and time. Mental status is at baseline.       Results for orders placed or performed in visit on 09/16/24 (from the past 24 hour(s))   Basic Metabolic Panel   Result Value Ref Range    Glucose 75 65 - 99 mg/dL    Sodium 134 133 - 145 mmol/L    Potassium 3.8 3.4 - 5.1 mmol/L    Chloride 92 (L) 97 - 107 mmol/L    Bicarbonate 31 24 - 31 mmol/L    Urea Nitrogen 17 8 - 25 mg/dL    Creatinine 0.80 0.40 - 1.60 mg/dL    eGFR 76 >60 mL/min/1.73m*2    Calcium 8.6 8.5 - 10.4 mg/dL    Anion Gap 11 <=19 mmol/L   CBC   Result Value Ref Range    WBC 10.2 4.4 - 11.3 x10*3/uL    nRBC 0.0 0.0 - 0.0 /100 WBCs    RBC 3.45 (L) 4.00 - 5.20 x10*6/uL    Hemoglobin 10.5 (L) 12.0 - 16.0 g/dL    Hematocrit 32.1 (L) 36.0 - 46.0 %    MCV 93 80 - 100 fL    MCH 30.4 26.0 - 34.0 pg    MCHC 32.7 32.0 - 36.0 g/dL    RDW 14.4 11.5 - 14.5 %    Platelets 322 150 - 450 x10*3/uL        No lab exists for component: \"CBC BMP\"  Assessment/Plan   Essential hypertension I10        Continue norvasc 10mg daily.  Monitor blood pressures and adjust meds as needed.           Nausea R11.0       Zofran prn for nausea.   improved with routine bowel meds.  She is now eating better.        Hyponatremia E87.1       Treated with fluid " restriction. Sodium 135 on last labs           CELINA (acute kidney injury) (CMS-HCC) - Primary N17.9       Maxzide and meloxicam stopped and labs improved.  Monitor weekly labs.            Acute cystitis without hematuria N30.00       Treated with course of ceftriaxone in the hospital.  Mild leukocytosis has resolved.  Repeat UA - negative.   CXR also negative            Bradycardia R00.1       Atenolol was stopped.            Chronic low back pain M54.50, G89.29       Continue pain management with gabapentin, tizanidine and tramadol prn.  PM&R following.  Gabapentin increased to bid and tylenol changed to routine.          GERD:  on omeprazole.   add famotidine 20mg PRN for indigestion.     Constipation:  Continue sennakot at bedtime routine and miralax daily routine    Overactive bladder:  restart home med - oxybutynin 5mg BID.         Time spent: 30 min in review of chart, labs and orders, consultation with pt and documentation.

## 2024-09-16 NOTE — LETTER
Patient: Angela Montelongo  : 1950    Encounter Date: 2024      Subjective  [id number] : Angela Montelongo is a 74 y.o. female admitted to [facility] for rehab. No chief complaint on file..  HPI  Pt with h/o HTN, OA, urinary incontinence and Gerd with weakness and fall treated for hyponatremia and CELINA.  Pt seen while resting in bed.  Pt reports that she is feeling betted.   She reports that she has been having issues with forgetfulness and issues with memory.  Discussed with nursing and will have speech work with pt on cognition.   She is repetitive and does not recall that we have discussed her blood pressures and the medication changes done in the hospital multiple times.   She is on norvasc.  Blood pressures were much improved over the weekend.  Her po intake has improved.   Having regular bowel movements.   She denies any abdominal pain.  NO n/v/d/c.  No shortness of breath or cough.  Her back pain is improved with current pain meds.   She had routine labs today which were reviewed and are stable.  Review of Systems   Constitutional:  Negative for appetite change, chills and fever.   Respiratory:  Negative for cough, shortness of breath and wheezing.    Cardiovascular:  Negative for chest pain and leg swelling.   Gastrointestinal:  Negative for abdominal pain, constipation, nausea and vomiting.   Genitourinary:  Negative for dysuria, frequency, hematuria and urgency.   Musculoskeletal:  Positive for back pain.   Neurological:  Negative for dizziness, tremors, weakness and headaches.   Psychiatric/Behavioral:  Negative for confusion. The patient is not nervous/anxious.    All other systems reviewed and are negative.      Objective  /70   Pulse 70   Temp 36.7 °C (98 °F)   Resp 18   Wt 120 kg (264 lb 6.4 oz)   SpO2 98%   BMI 46.27 kg/m²    Physical Exam  Constitutional:       General: She is not in acute distress.  Eyes:      Conjunctiva/sclera: Conjunctivae normal.      Pupils: Pupils are  "equal, round, and reactive to light.   Cardiovascular:      Rate and Rhythm: Normal rate and regular rhythm.      Heart sounds: No murmur heard.  Pulmonary:      Effort: Pulmonary effort is normal.      Breath sounds: No wheezing, rhonchi or rales.   Abdominal:      General: Abdomen is flat. Bowel sounds are normal. There is no distension.      Palpations: Abdomen is soft. There is no mass.      Tenderness: There is no abdominal tenderness.   Musculoskeletal:         General: Normal range of motion.      Right lower leg: Edema present.      Left lower leg: Edema present.      Comments: Trace edema to bilateral lower legs   Skin:     General: Skin is warm and dry.      Findings: No rash.   Neurological:      General: No focal deficit present.      Mental Status: She is alert and oriented to person, place, and time. Mental status is at baseline.       Results for orders placed or performed in visit on 09/16/24 (from the past 24 hour(s))   Basic Metabolic Panel   Result Value Ref Range    Glucose 75 65 - 99 mg/dL    Sodium 134 133 - 145 mmol/L    Potassium 3.8 3.4 - 5.1 mmol/L    Chloride 92 (L) 97 - 107 mmol/L    Bicarbonate 31 24 - 31 mmol/L    Urea Nitrogen 17 8 - 25 mg/dL    Creatinine 0.80 0.40 - 1.60 mg/dL    eGFR 76 >60 mL/min/1.73m*2    Calcium 8.6 8.5 - 10.4 mg/dL    Anion Gap 11 <=19 mmol/L   CBC   Result Value Ref Range    WBC 10.2 4.4 - 11.3 x10*3/uL    nRBC 0.0 0.0 - 0.0 /100 WBCs    RBC 3.45 (L) 4.00 - 5.20 x10*6/uL    Hemoglobin 10.5 (L) 12.0 - 16.0 g/dL    Hematocrit 32.1 (L) 36.0 - 46.0 %    MCV 93 80 - 100 fL    MCH 30.4 26.0 - 34.0 pg    MCHC 32.7 32.0 - 36.0 g/dL    RDW 14.4 11.5 - 14.5 %    Platelets 322 150 - 450 x10*3/uL        No lab exists for component: \"CBC BMP\"  Assessment/Plan  Essential hypertension I10        Continue norvasc 10mg daily.  Monitor blood pressures and adjust meds as needed.           Nausea R11.0       Zofran prn for nausea.   improved with routine bowel meds.  She is now " eating better.        Hyponatremia E87.1       Treated with fluid restriction. Sodium 135 on last labs           CELINA (acute kidney injury) (CMS-HCC) - Primary N17.9       Maxzide and meloxicam stopped and labs improved.  Monitor weekly labs.            Acute cystitis without hematuria N30.00       Treated with course of ceftriaxone in the hospital.  Mild leukocytosis has resolved.  Repeat UA - negative.   CXR also negative            Bradycardia R00.1       Atenolol was stopped.            Chronic low back pain M54.50, G89.29       Continue pain management with gabapentin, tizanidine and tramadol prn.  PM&R following.  Gabapentin increased to bid and tylenol changed to routine.          GERD:  on omeprazole.   add famotidine 20mg PRN for indigestion.     Constipation:  Continue sennakot at bedtime routine and miralax daily routine    Overactive bladder:  restart home med - oxybutynin 5mg BID.         Time spent: 30 min in review of chart, labs and orders, consultation with pt and documentation.       Electronically Signed By: Corrie Delarosa PA-C   9/16/24  8:16 PM

## 2024-09-18 ENCOUNTER — NURSING HOME VISIT (OUTPATIENT)
Dept: POST ACUTE CARE | Facility: EXTERNAL LOCATION | Age: 74
End: 2024-09-18
Payer: MEDICARE

## 2024-09-18 DIAGNOSIS — N17.9 AKI (ACUTE KIDNEY INJURY) (CMS-HCC): Primary | ICD-10-CM

## 2024-09-18 DIAGNOSIS — E87.1 HYPONATREMIA: ICD-10-CM

## 2024-09-18 DIAGNOSIS — N30.00 ACUTE CYSTITIS WITHOUT HEMATURIA: ICD-10-CM

## 2024-09-18 PROCEDURE — 99309 SBSQ NF CARE MODERATE MDM 30: CPT | Performed by: INTERNAL MEDICINE

## 2024-09-18 NOTE — LETTER
Patient: Angela Montelongo  : 1950    Encounter Date: 2024    PROGRESS NOTE      Angela Montelongo is a 74 y.o. female seen in follow up at [facility].    ASSESSMENT / PLAN:  CELINA (acute kidney injury) (CMS-[facility]) (Primary)  Acute cystitis without hematuria  Hyponatremia  Nausea  Constipation, unspecified constipation type  Chronic low back pain, unspecified back pain laterality, unspecified whether sciatica present    CELINA resolved w/ GFR of 76.    Hyponatremia resolved w/ Na of 134.    Anemia, normocytic, Hgb 10.5 g - monitor.    BP much more stable on amlodipine.    All identified medical problems have been addressed prior to admission, except as noted here. SNF services are necessary and appropriate for this person. The patient's medical needs can be met at a skilled nursing facility. This resident's rehabilitative prognosis is adequate to respond to the recommended skilled therapies. Client will likely be discharged to their former living situation with additional home health services. These rehabilitative efforts will improve their functional status and chances of residing in the community.    Subjective  Overall doing better.  No CP or SOB.  BP better.  Labs stable w/ a minor anemia we are monoitoring.  Appetite fair, bowels are moving.  Back pain better on current meds.    Objective  Vital signs reviewed in [facility].  Physical Exam  Constitutional:       General: She is not in acute distress.     Appearance: She is not toxic-appearing.   HENT:      Head: Normocephalic and atraumatic.      Mouth/Throat:      Mouth: Mucous membranes are moist.   Eyes:      Pupils: Pupils are equal, round, and reactive to light.   Cardiovascular:      Rate and Rhythm: Normal rate and regular rhythm.      Heart sounds: No murmur heard.  Pulmonary:      Breath sounds: Normal breath sounds. No wheezing, rhonchi or rales.   Abdominal:      General: There is no distension.      Palpations: Abdomen is soft.    Musculoskeletal:      Right lower leg: Edema present.      Left lower leg: Edema present.   Neurological:      General: No focal deficit present.      Mental Status: She is alert and oriented to person, place, and time.     Medical History as seen below has been reviewed and updated in the chart.  Past Medical History:   Diagnosis Date   • Elevation of levels of liver transaminase levels     ALT (SGPT) level raised   • Other conditions influencing health status     Osteoarthritis   • Personal history of other complications of pregnancy, childbirth and the puerperium     History of ectopic pregnancy   • Personal history of other diseases of the circulatory system     History of hypertension   • Personal history of other diseases of the musculoskeletal system and connective tissue     History of bursitis   • Personal history of other diseases of the musculoskeletal system and connective tissue     Personal history of gout   • Personal history of other diseases of the nervous system and sense organs     History of migraine headaches     Past Surgical History:   Procedure Laterality Date   • CARPAL TUNNEL RELEASE  04/09/2013    Neuroplasty Median Nerve At Carpal Tunnel   • CHOLECYSTECTOMY  04/09/2013    Cholecystectomy   • KNEE ARTHROPLASTY  04/09/2013    Knee Arthroplasty     No family history on file.  Allergies   Allergen Reactions   • Ciprofloxacin Anaphylaxis, Unknown and Swelling   • Lisinopril Anaphylaxis, Angioedema, Swelling, Unknown and Other   • Cefazolin Angioedema and Swelling   • Cephalexin Unknown   • Tetanus Toxoid, Adsorbed Unknown     Current Outpatient Medications on File Prior to Visit   Medication Sig Dispense Refill   • allopurinol (Zyloprim) 100 mg tablet Take 1 tablet (100 mg) by mouth once daily.     • amLODIPine (Norvasc) 5 mg tablet Take 1 tablet (5 mg) by mouth once daily.     • gabapentin (Neurontin) 300 mg capsule Take 1 capsule (300 mg) by mouth once daily. 30 capsule 3   • omeprazole  (PriLOSEC) 20 mg DR capsule TAKE 1 CAPSULE ONCE A DAY 90 capsule 0   • simvastatin (Zocor) 40 mg tablet TAKE 1 TABLET (40 MG) BY MOUTH ONCE DAILY AT BEDTIME. 90 tablet 1   • tiZANidine (Zanaflex) 4 mg tablet Take 1 tablet (4 mg) by mouth every 8 hours if needed for muscle spasms. 30 tablet 0   • traMADol (Ultram) 50 mg tablet Take 1 tablet (50 mg) by mouth every 8 hours if needed for moderate pain (4 - 6). 12 tablet 0     No current facility-administered medications on file prior to visit.     Immunization History   Administered Date(s) Administered   • Flu vaccine, quadrivalent, high-dose, preservative free, age 65y+ (FLUZONE) 09/22/2020, 11/03/2021   • Flu vaccine, trivalent, preservative free, HIGH-DOSE, age 65y+ (Fluzone) 09/16/2014, 10/20/2015, 02/18/2019, 11/21/2019   • Influenza, Seasonal, Quadrivalent, Adjuvanted 10/12/2022, 10/31/2023   • Influenza, seasonal, injectable 10/10/2011, 08/24/2012   • Moderna COVID-19 vaccine, 12 years and older (50mcg/0.5mL)(Spikevax) 10/31/2023   • Moderna COVID-19 vaccine, bivalent, blue cap/gray label *Check age/dose* 10/12/2022   • Moderna SARS-CoV-2 Vaccination 02/26/2021, 03/30/2021, 12/16/2021   • Novel influenza-H1N1-09, preservative-free 12/18/2009   • PPD Test 12/20/2018, 12/30/2018   • Pneumococcal conjugate vaccine, 13-valent (PREVNAR 13) 04/07/2017   • Pneumococcal polysaccharide vaccine, 23-valent, age 2 years and older (PNEUMOVAX 23) 09/16/2014   • Zoster vaccine, recombinant, adult (SHINGRIX) 04/12/2023, 09/12/2023     Giovanni Medina MD      Electronically Signed By: Giovanni Medina MD   9/18/24 11:20 AM

## 2024-09-18 NOTE — PROGRESS NOTES
PROGRESS NOTE      Angela Montelongo is a 74 y.o. female seen in follow up at [facility].    ASSESSMENT / PLAN:  CELINA (acute kidney injury) (CMS-[facility]) (Primary)  Acute cystitis without hematuria  Hyponatremia  Nausea  Constipation, unspecified constipation type  Chronic low back pain, unspecified back pain laterality, unspecified whether sciatica present    CELINA resolved w/ GFR of 76.    Hyponatremia resolved w/ Na of 134.    Anemia, normocytic, Hgb 10.5 g - monitor.    BP much more stable on amlodipine.    All identified medical problems have been addressed prior to admission, except as noted here. SNF services are necessary and appropriate for this person. The patient's medical needs can be met at a skilled nursing facility. This resident's rehabilitative prognosis is adequate to respond to the recommended skilled therapies. Client will likely be discharged to their former living situation with additional home health services. These rehabilitative efforts will improve their functional status and chances of residing in the community.    Subjective   Overall doing better.  No CP or SOB.  BP better.  Labs stable w/ a minor anemia we are monoitoring.  Appetite fair, bowels are moving.  Back pain better on current meds.    Objective   Vital signs reviewed in [facility].  Physical Exam  Constitutional:       General: She is not in acute distress.     Appearance: She is not toxic-appearing.   HENT:      Head: Normocephalic and atraumatic.      Mouth/Throat:      Mouth: Mucous membranes are moist.   Eyes:      Pupils: Pupils are equal, round, and reactive to light.   Cardiovascular:      Rate and Rhythm: Normal rate and regular rhythm.      Heart sounds: No murmur heard.  Pulmonary:      Breath sounds: Normal breath sounds. No wheezing, rhonchi or rales.   Abdominal:      General: There is no distension.      Palpations: Abdomen is soft.   Musculoskeletal:      Right lower leg: Edema present.      Left lower leg:  Edema present.   Neurological:      General: No focal deficit present.      Mental Status: She is alert and oriented to person, place, and time.     Medical History as seen below has been reviewed and updated in the chart.  Past Medical History:   Diagnosis Date    Elevation of levels of liver transaminase levels     ALT (SGPT) level raised    Other conditions influencing health status     Osteoarthritis    Personal history of other complications of pregnancy, childbirth and the puerperium     History of ectopic pregnancy    Personal history of other diseases of the circulatory system     History of hypertension    Personal history of other diseases of the musculoskeletal system and connective tissue     History of bursitis    Personal history of other diseases of the musculoskeletal system and connective tissue     Personal history of gout    Personal history of other diseases of the nervous system and sense organs     History of migraine headaches     Past Surgical History:   Procedure Laterality Date    CARPAL TUNNEL RELEASE  04/09/2013    Neuroplasty Median Nerve At Carpal Tunnel    CHOLECYSTECTOMY  04/09/2013    Cholecystectomy    KNEE ARTHROPLASTY  04/09/2013    Knee Arthroplasty     No family history on file.  Allergies   Allergen Reactions    Ciprofloxacin Anaphylaxis, Unknown and Swelling    Lisinopril Anaphylaxis, Angioedema, Swelling, Unknown and Other    Cefazolin Angioedema and Swelling    Cephalexin Unknown    Tetanus Toxoid, Adsorbed Unknown     Current Outpatient Medications on File Prior to Visit   Medication Sig Dispense Refill    allopurinol (Zyloprim) 100 mg tablet Take 1 tablet (100 mg) by mouth once daily.      amLODIPine (Norvasc) 5 mg tablet Take 1 tablet (5 mg) by mouth once daily.      gabapentin (Neurontin) 300 mg capsule Take 1 capsule (300 mg) by mouth once daily. 30 capsule 3    omeprazole (PriLOSEC) 20 mg DR capsule TAKE 1 CAPSULE ONCE A DAY 90 capsule 0    simvastatin (Zocor) 40 mg  tablet TAKE 1 TABLET (40 MG) BY MOUTH ONCE DAILY AT BEDTIME. 90 tablet 1    tiZANidine (Zanaflex) 4 mg tablet Take 1 tablet (4 mg) by mouth every 8 hours if needed for muscle spasms. 30 tablet 0    traMADol (Ultram) 50 mg tablet Take 1 tablet (50 mg) by mouth every 8 hours if needed for moderate pain (4 - 6). 12 tablet 0     No current facility-administered medications on file prior to visit.     Immunization History   Administered Date(s) Administered    Flu vaccine, quadrivalent, high-dose, preservative free, age 65y+ (FLUZONE) 09/22/2020, 11/03/2021    Flu vaccine, trivalent, preservative free, HIGH-DOSE, age 65y+ (Fluzone) 09/16/2014, 10/20/2015, 02/18/2019, 11/21/2019    Influenza, Seasonal, Quadrivalent, Adjuvanted 10/12/2022, 10/31/2023    Influenza, seasonal, injectable 10/10/2011, 08/24/2012    Moderna COVID-19 vaccine, 12 years and older (50mcg/0.5mL)(Spikevax) 10/31/2023    Moderna COVID-19 vaccine, bivalent, blue cap/gray label *Check age/dose* 10/12/2022    Moderna SARS-CoV-2 Vaccination 02/26/2021, 03/30/2021, 12/16/2021    Novel influenza-H1N1-09, preservative-free 12/18/2009    PPD Test 12/20/2018, 12/30/2018    Pneumococcal conjugate vaccine, 13-valent (PREVNAR 13) 04/07/2017    Pneumococcal polysaccharide vaccine, 23-valent, age 2 years and older (PNEUMOVAX 23) 09/16/2014    Zoster vaccine, recombinant, adult (SHINGRIX) 04/12/2023, 09/12/2023     Giovanni Medina MD

## 2024-09-23 ENCOUNTER — NURSING HOME VISIT (OUTPATIENT)
Dept: POST ACUTE CARE | Facility: EXTERNAL LOCATION | Age: 74
End: 2024-09-23
Payer: MEDICARE

## 2024-09-23 VITALS
OXYGEN SATURATION: 96 % | WEIGHT: 264.2 LBS | RESPIRATION RATE: 18 BRPM | SYSTOLIC BLOOD PRESSURE: 156 MMHG | HEART RATE: 98 BPM | DIASTOLIC BLOOD PRESSURE: 77 MMHG | BODY MASS INDEX: 46.23 KG/M2 | TEMPERATURE: 98 F

## 2024-09-23 DIAGNOSIS — N32.81 OVERACTIVE BLADDER: ICD-10-CM

## 2024-09-23 DIAGNOSIS — M54.50 CHRONIC LOW BACK PAIN, UNSPECIFIED BACK PAIN LATERALITY, UNSPECIFIED WHETHER SCIATICA PRESENT: ICD-10-CM

## 2024-09-23 DIAGNOSIS — N30.00 ACUTE CYSTITIS WITHOUT HEMATURIA: ICD-10-CM

## 2024-09-23 DIAGNOSIS — N17.9 AKI (ACUTE KIDNEY INJURY) (CMS-HCC): Primary | ICD-10-CM

## 2024-09-23 DIAGNOSIS — G89.29 CHRONIC LOW BACK PAIN, UNSPECIFIED BACK PAIN LATERALITY, UNSPECIFIED WHETHER SCIATICA PRESENT: ICD-10-CM

## 2024-09-23 DIAGNOSIS — E87.1 HYPONATREMIA: ICD-10-CM

## 2024-09-23 DIAGNOSIS — K59.00 CONSTIPATION, UNSPECIFIED CONSTIPATION TYPE: ICD-10-CM

## 2024-09-23 DIAGNOSIS — I10 ESSENTIAL HYPERTENSION: ICD-10-CM

## 2024-09-23 PROCEDURE — 99309 SBSQ NF CARE MODERATE MDM 30: CPT | Performed by: PHYSICIAN ASSISTANT

## 2024-09-23 ASSESSMENT — ENCOUNTER SYMPTOMS
FEVER: 0
HEADACHES: 0
VOMITING: 0
CONSTIPATION: 0
NERVOUS/ANXIOUS: 0
COUGH: 0
NAUSEA: 0
APPETITE CHANGE: 0
TREMORS: 0
CHILLS: 0
FREQUENCY: 0
CONFUSION: 0
HEMATURIA: 0
DIZZINESS: 0
DYSURIA: 0
WHEEZING: 0
SHORTNESS OF BREATH: 0
WEAKNESS: 0
BACK PAIN: 1
ABDOMINAL PAIN: 0

## 2024-09-23 NOTE — PROGRESS NOTES
Subjective   [id number] : Angela Montelongo is a 74 y.o. female admitted to [facility] for rehab. No chief complaint on file..  HPI  Pt with h/o HTN, OA, urinary incontinence and Gerd with weakness and fall treated for hyponatremia and CELINA.  Pt seen while resting in bed.  Pt reports that she is feeling betted.   Pt reports that her pain has improved with current pain management.  Blood pressures are stable. She offers no new complaints or concerns. She is eating and drinking well.    Now having regular bowel movements.   She denies any abdominal pain.  NO n/v/d/c.  No shortness of breath or cough.   She had routine labs today which were reviewed and are stable.  Pt lives alone and states that she is working on have a ramp placed and other modifications for her return home.  Review of Systems   Constitutional:  Negative for appetite change, chills and fever.   Respiratory:  Negative for cough, shortness of breath and wheezing.    Cardiovascular:  Negative for chest pain and leg swelling.   Gastrointestinal:  Negative for abdominal pain, constipation, nausea and vomiting.   Genitourinary:  Negative for dysuria, frequency, hematuria and urgency.   Musculoskeletal:  Positive for back pain.   Neurological:  Negative for dizziness, tremors, weakness and headaches.   Psychiatric/Behavioral:  Negative for confusion. The patient is not nervous/anxious.    All other systems reviewed and are negative.      Objective   /77   Pulse 98   Temp 36.7 °C (98 °F)   Resp 18   Wt 120 kg (264 lb 3.2 oz)   SpO2 96%   BMI 46.23 kg/m²    Physical Exam  Constitutional:       General: She is not in acute distress.  Eyes:      Conjunctiva/sclera: Conjunctivae normal.      Pupils: Pupils are equal, round, and reactive to light.   Cardiovascular:      Rate and Rhythm: Normal rate and regular rhythm.      Heart sounds: No murmur heard.  Pulmonary:      Effort: Pulmonary effort is normal.      Breath sounds: No wheezing, rhonchi or  "rales.   Abdominal:      General: Abdomen is flat. Bowel sounds are normal. There is no distension.      Palpations: Abdomen is soft. There is no mass.      Tenderness: There is no abdominal tenderness.   Musculoskeletal:         General: Normal range of motion.      Right lower leg: Edema present.      Left lower leg: Edema present.      Comments: Trace edema to bilateral lower legs   Skin:     General: Skin is warm and dry.      Findings: No rash.   Neurological:      General: No focal deficit present.      Mental Status: She is alert and oriented to person, place, and time. Mental status is at baseline.       Results for orders placed or performed in visit on 09/23/24 (from the past 24 hour(s))   Basic Metabolic Panel   Result Value Ref Range    Glucose 90 74 - 99 mg/dL    Sodium 132 (L) 136 - 145 mmol/L    Potassium 3.4 (L) 3.5 - 5.3 mmol/L    Chloride 90 (L) 98 - 107 mmol/L    Bicarbonate 31 21 - 32 mmol/L    Anion Gap 14 10 - 20 mmol/L    Urea Nitrogen 18 6 - 23 mg/dL    Creatinine 0.66 0.50 - 1.05 mg/dL    eGFR >90 >60 mL/min/1.73m*2    Calcium 8.2 (L) 8.6 - 10.3 mg/dL   CBC   Result Value Ref Range    WBC 10.0 4.4 - 11.3 x10*3/uL    nRBC 0.0 0.0 - 0.0 /100 WBCs    RBC 3.60 (L) 4.00 - 5.20 x10*6/uL    Hemoglobin 10.7 (L) 12.0 - 16.0 g/dL    Hematocrit 32.7 (L) 36.0 - 46.0 %    MCV 91 80 - 100 fL    MCH 29.7 26.0 - 34.0 pg    MCHC 32.7 32.0 - 36.0 g/dL    RDW 13.7 11.5 - 14.5 %    Platelets 369 150 - 450 x10*3/uL        No lab exists for component: \"CBC BMP\"  Assessment/Plan   Essential hypertension I10        Continue norvasc 10mg daily.  Monitor blood pressures and adjust meds as needed.           Nausea R11.0       Resolved.        Hyponatremia E87.1       Treated with fluid restriction. Sodium 132 on todays labs           CELINA (acute kidney injury) (CMS-HCC) - Primary N17.9       Maxzide and meloxicam stopped and labs improved.  Monitor weekly labs.            Acute cystitis without hematuria N30.00       " Treated with course of ceftriaxone in the hospital.  Mild leukocytosis has resolved.  Repeat UA - negative.   CXR also negative            Bradycardia R00.1       Atenolol was stopped.            Chronic low back pain M54.50, G89.29       Continue pain management with gabapentin, tizanidine and tramadol prn.  PM&R following.  Gabapentin increased to TID and tylenol changed to routine.          GERD:  on omeprazole.  .     Constipation:  Continue sennakot at bedtime routine and miralax daily routine    Overactive bladder:  restart home med - oxybutynin 5mg BID.     Hypokalemia:  k=3.4  potassium chloride 10mEq x 1 dose.   Monitor.        Time spent: 30 min in review of chart, labs and orders, consultation with pt and documentation.

## 2024-09-23 NOTE — LETTER
Patient: Angela Montelongo  : 1950    Encounter Date: 2024      Subjective  [id number] : Angela Montelongo is a 74 y.o. female admitted to [facility] for rehab. No chief complaint on file..  HPI  Pt with h/o HTN, OA, urinary incontinence and Gerd with weakness and fall treated for hyponatremia and CELINA.  Pt seen while resting in bed.  Pt reports that she is feeling betted.   Pt reports that her pain has improved with current pain management.  Blood pressures are stable. She offers no new complaints or concerns. She is eating and drinking well.    Now having regular bowel movements.   She denies any abdominal pain.  NO n/v/d/c.  No shortness of breath or cough.   She had routine labs today which were reviewed and are stable.  Pt lives alone and states that she is working on have a ramp placed and other modifications for her return home.  Review of Systems   Constitutional:  Negative for appetite change, chills and fever.   Respiratory:  Negative for cough, shortness of breath and wheezing.    Cardiovascular:  Negative for chest pain and leg swelling.   Gastrointestinal:  Negative for abdominal pain, constipation, nausea and vomiting.   Genitourinary:  Negative for dysuria, frequency, hematuria and urgency.   Musculoskeletal:  Positive for back pain.   Neurological:  Negative for dizziness, tremors, weakness and headaches.   Psychiatric/Behavioral:  Negative for confusion. The patient is not nervous/anxious.    All other systems reviewed and are negative.      Objective  /77   Pulse 98   Temp 36.7 °C (98 °F)   Resp 18   Wt 120 kg (264 lb 3.2 oz)   SpO2 96%   BMI 46.23 kg/m²    Physical Exam  Constitutional:       General: She is not in acute distress.  Eyes:      Conjunctiva/sclera: Conjunctivae normal.      Pupils: Pupils are equal, round, and reactive to light.   Cardiovascular:      Rate and Rhythm: Normal rate and regular rhythm.      Heart sounds: No murmur heard.  Pulmonary:      Effort:  "Pulmonary effort is normal.      Breath sounds: No wheezing, rhonchi or rales.   Abdominal:      General: Abdomen is flat. Bowel sounds are normal. There is no distension.      Palpations: Abdomen is soft. There is no mass.      Tenderness: There is no abdominal tenderness.   Musculoskeletal:         General: Normal range of motion.      Right lower leg: Edema present.      Left lower leg: Edema present.      Comments: Trace edema to bilateral lower legs   Skin:     General: Skin is warm and dry.      Findings: No rash.   Neurological:      General: No focal deficit present.      Mental Status: She is alert and oriented to person, place, and time. Mental status is at baseline.       Results for orders placed or performed in visit on 09/23/24 (from the past 24 hour(s))   Basic Metabolic Panel   Result Value Ref Range    Glucose 90 74 - 99 mg/dL    Sodium 132 (L) 136 - 145 mmol/L    Potassium 3.4 (L) 3.5 - 5.3 mmol/L    Chloride 90 (L) 98 - 107 mmol/L    Bicarbonate 31 21 - 32 mmol/L    Anion Gap 14 10 - 20 mmol/L    Urea Nitrogen 18 6 - 23 mg/dL    Creatinine 0.66 0.50 - 1.05 mg/dL    eGFR >90 >60 mL/min/1.73m*2    Calcium 8.2 (L) 8.6 - 10.3 mg/dL   CBC   Result Value Ref Range    WBC 10.0 4.4 - 11.3 x10*3/uL    nRBC 0.0 0.0 - 0.0 /100 WBCs    RBC 3.60 (L) 4.00 - 5.20 x10*6/uL    Hemoglobin 10.7 (L) 12.0 - 16.0 g/dL    Hematocrit 32.7 (L) 36.0 - 46.0 %    MCV 91 80 - 100 fL    MCH 29.7 26.0 - 34.0 pg    MCHC 32.7 32.0 - 36.0 g/dL    RDW 13.7 11.5 - 14.5 %    Platelets 369 150 - 450 x10*3/uL        No lab exists for component: \"CBC BMP\"  Assessment/Plan  Essential hypertension I10        Continue norvasc 10mg daily.  Monitor blood pressures and adjust meds as needed.           Nausea R11.0       Resolved.        Hyponatremia E87.1       Treated with fluid restriction. Sodium 132 on todays labs           CELINA (acute kidney injury) (CMS-HCC) - Primary N17.9       Maxzide and meloxicam stopped and labs improved.  Monitor " weekly labs.            Acute cystitis without hematuria N30.00       Treated with course of ceftriaxone in the hospital.  Mild leukocytosis has resolved.  Repeat UA - negative.   CXR also negative            Bradycardia R00.1       Atenolol was stopped.            Chronic low back pain M54.50, G89.29       Continue pain management with gabapentin, tizanidine and tramadol prn.  PM&R following.  Gabapentin increased to TID and tylenol changed to routine.          GERD:  on omeprazole.  .     Constipation:  Continue sennakot at bedtime routine and miralax daily routine    Overactive bladder:  restart home med - oxybutynin 5mg BID.     Hypokalemia:  k=3.4  potassium chloride 10mEq x 1 dose.   Monitor.        Time spent: 30 min in review of chart, labs and orders, consultation with pt and documentation.       Electronically Signed By: Corrie Delarosa PA-C   9/24/24  8:55 AM

## 2024-09-27 ENCOUNTER — NURSING HOME VISIT (OUTPATIENT)
Dept: POST ACUTE CARE | Facility: EXTERNAL LOCATION | Age: 74
End: 2024-09-27
Payer: MEDICARE

## 2024-09-27 VITALS
HEART RATE: 95 BPM | BODY MASS INDEX: 46.64 KG/M2 | RESPIRATION RATE: 20 BRPM | WEIGHT: 266.5 LBS | SYSTOLIC BLOOD PRESSURE: 149 MMHG | TEMPERATURE: 98.6 F | DIASTOLIC BLOOD PRESSURE: 76 MMHG | OXYGEN SATURATION: 98 %

## 2024-09-27 DIAGNOSIS — I10 ESSENTIAL HYPERTENSION: ICD-10-CM

## 2024-09-27 DIAGNOSIS — M54.50 CHRONIC LOW BACK PAIN, UNSPECIFIED BACK PAIN LATERALITY, UNSPECIFIED WHETHER SCIATICA PRESENT: ICD-10-CM

## 2024-09-27 DIAGNOSIS — G89.29 CHRONIC LOW BACK PAIN, UNSPECIFIED BACK PAIN LATERALITY, UNSPECIFIED WHETHER SCIATICA PRESENT: ICD-10-CM

## 2024-09-27 DIAGNOSIS — N32.81 OVERACTIVE BLADDER: ICD-10-CM

## 2024-09-27 DIAGNOSIS — N17.9 AKI (ACUTE KIDNEY INJURY) (CMS-HCC): Primary | ICD-10-CM

## 2024-09-27 DIAGNOSIS — N30.00 ACUTE CYSTITIS WITHOUT HEMATURIA: ICD-10-CM

## 2024-09-27 DIAGNOSIS — K59.00 CONSTIPATION, UNSPECIFIED CONSTIPATION TYPE: ICD-10-CM

## 2024-09-27 DIAGNOSIS — E87.1 HYPONATREMIA: ICD-10-CM

## 2024-09-27 DIAGNOSIS — R11.0 NAUSEA: ICD-10-CM

## 2024-09-27 PROCEDURE — 99309 SBSQ NF CARE MODERATE MDM 30: CPT | Performed by: PHYSICIAN ASSISTANT

## 2024-09-27 ASSESSMENT — ENCOUNTER SYMPTOMS
SHORTNESS OF BREATH: 0
DYSURIA: 0
WHEEZING: 0
CHILLS: 0
HEMATURIA: 0
NERVOUS/ANXIOUS: 0
VOMITING: 0
TREMORS: 0
ABDOMINAL PAIN: 0
CONFUSION: 0
CONSTIPATION: 0
FEVER: 0
NAUSEA: 1
WEAKNESS: 0
DIZZINESS: 0
APPETITE CHANGE: 0
BACK PAIN: 1
FREQUENCY: 0
COUGH: 0
HEADACHES: 0

## 2024-09-27 NOTE — LETTER
Patient: Angela Montelongo  : 1950    Encounter Date: 2024      Subjective  [id number] : Angela Montelongo is a 74 y.o. female admitted to [facility] for rehab. No chief complaint on file..  HPI  Pt with h/o HTN, OA, urinary incontinence and Gerd with weakness and fall treated for hyponatremia and CELINA.  Pt seen while sitting up in wheelchair.  She reports some back pain when sitting up in wheelchair.  She is nauseated this morning.   She denies any abdominal pain.  She reports that she has been having regular bowel movements.      Pt reports adequate pain management.   She is eating and drinking well.  No shortness of breath or cough.    Pt lives alone.  Review of Systems   Constitutional:  Negative for appetite change, chills and fever.   Respiratory:  Negative for cough, shortness of breath and wheezing.    Cardiovascular:  Negative for chest pain and leg swelling.   Gastrointestinal:  Positive for nausea. Negative for abdominal pain, constipation and vomiting.   Genitourinary:  Negative for dysuria, frequency, hematuria and urgency.   Musculoskeletal:  Positive for back pain.   Neurological:  Negative for dizziness, tremors, weakness and headaches.   Psychiatric/Behavioral:  Negative for confusion. The patient is not nervous/anxious.    All other systems reviewed and are negative.      Objective  /76   Pulse 95   Temp 37 °C (98.6 °F)   Resp 20   Wt 121 kg (266 lb 8 oz)   SpO2 98%   BMI 46.64 kg/m²    Physical Exam  Constitutional:       General: She is not in acute distress.  Eyes:      Conjunctiva/sclera: Conjunctivae normal.      Pupils: Pupils are equal, round, and reactive to light.   Cardiovascular:      Rate and Rhythm: Normal rate and regular rhythm.      Heart sounds: No murmur heard.  Pulmonary:      Effort: Pulmonary effort is normal.      Breath sounds: No wheezing, rhonchi or rales.   Abdominal:      General: Abdomen is flat. Bowel sounds are normal. There is no distension.       "Palpations: Abdomen is soft. There is no mass.      Tenderness: There is no abdominal tenderness.   Musculoskeletal:         General: Normal range of motion.      Right lower leg: Edema present.      Left lower leg: Edema present.      Comments: Trace edema to bilateral lower legs   Skin:     General: Skin is warm and dry.      Findings: No rash.   Neurological:      General: No focal deficit present.      Mental Status: She is alert and oriented to person, place, and time. Mental status is at baseline.       No results found for this or any previous visit (from the past 24 hour(s)).       No lab exists for component: \"CBC BMP\"  Assessment/Plan  Essential hypertension I10        Continue norvasc 10mg daily.  Monitor blood pressures and adjust meds as needed.           Nausea R11.0       Nauseated this morning.  Discussed with nursing - zofran prn.  Monitor.         Hyponatremia E87.1       Treated with fluid restriction. Sodium 132 on last labs           CELINA (acute kidney injury) (CMS-[facility]) - Primary N17.9       Maxzide and meloxicam stopped and labs improved.  Monitor weekly labs.            Acute cystitis without hematuria N30.00       Treated with course of ceftriaxone in the hospital.  Mild leukocytosis has resolved.             Bradycardia R00.1       Atenolol was stopped.            Chronic low back pain M54.50, G89.29       Continue pain management with gabapentin, tizanidine and tramadol prn.  PM&R following.  Gabapentin increased to TID and tylenol changed to routine.          GERD:  on omeprazole.  .     Constipation:  Continue sennakot at bedtime routine and miralax daily routine    Overactive bladder:  restart home med - oxybutynin 5mg BID.     Hypokalemia:  Monitor.        Time spent: 30 min in review of chart, labs and orders, consultation with pt and documentation.       Electronically Signed By: Corrie Delarosa PA-C   9/27/24 11:49 AM  "

## 2024-09-27 NOTE — PROGRESS NOTES
Subjective   [id number] : Angela Montelongo is a 74 y.o. female admitted to [facility] for rehab. No chief complaint on file..  HPI  Pt with h/o HTN, OA, urinary incontinence and Gerd with weakness and fall treated for hyponatremia and CELINA.  Pt seen while sitting up in wheelchair.  She reports some back pain when sitting up in wheelchair.  She is nauseated this morning.   She denies any abdominal pain.  She reports that she has been having regular bowel movements.      Pt reports adequate pain management.   She is eating and drinking well.  No shortness of breath or cough.    Pt lives alone.  Review of Systems   Constitutional:  Negative for appetite change, chills and fever.   Respiratory:  Negative for cough, shortness of breath and wheezing.    Cardiovascular:  Negative for chest pain and leg swelling.   Gastrointestinal:  Positive for nausea. Negative for abdominal pain, constipation and vomiting.   Genitourinary:  Negative for dysuria, frequency, hematuria and urgency.   Musculoskeletal:  Positive for back pain.   Neurological:  Negative for dizziness, tremors, weakness and headaches.   Psychiatric/Behavioral:  Negative for confusion. The patient is not nervous/anxious.    All other systems reviewed and are negative.      Objective   /76   Pulse 95   Temp 37 °C (98.6 °F)   Resp 20   Wt 121 kg (266 lb 8 oz)   SpO2 98%   BMI 46.64 kg/m²    Physical Exam  Constitutional:       General: She is not in acute distress.  Eyes:      Conjunctiva/sclera: Conjunctivae normal.      Pupils: Pupils are equal, round, and reactive to light.   Cardiovascular:      Rate and Rhythm: Normal rate and regular rhythm.      Heart sounds: No murmur heard.  Pulmonary:      Effort: Pulmonary effort is normal.      Breath sounds: No wheezing, rhonchi or rales.   Abdominal:      General: Abdomen is flat. Bowel sounds are normal. There is no distension.      Palpations: Abdomen is soft. There is no mass.      Tenderness: There  "is no abdominal tenderness.   Musculoskeletal:         General: Normal range of motion.      Right lower leg: Edema present.      Left lower leg: Edema present.      Comments: Trace edema to bilateral lower legs   Skin:     General: Skin is warm and dry.      Findings: No rash.   Neurological:      General: No focal deficit present.      Mental Status: She is alert and oriented to person, place, and time. Mental status is at baseline.       No results found for this or any previous visit (from the past 24 hour(s)).       No lab exists for component: \"CBC BMP\"  Assessment/Plan   Essential hypertension I10        Continue norvasc 10mg daily.  Monitor blood pressures and adjust meds as needed.           Nausea R11.0       Nauseated this morning.  Discussed with nursing - zofran prn.  Monitor.         Hyponatremia E87.1       Treated with fluid restriction. Sodium 132 on last labs           CELINA (acute kidney injury) (CMS-HCC) - Primary N17.9       Maxzide and meloxicam stopped and labs improved.  Monitor weekly labs.            Acute cystitis without hematuria N30.00       Treated with course of ceftriaxone in the hospital.  Mild leukocytosis has resolved.             Bradycardia R00.1       Atenolol was stopped.            Chronic low back pain M54.50, G89.29       Continue pain management with gabapentin, tizanidine and tramadol prn.  PM&R following.  Gabapentin increased to TID and tylenol changed to routine.          GERD:  on omeprazole.  .     Constipation:  Continue sennakot at bedtime routine and miralax daily routine    Overactive bladder:  restart home med - oxybutynin 5mg BID.     Hypokalemia:  Monitor.        Time spent: 30 min in review of chart, labs and orders, consultation with pt and documentation.   "

## 2024-09-30 ENCOUNTER — NURSING HOME VISIT (OUTPATIENT)
Dept: POST ACUTE CARE | Facility: EXTERNAL LOCATION | Age: 74
End: 2024-09-30
Payer: MEDICARE

## 2024-09-30 VITALS
HEART RATE: 78 BPM | WEIGHT: 266.5 LBS | TEMPERATURE: 97.7 F | BODY MASS INDEX: 46.64 KG/M2 | RESPIRATION RATE: 18 BRPM | OXYGEN SATURATION: 96 % | SYSTOLIC BLOOD PRESSURE: 140 MMHG | DIASTOLIC BLOOD PRESSURE: 70 MMHG

## 2024-09-30 DIAGNOSIS — I10 ESSENTIAL HYPERTENSION: ICD-10-CM

## 2024-09-30 DIAGNOSIS — M54.50 CHRONIC LOW BACK PAIN, UNSPECIFIED BACK PAIN LATERALITY, UNSPECIFIED WHETHER SCIATICA PRESENT: ICD-10-CM

## 2024-09-30 DIAGNOSIS — G89.29 CHRONIC LOW BACK PAIN, UNSPECIFIED BACK PAIN LATERALITY, UNSPECIFIED WHETHER SCIATICA PRESENT: ICD-10-CM

## 2024-09-30 DIAGNOSIS — N32.81 OVERACTIVE BLADDER: ICD-10-CM

## 2024-09-30 DIAGNOSIS — N30.00 ACUTE CYSTITIS WITHOUT HEMATURIA: ICD-10-CM

## 2024-09-30 DIAGNOSIS — E87.1 HYPONATREMIA: ICD-10-CM

## 2024-09-30 DIAGNOSIS — N17.9 AKI (ACUTE KIDNEY INJURY) (CMS-HCC): Primary | ICD-10-CM

## 2024-09-30 DIAGNOSIS — K59.00 CONSTIPATION, UNSPECIFIED CONSTIPATION TYPE: ICD-10-CM

## 2024-09-30 PROCEDURE — 99309 SBSQ NF CARE MODERATE MDM 30: CPT | Performed by: PHYSICIAN ASSISTANT

## 2024-09-30 ASSESSMENT — ENCOUNTER SYMPTOMS
ABDOMINAL PAIN: 0
DIZZINESS: 0
WEAKNESS: 0
TREMORS: 0
NAUSEA: 1
COUGH: 0
BACK PAIN: 1
WHEEZING: 0
HEMATURIA: 0
DYSURIA: 0
FEVER: 0
VOMITING: 0
CONFUSION: 0
CONSTIPATION: 0
NERVOUS/ANXIOUS: 0
SHORTNESS OF BREATH: 0
FREQUENCY: 0
CHILLS: 0
APPETITE CHANGE: 0
HEADACHES: 0

## 2024-09-30 NOTE — PROGRESS NOTES
Subjective   [id number] : Angela Montelongo is a 74 y.o. female admitted to [facility] for rehab. No chief complaint on file..  HPI  Pt with h/o HTN, OA, urinary incontinence and Gerd with weakness and fall treated for hyponatremia and CELINA.  Pt seen while resting in bed. .  She reports some continues back pain.   It appears PM&R provider has increased her gabapentin.  She has been refusing bowel meds.  States that she is having regular bowel movements.  She denies any abdominal pain.   Pt reports adequate pain management.   She is eating and drinking well.  No shortness of breath or cough.    Pt lives alone.  Review of Systems   Constitutional:  Negative for appetite change, chills and fever.   Respiratory:  Negative for cough, shortness of breath and wheezing.    Cardiovascular:  Negative for chest pain and leg swelling.   Gastrointestinal:  Positive for nausea. Negative for abdominal pain, constipation and vomiting.   Genitourinary:  Negative for dysuria, frequency, hematuria and urgency.   Musculoskeletal:  Positive for back pain.   Neurological:  Negative for dizziness, tremors, weakness and headaches.   Psychiatric/Behavioral:  Negative for confusion. The patient is not nervous/anxious.    All other systems reviewed and are negative.      Objective   /70   Pulse 78   Temp 36.5 °C (97.7 °F)   Resp 18   Wt 121 kg (266 lb 8 oz)   SpO2 96%   BMI 46.64 kg/m²    Physical Exam  Constitutional:       General: She is not in acute distress.  Eyes:      Conjunctiva/sclera: Conjunctivae normal.      Pupils: Pupils are equal, round, and reactive to light.   Cardiovascular:      Rate and Rhythm: Normal rate and regular rhythm.      Heart sounds: No murmur heard.  Pulmonary:      Effort: Pulmonary effort is normal.      Breath sounds: No wheezing, rhonchi or rales.   Abdominal:      General: Abdomen is flat. Bowel sounds are normal. There is no distension.      Palpations: Abdomen is soft. There is no mass.       "Tenderness: There is no abdominal tenderness.   Musculoskeletal:         General: Normal range of motion.      Right lower leg: Edema present.      Left lower leg: Edema present.      Comments: Trace edema to bilateral lower legs   Skin:     General: Skin is warm and dry.      Findings: No rash.   Neurological:      General: No focal deficit present.      Mental Status: She is alert and oriented to person, place, and time. Mental status is at baseline.       Results for orders placed or performed in visit on 09/30/24 (from the past 24 hour(s))   Basic Metabolic Panel   Result Value Ref Range    Glucose 83 74 - 99 mg/dL    Sodium 131 (L) 136 - 145 mmol/L    Potassium 3.6 3.5 - 5.3 mmol/L    Chloride 91 (L) 98 - 107 mmol/L    Bicarbonate 31 21 - 32 mmol/L    Anion Gap 13 10 - 20 mmol/L    Urea Nitrogen 21 6 - 23 mg/dL    Creatinine 0.68 0.50 - 1.05 mg/dL    eGFR 90 >60 mL/min/1.73m*2    Calcium 7.6 (L) 8.6 - 10.3 mg/dL   CBC   Result Value Ref Range    WBC 11.3 4.4 - 11.3 x10*3/uL    nRBC 0.0 0.0 - 0.0 /100 WBCs    RBC 3.29 (L) 4.00 - 5.20 x10*6/uL    Hemoglobin 9.5 (L) 12.0 - 16.0 g/dL    Hematocrit 29.8 (L) 36.0 - 46.0 %    MCV 91 80 - 100 fL    MCH 28.9 26.0 - 34.0 pg    MCHC 31.9 (L) 32.0 - 36.0 g/dL    RDW 14.4 11.5 - 14.5 %    Platelets 353 150 - 450 x10*3/uL          No lab exists for component: \"CBC BMP\"  Assessment/Plan   Essential hypertension I10        Continue norvasc 10mg daily.  Monitor blood pressures and adjust meds as needed.           Nausea R11.0       Has zofran prn.  Monitor.         Hyponatremia E87.1       Treated with fluid restriction. Sodium 131 on today's labs           CELINA (acute kidney injury) (CMS-HCC) - Primary N17.9       Maxzide and meloxicam stopped and labs improved.  Monitor weekly labs.  Discharge planning.   Will likely need additional help in the home at discharge.            Acute cystitis without hematuria N30.00       Treated with course of ceftriaxone in the hospital.  Mild " leukocytosis has resolved.             Bradycardia R00.1       Atenolol was stopped.            Chronic low back pain M54.50, G89.29       Continue pain management with gabapentin, tizanidine and tramadol prn.  PM&R following.  Gabapentin increased to TID and tylenol changed to routine.          GERD:  on omeprazole.  .     Constipation:  improved. change sennakot and miralax to prn per pt's request. .    Overactive bladder:  on home med - oxybutynin 5mg BID.     Hypokalemia:  Monitor.        Time spent: 30 min in review of chart, labs and orders, consultation with pt and documentation.

## 2024-09-30 NOTE — LETTER
Patient: Angela Montelongo  : 1950    Encounter Date: 2024      Subjective  [id number] : Angela Montelongo is a 74 y.o. female admitted to [facility] for rehab. No chief complaint on file..  HPI  Pt with h/o HTN, OA, urinary incontinence and Gerd with weakness and fall treated for hyponatremia and CELINA.  Pt seen while resting in bed. .  She reports some continues back pain.   It appears PM&R provider has increased her gabapentin.  She has been refusing bowel meds.  States that she is having regular bowel movements.  She denies any abdominal pain.   Pt reports adequate pain management.   She is eating and drinking well.  No shortness of breath or cough.    Pt lives alone.  Review of Systems   Constitutional:  Negative for appetite change, chills and fever.   Respiratory:  Negative for cough, shortness of breath and wheezing.    Cardiovascular:  Negative for chest pain and leg swelling.   Gastrointestinal:  Positive for nausea. Negative for abdominal pain, constipation and vomiting.   Genitourinary:  Negative for dysuria, frequency, hematuria and urgency.   Musculoskeletal:  Positive for back pain.   Neurological:  Negative for dizziness, tremors, weakness and headaches.   Psychiatric/Behavioral:  Negative for confusion. The patient is not nervous/anxious.    All other systems reviewed and are negative.      Objective  /70   Pulse 78   Temp 36.5 °C (97.7 °F)   Resp 18   Wt 121 kg (266 lb 8 oz)   SpO2 96%   BMI 46.64 kg/m²    Physical Exam  Constitutional:       General: She is not in acute distress.  Eyes:      Conjunctiva/sclera: Conjunctivae normal.      Pupils: Pupils are equal, round, and reactive to light.   Cardiovascular:      Rate and Rhythm: Normal rate and regular rhythm.      Heart sounds: No murmur heard.  Pulmonary:      Effort: Pulmonary effort is normal.      Breath sounds: No wheezing, rhonchi or rales.   Abdominal:      General: Abdomen is flat. Bowel sounds are normal. There is no  "distension.      Palpations: Abdomen is soft. There is no mass.      Tenderness: There is no abdominal tenderness.   Musculoskeletal:         General: Normal range of motion.      Right lower leg: Edema present.      Left lower leg: Edema present.      Comments: Trace edema to bilateral lower legs   Skin:     General: Skin is warm and dry.      Findings: No rash.   Neurological:      General: No focal deficit present.      Mental Status: She is alert and oriented to person, place, and time. Mental status is at baseline.       Results for orders placed or performed in visit on 09/30/24 (from the past 24 hour(s))   Basic Metabolic Panel   Result Value Ref Range    Glucose 83 74 - 99 mg/dL    Sodium 131 (L) 136 - 145 mmol/L    Potassium 3.6 3.5 - 5.3 mmol/L    Chloride 91 (L) 98 - 107 mmol/L    Bicarbonate 31 21 - 32 mmol/L    Anion Gap 13 10 - 20 mmol/L    Urea Nitrogen 21 6 - 23 mg/dL    Creatinine 0.68 0.50 - 1.05 mg/dL    eGFR 90 >60 mL/min/1.73m*2    Calcium 7.6 (L) 8.6 - 10.3 mg/dL   CBC   Result Value Ref Range    WBC 11.3 4.4 - 11.3 x10*3/uL    nRBC 0.0 0.0 - 0.0 /100 WBCs    RBC 3.29 (L) 4.00 - 5.20 x10*6/uL    Hemoglobin 9.5 (L) 12.0 - 16.0 g/dL    Hematocrit 29.8 (L) 36.0 - 46.0 %    MCV 91 80 - 100 fL    MCH 28.9 26.0 - 34.0 pg    MCHC 31.9 (L) 32.0 - 36.0 g/dL    RDW 14.4 11.5 - 14.5 %    Platelets 353 150 - 450 x10*3/uL          No lab exists for component: \"CBC BMP\"  Assessment/Plan  Essential hypertension I10        Continue norvasc 10mg daily.  Monitor blood pressures and adjust meds as needed.           Nausea R11.0       Has zofran prn.  Monitor.         Hyponatremia E87.1       Treated with fluid restriction. Sodium 131 on today's labs           CELINA (acute kidney injury) (CMS-HCC) - Primary N17.9       Maxzide and meloxicam stopped and labs improved.  Monitor weekly labs.  Discharge planning.   Will likely need additional help in the home at discharge.            Acute cystitis without hematuria " N30.00       Treated with course of ceftriaxone in the hospital.  Mild leukocytosis has resolved.             Bradycardia R00.1       Atenolol was stopped.            Chronic low back pain M54.50, G89.29       Continue pain management with gabapentin, tizanidine and tramadol prn.  PM&R following.  Gabapentin increased to TID and tylenol changed to routine.          GERD:  on omeprazole.  .     Constipation:  improved. change sennakot and miralax to prn per pt's request. .    Overactive bladder:  on home med - oxybutynin 5mg BID.     Hypokalemia:  Monitor.        Time spent: 30 min in review of chart, labs and orders, consultation with pt and documentation.       Electronically Signed By: Corrie Delarosa PA-C   9/30/24  2:32 PM

## 2024-10-04 ENCOUNTER — NURSING HOME VISIT (OUTPATIENT)
Dept: POST ACUTE CARE | Facility: EXTERNAL LOCATION | Age: 74
End: 2024-10-04
Payer: MEDICARE

## 2024-10-04 VITALS
BODY MASS INDEX: 46.69 KG/M2 | RESPIRATION RATE: 18 BRPM | HEART RATE: 76 BPM | TEMPERATURE: 98 F | WEIGHT: 266.8 LBS | DIASTOLIC BLOOD PRESSURE: 70 MMHG | OXYGEN SATURATION: 97 % | SYSTOLIC BLOOD PRESSURE: 140 MMHG

## 2024-10-04 DIAGNOSIS — K59.00 CONSTIPATION, UNSPECIFIED CONSTIPATION TYPE: ICD-10-CM

## 2024-10-04 DIAGNOSIS — M54.50 CHRONIC LOW BACK PAIN, UNSPECIFIED BACK PAIN LATERALITY, UNSPECIFIED WHETHER SCIATICA PRESENT: ICD-10-CM

## 2024-10-04 DIAGNOSIS — I10 ESSENTIAL HYPERTENSION: ICD-10-CM

## 2024-10-04 DIAGNOSIS — E87.1 HYPONATREMIA: ICD-10-CM

## 2024-10-04 DIAGNOSIS — N17.9 AKI (ACUTE KIDNEY INJURY) (CMS-HCC): Primary | ICD-10-CM

## 2024-10-04 DIAGNOSIS — N30.00 ACUTE CYSTITIS WITHOUT HEMATURIA: ICD-10-CM

## 2024-10-04 DIAGNOSIS — N32.81 OVERACTIVE BLADDER: ICD-10-CM

## 2024-10-04 DIAGNOSIS — G89.29 CHRONIC LOW BACK PAIN, UNSPECIFIED BACK PAIN LATERALITY, UNSPECIFIED WHETHER SCIATICA PRESENT: ICD-10-CM

## 2024-10-04 ASSESSMENT — ENCOUNTER SYMPTOMS
WHEEZING: 0
HEADACHES: 0
VOMITING: 0
ABDOMINAL PAIN: 0
CONFUSION: 0
HEMATURIA: 0
APPETITE CHANGE: 0
FREQUENCY: 0
COUGH: 0
TREMORS: 0
NERVOUS/ANXIOUS: 0
CHILLS: 0
NAUSEA: 0
DIZZINESS: 0
WEAKNESS: 0
FEVER: 0
SHORTNESS OF BREATH: 0
BACK PAIN: 1
CONSTIPATION: 0
DYSURIA: 0

## 2024-10-04 NOTE — LETTER
Patient: Angela Montelongo  : 1950    Encounter Date: 10/04/2024      Subjective  [id number] : Angela Montelongo is a 74 y.o. female admitted to [facility] for rehab. No chief complaint on file..  HPI  Pt with h/o HTN, OA, urinary incontinence and Gerd with weakness and fall treated for hyponatremia and CELINA.  Pt seen while resting in bed.  She reports some continued back pain which has decreased her ability to participate in therapy.  Her progress has been slow.  She is worried about discharge and knows that she will need more care at home.   She would like to pursue epidural injection for her pain management and referral has been made by PM&R provider.   She offers no other complaints or concerns.  She denies any abdominal pain.   Pt reports adequate pain management.   She is eating and drinking well.  No shortness of breath or cough.    Pt lives alone.  Review of Systems   Constitutional:  Negative for appetite change, chills and fever.   Respiratory:  Negative for cough, shortness of breath and wheezing.    Cardiovascular:  Negative for chest pain and leg swelling.   Gastrointestinal:  Negative for abdominal pain, constipation, nausea and vomiting.   Genitourinary:  Negative for dysuria, frequency, hematuria and urgency.   Musculoskeletal:  Positive for back pain.   Neurological:  Negative for dizziness, tremors, weakness and headaches.   Psychiatric/Behavioral:  Negative for confusion. The patient is not nervous/anxious.    All other systems reviewed and are negative.      Objective  /70   Pulse 76   Temp 36.7 °C (98 °F)   Resp 18   Wt 121 kg (266 lb 12.8 oz)   SpO2 97%   BMI 46.69 kg/m²    Physical Exam  Constitutional:       General: She is not in acute distress.  Eyes:      Conjunctiva/sclera: Conjunctivae normal.      Pupils: Pupils are equal, round, and reactive to light.   Cardiovascular:      Rate and Rhythm: Normal rate and regular rhythm.      Heart sounds: No murmur heard.  Pulmonary:     "  Effort: Pulmonary effort is normal.      Breath sounds: No wheezing, rhonchi or rales.   Abdominal:      General: Abdomen is flat. Bowel sounds are normal. There is no distension.      Palpations: Abdomen is soft. There is no mass.      Tenderness: There is no abdominal tenderness.   Musculoskeletal:         General: Normal range of motion.      Right lower leg: Edema present.      Left lower leg: Edema present.      Comments: Trace edema to bilateral lower legs   Skin:     General: Skin is warm and dry.      Findings: No rash.   Neurological:      General: No focal deficit present.      Mental Status: She is alert and oriented to person, place, and time. Mental status is at baseline.       No results found for this or any previous visit (from the past 24 hour(s)).         No lab exists for component: \"CBC BMP\"  Assessment/Plan  Essential hypertension I10        Continue norvasc 10mg daily.  Monitor blood pressures and adjust meds as needed.           Nausea R11.0       Has zofran prn.  Monitor.         Hyponatremia E87.1       Treated with fluid restriction. Sodium 131 on last labs           CELINA (acute kidney injury) (CMS-HCC) - Primary N17.9       Maxzide and meloxicam stopped and labs improved.  Monitor weekly labs.  Discharge planning.   Will likely need additional help in the home at discharge.            Acute cystitis without hematuria N30.00       Treated with course of ceftriaxone in the hospital.  Mild leukocytosis has resolved.             Bradycardia R00.1       Atenolol was stopped.            Chronic low back pain M54.50, G89.29       Continue pain management with gabapentin, tizanidine and tramadol prn.  PM&R following.  Gabapentin 200mg  TID and tylenol changed to routine.  Referral has been made to interventional pain management for epidural injection evaluation.         GERD:  on omeprazole.  .     Constipation:  improved. change sennakot and miralax to prn per pt's request. .    Overactive " bladder:  on home med - oxybutynin 5mg BID.     Hypokalemia:  Monitor.        Time spent: 30 min in review of chart, labs and orders, consultation with pt and documentation.       Electronically Signed By: Corrie Delarosa PA-C   10/4/24  5:18 PM

## 2024-10-04 NOTE — PROGRESS NOTES
Subjective   [id number] : Angela Montelongo is a 74 y.o. female admitted to [facility] for rehab. No chief complaint on file..  HPI  Pt with h/o HTN, OA, urinary incontinence and Gerd with weakness and fall treated for hyponatremia and CELINA.  Pt seen while resting in bed.  She reports some continued back pain which has decreased her ability to participate in therapy.  Her progress has been slow.  She is worried about discharge and knows that she will need more care at home.   She would like to pursue epidural injection for her pain management and referral has been made by PM&R provider.   She offers no other complaints or concerns.  She denies any abdominal pain.   Pt reports adequate pain management.   She is eating and drinking well.  No shortness of breath or cough.    Pt lives alone.  Review of Systems   Constitutional:  Negative for appetite change, chills and fever.   Respiratory:  Negative for cough, shortness of breath and wheezing.    Cardiovascular:  Negative for chest pain and leg swelling.   Gastrointestinal:  Negative for abdominal pain, constipation, nausea and vomiting.   Genitourinary:  Negative for dysuria, frequency, hematuria and urgency.   Musculoskeletal:  Positive for back pain.   Neurological:  Negative for dizziness, tremors, weakness and headaches.   Psychiatric/Behavioral:  Negative for confusion. The patient is not nervous/anxious.    All other systems reviewed and are negative.      Objective   /70   Pulse 76   Temp 36.7 °C (98 °F)   Resp 18   Wt 121 kg (266 lb 12.8 oz)   SpO2 97%   BMI 46.69 kg/m²    Physical Exam  Constitutional:       General: She is not in acute distress.  Eyes:      Conjunctiva/sclera: Conjunctivae normal.      Pupils: Pupils are equal, round, and reactive to light.   Cardiovascular:      Rate and Rhythm: Normal rate and regular rhythm.      Heart sounds: No murmur heard.  Pulmonary:      Effort: Pulmonary effort is normal.      Breath sounds: No  "wheezing, rhonchi or rales.   Abdominal:      General: Abdomen is flat. Bowel sounds are normal. There is no distension.      Palpations: Abdomen is soft. There is no mass.      Tenderness: There is no abdominal tenderness.   Musculoskeletal:         General: Normal range of motion.      Right lower leg: Edema present.      Left lower leg: Edema present.      Comments: Trace edema to bilateral lower legs   Skin:     General: Skin is warm and dry.      Findings: No rash.   Neurological:      General: No focal deficit present.      Mental Status: She is alert and oriented to person, place, and time. Mental status is at baseline.       No results found for this or any previous visit (from the past 24 hour(s)).         No lab exists for component: \"CBC BMP\"  Assessment/Plan   Essential hypertension I10        Continue norvasc 10mg daily.  Monitor blood pressures and adjust meds as needed.           Nausea R11.0       Has zofran prn.  Monitor.         Hyponatremia E87.1       Treated with fluid restriction. Sodium 131 on last labs           CELINA (acute kidney injury) (CMS-HCC) - Primary N17.9       Maxzide and meloxicam stopped and labs improved.  Monitor weekly labs.  Discharge planning.   Will likely need additional help in the home at discharge.            Acute cystitis without hematuria N30.00       Treated with course of ceftriaxone in the hospital.  Mild leukocytosis has resolved.             Bradycardia R00.1       Atenolol was stopped.            Chronic low back pain M54.50, G89.29       Continue pain management with gabapentin, tizanidine and tramadol prn.  PM&R following.  Gabapentin 200mg  TID and tylenol changed to routine.  Referral has been made to interventional pain management for epidural injection evaluation.         GERD:  on omeprazole.  .     Constipation:  improved. change sennakot and miralax to prn per pt's request. .    Overactive bladder:  on home med - oxybutynin 5mg BID.     Hypokalemia:  " Monitor.        Time spent: 30 min in review of chart, labs and orders, consultation with pt and documentation.

## 2024-10-07 ENCOUNTER — NURSING HOME VISIT (OUTPATIENT)
Dept: POST ACUTE CARE | Facility: EXTERNAL LOCATION | Age: 74
End: 2024-10-07
Payer: MEDICARE

## 2024-10-07 VITALS
SYSTOLIC BLOOD PRESSURE: 145 MMHG | OXYGEN SATURATION: 96 % | DIASTOLIC BLOOD PRESSURE: 67 MMHG | RESPIRATION RATE: 18 BRPM | WEIGHT: 264.5 LBS | BODY MASS INDEX: 46.29 KG/M2 | TEMPERATURE: 98.1 F | HEART RATE: 75 BPM

## 2024-10-07 DIAGNOSIS — M54.50 CHRONIC LOW BACK PAIN, UNSPECIFIED BACK PAIN LATERALITY, UNSPECIFIED WHETHER SCIATICA PRESENT: ICD-10-CM

## 2024-10-07 DIAGNOSIS — G89.29 CHRONIC LOW BACK PAIN, UNSPECIFIED BACK PAIN LATERALITY, UNSPECIFIED WHETHER SCIATICA PRESENT: ICD-10-CM

## 2024-10-07 DIAGNOSIS — K59.00 CONSTIPATION, UNSPECIFIED CONSTIPATION TYPE: ICD-10-CM

## 2024-10-07 DIAGNOSIS — E87.1 HYPONATREMIA: ICD-10-CM

## 2024-10-07 DIAGNOSIS — N17.9 AKI (ACUTE KIDNEY INJURY) (CMS-HCC): Primary | ICD-10-CM

## 2024-10-07 DIAGNOSIS — N32.81 OVERACTIVE BLADDER: ICD-10-CM

## 2024-10-07 DIAGNOSIS — I10 ESSENTIAL HYPERTENSION: ICD-10-CM

## 2024-10-07 DIAGNOSIS — N30.00 ACUTE CYSTITIS WITHOUT HEMATURIA: ICD-10-CM

## 2024-10-07 PROCEDURE — 99309 SBSQ NF CARE MODERATE MDM 30: CPT | Performed by: PHYSICIAN ASSISTANT

## 2024-10-07 ASSESSMENT — ENCOUNTER SYMPTOMS
BACK PAIN: 1
COUGH: 0
DYSURIA: 0
TREMORS: 0
NERVOUS/ANXIOUS: 0
HEADACHES: 0
VOMITING: 0
CHILLS: 0
FEVER: 0
WEAKNESS: 0
DIZZINESS: 0
SHORTNESS OF BREATH: 0
CONFUSION: 0
ABDOMINAL PAIN: 0
HEMATURIA: 0
FREQUENCY: 0
WHEEZING: 0
NAUSEA: 0
APPETITE CHANGE: 0
CONSTIPATION: 0

## 2024-10-07 NOTE — LETTER
Patient: Angela Montelongo  : 1950    Encounter Date: 10/07/2024      Subjective  [id number] : Angela Montelongo is a 74 y.o. female admitted to [facility] for rehab. No chief complaint on file..  HPI  Pt with h/o HTN, OA, urinary incontinence and Gerd with weakness and fall treated for hyponatremia and CELINA.  Pt seen while resting in bed.  Pt planning for discharge home.  She will need more care at discharge as she is not able to get out of bed or transfer unassisted. Pt lives with her daughter and there is plan to have an aid in the home per social work.  She continues to have issues with back pain.  She is complaining of pain but refused her tramadol this morning per nursing.   Pt has been referred for pain management.   Nursing reports that blood pressures is higher than usual this morning.  172/89 pulse of 99.  Pt pulse and blood pressure are elevated at times.  She had been on atenolol prior to this hospitalization.   She denies any abdominal pain.  She is eating and drinking well.  No n/v/d/c.   No shortness of breath or cough.      Review of Systems   Constitutional:  Negative for appetite change, chills and fever.   Respiratory:  Negative for cough, shortness of breath and wheezing.    Cardiovascular:  Negative for chest pain and leg swelling.   Gastrointestinal:  Negative for abdominal pain, constipation, nausea and vomiting.   Genitourinary:  Negative for dysuria, frequency, hematuria and urgency.   Musculoskeletal:  Positive for back pain.   Neurological:  Negative for dizziness, tremors, weakness and headaches.   Psychiatric/Behavioral:  Negative for confusion. The patient is not nervous/anxious.    All other systems reviewed and are negative.      Objective  /67   Pulse 75   Temp 36.7 °C (98.1 °F)   Resp 18   Wt 120 kg (264 lb 8 oz)   SpO2 96%   BMI 46.29 kg/m²    Physical Exam  Constitutional:       General: She is not in acute distress.  Eyes:      Conjunctiva/sclera: Conjunctivae  "normal.      Pupils: Pupils are equal, round, and reactive to light.   Cardiovascular:      Rate and Rhythm: Normal rate and regular rhythm.      Heart sounds: No murmur heard.  Pulmonary:      Effort: Pulmonary effort is normal.      Breath sounds: No wheezing, rhonchi or rales.   Abdominal:      General: Abdomen is flat. Bowel sounds are normal. There is no distension.      Palpations: Abdomen is soft. There is no mass.      Tenderness: There is no abdominal tenderness.   Musculoskeletal:         General: Normal range of motion.      Right lower leg: Edema present.      Left lower leg: Edema present.      Comments: Trace edema to bilateral lower legs   Skin:     General: Skin is warm and dry.      Findings: No rash.   Neurological:      General: No focal deficit present.      Mental Status: She is alert and oriented to person, place, and time. Mental status is at baseline.     Results for orders placed or performed in visit on 10/07/24 (from the past 24 hour(s))   Basic Metabolic Panel   Result Value Ref Range    Glucose 83 74 - 99 mg/dL    Sodium 135 (L) 136 - 145 mmol/L    Potassium 3.5 3.5 - 5.3 mmol/L    Chloride 92 (L) 98 - 107 mmol/L    Bicarbonate 33 (H) 21 - 32 mmol/L    Anion Gap 14 10 - 20 mmol/L    Urea Nitrogen 18 6 - 23 mg/dL    Creatinine 0.63 0.50 - 1.05 mg/dL    eGFR >90 >60 mL/min/1.73m*2    Calcium 8.0 (L) 8.6 - 10.3 mg/dL   CBC   Result Value Ref Range    WBC 10.5 4.4 - 11.3 x10*3/uL    nRBC 0.0 0.0 - 0.0 /100 WBCs    RBC 3.47 (L) 4.00 - 5.20 x10*6/uL    Hemoglobin 10.0 (L) 12.0 - 16.0 g/dL    Hematocrit 31.5 (L) 36.0 - 46.0 %    MCV 91 80 - 100 fL    MCH 28.8 26.0 - 34.0 pg    MCHC 31.7 (L) 32.0 - 36.0 g/dL    RDW 15.4 (H) 11.5 - 14.5 %    Platelets 315 150 - 450 x10*3/uL            No lab exists for component: \"CBC BMP\"  Assessment/Plan  Essential hypertension I10        Continue norvasc 10mg daily.   Some elevated blood pressures.  NO further issues with bradycardia.  Restart atenolol at 25mg " daily with hold parameter for pulse < 60.  Monitor blood pressures, and pulse  and adjust meds as needed.           Nausea R11.0       Has zofran prn.  Monitor.         Hyponatremia E87.1       Treated with fluid restriction. Sodium 135 on today's labs           CELINA (acute kidney injury) (CMS-HCC) - Primary N17.9       Maxzide and meloxicam stopped and labs improved.  Monitor weekly labs.  Discharge planning this .   Will likely need additional help in the home at discharge.            Acute cystitis without hematuria N30.00       Treated with course of ceftriaxone in the hospital.  Mild leukocytosis has resolved.             Bradycardia R00.1       No further issues.  Will restart atenolol at lower dose - 25mg daily with hold parameter.  Monitor for bradycardia.            Chronic low back pain M54.50, G89.29       Continue pain management with gabapentin, tizanidine and tramadol prn.  PM&R following.  Gabapentin 200mg  TID and tylenol changed to routine.  Referral has been made to interventional pain management for epidural injection evaluation.         GERD:  on omeprazole.      Constipation:  improved. change sennakot and miralax to prn per pt's request. .    Overactive bladder:  on home med - oxybutynin 5mg BID.         Time spent: 30 min in review of chart, labs and orders, consultation with pt and documentation.       Electronically Signed By: Corrie Delarosa PA-C   10/7/24 12:47 PM

## 2024-10-07 NOTE — PROGRESS NOTES
Subjective   [id number] : Angela Montelongo is a 74 y.o. female admitted to [facility] for rehab. No chief complaint on file..  HPI  Pt with h/o HTN, OA, urinary incontinence and Gerd with weakness and fall treated for hyponatremia and CELINA.  Pt seen while resting in bed.  Pt planning for discharge home.  She will need more care at discharge as she is not able to get out of bed or transfer unassisted. Pt lives with her daughter and there is plan to have an aid in the home per social work.  She continues to have issues with back pain.  She is complaining of pain but refused her tramadol this morning per nursing.   Pt has been referred for pain management.   Nursing reports that blood pressures is higher than usual this morning.  172/89 pulse of 99.  Pt pulse and blood pressure are elevated at times.  She had been on atenolol prior to this hospitalization.   She denies any abdominal pain.  She is eating and drinking well.  No n/v/d/c.   No shortness of breath or cough.      Review of Systems   Constitutional:  Negative for appetite change, chills and fever.   Respiratory:  Negative for cough, shortness of breath and wheezing.    Cardiovascular:  Negative for chest pain and leg swelling.   Gastrointestinal:  Negative for abdominal pain, constipation, nausea and vomiting.   Genitourinary:  Negative for dysuria, frequency, hematuria and urgency.   Musculoskeletal:  Positive for back pain.   Neurological:  Negative for dizziness, tremors, weakness and headaches.   Psychiatric/Behavioral:  Negative for confusion. The patient is not nervous/anxious.    All other systems reviewed and are negative.      Objective   /67   Pulse 75   Temp 36.7 °C (98.1 °F)   Resp 18   Wt 120 kg (264 lb 8 oz)   SpO2 96%   BMI 46.29 kg/m²    Physical Exam  Constitutional:       General: She is not in acute distress.  Eyes:      Conjunctiva/sclera: Conjunctivae normal.      Pupils: Pupils are equal, round, and reactive to light.  "  Cardiovascular:      Rate and Rhythm: Normal rate and regular rhythm.      Heart sounds: No murmur heard.  Pulmonary:      Effort: Pulmonary effort is normal.      Breath sounds: No wheezing, rhonchi or rales.   Abdominal:      General: Abdomen is flat. Bowel sounds are normal. There is no distension.      Palpations: Abdomen is soft. There is no mass.      Tenderness: There is no abdominal tenderness.   Musculoskeletal:         General: Normal range of motion.      Right lower leg: Edema present.      Left lower leg: Edema present.      Comments: Trace edema to bilateral lower legs   Skin:     General: Skin is warm and dry.      Findings: No rash.   Neurological:      General: No focal deficit present.      Mental Status: She is alert and oriented to person, place, and time. Mental status is at baseline.     Results for orders placed or performed in visit on 10/07/24 (from the past 24 hour(s))   Basic Metabolic Panel   Result Value Ref Range    Glucose 83 74 - 99 mg/dL    Sodium 135 (L) 136 - 145 mmol/L    Potassium 3.5 3.5 - 5.3 mmol/L    Chloride 92 (L) 98 - 107 mmol/L    Bicarbonate 33 (H) 21 - 32 mmol/L    Anion Gap 14 10 - 20 mmol/L    Urea Nitrogen 18 6 - 23 mg/dL    Creatinine 0.63 0.50 - 1.05 mg/dL    eGFR >90 >60 mL/min/1.73m*2    Calcium 8.0 (L) 8.6 - 10.3 mg/dL   CBC   Result Value Ref Range    WBC 10.5 4.4 - 11.3 x10*3/uL    nRBC 0.0 0.0 - 0.0 /100 WBCs    RBC 3.47 (L) 4.00 - 5.20 x10*6/uL    Hemoglobin 10.0 (L) 12.0 - 16.0 g/dL    Hematocrit 31.5 (L) 36.0 - 46.0 %    MCV 91 80 - 100 fL    MCH 28.8 26.0 - 34.0 pg    MCHC 31.7 (L) 32.0 - 36.0 g/dL    RDW 15.4 (H) 11.5 - 14.5 %    Platelets 315 150 - 450 x10*3/uL            No lab exists for component: \"CBC BMP\"  Assessment/Plan   Essential hypertension I10        Continue norvasc 10mg daily.   Some elevated blood pressures.  NO further issues with bradycardia.  Restart atenolol at 25mg daily with hold parameter for pulse < 60.  Monitor blood pressures, " and pulse  and adjust meds as needed.           Nausea R11.0       Has zofran prn.  Monitor.         Hyponatremia E87.1       Treated with fluid restriction. Sodium 135 on today's labs           CELINA (acute kidney injury) (CMS-HCC) - Primary N17.9       Maxzide and meloxicam stopped and labs improved.  Monitor weekly labs.  Discharge planning this .   Will likely need additional help in the home at discharge.            Acute cystitis without hematuria N30.00       Treated with course of ceftriaxone in the hospital.  Mild leukocytosis has resolved.             Bradycardia R00.1       No further issues.  Will restart atenolol at lower dose - 25mg daily with hold parameter.  Monitor for bradycardia.            Chronic low back pain M54.50, G89.29       Continue pain management with gabapentin, tizanidine and tramadol prn.  PM&R following.  Gabapentin 200mg  TID and tylenol changed to routine.  Referral has been made to interventional pain management for epidural injection evaluation.         GERD:  on omeprazole.      Constipation:  improved. change sennakot and miralax to prn per pt's request. .    Overactive bladder:  on home med - oxybutynin 5mg BID.         Time spent: 30 min in review of chart, labs and orders, consultation with pt and documentation.

## 2024-10-11 ENCOUNTER — TELEPHONE (OUTPATIENT)
Dept: PRIMARY CARE | Facility: CLINIC | Age: 74
End: 2024-10-11

## 2024-10-11 ENCOUNTER — NURSING HOME VISIT (OUTPATIENT)
Dept: POST ACUTE CARE | Facility: EXTERNAL LOCATION | Age: 74
End: 2024-10-11
Payer: MEDICARE

## 2024-10-11 VITALS
TEMPERATURE: 97.3 F | DIASTOLIC BLOOD PRESSURE: 81 MMHG | RESPIRATION RATE: 18 BRPM | WEIGHT: 252.2 LBS | BODY MASS INDEX: 44.13 KG/M2 | HEART RATE: 83 BPM | OXYGEN SATURATION: 96 % | SYSTOLIC BLOOD PRESSURE: 145 MMHG

## 2024-10-11 DIAGNOSIS — N30.00 ACUTE CYSTITIS WITHOUT HEMATURIA: ICD-10-CM

## 2024-10-11 DIAGNOSIS — M54.50 CHRONIC LOW BACK PAIN, UNSPECIFIED BACK PAIN LATERALITY, UNSPECIFIED WHETHER SCIATICA PRESENT: Primary | ICD-10-CM

## 2024-10-11 DIAGNOSIS — E87.1 HYPONATREMIA: ICD-10-CM

## 2024-10-11 DIAGNOSIS — N17.9 AKI (ACUTE KIDNEY INJURY) (CMS-HCC): ICD-10-CM

## 2024-10-11 DIAGNOSIS — R53.81 DEBILITY: ICD-10-CM

## 2024-10-11 DIAGNOSIS — I10 ESSENTIAL HYPERTENSION: ICD-10-CM

## 2024-10-11 DIAGNOSIS — N32.81 OVERACTIVE BLADDER: ICD-10-CM

## 2024-10-11 DIAGNOSIS — G89.29 CHRONIC LOW BACK PAIN, UNSPECIFIED BACK PAIN LATERALITY, UNSPECIFIED WHETHER SCIATICA PRESENT: Primary | ICD-10-CM

## 2024-10-11 PROCEDURE — 99315 NF DSCHRG MGMT 30 MIN/LESS: CPT | Performed by: PHYSICIAN ASSISTANT

## 2024-10-11 ASSESSMENT — ENCOUNTER SYMPTOMS
CONFUSION: 0
FREQUENCY: 0
HEMATURIA: 0
WEAKNESS: 0
NAUSEA: 0
DIZZINESS: 0
COUGH: 0
HEADACHES: 0
SHORTNESS OF BREATH: 0
NERVOUS/ANXIOUS: 0
CONSTIPATION: 0
TREMORS: 0
APPETITE CHANGE: 0
ABDOMINAL PAIN: 0
BACK PAIN: 1
WHEEZING: 0
CHILLS: 0
VOMITING: 0
DYSURIA: 0
FEVER: 0

## 2024-10-11 NOTE — TELEPHONE ENCOUNTER
[facility] referral placed by Corrie Delarosa NP. Patient is currently at [facility] with plan to discharge home today.   Per NP progress note: Patient with HTN, OA, urinary incontinence and GERD with weakness and fall treated for hyponatremia and CELINA. She is returning home with her daughter. She is planning on having additional help in the home. She is essentially bed bound at this time. She is unable to transfer unassisted. She had declined all DME per social work. Patient would benefit from house calls program. I did discuss with pt and she is in agreement with house calls referral.   [facility] will reach out to patient and daughter to further discuss program and offer to schedule a visit to establish care.

## 2024-10-11 NOTE — LETTER
Patient: Angela Montelongo  : 1950    Encounter Date: 10/11/2024      Subjective  [id number] : Angela Montelongo is a 74 y.o. female admitted to [facility] for rehab. No chief complaint on file..  HPI  Pt with h/o HTN, OA, urinary incontinence and Gerd with weakness and fall treated for hyponatremia and CELINA.  Pt seen while resting in bed.  Pt planning for discharge home today.  She is returning home with her daughter.  She is planning on having additional help in the home.   She is essentially bed bound at this time.   She is unable to transfer unassisted.  She had declined all DME per social work.  Pt would benefit from house calls program.  I did discuss with pt and she is in agreement with house calls referral.  She offers no new complaints or concerns.   She denies any abdominal pain.  She is eating and drinking well.  No n/v/d/c.   No shortness of breath or cough.      Review of Systems   Constitutional:  Negative for appetite change, chills and fever.   Respiratory:  Negative for cough, shortness of breath and wheezing.    Cardiovascular:  Negative for chest pain and leg swelling.   Gastrointestinal:  Negative for abdominal pain, constipation, nausea and vomiting.   Genitourinary:  Negative for dysuria, frequency, hematuria and urgency.   Musculoskeletal:  Positive for back pain.   Neurological:  Negative for dizziness, tremors, weakness and headaches.   Psychiatric/Behavioral:  Negative for confusion. The patient is not nervous/anxious.    All other systems reviewed and are negative.      Objective  /81   Pulse 83   Temp 36.3 °C (97.3 °F)   Resp 18   Wt 114 kg (252 lb 3.2 oz)   SpO2 96%   BMI 44.13 kg/m²    Physical Exam  Constitutional:       General: She is not in acute distress.  Eyes:      Conjunctiva/sclera: Conjunctivae normal.      Pupils: Pupils are equal, round, and reactive to light.   Cardiovascular:      Rate and Rhythm: Normal rate and regular rhythm.      Heart sounds: No murmur  "heard.  Pulmonary:      Effort: Pulmonary effort is normal.      Breath sounds: No wheezing, rhonchi or rales.   Abdominal:      General: Abdomen is flat. Bowel sounds are normal. There is no distension.      Palpations: Abdomen is soft. There is no mass.      Tenderness: There is no abdominal tenderness.   Musculoskeletal:         General: Normal range of motion.      Right lower leg: Edema present.      Left lower leg: Edema present.      Comments: Trace edema to bilateral lower legs   Skin:     General: Skin is warm and dry.      Findings: No rash.   Neurological:      General: No focal deficit present.      Mental Status: She is alert and oriented to person, place, and time. Mental status is at baseline.       No results found for this or any previous visit (from the past 24 hour(s)).  No lab exists for component: \"CBC BMP\"  Assessment/Plan  Essential hypertension I10        Continue norvasc 10mg daily and atenolol 25mg daily with hold parameter for pulse < 60.  Monitor blood pressures, and pulse  and adjust meds as needed.           Nausea R11.0       Has zofran prn.  Monitor.         Hyponatremia E87.1       Treated with fluid restriction. Sodium 135 on today's labs           CELINA (acute kidney injury) (CMS-HCC) - Primary N17.9       Maxzide and meloxicam stopped and labs improved.  Monitor weekly labs.  Discharge to home today .   Will likely need additional help in the home at discharge.  Home health for RN/PT/OT recommended.  Referral to house calls ordered.            Acute cystitis without hematuria N30.00       Treated with course of ceftriaxone in the hospital.  Mild leukocytosis has resolved.             Bradycardia R00.1       No further issues.  Will restart atenolol at lower dose - 25mg daily with hold parameter.  Monitor for bradycardia.            Chronic low back pain M54.50, G89.29       Continue pain management with gabapentin, tizanidine and tramadol prn.  PM&R following.  Gabapentin 200mg  TID " and tylenol changed to routine.  Referral has been made to interventional pain management for epidural injection evaluation.         GERD:  on omeprazole.      Constipation:  improved. change sennakot and miralax to prn per pt's request. .    Overactive bladder:  on home med - oxybutynin 5mg BID.         Time spent: 30 min in review of chart, labs and orders, consultation with pt and documentation.       Electronically Signed By: Corrie Delarosa PA-C   10/11/24  9:43 AM

## 2024-10-11 NOTE — PROGRESS NOTES
Subjective   [id number] : Angela Montelongo is a 74 y.o. female admitted to [facility] for rehab. No chief complaint on file..  HPI  Pt with h/o HTN, OA, urinary incontinence and Gerd with weakness and fall treated for hyponatremia and CELINA.  Pt seen while resting in bed.  Pt planning for discharge home today.  She is returning home with her daughter.  She is planning on having additional help in the home.   She is essentially bed bound at this time.   She is unable to transfer unassisted.  She had declined all DME per social work.  Pt would benefit from house calls program.  I did discuss with pt and she is in agreement with house calls referral.  She offers no new complaints or concerns.   She denies any abdominal pain.  She is eating and drinking well.  No n/v/d/c.   No shortness of breath or cough.      Review of Systems   Constitutional:  Negative for appetite change, chills and fever.   Respiratory:  Negative for cough, shortness of breath and wheezing.    Cardiovascular:  Negative for chest pain and leg swelling.   Gastrointestinal:  Negative for abdominal pain, constipation, nausea and vomiting.   Genitourinary:  Negative for dysuria, frequency, hematuria and urgency.   Musculoskeletal:  Positive for back pain.   Neurological:  Negative for dizziness, tremors, weakness and headaches.   Psychiatric/Behavioral:  Negative for confusion. The patient is not nervous/anxious.    All other systems reviewed and are negative.      Objective   /81   Pulse 83   Temp 36.3 °C (97.3 °F)   Resp 18   Wt 114 kg (252 lb 3.2 oz)   SpO2 96%   BMI 44.13 kg/m²    Physical Exam  Constitutional:       General: She is not in acute distress.  Eyes:      Conjunctiva/sclera: Conjunctivae normal.      Pupils: Pupils are equal, round, and reactive to light.   Cardiovascular:      Rate and Rhythm: Normal rate and regular rhythm.      Heart sounds: No murmur heard.  Pulmonary:      Effort: Pulmonary effort is normal.       "Breath sounds: No wheezing, rhonchi or rales.   Abdominal:      General: Abdomen is flat. Bowel sounds are normal. There is no distension.      Palpations: Abdomen is soft. There is no mass.      Tenderness: There is no abdominal tenderness.   Musculoskeletal:         General: Normal range of motion.      Right lower leg: Edema present.      Left lower leg: Edema present.      Comments: Trace edema to bilateral lower legs   Skin:     General: Skin is warm and dry.      Findings: No rash.   Neurological:      General: No focal deficit present.      Mental Status: She is alert and oriented to person, place, and time. Mental status is at baseline.       No results found for this or any previous visit (from the past 24 hour(s)).  No lab exists for component: \"CBC BMP\"  Assessment/Plan   Essential hypertension I10        Continue norvasc 10mg daily and atenolol 25mg daily with hold parameter for pulse < 60.  Monitor blood pressures, and pulse  and adjust meds as needed.           Nausea R11.0       Has zofran prn.  Monitor.         Hyponatremia E87.1       Treated with fluid restriction. Sodium 135 on today's labs           CELINA (acute kidney injury) (CMS-HCC) - Primary N17.9       Maxzide and meloxicam stopped and labs improved.  Monitor weekly labs.  Discharge to home today .   Will likely need additional help in the home at discharge.  Home health for RN/PT/OT recommended.  Referral to house calls ordered.            Acute cystitis without hematuria N30.00       Treated with course of ceftriaxone in the hospital.  Mild leukocytosis has resolved.             Bradycardia R00.1       No further issues.  Will restart atenolol at lower dose - 25mg daily with hold parameter.  Monitor for bradycardia.            Chronic low back pain M54.50, G89.29       Continue pain management with gabapentin, tizanidine and tramadol prn.  PM&R following.  Gabapentin 200mg  TID and tylenol changed to routine.  Referral has been made to " interventional pain management for epidural injection evaluation.         GERD:  on omeprazole.      Constipation:  improved. change sennakot and miralax to prn per pt's request. .    Overactive bladder:  on home med - oxybutynin 5mg BID.         Time spent: 30 min in review of chart, labs and orders, consultation with pt and documentation.

## 2024-10-12 PROCEDURE — G0180 MD CERTIFICATION HHA PATIENT: HCPCS | Performed by: FAMILY MEDICINE

## 2024-10-14 ENCOUNTER — TELEPHONE (OUTPATIENT)
Dept: PRIMARY CARE | Facility: CLINIC | Age: 74
End: 2024-10-14
Payer: MEDICARE

## 2024-10-14 RX ORDER — OXYBUTYNIN CHLORIDE 5 MG/1
5 TABLET ORAL 2 TIMES DAILY
Status: ON HOLD | COMMUNITY

## 2024-10-14 RX ORDER — ONDANSETRON 4 MG/1
4 TABLET, FILM COATED ORAL EVERY 8 HOURS PRN
Status: ON HOLD | COMMUNITY

## 2024-10-14 RX ORDER — GABAPENTIN 100 MG/1
200 CAPSULE ORAL 3 TIMES DAILY
Status: ON HOLD | COMMUNITY

## 2024-10-14 RX ORDER — ATENOLOL 25 MG/1
25 TABLET ORAL DAILY
Status: ON HOLD | COMMUNITY
Start: 2024-10-13

## 2024-10-14 RX ORDER — AMLODIPINE BESYLATE 10 MG/1
10 TABLET ORAL DAILY
Status: ON HOLD | COMMUNITY

## 2024-10-14 RX ORDER — FAMOTIDINE 20 MG/1
20 TABLET, FILM COATED ORAL NIGHTLY PRN
Status: ON HOLD | COMMUNITY

## 2024-10-14 NOTE — TELEPHONE ENCOUNTER
Patient returned call, she is receptive to House Calls. She reported she is currently bed bound. She lives with her daughter, MICAELA and grandchild. PCP - Dr. Roach. She is active with [facility]. House Calls visit scheduled with Aga Trinidad NP 10/22/24 at 9:00 am. JAYLON Yung - patient is requesting a toenail cutting, I informed her you would evaluate during the visit.

## 2024-10-14 NOTE — TELEPHONE ENCOUNTER
Phoned patient to discuss House Calls referral and program, no answer, LMOM with House Calls office phone number.

## 2024-10-17 ENCOUNTER — HOSPITAL ENCOUNTER (INPATIENT)
Facility: HOSPITAL | Age: 74
DRG: 987 | End: 2024-10-17
Attending: STUDENT IN AN ORGANIZED HEALTH CARE EDUCATION/TRAINING PROGRAM | Admitting: STUDENT IN AN ORGANIZED HEALTH CARE EDUCATION/TRAINING PROGRAM
Payer: MEDICARE

## 2024-10-17 ENCOUNTER — APPOINTMENT (OUTPATIENT)
Dept: RADIOLOGY | Facility: HOSPITAL | Age: 74
DRG: 987 | End: 2024-10-17
Payer: MEDICARE

## 2024-10-17 ENCOUNTER — APPOINTMENT (OUTPATIENT)
Dept: CARDIOLOGY | Facility: HOSPITAL | Age: 74
DRG: 987 | End: 2024-10-17
Payer: MEDICARE

## 2024-10-17 DIAGNOSIS — R09.02 HYPOXIA: ICD-10-CM

## 2024-10-17 DIAGNOSIS — N30.00 ACUTE CYSTITIS WITHOUT HEMATURIA: ICD-10-CM

## 2024-10-17 DIAGNOSIS — R78.81 BACTEREMIA: ICD-10-CM

## 2024-10-17 DIAGNOSIS — E87.70 HYPERVOLEMIA, UNSPECIFIED HYPERVOLEMIA TYPE: ICD-10-CM

## 2024-10-17 DIAGNOSIS — R79.89 ELEVATED BRAIN NATRIURETIC PEPTIDE (BNP) LEVEL: ICD-10-CM

## 2024-10-17 DIAGNOSIS — I50.33 ACUTE ON CHRONIC DIASTOLIC CONGESTIVE HEART FAILURE: ICD-10-CM

## 2024-10-17 DIAGNOSIS — E83.42 HYPOMAGNESEMIA: ICD-10-CM

## 2024-10-17 DIAGNOSIS — R06.02 SHORTNESS OF BREATH: ICD-10-CM

## 2024-10-17 DIAGNOSIS — E87.6 HYPOKALEMIA: ICD-10-CM

## 2024-10-17 DIAGNOSIS — R60.0 BILATERAL LEG EDEMA: ICD-10-CM

## 2024-10-17 DIAGNOSIS — M46.46 DISCITIS OF LUMBAR REGION: ICD-10-CM

## 2024-10-17 DIAGNOSIS — J96.01 ACUTE HYPOXIC RESPIRATORY FAILURE (MULTI): Primary | ICD-10-CM

## 2024-10-17 LAB
ALBUMIN SERPL BCP-MCNC: 3 G/DL (ref 3.4–5)
ALP SERPL-CCNC: 107 U/L (ref 33–136)
ALT SERPL W P-5'-P-CCNC: 17 U/L (ref 7–45)
ANION GAP SERPL CALCULATED.3IONS-SCNC: 14 MMOL/L (ref 10–20)
APPEARANCE UR: ABNORMAL
AST SERPL W P-5'-P-CCNC: 32 U/L (ref 9–39)
BACTERIA #/AREA URNS AUTO: ABNORMAL /HPF
BASE EXCESS BLDV CALC-SCNC: 5.8 MMOL/L (ref -2–3)
BASOPHILS # BLD AUTO: 0.03 X10*3/UL (ref 0–0.1)
BASOPHILS NFR BLD AUTO: 0.2 %
BILIRUB SERPL-MCNC: 1.2 MG/DL (ref 0–1.2)
BILIRUB UR STRIP.AUTO-MCNC: NEGATIVE MG/DL
BNP SERPL-MCNC: 3988 PG/ML (ref 0–99)
BODY TEMPERATURE: 37 DEGREES CELSIUS
BUN SERPL-MCNC: 16 MG/DL (ref 6–23)
CALCIUM SERPL-MCNC: 8.8 MG/DL (ref 8.6–10.3)
CHLORIDE SERPL-SCNC: 94 MMOL/L (ref 98–107)
CO2 SERPL-SCNC: 29 MMOL/L (ref 21–32)
COLOR UR: YELLOW
CREAT SERPL-MCNC: 0.65 MG/DL (ref 0.5–1.05)
EGFRCR SERPLBLD CKD-EPI 2021: >90 ML/MIN/1.73M*2
EOSINOPHIL # BLD AUTO: 0 X10*3/UL (ref 0–0.4)
EOSINOPHIL NFR BLD AUTO: 0 %
ERYTHROCYTE [DISTWIDTH] IN BLOOD BY AUTOMATED COUNT: 15.9 % (ref 11.5–14.5)
FLUAV RNA RESP QL NAA+PROBE: NOT DETECTED
FLUBV RNA RESP QL NAA+PROBE: NOT DETECTED
GLUCOSE SERPL-MCNC: 132 MG/DL (ref 74–99)
GLUCOSE UR STRIP.AUTO-MCNC: NORMAL MG/DL
HCO3 BLDV-SCNC: 31.7 MMOL/L (ref 22–26)
HCT VFR BLD AUTO: 33.8 % (ref 36–46)
HGB BLD-MCNC: 10.7 G/DL (ref 12–16)
IMM GRANULOCYTES # BLD AUTO: 0.08 X10*3/UL (ref 0–0.5)
IMM GRANULOCYTES NFR BLD AUTO: 0.6 % (ref 0–0.9)
INHALED O2 CONCENTRATION: 100 %
KETONES UR STRIP.AUTO-MCNC: NEGATIVE MG/DL
LACTATE SERPL-SCNC: 0.9 MMOL/L (ref 0.4–2)
LEUKOCYTE ESTERASE UR QL STRIP.AUTO: ABNORMAL
LYMPHOCYTES # BLD AUTO: 0.85 X10*3/UL (ref 0.8–3)
LYMPHOCYTES NFR BLD AUTO: 5.9 %
MAGNESIUM SERPL-MCNC: 0.91 MG/DL (ref 1.6–2.4)
MCH RBC QN AUTO: 28.3 PG (ref 26–34)
MCHC RBC AUTO-ENTMCNC: 31.7 G/DL (ref 32–36)
MCV RBC AUTO: 89 FL (ref 80–100)
MONOCYTES # BLD AUTO: 0.64 X10*3/UL (ref 0.05–0.8)
MONOCYTES NFR BLD AUTO: 4.5 %
MRSA DNA SPEC QL NAA+PROBE: NOT DETECTED
MUCOUS THREADS #/AREA URNS AUTO: ABNORMAL /LPF
NEUTROPHILS # BLD AUTO: 12.69 X10*3/UL (ref 1.6–5.5)
NEUTROPHILS NFR BLD AUTO: 88.8 %
NITRITE UR QL STRIP.AUTO: NEGATIVE
NRBC BLD-RTO: 0 /100 WBCS (ref 0–0)
OXYHGB MFR BLDV: 82 % (ref 45–75)
PCO2 BLDV: 50 MM HG (ref 41–51)
PH BLDV: 7.41 PH (ref 7.33–7.43)
PH UR STRIP.AUTO: 6.5 [PH]
PLATELET # BLD AUTO: 391 X10*3/UL (ref 150–450)
PO2 BLDV: 56 MM HG (ref 35–45)
POTASSIUM SERPL-SCNC: 3.3 MMOL/L (ref 3.5–5.3)
PROT SERPL-MCNC: 6.5 G/DL (ref 6.4–8.2)
PROT UR STRIP.AUTO-MCNC: ABNORMAL MG/DL
RBC # BLD AUTO: 3.78 X10*6/UL (ref 4–5.2)
RBC # UR STRIP.AUTO: ABNORMAL /UL
RBC #/AREA URNS AUTO: ABNORMAL /HPF
SAO2 % BLDV: 84 % (ref 45–75)
SARS-COV-2 RNA RESP QL NAA+PROBE: NOT DETECTED
SODIUM SERPL-SCNC: 134 MMOL/L (ref 136–145)
SP GR UR STRIP.AUTO: 1.02
SQUAMOUS #/AREA URNS AUTO: ABNORMAL /HPF
UROBILINOGEN UR STRIP.AUTO-MCNC: ABNORMAL MG/DL
WBC # BLD AUTO: 14.3 X10*3/UL (ref 4.4–11.3)
WBC #/AREA URNS AUTO: ABNORMAL /HPF

## 2024-10-17 PROCEDURE — 80053 COMPREHEN METABOLIC PANEL: CPT | Performed by: STUDENT IN AN ORGANIZED HEALTH CARE EDUCATION/TRAINING PROGRAM

## 2024-10-17 PROCEDURE — 83880 ASSAY OF NATRIURETIC PEPTIDE: CPT | Performed by: STUDENT IN AN ORGANIZED HEALTH CARE EDUCATION/TRAINING PROGRAM

## 2024-10-17 PROCEDURE — 83605 ASSAY OF LACTIC ACID: CPT | Performed by: STUDENT IN AN ORGANIZED HEALTH CARE EDUCATION/TRAINING PROGRAM

## 2024-10-17 PROCEDURE — 36415 COLL VENOUS BLD VENIPUNCTURE: CPT | Performed by: STUDENT IN AN ORGANIZED HEALTH CARE EDUCATION/TRAINING PROGRAM

## 2024-10-17 PROCEDURE — 71275 CT ANGIOGRAPHY CHEST: CPT | Performed by: RADIOLOGY

## 2024-10-17 PROCEDURE — 72131 CT LUMBAR SPINE W/O DYE: CPT

## 2024-10-17 PROCEDURE — 2500000004 HC RX 250 GENERAL PHARMACY W/ HCPCS (ALT 636 FOR OP/ED): Performed by: STUDENT IN AN ORGANIZED HEALTH CARE EDUCATION/TRAINING PROGRAM

## 2024-10-17 PROCEDURE — 82810 BLOOD GASES O2 SAT ONLY: CPT | Performed by: STUDENT IN AN ORGANIZED HEALTH CARE EDUCATION/TRAINING PROGRAM

## 2024-10-17 PROCEDURE — 87636 SARSCOV2 & INF A&B AMP PRB: CPT | Performed by: STUDENT IN AN ORGANIZED HEALTH CARE EDUCATION/TRAINING PROGRAM

## 2024-10-17 PROCEDURE — 2550000001 HC RX 255 CONTRASTS: Performed by: STUDENT IN AN ORGANIZED HEALTH CARE EDUCATION/TRAINING PROGRAM

## 2024-10-17 PROCEDURE — 87186 SC STD MICRODIL/AGAR DIL: CPT | Mod: TRILAB,WESLAB | Performed by: STUDENT IN AN ORGANIZED HEALTH CARE EDUCATION/TRAINING PROGRAM

## 2024-10-17 PROCEDURE — 81001 URINALYSIS AUTO W/SCOPE: CPT | Performed by: STUDENT IN AN ORGANIZED HEALTH CARE EDUCATION/TRAINING PROGRAM

## 2024-10-17 PROCEDURE — 96368 THER/DIAG CONCURRENT INF: CPT

## 2024-10-17 PROCEDURE — 85652 RBC SED RATE AUTOMATED: CPT | Performed by: NURSE PRACTITIONER

## 2024-10-17 PROCEDURE — 85025 COMPLETE CBC W/AUTO DIFF WBC: CPT | Performed by: STUDENT IN AN ORGANIZED HEALTH CARE EDUCATION/TRAINING PROGRAM

## 2024-10-17 PROCEDURE — 71275 CT ANGIOGRAPHY CHEST: CPT

## 2024-10-17 PROCEDURE — 87641 MR-STAPH DNA AMP PROBE: CPT | Performed by: STUDENT IN AN ORGANIZED HEALTH CARE EDUCATION/TRAINING PROGRAM

## 2024-10-17 PROCEDURE — 96365 THER/PROPH/DIAG IV INF INIT: CPT

## 2024-10-17 PROCEDURE — 87040 BLOOD CULTURE FOR BACTERIA: CPT | Mod: TRILAB | Performed by: STUDENT IN AN ORGANIZED HEALTH CARE EDUCATION/TRAINING PROGRAM

## 2024-10-17 PROCEDURE — 72128 CT CHEST SPINE W/O DYE: CPT | Performed by: RADIOLOGY

## 2024-10-17 PROCEDURE — 96366 THER/PROPH/DIAG IV INF ADDON: CPT

## 2024-10-17 PROCEDURE — 72131 CT LUMBAR SPINE W/O DYE: CPT | Performed by: RADIOLOGY

## 2024-10-17 PROCEDURE — 93010 ELECTROCARDIOGRAM REPORT: CPT | Performed by: INTERNAL MEDICINE

## 2024-10-17 PROCEDURE — 2500000002 HC RX 250 W HCPCS SELF ADMINISTERED DRUGS (ALT 637 FOR MEDICARE OP, ALT 636 FOR OP/ED)

## 2024-10-17 PROCEDURE — 96375 TX/PRO/DX INJ NEW DRUG ADDON: CPT

## 2024-10-17 PROCEDURE — 72128 CT CHEST SPINE W/O DYE: CPT

## 2024-10-17 PROCEDURE — 99285 EMERGENCY DEPT VISIT HI MDM: CPT | Mod: 25

## 2024-10-17 PROCEDURE — 93005 ELECTROCARDIOGRAM TRACING: CPT

## 2024-10-17 PROCEDURE — 94640 AIRWAY INHALATION TREATMENT: CPT

## 2024-10-17 PROCEDURE — 51798 US URINE CAPACITY MEASURE: CPT

## 2024-10-17 PROCEDURE — 83735 ASSAY OF MAGNESIUM: CPT | Performed by: STUDENT IN AN ORGANIZED HEALTH CARE EDUCATION/TRAINING PROGRAM

## 2024-10-17 RX ORDER — SODIUM CHLORIDE 9 MG/ML
126 INJECTION, SOLUTION INTRAVENOUS CONTINUOUS
Status: DISCONTINUED | OUTPATIENT
Start: 2024-10-17 | End: 2024-10-18

## 2024-10-17 RX ORDER — MAGNESIUM SULFATE HEPTAHYDRATE 40 MG/ML
2 INJECTION, SOLUTION INTRAVENOUS ONCE
Status: COMPLETED | OUTPATIENT
Start: 2024-10-17 | End: 2024-10-17

## 2024-10-17 RX ORDER — FUROSEMIDE 10 MG/ML
80 INJECTION INTRAMUSCULAR; INTRAVENOUS ONCE
Status: COMPLETED | OUTPATIENT
Start: 2024-10-17 | End: 2024-10-17

## 2024-10-17 RX ORDER — POTASSIUM CHLORIDE 14.9 MG/ML
20 INJECTION INTRAVENOUS ONCE
Status: COMPLETED | OUTPATIENT
Start: 2024-10-17 | End: 2024-10-18

## 2024-10-17 RX ORDER — IPRATROPIUM BROMIDE AND ALBUTEROL SULFATE 2.5; .5 MG/3ML; MG/3ML
3 SOLUTION RESPIRATORY (INHALATION) ONCE
Status: COMPLETED | OUTPATIENT
Start: 2024-10-17 | End: 2024-10-17

## 2024-10-17 RX ORDER — MAGNESIUM SULFATE HEPTAHYDRATE 40 MG/ML
2 INJECTION, SOLUTION INTRAVENOUS ONCE
Status: COMPLETED | OUTPATIENT
Start: 2024-10-17 | End: 2024-10-18

## 2024-10-17 RX ADMIN — MAGNESIUM SULFATE HEPTAHYDRATE 2 G: 40 INJECTION, SOLUTION INTRAVENOUS at 19:48

## 2024-10-17 RX ADMIN — MAGNESIUM SULFATE HEPTAHYDRATE 2 G: 40 INJECTION, SOLUTION INTRAVENOUS at 21:39

## 2024-10-17 RX ADMIN — IPRATROPIUM BROMIDE AND ALBUTEROL SULFATE 3 ML: 2.5; .5 SOLUTION RESPIRATORY (INHALATION) at 19:06

## 2024-10-17 RX ADMIN — DOXYCYCLINE 100 MG: 100 INJECTION, POWDER, LYOPHILIZED, FOR SOLUTION INTRAVENOUS at 19:45

## 2024-10-17 RX ADMIN — SODIUM CHLORIDE 126 ML/HR: 900 INJECTION, SOLUTION INTRAVENOUS at 19:48

## 2024-10-17 RX ADMIN — IOHEXOL 75 ML: 350 INJECTION, SOLUTION INTRAVENOUS at 20:05

## 2024-10-17 RX ADMIN — POTASSIUM CHLORIDE 20 MEQ: 200 INJECTION, SOLUTION INTRAVENOUS at 19:41

## 2024-10-17 RX ADMIN — FUROSEMIDE 80 MG: 10 INJECTION, SOLUTION INTRAMUSCULAR; INTRAVENOUS at 19:42

## 2024-10-17 ASSESSMENT — PAIN DESCRIPTION - PAIN TYPE: TYPE: CHRONIC PAIN

## 2024-10-17 ASSESSMENT — PAIN SCALES - GENERAL
PAINLEVEL_OUTOF10: 0 - NO PAIN

## 2024-10-17 ASSESSMENT — PAIN - FUNCTIONAL ASSESSMENT: PAIN_FUNCTIONAL_ASSESSMENT: 0-10

## 2024-10-17 ASSESSMENT — COLUMBIA-SUICIDE SEVERITY RATING SCALE - C-SSRS
2. HAVE YOU ACTUALLY HAD ANY THOUGHTS OF KILLING YOURSELF?: NO
6. HAVE YOU EVER DONE ANYTHING, STARTED TO DO ANYTHING, OR PREPARED TO DO ANYTHING TO END YOUR LIFE?: NO
1. IN THE PAST MONTH, HAVE YOU WISHED YOU WERE DEAD OR WISHED YOU COULD GO TO SLEEP AND NOT WAKE UP?: NO

## 2024-10-17 ASSESSMENT — PAIN DESCRIPTION - LOCATION: LOCATION: BACK

## 2024-10-17 NOTE — ED PROVIDER NOTES
HPI   Chief Complaint   Patient presents with    Shortness of Breath     Patient came in by EMS on stretcher. From home with family. Patient with worsening SOB over the last few weeks. Patient fell 8/29 and was in rehab for about a month and continued to be more weak. States wheezing at home. On arrival 84% ra, placed on NRB pulse ox 96%. Tachypnea noted. Other VSS.        Patient to ED by EMS for worsening shortness of breath over the last 5 to 7 days.  Patient notes lower extremity swelling symmetrically.  Family productive clear cough.  Does not endorse any chest pain or pleuritic chest pain.  Does not appreciate any fever/chills or changes in urine output.  Patient states she thinks her symptoms gradually got worse because they stopped her water pills.  Does not appreciate any abdominal distention/pain and no associated GI symptoms.  Denies any other significant systemic symptoms or complaints.              Patient History   Past Medical History:   Diagnosis Date    Elevation of levels of liver transaminase levels     ALT (SGPT) level raised    Other conditions influencing health status     Osteoarthritis    Personal history of other complications of pregnancy, childbirth and the puerperium     History of ectopic pregnancy    Personal history of other diseases of the circulatory system     History of hypertension    Personal history of other diseases of the musculoskeletal system and connective tissue     History of bursitis    Personal history of other diseases of the musculoskeletal system and connective tissue     Personal history of gout    Personal history of other diseases of the nervous system and sense organs     History of migraine headaches     Past Surgical History:   Procedure Laterality Date    CARPAL TUNNEL RELEASE  04/09/2013    Neuroplasty Median Nerve At Carpal Tunnel    CHOLECYSTECTOMY  04/09/2013    Cholecystectomy    KNEE ARTHROPLASTY  04/09/2013    Knee Arthroplasty     No family history on  file.  Social History     Tobacco Use    Smoking status: Never    Smokeless tobacco: Never   Vaping Use    Vaping status: Never Used   Substance Use Topics    Alcohol use: Not Currently    Drug use: Never       Physical Exam   ED Triage Vitals   Temperature Heart Rate Respirations BP   10/17/24 1759 10/17/24 1757 10/17/24 1757 10/17/24 1757   37.3 °C (99.1 °F) 95 (!) 30 142/76      Pulse Ox Temp Source Heart Rate Source Patient Position   10/17/24 1756 10/17/24 1759 10/17/24 1757 10/17/24 1757   99 % Temporal Monitor Lying      BP Location FiO2 (%)     10/17/24 1757 --     Left arm        Physical Exam  Vitals and nursing note reviewed.   Constitutional:       General: She is not in acute distress.     Appearance: Normal appearance. She is not ill-appearing.   HENT:      Mouth/Throat:      Mouth: Mucous membranes are moist.      Pharynx: Oropharynx is clear.   Eyes:      Extraocular Movements: Extraocular movements intact.      Pupils: Pupils are equal, round, and reactive to light.   Cardiovascular:      Rate and Rhythm: Normal rate and regular rhythm.      Pulses: Normal pulses.           Radial pulses are 2+ on the right side and 2+ on the left side.      Heart sounds: Normal heart sounds. Heart sounds not distant. No murmur heard.     Comments: Equal and symmetrical distal pulses, equal/symmetrical distal 1-2+ pitting edema of pedal and distal third tip/fib region  Pulmonary:      Effort: Pulmonary effort is normal. No respiratory distress.      Breath sounds: No decreased air movement. Examination of the right-lower field reveals rhonchi. Examination of the left-lower field reveals rhonchi. Rhonchi present. No decreased breath sounds.      Comments: Speaking in complete sentences requiring supplemental O2 for support, mild to moderate bibasilar crackles  Chest:      Chest wall: No tenderness.   Musculoskeletal:      Right lower le+ Edema present.      Left lower le+ Edema present.   Skin:     General:  Skin is warm and dry.      Coloration: Skin is not ashen, cyanotic or pale.   Neurological:      General: No focal deficit present.      Mental Status: She is alert and oriented to person, place, and time.           ED Course & MDM   ED Course as of 10/18/24 2023   Thu Oct 17, 2024   1808 VS notable for significant tachypnea on presentation in setting of reported shortness of breath, remaining VSS [BC]   1907 Blood Gas Venous(!)  No evidence of metabolic/respiratory acidosis [BC]   1908 CBC and Auto Differential(!)  Leukocytosis with predominant neutrophilia in the setting of shortness of breath with reported cough, tachypnea, concern for fluid overload with underlying pulmonary infection, no immediate concern at this time for sepsis [BC]   1910 Lactate  WNL, no immediate concern for severe sepsis or septic shock [BC]   1912 I performed a sepsis reperfusion exam on Angela Montelongo on 10/17/2024 7:12 PM    A targeted fluid bolus of 126mL/hr was given, if adult patient did not receive a 30cc/kg fluid bolus, the clinical rationale is fluid overload    Davidson Damon MD   [BC]   1913 B-type natriuretic peptide(!)  Severely elevated in the setting of tachypnea and hypoxia requiring supplemental O2, concern for fluid overload given change in medication [BC]   1922 I personally reviewed and interpreted the EKG @1005: NSR 88, no appreciable ischemia, LAD, LAFB, PVCs, prolonged Qtc 500 ms, and prior EKG on 8/30/2024 reviewed without any appreciable specific/identifiable changes. [BC]   1932 Comprehensive metabolic panel(!)  Mild hyponatremia, hyponatremia and hypochloremia likely in the setting of decreased p.o. intake and dilution given likely fluid overload [BC]   2056 Influenza A, and B PCR  Undetectable in the setting of respiratory/pulmonary symptoms [BC]   2056 Sars-CoV-2 PCR  Undetectable in setting of respiratory/pulmonary symptoms [BC]   2056 Magnesium(!)  Moderate hypomagnesemia likely associated with poor  nutritional intake [BC]      ED Course User Index  [BC] Davidson Damon MD         Diagnoses as of 10/18/24 2023   Shortness of breath   Hypoxia   Hypervolemia, unspecified hypervolemia type   Bilateral leg edema   Elevated brain natriuretic peptide (BNP) level   Hypokalemia   Hypomagnesemia                 No data recorded                                 Medical Decision Making  Patient presented to the ED for rash of breath for the last 5 to 7 days with lower extremity swelling and minimally productive cough with concerning PMHx of HTN, HLD, CELINA, GERD.  I personally reviewed and interpreted VS, labs, images, and EKG which are as stated above in the ED course.    Assessment/evaluation consistent with acute fluid overload and likely mixed acute acute on chronic HF with concern for underlying CAP, initially found to be significantly hypomagnesemic.  No concerning history, clinical evidence/work-up, or exam findings for the considered differentials of acute RF, COVID/influenza viral syndrome/PNA, worsening normocytic anemia, respiratory/metabolic acidosis, PE, ACS/MI, UTI/cystitis.  These conditions have been thoroughly evaluated and determined to be sufficiently unlikely to be the etiology of patient's presenting symptoms.    After receiving an appropriate exam, clinical work-up, and necessary interventions/treatment, Patient is appropriate for external transfer to  [facility] for further care at family request at this time due to concern for acute decompensation, pain control/management, further interrogation/management of symptoms, and unable to care for self or manage presenting symptoms/conditions at home/living situation.  Patient was encouraged to ask any questions or for clarification of today's ED encounter.  Patient is agreeable to plan of care.    Per Chart Review: ED encounter and hospitalization on 8/29/2024 which is weakness and fall, discharge summary significant for to be hyponatremic  and CELINA with evidence of UTI, received IV antibiotics, improvement in sodium with fluid restriction, thiazide was discontinued along with her atenolol, initiated amlodipine for BP management, overall improvement in CELINA through hospital stay, discharged back to SNF, appropriate follow-up instructions.      Parts of this chart have been completed using voice-to-text recognition software. Please excuse any errors of transcription that were missed for editing/correcting.    Problems Addressed:  Bilateral leg edema: acute illness or injury with systemic symptoms  Hypervolemia, unspecified hypervolemia type: undiagnosed new problem with uncertain prognosis  Hypoxia: acute illness or injury with systemic symptoms  Shortness of breath: acute illness or injury    Amount and/or Complexity of Data Reviewed  External Data Reviewed: notes.     Details: See MDM  Labs: ordered. Decision-making details documented in ED Course.  Radiology: ordered and independent interpretation performed. Decision-making details documented in ED Course.  ECG/medicine tests: ordered and independent interpretation performed. Decision-making details documented in ED Course.        Procedure  Procedures     Davidson Damon MD  10/18/24 2023

## 2024-10-18 ENCOUNTER — APPOINTMENT (OUTPATIENT)
Dept: CARDIOLOGY | Facility: HOSPITAL | Age: 74
DRG: 987 | End: 2024-10-18
Payer: MEDICARE

## 2024-10-18 ENCOUNTER — TELEPHONE (OUTPATIENT)
Dept: PRIMARY CARE | Facility: CLINIC | Age: 74
End: 2024-10-18

## 2024-10-18 PROBLEM — E83.42 HYPOMAGNESEMIA: Status: ACTIVE | Noted: 2024-10-18

## 2024-10-18 PROBLEM — I50.33 ACUTE ON CHRONIC DIASTOLIC CONGESTIVE HEART FAILURE: Status: ACTIVE | Noted: 2024-10-18

## 2024-10-18 PROBLEM — J96.01 ACUTE HYPOXIC RESPIRATORY FAILURE (MULTI): Status: ACTIVE | Noted: 2024-10-18

## 2024-10-18 PROBLEM — R06.02 SHORTNESS OF BREATH: Status: ACTIVE | Noted: 2024-10-18

## 2024-10-18 LAB
ANION GAP SERPL CALCULATED.3IONS-SCNC: 15 MMOL/L (ref 10–20)
ATRIAL RATE: 88 BPM
BUN SERPL-MCNC: 17 MG/DL (ref 6–23)
CALCIUM SERPL-MCNC: 8.8 MG/DL (ref 8.6–10.3)
CHLORIDE SERPL-SCNC: 95 MMOL/L (ref 98–107)
CO2 SERPL-SCNC: 28 MMOL/L (ref 21–32)
CREAT SERPL-MCNC: 0.68 MG/DL (ref 0.5–1.05)
CRP SERPL-MCNC: 18.71 MG/DL
EGFRCR SERPLBLD CKD-EPI 2021: 90 ML/MIN/1.73M*2
ERYTHROCYTE [SEDIMENTATION RATE] IN BLOOD BY WESTERGREN METHOD: 84 MM/H (ref 0–30)
GLUCOSE SERPL-MCNC: 104 MG/DL (ref 74–99)
MAGNESIUM SERPL-MCNC: 1.56 MG/DL (ref 1.6–2.4)
P AXIS: 44 DEGREES
P OFFSET: 181 MS
P ONSET: 113 MS
POTASSIUM SERPL-SCNC: 2.9 MMOL/L (ref 3.5–5.3)
PR INTERVAL: 176 MS
Q ONSET: 201 MS
QRS COUNT: 14 BEATS
QRS DURATION: 148 MS
QT INTERVAL: 414 MS
QTC CALCULATION(BAZETT): 500 MS
QTC FREDERICIA: 470 MS
R AXIS: -52 DEGREES
SODIUM SERPL-SCNC: 135 MMOL/L (ref 136–145)
T AXIS: 49 DEGREES
T OFFSET: 408 MS
VENTRICULAR RATE: 88 BPM

## 2024-10-18 PROCEDURE — 99222 1ST HOSP IP/OBS MODERATE 55: CPT | Performed by: NURSE PRACTITIONER

## 2024-10-18 PROCEDURE — 93005 ELECTROCARDIOGRAM TRACING: CPT

## 2024-10-18 PROCEDURE — 93306 TTE W/DOPPLER COMPLETE: CPT

## 2024-10-18 PROCEDURE — 2500000004 HC RX 250 GENERAL PHARMACY W/ HCPCS (ALT 636 FOR OP/ED): Mod: JZ

## 2024-10-18 PROCEDURE — 2500000005 HC RX 250 GENERAL PHARMACY W/O HCPCS: Performed by: STUDENT IN AN ORGANIZED HEALTH CARE EDUCATION/TRAINING PROGRAM

## 2024-10-18 PROCEDURE — 83735 ASSAY OF MAGNESIUM: CPT | Performed by: STUDENT IN AN ORGANIZED HEALTH CARE EDUCATION/TRAINING PROGRAM

## 2024-10-18 PROCEDURE — 86140 C-REACTIVE PROTEIN: CPT | Performed by: NURSE PRACTITIONER

## 2024-10-18 PROCEDURE — 93306 TTE W/DOPPLER COMPLETE: CPT | Performed by: INTERNAL MEDICINE

## 2024-10-18 PROCEDURE — 2500000002 HC RX 250 W HCPCS SELF ADMINISTERED DRUGS (ALT 637 FOR MEDICARE OP, ALT 636 FOR OP/ED): Performed by: STUDENT IN AN ORGANIZED HEALTH CARE EDUCATION/TRAINING PROGRAM

## 2024-10-18 PROCEDURE — 80048 BASIC METABOLIC PNL TOTAL CA: CPT | Performed by: STUDENT IN AN ORGANIZED HEALTH CARE EDUCATION/TRAINING PROGRAM

## 2024-10-18 PROCEDURE — 36415 COLL VENOUS BLD VENIPUNCTURE: CPT | Performed by: NURSE PRACTITIONER

## 2024-10-18 PROCEDURE — 1200000002 HC GENERAL ROOM WITH TELEMETRY DAILY

## 2024-10-18 PROCEDURE — 99222 1ST HOSP IP/OBS MODERATE 55: CPT | Performed by: STUDENT IN AN ORGANIZED HEALTH CARE EDUCATION/TRAINING PROGRAM

## 2024-10-18 PROCEDURE — 9420000001 HC RT PATIENT EDUCATION 5 MIN

## 2024-10-18 PROCEDURE — 99222 1ST HOSP IP/OBS MODERATE 55: CPT

## 2024-10-18 PROCEDURE — 2500000004 HC RX 250 GENERAL PHARMACY W/ HCPCS (ALT 636 FOR OP/ED): Performed by: STUDENT IN AN ORGANIZED HEALTH CARE EDUCATION/TRAINING PROGRAM

## 2024-10-18 PROCEDURE — 36415 COLL VENOUS BLD VENIPUNCTURE: CPT | Performed by: STUDENT IN AN ORGANIZED HEALTH CARE EDUCATION/TRAINING PROGRAM

## 2024-10-18 PROCEDURE — 2500000004 HC RX 250 GENERAL PHARMACY W/ HCPCS (ALT 636 FOR OP/ED): Performed by: NURSE PRACTITIONER

## 2024-10-18 PROCEDURE — 96366 THER/PROPH/DIAG IV INF ADDON: CPT

## 2024-10-18 PROCEDURE — 2500000004 HC RX 250 GENERAL PHARMACY W/ HCPCS (ALT 636 FOR OP/ED): Mod: JZ | Performed by: NURSE PRACTITIONER

## 2024-10-18 PROCEDURE — 2500000001 HC RX 250 WO HCPCS SELF ADMINISTERED DRUGS (ALT 637 FOR MEDICARE OP): Performed by: STUDENT IN AN ORGANIZED HEALTH CARE EDUCATION/TRAINING PROGRAM

## 2024-10-18 PROCEDURE — 94762 N-INVAS EAR/PLS OXIMTRY CONT: CPT

## 2024-10-18 PROCEDURE — 87040 BLOOD CULTURE FOR BACTERIA: CPT | Mod: TRILAB | Performed by: NURSE PRACTITIONER

## 2024-10-18 PROCEDURE — 99232 SBSQ HOSP IP/OBS MODERATE 35: CPT | Performed by: NURSE PRACTITIONER

## 2024-10-18 RX ORDER — ENOXAPARIN SODIUM 100 MG/ML
40 INJECTION SUBCUTANEOUS EVERY 12 HOURS SCHEDULED
Status: DISCONTINUED | OUTPATIENT
Start: 2024-10-18 | End: 2024-10-24 | Stop reason: HOSPADM

## 2024-10-18 RX ORDER — MAGNESIUM SULFATE HEPTAHYDRATE 40 MG/ML
2 INJECTION, SOLUTION INTRAVENOUS ONCE
Status: COMPLETED | OUTPATIENT
Start: 2024-10-18 | End: 2024-10-18

## 2024-10-18 RX ORDER — FAMOTIDINE 10 MG/ML
20 INJECTION INTRAVENOUS 2 TIMES DAILY
Status: DISCONTINUED | OUTPATIENT
Start: 2024-10-18 | End: 2024-10-24 | Stop reason: HOSPADM

## 2024-10-18 RX ORDER — MEROPENEM AND SODIUM CHLORIDE 1 G/50ML
1 INJECTION, SOLUTION INTRAVENOUS EVERY 8 HOURS
Status: DISCONTINUED | OUTPATIENT
Start: 2024-10-18 | End: 2024-10-24

## 2024-10-18 RX ORDER — ACETAMINOPHEN 160 MG/5ML
650 SOLUTION ORAL EVERY 4 HOURS PRN
Status: DISCONTINUED | OUTPATIENT
Start: 2024-10-18 | End: 2024-10-18

## 2024-10-18 RX ORDER — POTASSIUM CHLORIDE 14.9 MG/ML
20 INJECTION INTRAVENOUS
Status: COMPLETED | OUTPATIENT
Start: 2024-10-18 | End: 2024-10-18

## 2024-10-18 RX ORDER — ACETAMINOPHEN 325 MG/1
650 TABLET ORAL EVERY 4 HOURS PRN
Status: DISCONTINUED | OUTPATIENT
Start: 2024-10-18 | End: 2024-10-18

## 2024-10-18 RX ORDER — SIMVASTATIN 40 MG/1
40 TABLET, FILM COATED ORAL NIGHTLY
Status: DISCONTINUED | OUTPATIENT
Start: 2024-10-18 | End: 2024-10-24 | Stop reason: HOSPADM

## 2024-10-18 RX ORDER — FAMOTIDINE 20 MG/1
20 TABLET, FILM COATED ORAL NIGHTLY PRN
Status: DISCONTINUED | OUTPATIENT
Start: 2024-10-18 | End: 2024-10-18 | Stop reason: SDUPTHER

## 2024-10-18 RX ORDER — ACETAMINOPHEN 650 MG/1
650 SUPPOSITORY RECTAL EVERY 4 HOURS PRN
Status: DISCONTINUED | OUTPATIENT
Start: 2024-10-18 | End: 2024-10-18

## 2024-10-18 RX ORDER — ACETAMINOPHEN 325 MG/1
650 TABLET ORAL EVERY 6 HOURS PRN
Status: DISCONTINUED | OUTPATIENT
Start: 2024-10-18 | End: 2024-10-24 | Stop reason: HOSPADM

## 2024-10-18 RX ORDER — VANCOMYCIN 1.75 G/350ML
1250 INJECTION, SOLUTION INTRAVENOUS EVERY 12 HOURS
Status: DISCONTINUED | OUTPATIENT
Start: 2024-10-18 | End: 2024-10-19

## 2024-10-18 RX ORDER — OXYBUTYNIN CHLORIDE 5 MG/1
5 TABLET ORAL 2 TIMES DAILY
Status: DISCONTINUED | OUTPATIENT
Start: 2024-10-18 | End: 2024-10-24 | Stop reason: HOSPADM

## 2024-10-18 RX ORDER — POTASSIUM CHLORIDE 1.5 G/1.58G
20 POWDER, FOR SOLUTION ORAL
Status: DISCONTINUED | OUTPATIENT
Start: 2024-10-18 | End: 2024-10-24

## 2024-10-18 RX ORDER — POLYETHYLENE GLYCOL 3350 17 G/17G
17 POWDER, FOR SOLUTION ORAL DAILY
Status: DISCONTINUED | OUTPATIENT
Start: 2024-10-18 | End: 2024-10-24 | Stop reason: HOSPADM

## 2024-10-18 RX ORDER — ALLOPURINOL 100 MG/1
100 TABLET ORAL DAILY
Status: DISCONTINUED | OUTPATIENT
Start: 2024-10-18 | End: 2024-10-24 | Stop reason: HOSPADM

## 2024-10-18 RX ORDER — AMLODIPINE BESYLATE 5 MG/1
5 TABLET ORAL DAILY
Status: DISCONTINUED | OUTPATIENT
Start: 2024-10-18 | End: 2024-10-19

## 2024-10-18 RX ORDER — POLYETHYLENE GLYCOL 3350 17 G/17G
17 POWDER, FOR SOLUTION ORAL DAILY
Status: DISCONTINUED | OUTPATIENT
Start: 2024-10-18 | End: 2024-10-18 | Stop reason: SDUPTHER

## 2024-10-18 RX ORDER — FAMOTIDINE 20 MG/1
20 TABLET, FILM COATED ORAL 2 TIMES DAILY
Status: DISCONTINUED | OUTPATIENT
Start: 2024-10-18 | End: 2024-10-24 | Stop reason: HOSPADM

## 2024-10-18 RX ORDER — VANCOMYCIN HYDROCHLORIDE 1 G/20ML
INJECTION, POWDER, LYOPHILIZED, FOR SOLUTION INTRAVENOUS DAILY PRN
Status: DISCONTINUED | OUTPATIENT
Start: 2024-10-18 | End: 2024-10-21

## 2024-10-18 RX ORDER — ACETAMINOPHEN 650 MG/1
650 SUPPOSITORY RECTAL EVERY 6 HOURS PRN
Status: DISCONTINUED | OUTPATIENT
Start: 2024-10-18 | End: 2024-10-24 | Stop reason: HOSPADM

## 2024-10-18 RX ORDER — ACETAMINOPHEN 160 MG/5ML
650 SOLUTION ORAL EVERY 6 HOURS PRN
Status: DISCONTINUED | OUTPATIENT
Start: 2024-10-18 | End: 2024-10-24 | Stop reason: HOSPADM

## 2024-10-18 RX ORDER — FUROSEMIDE 10 MG/ML
40 INJECTION INTRAMUSCULAR; INTRAVENOUS 2 TIMES DAILY
Status: DISCONTINUED | OUTPATIENT
Start: 2024-10-18 | End: 2024-10-21

## 2024-10-18 RX ORDER — GABAPENTIN 100 MG/1
200 CAPSULE ORAL 3 TIMES DAILY
Status: DISCONTINUED | OUTPATIENT
Start: 2024-10-18 | End: 2024-10-24 | Stop reason: HOSPADM

## 2024-10-18 RX ORDER — ENOXAPARIN SODIUM 100 MG/ML
40 INJECTION SUBCUTANEOUS DAILY
Status: DISCONTINUED | OUTPATIENT
Start: 2024-10-18 | End: 2024-10-18 | Stop reason: SDUPTHER

## 2024-10-18 RX ADMIN — VANCOMYCIN 1250 MG: 1.75 INJECTION, SOLUTION INTRAVENOUS at 17:40

## 2024-10-18 RX ADMIN — MAGNESIUM SULFATE HEPTAHYDRATE 2 G: 40 INJECTION, SOLUTION INTRAVENOUS at 08:33

## 2024-10-18 RX ADMIN — AMLODIPINE BESYLATE 5 MG: 5 TABLET ORAL at 08:35

## 2024-10-18 RX ADMIN — ENOXAPARIN SODIUM 40 MG: 40 INJECTION SUBCUTANEOUS at 08:36

## 2024-10-18 RX ADMIN — ENOXAPARIN SODIUM 40 MG: 40 INJECTION SUBCUTANEOUS at 20:43

## 2024-10-18 RX ADMIN — POTASSIUM CHLORIDE 20 MEQ: 1.5 FOR SOLUTION ORAL at 16:01

## 2024-10-18 RX ADMIN — FAMOTIDINE 20 MG: 20 TABLET ORAL at 08:35

## 2024-10-18 RX ADMIN — GABAPENTIN 200 MG: 100 CAPSULE ORAL at 08:35

## 2024-10-18 RX ADMIN — SIMVASTATIN 40 MG: 40 TABLET, FILM COATED ORAL at 20:43

## 2024-10-18 RX ADMIN — FUROSEMIDE 40 MG: 10 INJECTION, SOLUTION INTRAMUSCULAR; INTRAVENOUS at 16:01

## 2024-10-18 RX ADMIN — Medication 4 L/MIN: at 08:45

## 2024-10-18 RX ADMIN — FAMOTIDINE 20 MG: 20 TABLET ORAL at 20:43

## 2024-10-18 RX ADMIN — MEROPENEM AND SODIUM CHLORIDE 1 G: 1 INJECTION, SOLUTION INTRAVENOUS at 16:01

## 2024-10-18 RX ADMIN — POTASSIUM CHLORIDE 20 MEQ: 1.5 FOR SOLUTION ORAL at 08:35

## 2024-10-18 RX ADMIN — ALLOPURINOL 100 MG: 100 TABLET ORAL at 08:35

## 2024-10-18 RX ADMIN — OXYBUTYNIN CHLORIDE 5 MG: 5 TABLET ORAL at 20:43

## 2024-10-18 RX ADMIN — PERFLUTREN 4 ML OF DILUTION: 6.52 INJECTION, SUSPENSION INTRAVENOUS at 14:17

## 2024-10-18 RX ADMIN — GABAPENTIN 200 MG: 100 CAPSULE ORAL at 20:43

## 2024-10-18 RX ADMIN — FUROSEMIDE 40 MG: 10 INJECTION, SOLUTION INTRAMUSCULAR; INTRAVENOUS at 08:35

## 2024-10-18 RX ADMIN — POTASSIUM CHLORIDE 20 MEQ: 200 INJECTION, SOLUTION INTRAVENOUS at 08:31

## 2024-10-18 RX ADMIN — POTASSIUM CHLORIDE 20 MEQ: 200 INJECTION, SOLUTION INTRAVENOUS at 11:23

## 2024-10-18 RX ADMIN — OXYBUTYNIN CHLORIDE 5 MG: 5 TABLET ORAL at 08:35

## 2024-10-18 RX ADMIN — Medication 4 L/MIN: at 16:22

## 2024-10-18 RX ADMIN — ACETAMINOPHEN 650 MG: 325 TABLET ORAL at 20:45

## 2024-10-18 RX ADMIN — MEROPENEM AND SODIUM CHLORIDE 1 G: 1 INJECTION, SOLUTION INTRAVENOUS at 23:39

## 2024-10-18 RX ADMIN — GABAPENTIN 200 MG: 100 CAPSULE ORAL at 16:12

## 2024-10-18 SDOH — SOCIAL STABILITY: SOCIAL NETWORK: HOW OFTEN DO YOU GET TOGETHER WITH FRIENDS OR RELATIVES?: MORE THAN THREE TIMES A WEEK

## 2024-10-18 SDOH — ECONOMIC STABILITY: FOOD INSECURITY: WITHIN THE PAST 12 MONTHS, THE FOOD YOU BOUGHT JUST DIDN'T LAST AND YOU DIDN'T HAVE MONEY TO GET MORE.: NEVER TRUE

## 2024-10-18 SDOH — SOCIAL STABILITY: SOCIAL NETWORK: HOW OFTEN DO YOU ATTEND MEETINGS OF THE CLUBS OR ORGANIZATIONS YOU BELONG TO?: PATIENT DECLINED

## 2024-10-18 SDOH — HEALTH STABILITY: PHYSICAL HEALTH: ON AVERAGE, HOW MANY DAYS PER WEEK DO YOU ENGAGE IN MODERATE TO STRENUOUS EXERCISE (LIKE A BRISK WALK)?: 0 DAYS

## 2024-10-18 SDOH — SOCIAL STABILITY: SOCIAL INSECURITY: ARE YOU MARRIED, WIDOWED, DIVORCED, SEPARATED, NEVER MARRIED, OR LIVING WITH A PARTNER?: DIVORCED

## 2024-10-18 SDOH — SOCIAL STABILITY: SOCIAL INSECURITY
WITHIN THE LAST YEAR, HAVE YOU BEEN RAPED OR FORCED TO HAVE ANY KIND OF SEXUAL ACTIVITY BY YOUR PARTNER OR EX-PARTNER?: NO

## 2024-10-18 SDOH — SOCIAL STABILITY: SOCIAL INSECURITY: DO YOU FEEL UNSAFE GOING BACK TO THE PLACE WHERE YOU ARE LIVING?: NO

## 2024-10-18 SDOH — ECONOMIC STABILITY: INCOME INSECURITY: IN THE PAST 12 MONTHS HAS THE ELECTRIC, GAS, OIL, OR WATER COMPANY THREATENED TO SHUT OFF SERVICES IN YOUR HOME?: NO

## 2024-10-18 SDOH — SOCIAL STABILITY: SOCIAL INSECURITY: HAVE YOU HAD ANY THOUGHTS OF HARMING ANYONE ELSE?: NO

## 2024-10-18 SDOH — SOCIAL STABILITY: SOCIAL INSECURITY: ARE YOU OR HAVE YOU BEEN THREATENED OR ABUSED PHYSICALLY, EMOTIONALLY, OR SEXUALLY BY ANYONE?: NO

## 2024-10-18 SDOH — ECONOMIC STABILITY: FOOD INSECURITY: WITHIN THE PAST 12 MONTHS, YOU WORRIED THAT YOUR FOOD WOULD RUN OUT BEFORE YOU GOT THE MONEY TO BUY MORE.: NEVER TRUE

## 2024-10-18 SDOH — SOCIAL STABILITY: SOCIAL INSECURITY: WITHIN THE LAST YEAR, HAVE YOU BEEN AFRAID OF YOUR PARTNER OR EX-PARTNER?: NO

## 2024-10-18 SDOH — HEALTH STABILITY: PHYSICAL HEALTH
HOW OFTEN DO YOU NEED TO HAVE SOMEONE HELP YOU WHEN YOU READ INSTRUCTIONS, PAMPHLETS, OR OTHER WRITTEN MATERIAL FROM YOUR DOCTOR OR PHARMACY?: NEVER

## 2024-10-18 SDOH — SOCIAL STABILITY: SOCIAL INSECURITY: ARE THERE ANY APPARENT SIGNS OF INJURIES/BEHAVIORS THAT COULD BE RELATED TO ABUSE/NEGLECT?: NO

## 2024-10-18 SDOH — SOCIAL STABILITY: SOCIAL INSECURITY
WITHIN THE LAST YEAR, HAVE YOU BEEN KICKED, HIT, SLAPPED, OR OTHERWISE PHYSICALLY HURT BY YOUR PARTNER OR EX-PARTNER?: NO

## 2024-10-18 SDOH — SOCIAL STABILITY: SOCIAL INSECURITY: WITHIN THE LAST YEAR, HAVE YOU BEEN HUMILIATED OR EMOTIONALLY ABUSED IN OTHER WAYS BY YOUR PARTNER OR EX-PARTNER?: NO

## 2024-10-18 SDOH — SOCIAL STABILITY: SOCIAL NETWORK: HOW OFTEN DO YOU ATTEND CHURCH OR RELIGIOUS SERVICES?: PATIENT DECLINED

## 2024-10-18 SDOH — SOCIAL STABILITY: SOCIAL INSECURITY: WERE YOU ABLE TO COMPLETE ALL THE BEHAVIORAL HEALTH SCREENINGS?: YES

## 2024-10-18 SDOH — SOCIAL STABILITY: SOCIAL NETWORK
DO YOU BELONG TO ANY CLUBS OR ORGANIZATIONS SUCH AS CHURCH GROUPS, UNIONS, FRATERNAL OR ATHLETIC GROUPS, OR SCHOOL GROUPS?: PATIENT DECLINED

## 2024-10-18 SDOH — SOCIAL STABILITY: SOCIAL INSECURITY: ABUSE: ADULT

## 2024-10-18 SDOH — HEALTH STABILITY: MENTAL HEALTH
DO YOU FEEL STRESS - TENSE, RESTLESS, NERVOUS, OR ANXIOUS, OR UNABLE TO SLEEP AT NIGHT BECAUSE YOUR MIND IS TROUBLED ALL THE TIME - THESE DAYS?: NOT AT ALL

## 2024-10-18 SDOH — SOCIAL STABILITY: SOCIAL INSECURITY: HAVE YOU HAD THOUGHTS OF HARMING ANYONE ELSE?: NO

## 2024-10-18 SDOH — SOCIAL STABILITY: SOCIAL INSECURITY: DOES ANYONE TRY TO KEEP YOU FROM HAVING/CONTACTING OTHER FRIENDS OR DOING THINGS OUTSIDE YOUR HOME?: NO

## 2024-10-18 SDOH — HEALTH STABILITY: PHYSICAL HEALTH: ON AVERAGE, HOW MANY MINUTES DO YOU ENGAGE IN EXERCISE AT THIS LEVEL?: 0 MIN

## 2024-10-18 SDOH — SOCIAL STABILITY: SOCIAL INSECURITY: DO YOU FEEL ANYONE HAS EXPLOITED OR TAKEN ADVANTAGE OF YOU FINANCIALLY OR OF YOUR PERSONAL PROPERTY?: NO

## 2024-10-18 SDOH — SOCIAL STABILITY: SOCIAL NETWORK
IN A TYPICAL WEEK, HOW MANY TIMES DO YOU TALK ON THE PHONE WITH FAMILY, FRIENDS, OR NEIGHBORS?: MORE THAN THREE TIMES A WEEK

## 2024-10-18 SDOH — SOCIAL STABILITY: SOCIAL INSECURITY: HAS ANYONE EVER THREATENED TO HURT YOUR FAMILY OR YOUR PETS?: NO

## 2024-10-18 ASSESSMENT — LIFESTYLE VARIABLES
HOW OFTEN DO YOU HAVE A DRINK CONTAINING ALCOHOL: 2-4 TIMES A MONTH
HOW OFTEN DO YOU HAVE 6 OR MORE DRINKS ON ONE OCCASION: NEVER
HOW MANY STANDARD DRINKS CONTAINING ALCOHOL DO YOU HAVE ON A TYPICAL DAY: 1 OR 2
SKIP TO QUESTIONS 9-10: 1
AUDIT-C TOTAL SCORE: 2
AUDIT-C TOTAL SCORE: 2

## 2024-10-18 ASSESSMENT — COGNITIVE AND FUNCTIONAL STATUS - GENERAL
DAILY ACTIVITIY SCORE: 14
DRESSING REGULAR UPPER BODY CLOTHING: A LOT
DRESSING REGULAR UPPER BODY CLOTHING: A LITTLE
DRESSING REGULAR LOWER BODY CLOTHING: A LITTLE
CLIMB 3 TO 5 STEPS WITH RAILING: TOTAL
PATIENT BASELINE BEDBOUND: NO
STANDING UP FROM CHAIR USING ARMS: A LOT
TOILETING: A LITTLE
DAILY ACTIVITIY SCORE: 20
MOVING TO AND FROM BED TO CHAIR: A LOT
DRESSING REGULAR LOWER BODY CLOTHING: A LOT
TOILETING: A LOT
MOVING TO AND FROM BED TO CHAIR: A LITTLE
EATING MEALS: A LITTLE
MOBILITY SCORE: 17
HELP NEEDED FOR BATHING: A LITTLE
STANDING UP FROM CHAIR USING ARMS: A LITTLE
MOVING FROM LYING ON BACK TO SITTING ON SIDE OF FLAT BED WITH BEDRAILS: A LITTLE
MOBILITY SCORE: 11
PERSONAL GROOMING: A LITTLE
TURNING FROM BACK TO SIDE WHILE IN FLAT BAD: A LITTLE
WALKING IN HOSPITAL ROOM: A LITTLE
MOVING FROM LYING ON BACK TO SITTING ON SIDE OF FLAT BED WITH BEDRAILS: A LOT
HELP NEEDED FOR BATHING: A LOT
TURNING FROM BACK TO SIDE WHILE IN FLAT BAD: A LOT
CLIMB 3 TO 5 STEPS WITH RAILING: A LOT
WALKING IN HOSPITAL ROOM: A LOT

## 2024-10-18 ASSESSMENT — ACTIVITIES OF DAILY LIVING (ADL)
BATHING: NEEDS ASSISTANCE
ASSISTIVE_DEVICE: WALKER
JUDGMENT_ADEQUATE_SAFELY_COMPLETE_DAILY_ACTIVITIES: YES
HEARING - LEFT EAR: FUNCTIONAL
PATIENT'S MEMORY ADEQUATE TO SAFELY COMPLETE DAILY ACTIVITIES?: YES
HEARING - RIGHT EAR: FUNCTIONAL
GROOMING: NEEDS ASSISTANCE
FEEDING YOURSELF: NEEDS ASSISTANCE
DRESSING YOURSELF: NEEDS ASSISTANCE
ADEQUATE_TO_COMPLETE_ADL: YES
LACK_OF_TRANSPORTATION: NO
TOILETING: NEEDS ASSISTANCE
WALKS IN HOME: NEEDS ASSISTANCE

## 2024-10-18 ASSESSMENT — PAIN SCALES - GENERAL
PAINLEVEL_OUTOF10: 3
PAINLEVEL_OUTOF10: 3
PAINLEVEL_OUTOF10: 4

## 2024-10-18 ASSESSMENT — ENCOUNTER SYMPTOMS
ALLERGIC/IMMUNOLOGIC NEGATIVE: 1
PSYCHIATRIC NEGATIVE: 1
GASTROINTESTINAL NEGATIVE: 1
HEMATOLOGIC/LYMPHATIC NEGATIVE: 1
CONSTITUTIONAL NEGATIVE: 1
WEAKNESS: 1
ENDOCRINE NEGATIVE: 1
EYES NEGATIVE: 1
ACTIVITY CHANGE: 1
SHORTNESS OF BREATH: 1
BACK PAIN: 1
MUSCULOSKELETAL NEGATIVE: 1
NEUROLOGICAL NEGATIVE: 1
PALPITATIONS: 0
COUGH: 1
FATIGUE: 1

## 2024-10-18 ASSESSMENT — PATIENT HEALTH QUESTIONNAIRE - PHQ9
SUM OF ALL RESPONSES TO PHQ9 QUESTIONS 1 & 2: 1
2. FEELING DOWN, DEPRESSED OR HOPELESS: SEVERAL DAYS
1. LITTLE INTEREST OR PLEASURE IN DOING THINGS: NOT AT ALL

## 2024-10-18 ASSESSMENT — PAIN - FUNCTIONAL ASSESSMENT
PAIN_FUNCTIONAL_ASSESSMENT: 0-10
PAIN_FUNCTIONAL_ASSESSMENT: UNABLE TO SELF-REPORT
PAIN_FUNCTIONAL_ASSESSMENT: 0-10

## 2024-10-18 ASSESSMENT — PAIN DESCRIPTION - LOCATION: LOCATION: BACK

## 2024-10-18 ASSESSMENT — PAIN DESCRIPTION - ORIENTATION: ORIENTATION: LOWER

## 2024-10-18 NOTE — H&P
History Of Present Illness  Angela Montelongo is a 74 y.o. female presenting with past medical hx of Carpel tunnel came here via EMS for worsening shortness of breath over the last 5 to 7 days. Patient notes lower extremity swelling symmetrically. Family mentioned clear cough. Does not endorse any chest pain or pleuritic chest pain. Does not appreciate any fever/chills or changes in urine output. Patient states she thinks her symptoms gradually got worse because they stopped her water pills. Recently she had a fall and thiazide diuretics were stopped. No evidence of CHF in the chart and as per patient. Denied smoking. Lives with daughter.     ED workup concerning for CHF with severe b/l pleural effusions on ct. Was tachypnea and reqeuiring oxygen on arrival. Was given 80 mg of lasix with good urine output. As per family preference, patient was accepted to [facility]. ED provider mentioned that she cannot wait in the ED as its been more than 4 hours and asked us to accept on the hospital service.      Past Medical History  She has a past medical history of Elevation of levels of liver transaminase levels, Other conditions influencing health status, Personal history of other complications of pregnancy, childbirth and the puerperium, Personal history of other diseases of the circulatory system, Personal history of other diseases of the musculoskeletal system and connective tissue, Personal history of other diseases of the musculoskeletal system and connective tissue, and Personal history of other diseases of the nervous system and sense organs.    Surgical History  She has a past surgical history that includes Carpal tunnel release (04/09/2013); Knee Arthroplasty (04/09/2013); and Cholecystectomy (04/09/2013).     Social History  She reports that she has never smoked. She has never used smokeless tobacco. She reports that she does not currently use alcohol. She reports that she does not use drugs.    Family History  No  family history on file.     Allergies  Ciprofloxacin; Lisinopril; Cefazolin; Cephalexin; and Tetanus toxoid, adsorbed    Review of Systems   Constitutional:  Positive for fatigue.   HENT: Negative.     Respiratory:  Positive for cough and shortness of breath.    Cardiovascular:  Positive for leg swelling.   Gastrointestinal: Negative.    Endocrine: Negative.    Genitourinary: Negative.    Musculoskeletal: Negative.    Neurological: Negative.    Psychiatric/Behavioral: Negative.          Physical Exam  Constitutional:       Appearance: She is obese. She is ill-appearing.   HENT:      Head: Normocephalic and atraumatic.      Nose: Nose normal.      Mouth/Throat:      Mouth: Mucous membranes are moist.      Pharynx: Oropharynx is clear.   Eyes:      Extraocular Movements: Extraocular movements intact.      Conjunctiva/sclera: Conjunctivae normal.      Pupils: Pupils are equal, round, and reactive to light.   Cardiovascular:      Rate and Rhythm: Normal rate and regular rhythm.      Pulses: Normal pulses.      Heart sounds: Normal heart sounds.   Pulmonary:      Effort: Pulmonary effort is normal.      Comments: On 4 L Nc  Decreased b.l breath sounds  Abdominal:      General: Abdomen is flat. Bowel sounds are normal.   Musculoskeletal:         General: Normal range of motion.      Cervical back: Normal range of motion and neck supple.      Right lower leg: Edema present.      Left lower leg: Edema present.      Comments: Plus 3 b/l swelling   Skin:     General: Skin is warm and dry.   Neurological:      General: No focal deficit present.      Mental Status: She is alert and oriented to person, place, and time.      Cranial Nerves: No cranial nerve deficit.      Sensory: No sensory deficit.      Coordination: Coordination normal.   Psychiatric:         Mood and Affect: Mood normal.         Behavior: Behavior normal.         Thought Content: Thought content normal.         Judgment: Judgment normal.          Last Recorded  Vitals  /65 (BP Location: Left arm, Patient Position: Lying)   Pulse 82   Temp 36.8 °C (98.2 °F) (Temporal)   Resp 18   Wt 111 kg (245 lb)   SpO2 95%     Relevant Results             Assessment/Plan   Assessment & Plan  Acute on chronic diastolic congestive heart failure  # Acute hypoxic resp failure  - SOB, with elevated bnp plus b/l pleural effusion req 4 L of oxygen, which is new for her  - Start 40 mg of lasix bid  - Wean oxygen as possible  - Echo  - Cardiology consult  - Will consider GDMT after echo  Hyperlipidemia  - Continue statin  Essential hypertension  - Continue amlodipine    Chronic low back pain  - Ct back concerning for severe stenosis  - Pain control   Shortness of breath  - Due to above CHF   Hypomagnesemia  - Repalced  Acute hypoxic respiratory failure (Multi)  - Due to CHF exacerbation  - Continue lasix   - Wean oxygen           Aneta White MD

## 2024-10-18 NOTE — CONSULTS
"Nutrition Assessement Note    Nutrition Assessment    Reason for Assessment: Dietitian discretion    Reason for Hospital Admission:  Angela Montelongo is a 74 y.o. female who is admitted for acute on chronic CHF. Pt was gone for testing when visited. Possible transfer to [facility] facility. Left low Na diet info at bedside    Past Medical History:   Diagnosis Date    Elevation of levels of liver transaminase levels     ALT (SGPT) level raised    Other conditions influencing health status     Osteoarthritis    Personal history of other complications of pregnancy, childbirth and the puerperium     History of ectopic pregnancy    Personal history of other diseases of the circulatory system     History of hypertension    Personal history of other diseases of the musculoskeletal system and connective tissue     History of bursitis    Personal history of other diseases of the musculoskeletal system and connective tissue     Personal history of gout    Personal history of other diseases of the nervous system and sense organs     History of migraine headaches      Past Surgical History:   Procedure Laterality Date    CARPAL TUNNEL RELEASE  04/09/2013    Neuroplasty Median Nerve At Carpal Tunnel    CHOLECYSTECTOMY  04/09/2013    Cholecystectomy    KNEE ARTHROPLASTY  04/09/2013    Knee Arthroplasty       Nutrition History:           Food Allergies/Intolerances:  None  GI Symptoms: None  Oral Problems: None    Anthropometrics:  Ht: 160 cm (5' 3\"), Wt: 111 kg (245 lb), BMI: 43.41  IBW/kg (Dietitian Calculated): 52 kg  Percent of IBW: 213 %  Adjusted Body Weight (kg): 67 kg    Weight Change:  Daily Weight  10/18/24 : 111 kg (245 lb)  10/11/24 : 114 kg (252 lb 3.2 oz)  10/07/24 : 120 kg (264 lb 8 oz)  10/04/24 : 121 kg (266 lb 12.8 oz)  09/30/24 : 121 kg (266 lb 8 oz)  09/27/24 : 121 kg (266 lb 8 oz)  09/23/24 : 120 kg (264 lb 3.2 oz)  09/16/24 : 120 kg (264 lb 6.4 oz)  09/13/24 : 120 kg (264 lb 6.4 oz)  09/09/24 : 121 kg (266 lb 12.8 " oz)               Nutrition Focused Physical Exam Findings: defer: MICHELE          Nutrition Significant Labs:  Lab Results   Component Value Date    WBC 14.3 (H) 10/17/2024    HGB 10.7 (L) 10/17/2024    HCT 33.8 (L) 10/17/2024     10/17/2024    CHOL 164 06/07/2022    TRIG 91 06/07/2022    HDL 65 06/07/2022    ALT 17 10/17/2024    AST 32 10/17/2024     (L) 10/18/2024    K 2.9 (LL) 10/18/2024    CL 95 (L) 10/18/2024    CREATININE 0.68 10/18/2024    BUN 17 10/18/2024    CO2 28 10/18/2024    TSH 2.31 08/29/2024    INR 1.0 02/01/2019    HGBA1C 5.3 02/01/2019     Nutrition Specific Medications:  allopurinol, 100 mg, oral, Daily  amLODIPine, 5 mg, oral, Daily  enoxaparin, 40 mg, subcutaneous, q12h DYLAN  famotidine, 20 mg, oral, BID   Or  famotidine, 20 mg, intravenous, BID  furosemide, 40 mg, intravenous, BID  gabapentin, 200 mg, oral, TID  oxybutynin, 5 mg, oral, BID  oxygen, , inhalation, q8h  perflutren lipid microspheres, 0.5-10 mL of dilution, intravenous, Once in imaging  perflutren protein A microsphere, 0.5 mL, intravenous, Once in imaging  polyethylene glycol, 17 g, oral, Daily  potassium chloride, 20 mEq, oral, BID  simvastatin, 40 mg, oral, Nightly  sulfur hexafluoride microsphr, 2 mL, intravenous, Once in imaging         Dietary Orders (From admission, onward)       Start     Ordered    10/18/24 0708  May Participate in Room Service  ( ROOM SERVICE MAY PARTICIPATE)  Once        Question:  .  Answer:  Yes    10/18/24 0707    10/18/24 0641  Adult diet Cardiac; 70 gm fat; 2 - 3 grams Sodium  Diet effective now        Question Answer Comment   Diet type Cardiac    Fat restriction: 70 gm fat    Sodium restriction: 2 - 3 grams Sodium        10/18/24 0640                     Estimated Needs:   Estimated Energy Needs  Total Energy Estimated Needs (kCal): 1675 kCal  Total Estimated Energy Need per Day (kCal/kg): 25 kCal/kg  Method for Estimating Needs: adj wt    Estimated Protein Needs  Total Protein  Estimated Needs (g): 80 g  Total Protein Estimated Needs (g/kg): 1.2 g/kg  Method for Estimating Needs: adj wt    Estimated Fluid Needs  Total Fluid Estimated Needs (mL): 1675 mL  Method for Estimating Needs: 1ml/kcal        Nutrition Diagnosis   Nutrition Diagnosis:       Nutrition Diagnosis  Patient has Nutrition Diagnosis: No     Nutrition Interventions/Recommendations   Nutrition Interventions and Recommendations:    Nutrition Prescription:  Individualized Nutrition Prescription Provided for : 1675 calories 80gm protein low Na diet    Nutrition Interventions:   Food and/or Nutrient Delivery Interventions  Interventions: Meals and snacks  Meals and Snacks: Mineral-modified diet  Goal: Provide diet as ordered  Additional Interventions: low Na handout    Education Documentation  Nutrition Care Manual, taught by Kelsy Deleon, RD, LD at 10/18/2024  2:10 PM.  Learner: Other  Readiness: Nonacceptance  Method: Handout  Response: No Evidence of Learning  Comment: Pt was not in room when visited, left written low Na diet info at bedside w/ phone # for questions         Nutrition Monitoring and Evaluation   Monitoring/Evaluation:   Food/Nutrient Related History Monitoring  Monitoring and Evaluation Plan: Energy intake  Energy Intake: Estimated energy intake  Criteria: Pt will consume >/=75% estimated energy needs    Body Composition/Growth/Weight History  Monitoring and Evaluation Plan: Weight  Weight: Measured weight  Criteria: Pt will maintain wt at this time     Time Spent/Follow-up:   Follow Up  Time Spent (min): 25 minutes  Last Date of Nutrition Visit: 10/18/24  Nutrition Follow-Up Needed?: 3-5 days  Follow up Comment: 10/22/24

## 2024-10-18 NOTE — CONSULTS
Vancomycin Dosing by Pharmacy- INITIAL    Angela Montelongo is a 74 y.o. year old female who Pharmacy has been consulted for vancomycin dosing for osteomyelitis/septic arthritis. Based on the patient's indication and renal status this patient will be dosed based on a goal AUC of 400-600.     Renal function is currently stable.    Visit Vitals  /70 (BP Location: Right arm, Patient Position: Lying)   Pulse 93   Temp 36.8 °C (98.2 °F) (Temporal)   Resp 20        Lab Results   Component Value Date    CREATININE 0.68 10/18/2024    CREATININE 0.65 10/17/2024    CREATININE 0.63 10/07/2024    CREATININE 0.68 2024        Patient weight is as follows:   Vitals:    10/18/24 0653   Weight: 111 kg (245 lb)       Cultures:  No results found for the encounter in last 14 days.        No intake/output data recorded.  I/O during current shift:  I/O this shift:  In: 450 [P.O.:250; IV Piggyback:200]  Out: 500 [Urine:500]    Temp (24hrs), Av.9 °C (98.5 °F), Min:36.8 °C (98.2 °F), Max:37.3 °C (99.1 °F)         Assessment/Plan     Patient will not be given a loading dose.  Will initiate vancomycin maintenance,  1250 mg every 12 hours.    This dosing regimen is predicted by [employer] to result in the following pharmacokinetic parameters:  Loading dose: N/A  Regimen: 1250 mg IV every 12 hours.  Start time: 15:05 on 10/18/2024  Exposure target: AUC24 (range)400-600 mg/L.hr   [id number]: 508 mg/L.hr  AUC24,ss: 543 mg/L.hr  Probability of AUC24 > 400: 72 %  Ctrough,ss: 14.9 mg/L  Probability of Ctrough,ss > 20: 36 %      Follow-up level will be ordered on 10-19 with am labs unless clinically indicated sooner.  Will continue to monitor renal function daily while on vancomycin and order serum creatinine at least every 48 hours if not already ordered.  Follow for continued vancomycin needs, clinical response, and signs/symptoms of toxicity.       JW LEOS, PharmD

## 2024-10-18 NOTE — NURSING NOTE
Agree with previous assessment. Patient awake resting in bed watching TV. Call light within reach. Will continue plan of care.

## 2024-10-18 NOTE — CONSULTS
"Interprofessional Consultation  Reason For Consult  Likely osteomyelitis thoracolumbar spine.    History Of Present Illness  Angela Montelongo is a 74 y.o. female presenting with shortness of breath.  She was sent here from a nursing facility because of increasing shortness of breath over the last 5 to 7 days.  Signs and symptoms suggestive of congestive heart failure and she has already been evaluated by cardiology.  She has had increasing back pain for several months by report.  She denies any febrile illnesses.  There is no mention of spinal treatment or investigations.  There is no mention of weakness or numbness in the extremities.  No recent trauma.     Past Medical History  She has a past medical history of Elevation of levels of liver transaminase levels, Other conditions influencing health status, Personal history of other complications of pregnancy, childbirth and the puerperium, Personal history of other diseases of the circulatory system, Personal history of other diseases of the musculoskeletal system and connective tissue, Personal history of other diseases of the musculoskeletal system and connective tissue, and Personal history of other diseases of the nervous system and sense organs.    Surgical History  She has a past surgical history that includes Carpal tunnel release (04/09/2013); Knee Arthroplasty (04/09/2013); and Cholecystectomy (04/09/2013).     Social History  She reports that she has never smoked. She has never used smokeless tobacco. She reports that she does not currently use alcohol. She reports that she does not use drugs.    Family History  No family history on file.     Allergies  Ciprofloxacin; Lisinopril; Cefazolin; Cephalexin; and Tetanus toxoid, adsorbed    Review of Systems       Physical Exam       Last Recorded Vitals  Blood pressure 146/70, pulse 93, temperature 36.8 °C (98.2 °F), temperature source Temporal, resp. rate 20, height 1.6 m (5' 3\"), weight 111 kg (245 lb), SpO2 " 97%.    Relevant Results  I reviewed the images of the CT scan of the thoracic and lumbar spine on 10/17/2024.  The patient has classic findings of advanced discitis and osteomyelitis at T11-12 and L1-2.  There is extensive bony erosion of both endplates at both of these levels.  These are new since a CT of the abdomen on August 31, 2024.  Her white count is elevated and her C-reactive protein is 18.7, tremendously elevated.  Her urinalysis does suggest a UTI as well.      Scheduled medications  allopurinol, 100 mg, oral, Daily  amLODIPine, 5 mg, oral, Daily  enoxaparin, 40 mg, subcutaneous, q12h DYLAN  famotidine, 20 mg, oral, BID   Or  famotidine, 20 mg, intravenous, BID  furosemide, 40 mg, intravenous, BID  gabapentin, 200 mg, oral, TID  oxybutynin, 5 mg, oral, BID  oxygen, , inhalation, q8h  perflutren lipid microspheres, 0.5-10 mL of dilution, intravenous, Once in imaging  perflutren protein A microsphere, 0.5 mL, intravenous, Once in imaging  polyethylene glycol, 17 g, oral, Daily  potassium chloride, 20 mEq, oral, BID  potassium chloride, 20 mEq, intravenous, q2h  simvastatin, 40 mg, oral, Nightly  sulfur hexafluoride microsphr, 2 mL, intravenous, Once in imaging      Continuous medications     PRN medications  PRN medications: acetaminophen **OR** acetaminophen **OR** acetaminophen  Results for orders placed or performed during the hospital encounter of 10/17/24 (from the past 24 hours)   CBC and Auto Differential   Result Value Ref Range    WBC 14.3 (H) 4.4 - 11.3 x10*3/uL    nRBC 0.0 0.0 - 0.0 /100 WBCs    RBC 3.78 (L) 4.00 - 5.20 x10*6/uL    Hemoglobin 10.7 (L) 12.0 - 16.0 g/dL    Hematocrit 33.8 (L) 36.0 - 46.0 %    MCV 89 80 - 100 fL    MCH 28.3 26.0 - 34.0 pg    MCHC 31.7 (L) 32.0 - 36.0 g/dL    RDW 15.9 (H) 11.5 - 14.5 %    Platelets 391 150 - 450 x10*3/uL    Neutrophils % 88.8 40.0 - 80.0 %    Immature Granulocytes %, Automated 0.6 0.0 - 0.9 %    Lymphocytes % 5.9 13.0 - 44.0 %    Monocytes % 4.5 2.0 -  10.0 %    Eosinophils % 0.0 0.0 - 6.0 %    Basophils % 0.2 0.0 - 2.0 %    Neutrophils Absolute 12.69 (H) 1.60 - 5.50 x10*3/uL    Immature Granulocytes Absolute, Automated 0.08 0.00 - 0.50 x10*3/uL    Lymphocytes Absolute 0.85 0.80 - 3.00 x10*3/uL    Monocytes Absolute 0.64 0.05 - 0.80 x10*3/uL    Eosinophils Absolute 0.00 0.00 - 0.40 x10*3/uL    Basophils Absolute 0.03 0.00 - 0.10 x10*3/uL   Magnesium   Result Value Ref Range    Magnesium 0.91 (L) 1.60 - 2.40 mg/dL   Comprehensive metabolic panel   Result Value Ref Range    Glucose 132 (H) 74 - 99 mg/dL    Sodium 134 (L) 136 - 145 mmol/L    Potassium 3.3 (L) 3.5 - 5.3 mmol/L    Chloride 94 (L) 98 - 107 mmol/L    Bicarbonate 29 21 - 32 mmol/L    Anion Gap 14 10 - 20 mmol/L    Urea Nitrogen 16 6 - 23 mg/dL    Creatinine 0.65 0.50 - 1.05 mg/dL    eGFR >90 >60 mL/min/1.73m*2    Calcium 8.8 8.6 - 10.3 mg/dL    Albumin 3.0 (L) 3.4 - 5.0 g/dL    Alkaline Phosphatase 107 33 - 136 U/L    Total Protein 6.5 6.4 - 8.2 g/dL    AST 32 9 - 39 U/L    Bilirubin, Total 1.2 0.0 - 1.2 mg/dL    ALT 17 7 - 45 U/L   Blood Gas Venous   Result Value Ref Range    POCT pH, Venous 7.41 7.33 - 7.43 pH    POCT pCO2, Venous 50 41 - 51 mm Hg    POCT pO2, Venous 56 (H) 35 - 45 mm Hg    POCT SO2, Venous 84 (H) 45 - 75 %    POCT Oxy Hemoglobin, Venous 82.0 (H) 45.0 - 75.0 %    POCT Base Excess, Venous 5.8 (H) -2.0 - 3.0 mmol/L    POCT HCO3 Calculated, Venous 31.7 (H) 22.0 - 26.0 mmol/L    Patient Temperature 37.0 degrees Celsius    FiO2 100 %   Lactate   Result Value Ref Range    Lactate 0.9 0.4 - 2.0 mmol/L   B-type natriuretic peptide   Result Value Ref Range    BNP 3,988 (H) 0 - 99 pg/mL   Sedimentation rate, automated   Result Value Ref Range    Sedimentation Rate 84 (H) 0 - 30 mm/h   Influenza A, and B PCR   Result Value Ref Range    Flu A Result Not Detected Not Detected    Flu B Result Not Detected Not Detected   Sars-CoV-2 PCR   Result Value Ref Range    Coronavirus 2019, PCR Not Detected Not  Detected   ECG 12 lead   Result Value Ref Range    Ventricular Rate 88 BPM    Atrial Rate 88 BPM    WA Interval 176 ms    QRS Duration 148 ms    QT Interval 414 ms    QTC Calculation(Bazett) 500 ms    P Axis 44 degrees    R Axis -52 degrees    T Axis 49 degrees    QRS Count 14 beats    Q Onset 201 ms    P Onset 113 ms    P Offset 181 ms    T Offset 408 ms    QTC Fredericia 470 ms   Blood Culture    Specimen: Peripheral Venipuncture; Blood culture   Result Value Ref Range    Blood Culture Loaded on Instrument - Culture in progress    Blood Culture    Specimen: Peripheral Venipuncture; Blood culture   Result Value Ref Range    Blood Culture Loaded on Instrument - Culture in progress    MRSA Surveillance for Vancomycin De-escalation, PCR    Specimen: Anterior Nares; Swab   Result Value Ref Range    MRSA PCR Not Detected Not Detected   Urinalysis with Reflex Culture and Microscopic   Result Value Ref Range    Color, Urine Yellow Light-Yellow, Yellow, Dark-Yellow    Appearance, Urine Turbid (N) Clear    Specific Gravity, Urine 1.017 1.005 - 1.035    pH, Urine 6.5 5.0, 5.5, 6.0, 6.5, 7.0, 7.5, 8.0    Protein, Urine 100 (2+) (A) NEGATIVE, 10 (TRACE), 20 (TRACE) mg/dL    Glucose, Urine Normal Normal mg/dL    Blood, Urine 0.03 (TRACE) (A) NEGATIVE    Ketones, Urine NEGATIVE NEGATIVE mg/dL    Bilirubin, Urine NEGATIVE NEGATIVE    Urobilinogen, Urine 2 (1+) (A) Normal mg/dL    Nitrite, Urine NEGATIVE NEGATIVE    Leukocyte Esterase, Urine 75 Chanel/µL (A) NEGATIVE   Microscopic Only, Urine   Result Value Ref Range    WBC, Urine 11-20 (A) 1-5, NONE /HPF    RBC, Urine 1-2 NONE, 1-2, 3-5 /HPF    Squamous Epithelial Cells, Urine 1-9 (SPARSE) Reference range not established. /HPF    Bacteria, Urine 1+ (A) NONE SEEN /HPF    Mucus, Urine FEW Reference range not established. /LPF   Basic metabolic panel   Result Value Ref Range    Glucose 104 (H) 74 - 99 mg/dL    Sodium 135 (L) 136 - 145 mmol/L    Potassium 2.9 (LL) 3.5 - 5.3 mmol/L     Chloride 95 (L) 98 - 107 mmol/L    Bicarbonate 28 21 - 32 mmol/L    Anion Gap 15 10 - 20 mmol/L    Urea Nitrogen 17 6 - 23 mg/dL    Creatinine 0.68 0.50 - 1.05 mg/dL    eGFR 90 >60 mL/min/1.73m*2    Calcium 8.8 8.6 - 10.3 mg/dL   Magnesium   Result Value Ref Range    Magnesium 1.56 (L) 1.60 - 2.40 mg/dL   C-reactive protein   Result Value Ref Range    C-Reactive Protein 18.71 (H) <1.00 mg/dL        Assessment/Plan     Patient has obvious CAT scan findings of advanced discitis and osteomyelitis at T11-12 and L1-2.  In addition to blood cultures and urine cultures, she should undergo an MRI for evaluation for possible epidural abscess and other possible levels of infection.  Definitive cultures should be obtained by aspiration of 1 of these 2 sites of discitis.    I have spent 20 minutes evaluating this patient's chart, discussing the case with the hospitalist, reviewing the imaging of her CT scans and preparing this report.    Simone Barrow MD

## 2024-10-18 NOTE — ASSESSMENT & PLAN NOTE
# Acute hypoxic resp failure  - SOB, with elevated bnp plus b/l pleural effusion req 4 L of oxygen, which is new for her  - Start 40 mg of lasix bid  - Wean oxygen as possible  - Echo  - Cardiology consult  - Will consider GDMT after echo

## 2024-10-18 NOTE — CONSULTS
Reason For Consult  CHF    History Of Present Illness  Angela Montelongo is a 74 y.o. female presenting with back pain. Patient has a PMH significant for HTN, HLD, and CKD. She complains of back pain that has been ongoing for months. Patient tells me she was recently admitted to this hospital after a fall, she was found to be hyponatremic and there was concern for tobias arrythmias. Ultimately her beta blocker and diuretic were stopped, she was discharged to an acute rehab facility. Patient also tells me she has been increasingly short of breath for the past several days. Endorses productive cough. She denies fever, chills, nausea or vomiting. She denies chest pain.  Endorses bilateral leg swelling.  ED work up reveals sodium 134, potassium 3.3, creatinine 0.65, magnesium 0.91.  WBC 14.3, hemoglobin 10.7 hematocrit 33.8.  BNP elevated at 3988.  High-sensitivity troponins were not obtained.  EKG reveals normal sinus rhythm with right bundle branch block and left anterior fascicular block.  There are PVCs.  No evidence of ST elevation or T wave inversions.  CT of lumbar and thoracic spine showing worsening degenerative changes of T11-T12 and L2 to L3 questionable osteomyelitis.  CT angio for PE did not show pulmonary embolism however reveals large bilateral pleural effusions with atelectasis versus consolidation.  Patient was given IV magnesium, 80 mg IV Lasix and started on IV antibiotics for suspected pneumonia.  She was admitted for further workup and evaluation.  We have been consulted for CHF.      Past Medical History  She has a past medical history of Elevation of levels of liver transaminase levels, Other conditions influencing health status, Personal history of other complications of pregnancy, childbirth and the puerperium, Personal history of other diseases of the circulatory system, Personal history of other diseases of the musculoskeletal system and connective tissue, Personal history of other diseases of the  "musculoskeletal system and connective tissue, and Personal history of other diseases of the nervous system and sense organs.    Surgical History  She has a past surgical history that includes Carpal tunnel release (04/09/2013); Knee Arthroplasty (04/09/2013); and Cholecystectomy (04/09/2013).     Social History  She reports that she has never smoked. She has never used smokeless tobacco. She reports that she does not currently use alcohol. She reports that she does not use drugs.    Family History  No family history on file.     Allergies  Ciprofloxacin; Lisinopril; Cefazolin; Cephalexin; and Tetanus toxoid, adsorbed    Review of Systems  Review of Systems   Constitutional:  Positive for activity change.   Respiratory:  Positive for cough and shortness of breath.    Cardiovascular:  Positive for leg swelling. Negative for chest pain and palpitations.   Gastrointestinal: Negative.    Musculoskeletal:  Positive for back pain.   All other systems reviewed and are negative.         Physical Exam  Physical Exam  Constitutional:       Appearance: She is obese. She is not ill-appearing.   Cardiovascular:      Rate and Rhythm: Normal rate and regular rhythm.      Pulses: Normal pulses.      Heart sounds: Normal heart sounds.   Pulmonary:      Effort: Pulmonary effort is normal.      Breath sounds: No rales.   Abdominal:      General: Abdomen is flat.      Palpations: Abdomen is soft.   Musculoskeletal:      Right lower leg: Edema present.      Left lower leg: Edema present.   Skin:     Capillary Refill: Capillary refill takes less than 2 seconds.   Neurological:      Mental Status: She is alert and oriented to person, place, and time.            Last Recorded Vitals  Blood pressure 144/65, pulse 82, temperature 36.8 °C (98.2 °F), temperature source Temporal, resp. rate 18, height 1.6 m (5' 3\"), weight 111 kg (245 lb), SpO2 95%.    Relevant Results  Results for orders placed or performed during the hospital encounter of " 10/17/24 (from the past 24 hours)   CBC and Auto Differential   Result Value Ref Range    WBC 14.3 (H) 4.4 - 11.3 x10*3/uL    nRBC 0.0 0.0 - 0.0 /100 WBCs    RBC 3.78 (L) 4.00 - 5.20 x10*6/uL    Hemoglobin 10.7 (L) 12.0 - 16.0 g/dL    Hematocrit 33.8 (L) 36.0 - 46.0 %    MCV 89 80 - 100 fL    MCH 28.3 26.0 - 34.0 pg    MCHC 31.7 (L) 32.0 - 36.0 g/dL    RDW 15.9 (H) 11.5 - 14.5 %    Platelets 391 150 - 450 x10*3/uL    Neutrophils % 88.8 40.0 - 80.0 %    Immature Granulocytes %, Automated 0.6 0.0 - 0.9 %    Lymphocytes % 5.9 13.0 - 44.0 %    Monocytes % 4.5 2.0 - 10.0 %    Eosinophils % 0.0 0.0 - 6.0 %    Basophils % 0.2 0.0 - 2.0 %    Neutrophils Absolute 12.69 (H) 1.60 - 5.50 x10*3/uL    Immature Granulocytes Absolute, Automated 0.08 0.00 - 0.50 x10*3/uL    Lymphocytes Absolute 0.85 0.80 - 3.00 x10*3/uL    Monocytes Absolute 0.64 0.05 - 0.80 x10*3/uL    Eosinophils Absolute 0.00 0.00 - 0.40 x10*3/uL    Basophils Absolute 0.03 0.00 - 0.10 x10*3/uL   Magnesium   Result Value Ref Range    Magnesium 0.91 (L) 1.60 - 2.40 mg/dL   Comprehensive metabolic panel   Result Value Ref Range    Glucose 132 (H) 74 - 99 mg/dL    Sodium 134 (L) 136 - 145 mmol/L    Potassium 3.3 (L) 3.5 - 5.3 mmol/L    Chloride 94 (L) 98 - 107 mmol/L    Bicarbonate 29 21 - 32 mmol/L    Anion Gap 14 10 - 20 mmol/L    Urea Nitrogen 16 6 - 23 mg/dL    Creatinine 0.65 0.50 - 1.05 mg/dL    eGFR >90 >60 mL/min/1.73m*2    Calcium 8.8 8.6 - 10.3 mg/dL    Albumin 3.0 (L) 3.4 - 5.0 g/dL    Alkaline Phosphatase 107 33 - 136 U/L    Total Protein 6.5 6.4 - 8.2 g/dL    AST 32 9 - 39 U/L    Bilirubin, Total 1.2 0.0 - 1.2 mg/dL    ALT 17 7 - 45 U/L   Blood Gas Venous   Result Value Ref Range    POCT pH, Venous 7.41 7.33 - 7.43 pH    POCT pCO2, Venous 50 41 - 51 mm Hg    POCT pO2, Venous 56 (H) 35 - 45 mm Hg    POCT SO2, Venous 84 (H) 45 - 75 %    POCT Oxy Hemoglobin, Venous 82.0 (H) 45.0 - 75.0 %    POCT Base Excess, Venous 5.8 (H) -2.0 - 3.0 mmol/L    POCT HCO3  Calculated, Venous 31.7 (H) 22.0 - 26.0 mmol/L    Patient Temperature 37.0 degrees Celsius    FiO2 100 %   Lactate   Result Value Ref Range    Lactate 0.9 0.4 - 2.0 mmol/L   B-type natriuretic peptide   Result Value Ref Range    BNP 3,988 (H) 0 - 99 pg/mL   Influenza A, and B PCR   Result Value Ref Range    Flu A Result Not Detected Not Detected    Flu B Result Not Detected Not Detected   Sars-CoV-2 PCR   Result Value Ref Range    Coronavirus 2019, PCR Not Detected Not Detected   ECG 12 lead   Result Value Ref Range    Ventricular Rate 88 BPM    Atrial Rate 88 BPM    KY Interval 176 ms    QRS Duration 148 ms    QT Interval 414 ms    QTC Calculation(Bazett) 500 ms    P Axis 44 degrees    R Axis -52 degrees    T Axis 49 degrees    QRS Count 14 beats    Q Onset 201 ms    P Onset 113 ms    P Offset 181 ms    T Offset 408 ms    QTC Fredericia 470 ms   Blood Culture    Specimen: Peripheral Venipuncture; Blood culture   Result Value Ref Range    Blood Culture Loaded on Instrument - Culture in progress    Blood Culture    Specimen: Peripheral Venipuncture; Blood culture   Result Value Ref Range    Blood Culture Loaded on Instrument - Culture in progress    MRSA Surveillance for Vancomycin De-escalation, PCR    Specimen: Anterior Nares; Swab   Result Value Ref Range    MRSA PCR Not Detected Not Detected   Urinalysis with Reflex Culture and Microscopic   Result Value Ref Range    Color, Urine Yellow Light-Yellow, Yellow, Dark-Yellow    Appearance, Urine Turbid (N) Clear    Specific Gravity, Urine 1.017 1.005 - 1.035    pH, Urine 6.5 5.0, 5.5, 6.0, 6.5, 7.0, 7.5, 8.0    Protein, Urine 100 (2+) (A) NEGATIVE, 10 (TRACE), 20 (TRACE) mg/dL    Glucose, Urine Normal Normal mg/dL    Blood, Urine 0.03 (TRACE) (A) NEGATIVE    Ketones, Urine NEGATIVE NEGATIVE mg/dL    Bilirubin, Urine NEGATIVE NEGATIVE    Urobilinogen, Urine 2 (1+) (A) Normal mg/dL    Nitrite, Urine NEGATIVE NEGATIVE    Leukocyte Esterase, Urine 75 Chanel/µL (A) NEGATIVE    Microscopic Only, Urine   Result Value Ref Range    WBC, Urine 11-20 (A) 1-5, NONE /HPF    RBC, Urine 1-2 NONE, 1-2, 3-5 /HPF    Squamous Epithelial Cells, Urine 1-9 (SPARSE) Reference range not established. /HPF    Bacteria, Urine 1+ (A) NONE SEEN /HPF    Mucus, Urine FEW Reference range not established. /LPF   Basic metabolic panel   Result Value Ref Range    Glucose 104 (H) 74 - 99 mg/dL    Sodium 135 (L) 136 - 145 mmol/L    Potassium 2.9 (LL) 3.5 - 5.3 mmol/L    Chloride 95 (L) 98 - 107 mmol/L    Bicarbonate 28 21 - 32 mmol/L    Anion Gap 15 10 - 20 mmol/L    Urea Nitrogen 17 6 - 23 mg/dL    Creatinine 0.68 0.50 - 1.05 mg/dL    eGFR 90 >60 mL/min/1.73m*2    Calcium 8.8 8.6 - 10.3 mg/dL   Magnesium   Result Value Ref Range    Magnesium 1.56 (L) 1.60 - 2.40 mg/dL          Assessment/Plan     Suspected chronic diastolic heart failure  Bilateral pleural effusions  Pneumonia  Hyponatremia  Hypokalemia  Weakness  Hypertension  Hyperlipidemia    Overall impression/plan:  10/18: 74-year-old female admitted with symptoms concerning for CHF.  Bilateral pleural effusions were found on CT.  She is on 4 L of O2.  Found to be hyponatremic and hypokalemic yesterday.  40 mg IV Lasix BID has been ordered, agree with this.  Echocardiogram has been ordered.  Will review results once available. Last echo in 2019 was essentially normal, EF of 55-59% normal ventricular diastolic filling, and trivial tricuspid regurgitation.  Patient is awaiting bed at [facility], per family request. Will continue to diurese during her time here.  Mildly hypertensive this morning most recent reading 144/65.  She remains on 4 L of O2 with saturations at 97%.  She remains in normal sinus rhythm on telemetry with PVCs.  Lab work this morning reveals a sodium of 135, potassium 2.9, creatinine 0.68.  Electrolytes are being replaced.  Will continue to follow with you while in house.    I spent 60 minutes in the professional and overall care of  this patient.      Tana Lassiter PA-C

## 2024-10-18 NOTE — PROGRESS NOTES
Angela Montelongo is a 74 y.o. female on day 0 of admission presenting with Acute on chronic diastolic congestive heart failure.      Subjective   Patient seen and examined. Resting in bed. States breathing feels improved with lasix. She remains on supplemental O2. She complains of significant back pain and and trouble ambulating. Denies fever or chills. Does have some dysuria.        Objective     Last Recorded Vitals  /70 (BP Location: Right arm, Patient Position: Lying)   Pulse 93   Temp 36.8 °C (98.2 °F) (Temporal)   Resp 20   Wt 111 kg (245 lb)   SpO2 97%   Intake/Output last 3 Shifts:    Intake/Output Summary (Last 24 hours) at 10/18/2024 1334  Last data filed at 10/18/2024 1047  Gross per 24 hour   Intake 350 ml   Output --   Net 350 ml       Admission Weight  Weight: 118 kg (260 lb) (10/17/24 1757)    Daily Weight  10/18/24 : 111 kg (245 lb)    Image Results    CT spine imaging reviewed with concern for discitis and osteomyelitis.  CTA chest reviewed with bilateral pleural effusions. No PE.     Physical Exam    General: Alert and oriented x3, pleasant.   Cardiac: Regular rate and rhythm, S1/S2 , no murmur.   Pulmonary: Clear/Diminished on 4L NC.    Abdomen: Soft, obese, nontender. BS +x4.   Extremities: Some edema present to BLE. Has general weakness.   Skin: No rashes or lesions.      Relevant Results    Scheduled medications  allopurinol, 100 mg, oral, Daily  amLODIPine, 5 mg, oral, Daily  enoxaparin, 40 mg, subcutaneous, q12h DYLAN  famotidine, 20 mg, oral, BID   Or  famotidine, 20 mg, intravenous, BID  furosemide, 40 mg, intravenous, BID  gabapentin, 200 mg, oral, TID  oxybutynin, 5 mg, oral, BID  oxygen, , inhalation, q8h  perflutren lipid microspheres, 0.5-10 mL of dilution, intravenous, Once in imaging  perflutren protein A microsphere, 0.5 mL, intravenous, Once in imaging  polyethylene glycol, 17 g, oral, Daily  potassium chloride, 20 mEq, oral, BID  simvastatin, 40 mg, oral, Nightly  sulfur  hexafluoride microsphr, 2 mL, intravenous, Once in imaging      Continuous medications     PRN medications  PRN medications: acetaminophen **OR** acetaminophen **OR** acetaminophen     Results for orders placed or performed during the hospital encounter of 10/17/24 (from the past 24 hours)   CBC and Auto Differential   Result Value Ref Range    WBC 14.3 (H) 4.4 - 11.3 x10*3/uL    nRBC 0.0 0.0 - 0.0 /100 WBCs    RBC 3.78 (L) 4.00 - 5.20 x10*6/uL    Hemoglobin 10.7 (L) 12.0 - 16.0 g/dL    Hematocrit 33.8 (L) 36.0 - 46.0 %    MCV 89 80 - 100 fL    MCH 28.3 26.0 - 34.0 pg    MCHC 31.7 (L) 32.0 - 36.0 g/dL    RDW 15.9 (H) 11.5 - 14.5 %    Platelets 391 150 - 450 x10*3/uL    Neutrophils % 88.8 40.0 - 80.0 %    Immature Granulocytes %, Automated 0.6 0.0 - 0.9 %    Lymphocytes % 5.9 13.0 - 44.0 %    Monocytes % 4.5 2.0 - 10.0 %    Eosinophils % 0.0 0.0 - 6.0 %    Basophils % 0.2 0.0 - 2.0 %    Neutrophils Absolute 12.69 (H) 1.60 - 5.50 x10*3/uL    Immature Granulocytes Absolute, Automated 0.08 0.00 - 0.50 x10*3/uL    Lymphocytes Absolute 0.85 0.80 - 3.00 x10*3/uL    Monocytes Absolute 0.64 0.05 - 0.80 x10*3/uL    Eosinophils Absolute 0.00 0.00 - 0.40 x10*3/uL    Basophils Absolute 0.03 0.00 - 0.10 x10*3/uL   Magnesium   Result Value Ref Range    Magnesium 0.91 (L) 1.60 - 2.40 mg/dL   Comprehensive metabolic panel   Result Value Ref Range    Glucose 132 (H) 74 - 99 mg/dL    Sodium 134 (L) 136 - 145 mmol/L    Potassium 3.3 (L) 3.5 - 5.3 mmol/L    Chloride 94 (L) 98 - 107 mmol/L    Bicarbonate 29 21 - 32 mmol/L    Anion Gap 14 10 - 20 mmol/L    Urea Nitrogen 16 6 - 23 mg/dL    Creatinine 0.65 0.50 - 1.05 mg/dL    eGFR >90 >60 mL/min/1.73m*2    Calcium 8.8 8.6 - 10.3 mg/dL    Albumin 3.0 (L) 3.4 - 5.0 g/dL    Alkaline Phosphatase 107 33 - 136 U/L    Total Protein 6.5 6.4 - 8.2 g/dL    AST 32 9 - 39 U/L    Bilirubin, Total 1.2 0.0 - 1.2 mg/dL    ALT 17 7 - 45 U/L   Blood Gas Venous   Result Value Ref Range    POCT pH, Venous  7.41 7.33 - 7.43 pH    POCT pCO2, Venous 50 41 - 51 mm Hg    POCT pO2, Venous 56 (H) 35 - 45 mm Hg    POCT SO2, Venous 84 (H) 45 - 75 %    POCT Oxy Hemoglobin, Venous 82.0 (H) 45.0 - 75.0 %    POCT Base Excess, Venous 5.8 (H) -2.0 - 3.0 mmol/L    POCT HCO3 Calculated, Venous 31.7 (H) 22.0 - 26.0 mmol/L    Patient Temperature 37.0 degrees Celsius    FiO2 100 %   Lactate   Result Value Ref Range    Lactate 0.9 0.4 - 2.0 mmol/L   B-type natriuretic peptide   Result Value Ref Range    BNP 3,988 (H) 0 - 99 pg/mL   Sedimentation rate, automated   Result Value Ref Range    Sedimentation Rate 84 (H) 0 - 30 mm/h   Influenza A, and B PCR   Result Value Ref Range    Flu A Result Not Detected Not Detected    Flu B Result Not Detected Not Detected   Sars-CoV-2 PCR   Result Value Ref Range    Coronavirus 2019, PCR Not Detected Not Detected   ECG 12 lead   Result Value Ref Range    Ventricular Rate 88 BPM    Atrial Rate 88 BPM    NV Interval 176 ms    QRS Duration 148 ms    QT Interval 414 ms    QTC Calculation(Bazett) 500 ms    P Axis 44 degrees    R Axis -52 degrees    T Axis 49 degrees    QRS Count 14 beats    Q Onset 201 ms    P Onset 113 ms    P Offset 181 ms    T Offset 408 ms    QTC Fredericia 470 ms   Blood Culture    Specimen: Peripheral Venipuncture; Blood culture   Result Value Ref Range    Blood Culture Loaded on Instrument - Culture in progress    Blood Culture    Specimen: Peripheral Venipuncture; Blood culture   Result Value Ref Range    Blood Culture Loaded on Instrument - Culture in progress    MRSA Surveillance for Vancomycin De-escalation, PCR    Specimen: Anterior Nares; Swab   Result Value Ref Range    MRSA PCR Not Detected Not Detected   Urinalysis with Reflex Culture and Microscopic   Result Value Ref Range    Color, Urine Yellow Light-Yellow, Yellow, Dark-Yellow    Appearance, Urine Turbid (N) Clear    Specific Gravity, Urine 1.017 1.005 - 1.035    pH, Urine 6.5 5.0, 5.5, 6.0, 6.5, 7.0, 7.5, 8.0    Protein,  Urine 100 (2+) (A) NEGATIVE, 10 (TRACE), 20 (TRACE) mg/dL    Glucose, Urine Normal Normal mg/dL    Blood, Urine 0.03 (TRACE) (A) NEGATIVE    Ketones, Urine NEGATIVE NEGATIVE mg/dL    Bilirubin, Urine NEGATIVE NEGATIVE    Urobilinogen, Urine 2 (1+) (A) Normal mg/dL    Nitrite, Urine NEGATIVE NEGATIVE    Leukocyte Esterase, Urine 75 Chanel/µL (A) NEGATIVE   Microscopic Only, Urine   Result Value Ref Range    WBC, Urine 11-20 (A) 1-5, NONE /HPF    RBC, Urine 1-2 NONE, 1-2, 3-5 /HPF    Squamous Epithelial Cells, Urine 1-9 (SPARSE) Reference range not established. /HPF    Bacteria, Urine 1+ (A) NONE SEEN /HPF    Mucus, Urine FEW Reference range not established. /LPF   Basic metabolic panel   Result Value Ref Range    Glucose 104 (H) 74 - 99 mg/dL    Sodium 135 (L) 136 - 145 mmol/L    Potassium 2.9 (LL) 3.5 - 5.3 mmol/L    Chloride 95 (L) 98 - 107 mmol/L    Bicarbonate 28 21 - 32 mmol/L    Anion Gap 15 10 - 20 mmol/L    Urea Nitrogen 17 6 - 23 mg/dL    Creatinine 0.68 0.50 - 1.05 mg/dL    eGFR 90 >60 mL/min/1.73m*2    Calcium 8.8 8.6 - 10.3 mg/dL   Magnesium   Result Value Ref Range    Magnesium 1.56 (L) 1.60 - 2.40 mg/dL   C-reactive protein   Result Value Ref Range    C-Reactive Protein 18.71 (H) <1.00 mg/dL             Assessment/Plan      Acute on Chronic Hypoxic Respiratory Failure / Bilateral Pleural Effusions  -Pulmonary consulted.   -Likely secondary to the pleural effusions.   -Currently on 4L NC, wean as able, baseline is room air.   -CTA chest shows no PE. Does show large bilateral pleural effusions.   -IV lasix BID.   -May require thoracentesis.     Acute on Chronic Diastolic CHF  -Cardiology follows.   -ProBNP elevated 3,988 with pleural effusions on imaging.    -Echo to be done.   -BID IV lasix.   -Monitor strict I&O and daily weights.     Back Pain / Concern for Osteomyelitis/Discitis  -CT lumbar and thoracic spine have classic findings of advanced discitis and osteomyelitis at T11-12 and L1-2. Discussed this  with Dr. Barrow who was consulted to see the patient.   -MRI lumbar and thoracic spine ordered.   -ID consulted.   -IV abx per ID, discussed with them, she has multiple abx allergies.   -May require IR aspiration of the disc space.   -Blood cultures ordered  -CRP and ESR elevated.     UTI  -Has dysuria with leuks in UA.   -Urine culture pending.   -IV abx per ID.     Hypokalemia / Hypomagnesemia  -Repalcement K and mag ordered.   -Monitor on telemetry.   -Monitor levels.     HTN  -Continue amlodipine. Recent adjustments to her cardiac medications last admission.   -Monitor BP.     HLD  -Continue statin.     DVT Risk  -Lovenox subcutaneous.     Plan  Cardio, Pulmonary, ID and Ortho Spine consults.   Currently on IV lasix BID, continue for now, breathing improved.   Discussed with ID and ortho spine. IV abx per ID to be ordered. Obtain blood cultures, ESR and CRP. MRI lumbar and thoracic spine ordered. She may require IR aspiration of the disc. Will await MRI result.   Echo to be done.   May require thoracentesis, appreciate pulmonary opinion.  Wean O2 as sats allow.   K and Mag replacements ordered.   PT/OT evals pending the result of MRI and plan for her spine issue.   Called and discussed with her daughter Eliana to update on all of the information and the concern for OM/discitis, all questions answered.   Called and checked with [facility] transfer center, bed still pending at [place] they are unsure of the timeframe of when she will get a bed.         Suzi Wilcox, ANGELITO-CNP

## 2024-10-18 NOTE — TELEPHONE ENCOUNTER
Patient was referred to [facility] after recent discharge from [facility]. Patient has a visit scheduled 10/22/24 with Aga Trinidad NP to establish care with [facility]. Admitted to  [facility] 10/17/24 with acute hypoxic respiratory failure, acute on chronic diastolic CHF. Per H/P: Presented with SOB x 5-7 days and LE edema. Patient states she thinks her symptoms gradually got worse because they stopped her water pills. Recently she had a fall and thiazide diuretics were stopped. Elevated BNP plus b/l pleural effusions noted, requiring 4L of 02, which is new for her. Lasix ordered. Echo ordered. Cardiology consulted. Will monitor hospitalization and discharge plan.

## 2024-10-18 NOTE — CONSULTS
Inpatient consult to Pulmonology  Consult performed by: HALEY Yan  Consult ordered by: HALEY Diego  Reason for consult: Acute hypoxic respiratory failure, bilateral pleural effusions          Reason For Consult  Acute hypoxic respiratory failure, bilateral pleural effusions     History Of Present Illness  Angela Montelongo is a 74 y.o. female with a past medical history of hypertension, hyperlipidemia and chronic kidney disease who presented to [facility] Emergency Department yesterday via squad for evaluation of worsening shortness of breath, cough and bilateral lower extremity edema for the past week. She arrived on 100% nonrebreather and is now on 4 L nasal cannula oxygen. A CT angio was obtained and showed large bilateral pleural effusions with overlying atelectasis versus consolidation. She denies fevers, chills,  nausea, vomiting, diarrhea, chest or abdominal pain.      Past Medical History  Past Medical History:   Diagnosis Date    Elevation of levels of liver transaminase levels     ALT (SGPT) level raised    Other conditions influencing health status     Osteoarthritis    Personal history of other complications of pregnancy, childbirth and the puerperium     History of ectopic pregnancy    Personal history of other diseases of the circulatory system     History of hypertension    Personal history of other diseases of the musculoskeletal system and connective tissue     History of bursitis    Personal history of other diseases of the musculoskeletal system and connective tissue     Personal history of gout    Personal history of other diseases of the nervous system and sense organs     History of migraine headaches     Surgical History  Past Surgical History:   Procedure Laterality Date    CARPAL TUNNEL RELEASE  04/09/2013    Neuroplasty Median Nerve At Carpal Tunnel    CHOLECYSTECTOMY  04/09/2013    Cholecystectomy    KNEE ARTHROPLASTY  04/09/2013    Knee Arthroplasty         Social History  Social History     Tobacco Use    Smoking status: Never    Smokeless tobacco: Never   Vaping Use    Vaping status: Never Used   Substance Use Topics    Alcohol use: Not Currently    Drug use: Never       Family History  No family history on file.     Allergies  Ciprofloxacin; Lisinopril; Cefazolin; Cephalexin; and Tetanus toxoid, adsorbed    Review of Systems   Constitutional: Negative.    HENT: Negative.     Eyes: Negative.    Respiratory:  Positive for cough and shortness of breath.    Cardiovascular:  Positive for leg swelling.   Gastrointestinal: Negative.    Endocrine: Negative.    Genitourinary: Negative.    Musculoskeletal: Negative.    Allergic/Immunologic: Negative.    Neurological: Negative.    Hematological: Negative.    Psychiatric/Behavioral: Negative.          Physical Exam  Vitals and nursing note reviewed.   Constitutional:       Appearance: She is obese.   HENT:      Head: Normocephalic and atraumatic.      Nose: Nose normal.      Mouth/Throat:      Mouth: Mucous membranes are moist.   Eyes:      Extraocular Movements: Extraocular movements intact.      Conjunctiva/sclera: Conjunctivae normal.      Pupils: Pupils are equal, round, and reactive to light.   Cardiovascular:      Rate and Rhythm: Normal rate and regular rhythm.      Pulses: Normal pulses.      Heart sounds: Normal heart sounds.   Pulmonary:      Effort: Pulmonary effort is normal.      Comments: Lungs diminished but clear. On 4 L nasal cannula oxygen; O2 sats 95%.  Abdominal:      General: Bowel sounds are normal.      Palpations: Abdomen is soft.   Musculoskeletal:         General: Normal range of motion.      Right lower leg: Edema present.      Left lower leg: Edema present.   Skin:     General: Skin is warm and dry.      Capillary Refill: Capillary refill takes less than 2 seconds.   Neurological:      General: No focal deficit present.      Mental Status: She is alert and oriented to person, place, and time.  "  Psychiatric:         Mood and Affect: Mood normal.         Behavior: Behavior normal.          Vital Signs  Blood pressure 144/65, pulse 82, temperature 36.8 °C (98.2 °F), temperature source Temporal, resp. rate 18, height 1.6 m (5' 3\"), weight 111 kg (245 lb), SpO2 95%.  Oxygen Therapy  SpO2: 95 %  Medical Gas Therapy: Supplemental oxygen  Medical Gas Delivery Method: Nasal cannula     Scheduled medications:  allopurinol, 100 mg, oral, Daily  amLODIPine, 5 mg, oral, Daily  enoxaparin, 40 mg, subcutaneous, q12h YDLAN  famotidine, 20 mg, oral, BID   Or  famotidine, 20 mg, intravenous, BID  furosemide, 40 mg, intravenous, BID  gabapentin, 200 mg, oral, TID  magnesium sulfate, 2 g, intravenous, Once  oxybutynin, 5 mg, oral, BID  oxygen, , inhalation, q8h  perflutren lipid microspheres, 0.5-10 mL of dilution, intravenous, Once in imaging  perflutren protein A microsphere, 0.5 mL, intravenous, Once in imaging  polyethylene glycol, 17 g, oral, Daily  potassium chloride, 20 mEq, oral, BID  potassium chloride, 20 mEq, intravenous, q2h  simvastatin, 40 mg, oral, Nightly  sulfur hexafluoride microsphr, 2 mL, intravenous, Once in imaging     PRN medications: acetaminophen **OR** acetaminophen **OR** acetaminophen       Relevant Results  Results for orders placed or performed during the hospital encounter of 10/17/24 (from the past 24 hours)   CBC and Auto Differential   Result Value Ref Range    WBC 14.3 (H) 4.4 - 11.3 x10*3/uL    nRBC 0.0 0.0 - 0.0 /100 WBCs    RBC 3.78 (L) 4.00 - 5.20 x10*6/uL    Hemoglobin 10.7 (L) 12.0 - 16.0 g/dL    Hematocrit 33.8 (L) 36.0 - 46.0 %    MCV 89 80 - 100 fL    MCH 28.3 26.0 - 34.0 pg    MCHC 31.7 (L) 32.0 - 36.0 g/dL    RDW 15.9 (H) 11.5 - 14.5 %    Platelets 391 150 - 450 x10*3/uL    Neutrophils % 88.8 40.0 - 80.0 %    Immature Granulocytes %, Automated 0.6 0.0 - 0.9 %    Lymphocytes % 5.9 13.0 - 44.0 %    Monocytes % 4.5 2.0 - 10.0 %    Eosinophils % 0.0 0.0 - 6.0 %    Basophils % 0.2 0.0 " - 2.0 %    Neutrophils Absolute 12.69 (H) 1.60 - 5.50 x10*3/uL    Immature Granulocytes Absolute, Automated 0.08 0.00 - 0.50 x10*3/uL    Lymphocytes Absolute 0.85 0.80 - 3.00 x10*3/uL    Monocytes Absolute 0.64 0.05 - 0.80 x10*3/uL    Eosinophils Absolute 0.00 0.00 - 0.40 x10*3/uL    Basophils Absolute 0.03 0.00 - 0.10 x10*3/uL   Magnesium   Result Value Ref Range    Magnesium 0.91 (L) 1.60 - 2.40 mg/dL   Comprehensive metabolic panel   Result Value Ref Range    Glucose 132 (H) 74 - 99 mg/dL    Sodium 134 (L) 136 - 145 mmol/L    Potassium 3.3 (L) 3.5 - 5.3 mmol/L    Chloride 94 (L) 98 - 107 mmol/L    Bicarbonate 29 21 - 32 mmol/L    Anion Gap 14 10 - 20 mmol/L    Urea Nitrogen 16 6 - 23 mg/dL    Creatinine 0.65 0.50 - 1.05 mg/dL    eGFR >90 >60 mL/min/1.73m*2    Calcium 8.8 8.6 - 10.3 mg/dL    Albumin 3.0 (L) 3.4 - 5.0 g/dL    Alkaline Phosphatase 107 33 - 136 U/L    Total Protein 6.5 6.4 - 8.2 g/dL    AST 32 9 - 39 U/L    Bilirubin, Total 1.2 0.0 - 1.2 mg/dL    ALT 17 7 - 45 U/L   Blood Gas Venous   Result Value Ref Range    POCT pH, Venous 7.41 7.33 - 7.43 pH    POCT pCO2, Venous 50 41 - 51 mm Hg    POCT pO2, Venous 56 (H) 35 - 45 mm Hg    POCT SO2, Venous 84 (H) 45 - 75 %    POCT Oxy Hemoglobin, Venous 82.0 (H) 45.0 - 75.0 %    POCT Base Excess, Venous 5.8 (H) -2.0 - 3.0 mmol/L    POCT HCO3 Calculated, Venous 31.7 (H) 22.0 - 26.0 mmol/L    Patient Temperature 37.0 degrees Celsius    FiO2 100 %   Lactate   Result Value Ref Range    Lactate 0.9 0.4 - 2.0 mmol/L   B-type natriuretic peptide   Result Value Ref Range    BNP 3,988 (H) 0 - 99 pg/mL   Influenza A, and B PCR   Result Value Ref Range    Flu A Result Not Detected Not Detected    Flu B Result Not Detected Not Detected   Sars-CoV-2 PCR   Result Value Ref Range    Coronavirus 2019, PCR Not Detected Not Detected   ECG 12 lead   Result Value Ref Range    Ventricular Rate 88 BPM    Atrial Rate 88 BPM    NH Interval 176 ms    QRS Duration 148 ms    QT Interval 414  ms    QTC Calculation(Bazett) 500 ms    P Axis 44 degrees    R Axis -52 degrees    T Axis 49 degrees    QRS Count 14 beats    Q Onset 201 ms    P Onset 113 ms    P Offset 181 ms    T Offset 408 ms    QTC Fredericia 470 ms   Blood Culture    Specimen: Peripheral Venipuncture; Blood culture   Result Value Ref Range    Blood Culture Loaded on Instrument - Culture in progress    Blood Culture    Specimen: Peripheral Venipuncture; Blood culture   Result Value Ref Range    Blood Culture Loaded on Instrument - Culture in progress    MRSA Surveillance for Vancomycin De-escalation, PCR    Specimen: Anterior Nares; Swab   Result Value Ref Range    MRSA PCR Not Detected Not Detected   Urinalysis with Reflex Culture and Microscopic   Result Value Ref Range    Color, Urine Yellow Light-Yellow, Yellow, Dark-Yellow    Appearance, Urine Turbid (N) Clear    Specific Gravity, Urine 1.017 1.005 - 1.035    pH, Urine 6.5 5.0, 5.5, 6.0, 6.5, 7.0, 7.5, 8.0    Protein, Urine 100 (2+) (A) NEGATIVE, 10 (TRACE), 20 (TRACE) mg/dL    Glucose, Urine Normal Normal mg/dL    Blood, Urine 0.03 (TRACE) (A) NEGATIVE    Ketones, Urine NEGATIVE NEGATIVE mg/dL    Bilirubin, Urine NEGATIVE NEGATIVE    Urobilinogen, Urine 2 (1+) (A) Normal mg/dL    Nitrite, Urine NEGATIVE NEGATIVE    Leukocyte Esterase, Urine 75 Chanel/µL (A) NEGATIVE   Microscopic Only, Urine   Result Value Ref Range    WBC, Urine 11-20 (A) 1-5, NONE /HPF    RBC, Urine 1-2 NONE, 1-2, 3-5 /HPF    Squamous Epithelial Cells, Urine 1-9 (SPARSE) Reference range not established. /HPF    Bacteria, Urine 1+ (A) NONE SEEN /HPF    Mucus, Urine FEW Reference range not established. /LPF   Basic metabolic panel   Result Value Ref Range    Glucose 104 (H) 74 - 99 mg/dL    Sodium 135 (L) 136 - 145 mmol/L    Potassium 2.9 (LL) 3.5 - 5.3 mmol/L    Chloride 95 (L) 98 - 107 mmol/L    Bicarbonate 28 21 - 32 mmol/L    Anion Gap 15 10 - 20 mmol/L    Urea Nitrogen 17 6 - 23 mg/dL    Creatinine 0.68 0.50 - 1.05  mg/dL    eGFR 90 >60 mL/min/1.73m*2    Calcium 8.8 8.6 - 10.3 mg/dL   Magnesium   Result Value Ref Range    Magnesium 1.56 (L) 1.60 - 2.40 mg/dL        CT thoracic spine wo IV contrast  Result Date: 10/17/2024  FINDINGS:     Alignment: There is mild scoliosis noted.   Vertebrae/Intervertebral Discs: The thoracolumbar vertebral body heights are intact. There is severe loss of intervertebral disc space. There are severe intervertebral destructive changes noted of the T11-T12 and L2-L3 levels. Findings appear somewhat worsened when compared to the prior study. Question underlying discitis/osteomyelitis. Recommend MRI for further evaluation. There is multilevel spinal canal stenosis noted. There is facet arthrosis noted.   Paraspinous Soft Tissues: Within normal limits.   There are severe intervertebral destructive changes noted of the T11-T12 and L2-L3 levels. Findings appear somewhat worsened when compared to the prior study. Question underlying discitis/osteomyelitis. Recommend MRI for further evaluation. Recommend clinical correlation.     CT lumbar spine wo IV contrast  Result Date: 10/17/2024  FINDINGS:     Alignment: There is mild scoliosis noted.   Vertebrae/Intervertebral Discs: The thoracolumbar vertebral body heights are intact. There is severe loss of intervertebral disc space. There are severe intervertebral destructive changes noted of the T11-T12 and L2-L3 levels. Findings appear somewhat worsened when compared to the prior study. Question underlying discitis/osteomyelitis. Recommend MRI for further evaluation. There is multilevel spinal canal stenosis noted. There is facet arthrosis noted.   Paraspinous Soft Tissues: Within normal limits.  There are severe intervertebral destructive changes noted of the T11-T12 and L2-L3 levels. Findings appear somewhat worsened when compared to the prior study. Question underlying discitis/osteomyelitis. Recommend MRI for further evaluation. Recommend clinical  correlation.     CT angio chest for pulmonary embolism  Result Date: 10/17/2024  FINDINGS: POTENTIAL LIMITATIONS OF THE STUDY: The assessment is limited by respiratory motion and suboptimal contrast opacification of pulmonary arteries.   HEART AND VESSELS: There is no main or lobar pulmonary embolus visualized. There is suboptimal opacification and respiratory motion noted limiting evaluation of the segmental and subsegmental branches. Main pulmonary artery and its branches are normal in caliber.   The thoracic aorta normal in course and caliber. Coronary artery calcifications are seen. Please note, the study is not optimized for evaluation of coronary arteries.   The cardiac chambers are not enlarged.   There is no pericardial effusion seen.   MEDIASTINUM AND SENG, LOWER NECK AND AXILLA: The visualized thyroid gland is within normal limits. No evidence of thoracic lymphadenopathy by CT criteria. Esophagus appears within normal limits as seen.   LUNGS AND AIRWAYS: There are large bilateral pleural effusions with overlying atelectasis versus consolidation. There are scattered ground-glass airspace opacities noted.     UPPER ABDOMEN: The visualized subdiaphragmatic structures demonstrate no remarkable findings.       CHEST WALL AND OSSEOUS STRUCTURES: Chest wall is within normal limits. There is multilevel loss of intervertebral disc space. The endplate osteophytic changes noted.There are severe degenerative changes noted of the bilateral shoulder joints. Associated soft tissue lesions.There are destructive changes noted of the T11-T12 and L1-L2 disc spaces noted. Findings are unchanged from the prior study.   1. There is no main or lobar pulmonary embolus visualized. There is suboptimal opacification and respiratory motion noted limiting evaluation of the segmental and subsegmental branches. 2. There are large bilateral pleural effusions with overlying atelectasis versus consolidation. There are scattered  ground-glass airspace opacities noted. 3. There are severe degenerative changes noted of the bilateral shoulder joints. Associated soft tissue lesions.      Assessment/Plan   Acute hypoxic respiratory failure  Wean oxygen as sats allow  Will add as needed nebulized bronchodilator  Continuous pulse oximetry  Incentive spirometry/pulmonary hygiene    Bilateral pleural effusions  Monitor response to diuresis  Repeat PA and lateral chest x-ray Monday  May need thoracentesis    Acute on chronic diastolic congestive heart failure  80 mg IV Lasix given x 1 yesterday  40 mg IV Lasix twice/daily  Strict I's and O's  Cardiology follow-up     Back pain  Question underlying discitis/osteomyelitis  Follow-up cultures  Analgesia  MRI lumbar thoracic spine pending  Orthopedic Surgery consultation  Infectious Disease consultation    Urinary tract infection  Follow-up urine culture  Antibiotics per Infectious Disease    Morbid obesity  Polysomnogram as outpatient  Nakia Maxwell, APRN-CNP

## 2024-10-18 NOTE — CONSULTS
Inpatient consult to Infectious Diseases  Consult performed by: ANGELITO Bey-CNP  Consult ordered by: ANGEILTO Diego-CNP  Reason for consult: UTI, discitis        Primary MD: Danielito Roach DO        History Of Present Illness  Angela Montelongo is a 74 y.o. female sniffing and past medical history of hypertension, chronic kidney disease.  She presented to the emergency room with shortness of breath.  Reports she has had difficulty with ambulating and has had a few falls.  Patient reported a significant fall back in August.  She reports back pain since that time.  She reports she has tried working with physical therapy and does not feel like she has improved.  She reports significant lower extremity numbness, weakness and difficulty ambulating.  She reports chronic urinary incontinence and frequency.  She denies fever, chills.  She does have worsening shortness of breath over the last few days.  She Reports intermittent productive cough.  She denies chest pain.  She does endorse bilateral leg swelling.  CT of thoracic spine shows findings consistent with discitis, osteomyelitis.  CT angio chest shows no evidence of pulmonary embolism, large bilateral pleural effusions with overlying atelectasis versus consolidation, scattered groundglass airspace opacities.  Was evaluated by orthopedic spine surgery who recommended MRI.  Labs with leukocytosis.  Urinalysis with pyuria. Covid, influenza PCR negative.   Urine and blood cultures are pending.  She was given dose on doxycyline in the ED.    Past Medical History  She has a past medical history of Elevation of levels of liver transaminase levels, Other conditions influencing health status, Personal history of other complications of pregnancy, childbirth and the puerperium, Personal history of other diseases of the circulatory system, Personal history of other diseases of the musculoskeletal system and connective tissue, Personal history of other  diseases of the musculoskeletal system and connective tissue, and Personal history of other diseases of the nervous system and sense organs.    Surgical History  She has a past surgical history that includes Carpal tunnel release (04/09/2013); Knee Arthroplasty (04/09/2013); and Cholecystectomy (04/09/2013).     Social History     Occupational History    Not on file   Tobacco Use    Smoking status: Never    Smokeless tobacco: Never   Vaping Use    Vaping status: Never Used   Substance and Sexual Activity    Alcohol use: Not Currently    Drug use: Never    Sexual activity: Defer     Travel History   Travel since 09/18/24    No documented travel since 09/18/24              Family History  No family history on file.  Allergies  Ciprofloxacin; Lisinopril; Cefazolin; Cephalexin; and Tetanus toxoid, adsorbed     Immunization History   Administered Date(s) Administered    Flu vaccine, quadrivalent, high-dose, preservative free, age 65y+ (FLUZONE) 09/22/2020, 11/03/2021    Flu vaccine, trivalent, preservative free, HIGH-DOSE, age 65y+ (Fluzone) 09/16/2014, 10/20/2015, 02/18/2019, 11/21/2019    Influenza, Seasonal, Quadrivalent, Adjuvanted 10/12/2022, 10/31/2023    Influenza, seasonal, injectable 10/10/2011, 08/24/2012    Moderna COVID-19 vaccine, 12 years and older (50mcg/0.5mL)(Spikevax) 10/31/2023    Moderna COVID-19 vaccine, bivalent, blue cap/gray label *Check age/dose* 10/12/2022    Moderna SARS-CoV-2 Vaccination 02/26/2021, 03/30/2021, 12/16/2021    Novel influenza-H1N1-09, preservative-free 12/18/2009    PPD Test 12/20/2018, 12/30/2018    Pneumococcal conjugate vaccine, 13-valent (PREVNAR 13) 04/07/2017    Pneumococcal polysaccharide vaccine, 23-valent, age 2 years and older (PNEUMOVAX 23) 09/16/2014    Zoster vaccine, recombinant, adult (SHINGRIX) 04/12/2023, 09/12/2023     Medications  Home medications:  Medications Prior to Admission   Medication Sig Dispense Refill Last Dose/Taking    allopurinol (Zyloprim) 100  mg tablet Take 1 tablet (100 mg) by mouth once daily.       amLODIPine (Norvasc) 10 mg tablet Take 1 tablet (10 mg) by mouth once daily.       amLODIPine (Norvasc) 5 mg tablet Take 1 tablet (5 mg) by mouth once daily.       atenolol (Tenormin) 25 mg tablet Take 1 tablet (25 mg) by mouth once daily. Hold for pulse less than 60       famotidine (Pepcid) 20 mg tablet Take 1 tablet (20 mg) by mouth as needed at bedtime for heartburn.       gabapentin (Neurontin) 100 mg capsule Take 2 capsules (200 mg) by mouth 3 times a day.       gabapentin (Neurontin) 300 mg capsule Take 1 capsule (300 mg) by mouth once daily. 30 capsule 3     omeprazole (PriLOSEC) 20 mg DR capsule TAKE 1 CAPSULE ONCE A DAY 90 capsule 0     ondansetron (Zofran) 4 mg tablet Take 1 tablet (4 mg) by mouth every 8 hours if needed for nausea or vomiting.       oxybutynin (Ditropan) 5 mg tablet Take 1 tablet (5 mg) by mouth 2 times a day.       simvastatin (Zocor) 40 mg tablet TAKE 1 TABLET (40 MG) BY MOUTH ONCE DAILY AT BEDTIME. 90 tablet 1     tiZANidine (Zanaflex) 4 mg tablet Take 1 tablet (4 mg) by mouth every 8 hours if needed for muscle spasms. 30 tablet 0     traMADol (Ultram) 50 mg tablet Take 1 tablet (50 mg) by mouth every 8 hours if needed for moderate pain (4 - 6). 12 tablet 0      Current medications:  Scheduled medications  allopurinol, 100 mg, oral, Daily  amLODIPine, 5 mg, oral, Daily  enoxaparin, 40 mg, subcutaneous, q12h DYLAN  famotidine, 20 mg, oral, BID   Or  famotidine, 20 mg, intravenous, BID  furosemide, 40 mg, intravenous, BID  gabapentin, 200 mg, oral, TID  oxybutynin, 5 mg, oral, BID  oxygen, , inhalation, q8h  perflutren lipid microspheres, 0.5-10 mL of dilution, intravenous, Once in imaging  perflutren protein A microsphere, 0.5 mL, intravenous, Once in imaging  polyethylene glycol, 17 g, oral, Daily  potassium chloride, 20 mEq, oral, BID  simvastatin, 40 mg, oral, Nightly  sulfur hexafluoride microsphr, 2 mL, intravenous, Once in  "imaging      Continuous medications     PRN medications  PRN medications: acetaminophen **OR** acetaminophen **OR** acetaminophen    Review of Systems   Constitutional: Negative.    HENT: Negative.     Eyes: Negative.    Respiratory:  Positive for cough and shortness of breath.    Cardiovascular:  Positive for leg swelling.   Gastrointestinal: Negative.    Endocrine: Negative.    Genitourinary: Negative.    Musculoskeletal:  Positive for back pain.   Skin: Negative.    Neurological:  Positive for weakness.   Psychiatric/Behavioral: Negative.          Objective  Range of Vitals (last 24 hours)  Heart Rate:  []   Temp:  [36.8 °C (98.2 °F)-37.3 °C (99.1 °F)]   Resp:  [13-30]   BP: (131-172)/(65-78)   Height:  [160 cm (5' 3\")]   Weight:  [111 kg (245 lb)-118 kg (260 lb)]   SpO2:  [86 %-99 %]   Daily Weight  10/18/24 : 111 kg (245 lb)    Body mass index is 43.4 kg/m².     Physical Exam  Constitutional:       Appearance: Normal appearance. She is obese.   HENT:      Head: Normocephalic and atraumatic.      Nose: Nose normal.      Mouth/Throat:      Mouth: Mucous membranes are moist.      Pharynx: Oropharynx is clear.   Eyes:      General: No scleral icterus.  Cardiovascular:      Rate and Rhythm: Normal rate and regular rhythm.   Pulmonary:      Comments: Decreased bilateral breath sounds   Abdominal:      General: Bowel sounds are normal.      Palpations: Abdomen is soft.   Musculoskeletal:         General: Normal range of motion.      Cervical back: Normal range of motion and neck supple.   Skin:     General: Skin is warm and dry.   Neurological:      Mental Status: She is alert.      Comments: Awake, alert   Psychiatric:         Mood and Affect: Mood normal.         Behavior: Behavior normal.        Relevant Results  Outside Hospital Results  No  Labs  Results from last 72 hours   Lab Units 10/17/24  1818   WBC AUTO x10*3/uL 14.3*   HEMOGLOBIN g/dL 10.7*   HEMATOCRIT % 33.8*   PLATELETS AUTO x10*3/uL 391 " "  NEUTROS PCT AUTO % 88.8   LYMPHS PCT AUTO % 5.9   MONOS PCT AUTO % 4.5   EOS PCT AUTO % 0.0     Results from last 72 hours   Lab Units 10/18/24  0702 10/17/24  1818   SODIUM mmol/L 135* 134*   POTASSIUM mmol/L 2.9* 3.3*   CHLORIDE mmol/L 95* 94*   CO2 mmol/L 28 29   BUN mg/dL 17 16   CREATININE mg/dL 0.68 0.65   GLUCOSE mg/dL 104* 132*   CALCIUM mg/dL 8.8 8.8   ANION GAP mmol/L 15 14   EGFR mL/min/1.73m*2 90 >90     Results from last 72 hours   Lab Units 10/17/24  1818   ALK PHOS U/L 107   BILIRUBIN TOTAL mg/dL 1.2   PROTEIN TOTAL g/dL 6.5   ALT U/L 17   AST U/L 32   ALBUMIN g/dL 3.0*     Estimated Creatinine Clearance: 84.2 mL/min (by C-G formula based on SCr of 0.68 mg/dL).  C-Reactive Protein   Date Value Ref Range Status   10/18/2024 18.71 (H) <1.00 mg/dL Final     CRP   Date Value Ref Range Status   03/07/2018 4.2 (H) 0 - 2.0 MG/DL Final     Comment:     Performed at [place] [address]     Sedimentation Rate   Date Value Ref Range Status   10/17/2024 84 (H) 0 - 30 mm/h Final   03/07/2018 49 (H) 0 - 20 MM/HR Final     Comment:     Performed at [address] [address]     No results found for: \"HIV1X2\", \"HIVCONF\", \"CXBIIF6RB\"  No results found for: \"HEPCABINIT\", \"HEPCAB\", \"HCVPCRQUANT\"  Microbiology  Susceptibility data from last 90 days.  Collected Specimen Info Organism Ampicillin Cefazolin Cefazolin (uncomplicated UTIs only) Ciprofloxacin Gentamicin Nitrofurantoin Piperacillin/Tazobactam Trimethoprim/Sulfamethoxazole   08/29/24 Urine from Clean Catch/Voided Escherichia coli  S  S  S  S  S  S  S  S         Imaging  ECG 12 lead    Result Date: 10/18/2024  Sinus rhythm with Premature supraventricular complexes and with occasional and consecutive Premature ventricular complexes Right bundle branch block Left anterior fascicular block  Bifascicular block  Cannot rule out Anteroseptal infarct , age undetermined Abnormal ECG When compared with ECG of 30-AUG-2024 " 12:12, Premature supraventricular complexes are now Present Sinus rhythm is no longer with 2nd degree AV block (Mobitz II) Right bundle branch block has replaced Nonspecific intraventricular block Minimal criteria for Anteroseptal infarct are now Present    CT thoracic spine wo IV contrast    Result Date: 10/17/2024  Interpreted By:  Awais Ellis, STUDY: CT THORACIC SPINE WO IV CONTRAST; CT LUMBAR SPINE WO IV CONTRAST; 10/17/2024 10:15 pm   INDICATION: Signs/Symptoms:back pain, fall x 2 days ago.     COMPARISON: 08/31/2024   ACCESSION NUMBER(S): [id number]; [id number]   ORDERING CLINICIAN: KALYN RON   TECHNIQUE: Axial CT images of the thoracolumbar spine are obtained. Axial, coronal and sagittal reconstructions are submitted for review.   FINDINGS:     Alignment: There is mild scoliosis noted.   Vertebrae/Intervertebral Discs: The thoracolumbar vertebral body heights are intact. There is severe loss of intervertebral disc space. There are severe intervertebral destructive changes noted of the T11-T12 and L2-L3 levels. Findings appear somewhat worsened when compared to the prior study. Question underlying discitis/osteomyelitis. Recommend MRI for further evaluation. There is multilevel spinal canal stenosis noted. There is facet arthrosis noted.   Paraspinous Soft Tissues: Within normal limits.         There are severe intervertebral destructive changes noted of the T11-T12 and L2-L3 levels. Findings appear somewhat worsened when compared to the prior study. Question underlying discitis/osteomyelitis. Recommend MRI for further evaluation. Recommend clinical correlation.   MACRO: None   Signed by: Awais Ellis 10/17/2024 10:32 PM Dictation workstation:   [id number]    CT lumbar spine wo IV contrast    Result Date: 10/17/2024  Interpreted By:  Awais Ellis, STUDY: CT THORACIC SPINE WO IV CONTRAST; CT LUMBAR SPINE WO IV CONTRAST; 10/17/2024 10:15 pm   INDICATION: Signs/Symptoms:back pain, fall x 2 days  ago.     COMPARISON: 08/31/2024   ACCESSION NUMBER(S): [id number]; [id number]   ORDERING CLINICIAN: KALYN RON   TECHNIQUE: Axial CT images of the thoracolumbar spine are obtained. Axial, coronal and sagittal reconstructions are submitted for review.   FINDINGS:     Alignment: There is mild scoliosis noted.   Vertebrae/Intervertebral Discs: The thoracolumbar vertebral body heights are intact. There is severe loss of intervertebral disc space. There are severe intervertebral destructive changes noted of the T11-T12 and L2-L3 levels. Findings appear somewhat worsened when compared to the prior study. Question underlying discitis/osteomyelitis. Recommend MRI for further evaluation. There is multilevel spinal canal stenosis noted. There is facet arthrosis noted.   Paraspinous Soft Tissues: Within normal limits.         There are severe intervertebral destructive changes noted of the T11-T12 and L2-L3 levels. Findings appear somewhat worsened when compared to the prior study. Question underlying discitis/osteomyelitis. Recommend MRI for further evaluation. Recommend clinical correlation.   MACRO: None   Signed by: Awais Ellis 10/17/2024 10:32 PM Dictation workstation:   [id number]    CT angio chest for pulmonary embolism    Result Date: 10/17/2024  Interpreted By:  Awais Ellis, STUDY: CT ANGIO CHEST FOR PULMONARY EMBOLISM;  10/17/2024 8:09 pm   INDICATION: Signs/Symptoms:shortness of breath and tachypnea.     COMPARISON: CT abdomen pelvis dated 08/31/2024   ACCESSION NUMBER(S): [id number]   ORDERING CLINICIAN: KALYN RON   TECHNIQUE: Helical data acquisition of the chest was obtained after intravenous administration of 75 ML Omnipaque 350, as per PE protocol. Images were reformatted in coronal and sagittal planes. Axial and coronal maximum intensity projection (MIP) images were created and reviewed.   FINDINGS: POTENTIAL LIMITATIONS OF THE STUDY: The assessment is limited by respiratory motion  and suboptimal contrast opacification of pulmonary arteries.   HEART AND VESSELS: There is no main or lobar pulmonary embolus visualized. There is suboptimal opacification and respiratory motion noted limiting evaluation of the segmental and subsegmental branches. Main pulmonary artery and its branches are normal in caliber.   The thoracic aorta normal in course and caliber. Coronary artery calcifications are seen. Please note, the study is not optimized for evaluation of coronary arteries.   The cardiac chambers are not enlarged.   There is no pericardial effusion seen.   MEDIASTINUM AND SENG, LOWER NECK AND AXILLA: The visualized thyroid gland is within normal limits. No evidence of thoracic lymphadenopathy by CT criteria. Esophagus appears within normal limits as seen.   LUNGS AND AIRWAYS: There are large bilateral pleural effusions with overlying atelectasis versus consolidation. There are scattered ground-glass airspace opacities noted.     UPPER ABDOMEN: The visualized subdiaphragmatic structures demonstrate no remarkable findings.       CHEST WALL AND OSSEOUS STRUCTURES: Chest wall is within normal limits. There is multilevel loss of intervertebral disc space. The endplate osteophytic changes noted.There are severe degenerative changes noted of the bilateral shoulder joints. Associated soft tissue lesions.There are destructive changes noted of the T11-T12 and L1-L2 disc spaces noted. Findings are unchanged from the prior study.       1. There is no main or lobar pulmonary embolus visualized. There is suboptimal opacification and respiratory motion noted limiting evaluation of the segmental and subsegmental branches. 2. There are large bilateral pleural effusions with overlying atelectasis versus consolidation. There are scattered ground-glass airspace opacities noted. 3. There are severe degenerative changes noted of the bilateral shoulder joints. Associated soft tissue lesions.   MACRO: None   Signed by:  Awais Ellis 10/17/2024 8:28 PM Dictation workstation:   [id number]      Assessment/Plan   Discitis/osteomyelitis -seen on CT-awaiting MRI  Acute hypoxic respiratory failure, on 4 liters oxygen   Bilateral pleural effusion   Pyuria versus urinary tract infection  History of UTI-culture grew E. Coli   Allergy to cephalosporin       IV meropenem-monitor for adverse reaction   IV vancomycin  Follow up Blood culture   Supplemental oxygen, wean as tolerated  Monitor temperature and WBC  Follow urine culture  Follow up echocardiogram   Orthopedic spine surgery follow-up  Thoracolumbar Spine MRI-for further evaluation   Pulmonary consult     Discussed with Dr. Norwood     Total time spent caring for the patient today was 30 minutes. This includes time spent before the visit reviewing the chart, time spent during the visit, and time spent after the visit on documentation.     Lalitha Yancey, APRN-CNP

## 2024-10-18 NOTE — NURSING NOTE
Assumed care of patient. Patient awake A&O x4, resting in bed. Voices no needs or concerns at this time. Call light and belongings within reach. Will continue plan of care.

## 2024-10-18 NOTE — PROGRESS NOTES
10/18/24 1508   Discharge Planning   Living Arrangements Children;Family members   Support Systems Children;Family members   Assistance Needed walker - mobility, ADL's   Type of Residence Private residence   Number of Stairs to Enter Residence 7   Number of Stairs Within Residence 0   Do you have animals or pets at home? Yes   Type of Animals or Pets 1 dog   Who is requesting discharge planning? Patient   Home or Post Acute Services Other (Comment)  (transfer to [facility])   Expected Discharge Disposition [facility]  [facility])   Does the patient need discharge transport arranged? Yes   RoundTrip coordination needed? Yes   Has discharge transport been arranged? No     Transfer to [facility] when bed available

## 2024-10-19 ENCOUNTER — APPOINTMENT (OUTPATIENT)
Dept: RADIOLOGY | Facility: HOSPITAL | Age: 74
DRG: 987 | End: 2024-10-19
Payer: MEDICARE

## 2024-10-19 LAB
ALBUMIN SERPL BCP-MCNC: 2.5 G/DL (ref 3.4–5)
ALP SERPL-CCNC: 90 U/L (ref 33–136)
ALT SERPL W P-5'-P-CCNC: 21 U/L (ref 7–45)
ANION GAP SERPL CALCULATED.3IONS-SCNC: 11 MMOL/L (ref 10–20)
AORTIC VALVE MEAN GRADIENT: 4 MMHG
AORTIC VALVE PEAK VELOCITY: 1.34 M/S
AST SERPL W P-5'-P-CCNC: 38 U/L (ref 9–39)
AV PEAK GRADIENT: 7.2 MMHG
AVA (PEAK VEL): 3.61 CM2
AVA (VTI): 3.53 CM2
BILIRUB SERPL-MCNC: 0.5 MG/DL (ref 0–1.2)
BUN SERPL-MCNC: 18 MG/DL (ref 6–23)
CALCIUM SERPL-MCNC: 8.6 MG/DL (ref 8.6–10.3)
CARDIAC TROPONIN I PNL SERPL HS: 35 NG/L (ref 0–13)
CHLORIDE SERPL-SCNC: 97 MMOL/L (ref 98–107)
CO2 SERPL-SCNC: 32 MMOL/L (ref 21–32)
CREAT SERPL-MCNC: 0.75 MG/DL (ref 0.5–1.05)
EGFRCR SERPLBLD CKD-EPI 2021: 83 ML/MIN/1.73M*2
EJECTION FRACTION APICAL 4 CHAMBER: 38
EJECTION FRACTION: 33 %
ERYTHROCYTE [DISTWIDTH] IN BLOOD BY AUTOMATED COUNT: 15.7 % (ref 11.5–14.5)
GLUCOSE SERPL-MCNC: 86 MG/DL (ref 74–99)
HCT VFR BLD AUTO: 29 % (ref 36–46)
HGB BLD-MCNC: 9.1 G/DL (ref 12–16)
LEFT VENTRICULAR OUTFLOW TRACT DIAMETER: 2.4 CM
MAGNESIUM SERPL-MCNC: 1.36 MG/DL (ref 1.6–2.4)
MAGNESIUM SERPL-MCNC: 1.64 MG/DL (ref 1.6–2.4)
MCH RBC QN AUTO: 28.3 PG (ref 26–34)
MCHC RBC AUTO-ENTMCNC: 31.4 G/DL (ref 32–36)
MCV RBC AUTO: 90 FL (ref 80–100)
MITRAL VALVE E/A RATIO: 0.79
NRBC BLD-RTO: 0 /100 WBCS (ref 0–0)
PLATELET # BLD AUTO: 293 X10*3/UL (ref 150–450)
POTASSIUM SERPL-SCNC: 2.9 MMOL/L (ref 3.5–5.3)
POTASSIUM SERPL-SCNC: 3.2 MMOL/L (ref 3.5–5.3)
PROT SERPL-MCNC: 5.1 G/DL (ref 6.4–8.2)
RBC # BLD AUTO: 3.21 X10*6/UL (ref 4–5.2)
RIGHT VENTRICLE FREE WALL PEAK S': 11.9 CM/S
SODIUM SERPL-SCNC: 137 MMOL/L (ref 136–145)
TRICUSPID ANNULAR PLANE SYSTOLIC EXCURSION: 1.5 CM
VANCOMYCIN SERPL-MCNC: 10.3 UG/ML (ref 5–20)
WBC # BLD AUTO: 6.9 X10*3/UL (ref 4.4–11.3)

## 2024-10-19 PROCEDURE — 80202 ASSAY OF VANCOMYCIN: CPT

## 2024-10-19 PROCEDURE — 99232 SBSQ HOSP IP/OBS MODERATE 35: CPT | Performed by: NURSE PRACTITIONER

## 2024-10-19 PROCEDURE — 36415 COLL VENOUS BLD VENIPUNCTURE: CPT | Performed by: NURSE PRACTITIONER

## 2024-10-19 PROCEDURE — 2500000004 HC RX 250 GENERAL PHARMACY W/ HCPCS (ALT 636 FOR OP/ED): Mod: JZ | Performed by: NURSE PRACTITIONER

## 2024-10-19 PROCEDURE — 36415 COLL VENOUS BLD VENIPUNCTURE: CPT | Performed by: STUDENT IN AN ORGANIZED HEALTH CARE EDUCATION/TRAINING PROGRAM

## 2024-10-19 PROCEDURE — 2500000002 HC RX 250 W HCPCS SELF ADMINISTERED DRUGS (ALT 637 FOR MEDICARE OP, ALT 636 FOR OP/ED): Performed by: STUDENT IN AN ORGANIZED HEALTH CARE EDUCATION/TRAINING PROGRAM

## 2024-10-19 PROCEDURE — 2500000004 HC RX 250 GENERAL PHARMACY W/ HCPCS (ALT 636 FOR OP/ED): Mod: JZ

## 2024-10-19 PROCEDURE — 72148 MRI LUMBAR SPINE W/O DYE: CPT

## 2024-10-19 PROCEDURE — 84075 ASSAY ALKALINE PHOSPHATASE: CPT | Performed by: STUDENT IN AN ORGANIZED HEALTH CARE EDUCATION/TRAINING PROGRAM

## 2024-10-19 PROCEDURE — 85027 COMPLETE CBC AUTOMATED: CPT | Performed by: STUDENT IN AN ORGANIZED HEALTH CARE EDUCATION/TRAINING PROGRAM

## 2024-10-19 PROCEDURE — 99233 SBSQ HOSP IP/OBS HIGH 50: CPT | Performed by: INTERNAL MEDICINE

## 2024-10-19 PROCEDURE — 2500000005 HC RX 250 GENERAL PHARMACY W/O HCPCS: Performed by: STUDENT IN AN ORGANIZED HEALTH CARE EDUCATION/TRAINING PROGRAM

## 2024-10-19 PROCEDURE — 84132 ASSAY OF SERUM POTASSIUM: CPT | Performed by: NURSE PRACTITIONER

## 2024-10-19 PROCEDURE — 2500000004 HC RX 250 GENERAL PHARMACY W/ HCPCS (ALT 636 FOR OP/ED): Performed by: INTERNAL MEDICINE

## 2024-10-19 PROCEDURE — 2500000004 HC RX 250 GENERAL PHARMACY W/ HCPCS (ALT 636 FOR OP/ED): Performed by: STUDENT IN AN ORGANIZED HEALTH CARE EDUCATION/TRAINING PROGRAM

## 2024-10-19 PROCEDURE — 72146 MRI CHEST SPINE W/O DYE: CPT

## 2024-10-19 PROCEDURE — 83735 ASSAY OF MAGNESIUM: CPT | Performed by: NURSE PRACTITIONER

## 2024-10-19 PROCEDURE — 72146 MRI CHEST SPINE W/O DYE: CPT | Performed by: RADIOLOGY

## 2024-10-19 PROCEDURE — 2500000004 HC RX 250 GENERAL PHARMACY W/ HCPCS (ALT 636 FOR OP/ED): Performed by: NURSE PRACTITIONER

## 2024-10-19 PROCEDURE — 83735 ASSAY OF MAGNESIUM: CPT | Performed by: STUDENT IN AN ORGANIZED HEALTH CARE EDUCATION/TRAINING PROGRAM

## 2024-10-19 PROCEDURE — 2500000001 HC RX 250 WO HCPCS SELF ADMINISTERED DRUGS (ALT 637 FOR MEDICARE OP): Performed by: NURSE PRACTITIONER

## 2024-10-19 PROCEDURE — 2500000001 HC RX 250 WO HCPCS SELF ADMINISTERED DRUGS (ALT 637 FOR MEDICARE OP): Performed by: STUDENT IN AN ORGANIZED HEALTH CARE EDUCATION/TRAINING PROGRAM

## 2024-10-19 PROCEDURE — 72148 MRI LUMBAR SPINE W/O DYE: CPT | Performed by: RADIOLOGY

## 2024-10-19 PROCEDURE — 1200000002 HC GENERAL ROOM WITH TELEMETRY DAILY

## 2024-10-19 PROCEDURE — 2500000001 HC RX 250 WO HCPCS SELF ADMINISTERED DRUGS (ALT 637 FOR MEDICARE OP): Performed by: INTERNAL MEDICINE

## 2024-10-19 PROCEDURE — 84484 ASSAY OF TROPONIN QUANT: CPT | Performed by: INTERNAL MEDICINE

## 2024-10-19 RX ORDER — LOSARTAN POTASSIUM 25 MG/1
25 TABLET ORAL DAILY
Status: DISCONTINUED | OUTPATIENT
Start: 2024-10-19 | End: 2024-10-21

## 2024-10-19 RX ORDER — VANCOMYCIN 1 G/200ML
1 INJECTION, SOLUTION INTRAVENOUS EVERY 12 HOURS
Status: DISCONTINUED | OUTPATIENT
Start: 2024-10-19 | End: 2024-10-20

## 2024-10-19 RX ORDER — POTASSIUM CHLORIDE 1.5 G/1.58G
40 POWDER, FOR SOLUTION ORAL ONCE
Status: COMPLETED | OUTPATIENT
Start: 2024-10-19 | End: 2024-10-19

## 2024-10-19 RX ORDER — MAGNESIUM SULFATE HEPTAHYDRATE 40 MG/ML
2 INJECTION, SOLUTION INTRAVENOUS ONCE
Status: COMPLETED | OUTPATIENT
Start: 2024-10-19 | End: 2024-10-19

## 2024-10-19 RX ORDER — DAPAGLIFLOZIN 10 MG/1
10 TABLET, FILM COATED ORAL DAILY
Status: DISCONTINUED | OUTPATIENT
Start: 2024-10-19 | End: 2024-10-24 | Stop reason: HOSPADM

## 2024-10-19 RX ADMIN — POTASSIUM CHLORIDE 40 MEQ: 1.5 POWDER, FOR SOLUTION ORAL at 09:12

## 2024-10-19 RX ADMIN — OXYBUTYNIN CHLORIDE 5 MG: 5 TABLET ORAL at 08:45

## 2024-10-19 RX ADMIN — ENOXAPARIN SODIUM 40 MG: 40 INJECTION SUBCUTANEOUS at 22:00

## 2024-10-19 RX ADMIN — GABAPENTIN 200 MG: 100 CAPSULE ORAL at 15:00

## 2024-10-19 RX ADMIN — SIMVASTATIN 40 MG: 40 TABLET, FILM COATED ORAL at 21:59

## 2024-10-19 RX ADMIN — FUROSEMIDE 40 MG: 10 INJECTION, SOLUTION INTRAMUSCULAR; INTRAVENOUS at 17:12

## 2024-10-19 RX ADMIN — FUROSEMIDE 40 MG: 10 INJECTION, SOLUTION INTRAMUSCULAR; INTRAVENOUS at 08:45

## 2024-10-19 RX ADMIN — ALLOPURINOL 100 MG: 100 TABLET ORAL at 08:45

## 2024-10-19 RX ADMIN — MEROPENEM AND SODIUM CHLORIDE 1 G: 1 INJECTION, SOLUTION INTRAVENOUS at 19:40

## 2024-10-19 RX ADMIN — MAGNESIUM SULFATE HEPTAHYDRATE 2 G: 40 INJECTION, SOLUTION INTRAVENOUS at 17:13

## 2024-10-19 RX ADMIN — GABAPENTIN 200 MG: 100 CAPSULE ORAL at 08:45

## 2024-10-19 RX ADMIN — ACETAMINOPHEN 650 MG: 325 TABLET ORAL at 23:16

## 2024-10-19 RX ADMIN — LOSARTAN POTASSIUM 25 MG: 25 TABLET, FILM COATED ORAL at 17:34

## 2024-10-19 RX ADMIN — VANCOMYCIN 1250 MG: 1.75 INJECTION, SOLUTION INTRAVENOUS at 05:17

## 2024-10-19 RX ADMIN — POTASSIUM CHLORIDE 40 MEQ: 1.5 POWDER, FOR SOLUTION ORAL at 21:59

## 2024-10-19 RX ADMIN — DAPAGLIFLOZIN 10 MG: 10 TABLET, FILM COATED ORAL at 17:12

## 2024-10-19 RX ADMIN — POTASSIUM CHLORIDE 20 MEQ: 1.5 FOR SOLUTION ORAL at 17:12

## 2024-10-19 RX ADMIN — OXYBUTYNIN CHLORIDE 5 MG: 5 TABLET ORAL at 21:59

## 2024-10-19 RX ADMIN — ACETAMINOPHEN 650 MG: 325 TABLET ORAL at 15:00

## 2024-10-19 RX ADMIN — FAMOTIDINE 20 MG: 20 TABLET ORAL at 21:59

## 2024-10-19 RX ADMIN — MEROPENEM AND SODIUM CHLORIDE 1 G: 1 INJECTION, SOLUTION INTRAVENOUS at 08:45

## 2024-10-19 RX ADMIN — VANCOMYCIN 1 G: 1 INJECTION, SOLUTION INTRAVENOUS at 21:58

## 2024-10-19 RX ADMIN — Medication 2 L/MIN: at 15:17

## 2024-10-19 RX ADMIN — POTASSIUM CHLORIDE 20 MEQ: 1.5 FOR SOLUTION ORAL at 06:38

## 2024-10-19 RX ADMIN — AMLODIPINE BESYLATE 5 MG: 5 TABLET ORAL at 08:45

## 2024-10-19 RX ADMIN — Medication 2 L/MIN: at 08:28

## 2024-10-19 RX ADMIN — Medication 2 L/MIN: at 23:14

## 2024-10-19 RX ADMIN — GABAPENTIN 200 MG: 100 CAPSULE ORAL at 21:59

## 2024-10-19 RX ADMIN — MAGNESIUM SULFATE HEPTAHYDRATE 2 G: 40 INJECTION, SOLUTION INTRAVENOUS at 08:46

## 2024-10-19 RX ADMIN — FAMOTIDINE 20 MG: 20 TABLET ORAL at 08:45

## 2024-10-19 RX ADMIN — ENOXAPARIN SODIUM 40 MG: 40 INJECTION SUBCUTANEOUS at 08:44

## 2024-10-19 RX ADMIN — Medication 3 L/MIN: at 02:13

## 2024-10-19 ASSESSMENT — PAIN DESCRIPTION - ORIENTATION: ORIENTATION: LOWER

## 2024-10-19 ASSESSMENT — PAIN SCALES - GENERAL
PAINLEVEL_OUTOF10: 0 - NO PAIN
PAINLEVEL_OUTOF10: 3
PAINLEVEL_OUTOF10: 0 - NO PAIN

## 2024-10-19 ASSESSMENT — PAIN - FUNCTIONAL ASSESSMENT: PAIN_FUNCTIONAL_ASSESSMENT: UNABLE TO SELF-REPORT

## 2024-10-19 ASSESSMENT — PAIN DESCRIPTION - LOCATION: LOCATION: BACK

## 2024-10-19 NOTE — PROGRESS NOTES
Angela Montelongo is a 74 y.o. female on day 1 of admission presenting with Acute on chronic diastolic congestive heart failure.      Subjective   Patient seen and examined. Resting in bed. States she is feeling better today, less SOB. Has some mild back pain but she feels is a little better. Down to 2L NC.        Objective     Last Recorded Vitals  /66 (BP Location: Right arm, Patient Position: Lying)   Pulse 91   Temp 36.7 °C (98.1 °F) (Temporal)   Resp 18   Wt 111 kg (245 lb)   SpO2 99%   Intake/Output last 3 Shifts:    Intake/Output Summary (Last 24 hours) at 10/19/2024 0902  Last data filed at 10/19/2024 0925  Gross per 24 hour   Intake 1500 ml   Output 1400 ml   Net 100 ml       Admission Weight  Weight: 118 kg (260 lb) (10/17/24 1757)    Daily Weight  10/18/24 : 111 kg (245 lb)    Image Results    Echo pending read.     Physical Exam    General: Alert and oriented x3, pleasant.   Cardiac: Regular rate and rhythm, S1/S2 , no murmur.   Pulmonary: Clear/Diminished on 2L NC.    Abdomen: Soft, obese, nontender. BS +x4.   Extremities: Some edema present to BLE worse on the L. Has general weakness.   Skin: No rashes or lesions.     Relevant Results    Scheduled medications  allopurinol, 100 mg, oral, Daily  amLODIPine, 5 mg, oral, Daily  enoxaparin, 40 mg, subcutaneous, q12h DYLAN  famotidine, 20 mg, oral, BID   Or  famotidine, 20 mg, intravenous, BID  furosemide, 40 mg, intravenous, BID  gabapentin, 200 mg, oral, TID  magnesium sulfate, 2 g, intravenous, Once  meropenem, 1 g, intravenous, q8h  oxybutynin, 5 mg, oral, BID  oxygen, , inhalation, q8h  perflutren protein A microsphere, 0.5 mL, intravenous, Once in imaging  polyethylene glycol, 17 g, oral, Daily  potassium chloride, 20 mEq, oral, BID  simvastatin, 40 mg, oral, Nightly  sulfur hexafluoride microsphr, 2 mL, intravenous, Once in imaging  vancomycin, 1 g, intravenous, q12h      Continuous medications     PRN medications  PRN medications: acetaminophen  **OR** acetaminophen **OR** acetaminophen, vancomycin     Results for orders placed or performed during the hospital encounter of 10/17/24 (from the past 24 hours)   Blood Culture    Specimen: Peripheral Venipuncture; Blood culture   Result Value Ref Range    Blood Culture Loaded on Instrument - Culture in progress    Blood Culture    Specimen: Peripheral Venipuncture; Blood culture   Result Value Ref Range    Blood Culture Loaded on Instrument - Culture in progress    Transthoracic Echo (TTE) Complete   Result Value Ref Range    BSA 2.22 m2   CBC   Result Value Ref Range    WBC 6.9 4.4 - 11.3 x10*3/uL    nRBC 0.0 0.0 - 0.0 /100 WBCs    RBC 3.21 (L) 4.00 - 5.20 x10*6/uL    Hemoglobin 9.1 (L) 12.0 - 16.0 g/dL    Hematocrit 29.0 (L) 36.0 - 46.0 %    MCV 90 80 - 100 fL    MCH 28.3 26.0 - 34.0 pg    MCHC 31.4 (L) 32.0 - 36.0 g/dL    RDW 15.7 (H) 11.5 - 14.5 %    Platelets 293 150 - 450 x10*3/uL   Comprehensive metabolic panel   Result Value Ref Range    Glucose 86 74 - 99 mg/dL    Sodium 137 136 - 145 mmol/L    Potassium 2.9 (LL) 3.5 - 5.3 mmol/L    Chloride 97 (L) 98 - 107 mmol/L    Bicarbonate 32 21 - 32 mmol/L    Anion Gap 11 10 - 20 mmol/L    Urea Nitrogen 18 6 - 23 mg/dL    Creatinine 0.75 0.50 - 1.05 mg/dL    eGFR 83 >60 mL/min/1.73m*2    Calcium 8.6 8.6 - 10.3 mg/dL    Albumin 2.5 (L) 3.4 - 5.0 g/dL    Alkaline Phosphatase 90 33 - 136 U/L    Total Protein 5.1 (L) 6.4 - 8.2 g/dL    AST 38 9 - 39 U/L    Bilirubin, Total 0.5 0.0 - 1.2 mg/dL    ALT 21 7 - 45 U/L   Magnesium   Result Value Ref Range    Magnesium 1.36 (L) 1.60 - 2.40 mg/dL   Vancomycin   Result Value Ref Range    Vancomycin 10.3 5.0 - 20.0 ug/mL             Assessment/Plan      Acute on Chronic Hypoxic Respiratory Failure / Bilateral Pleural Effusions  -Pulmonary follows.    -Likely secondary to the pleural effusions.   -Down to 2L NC this AM. Continue to wean as able.   -CTA chest shows no PE. Does show large bilateral pleural effusions.   -IV lasix  BID, continue for now, she is improving.   -May require thoracentesis, waiting to see how she tolerates diuresis.  Repeat 2 view CXR to be done on Monday.      Acute on Chronic Diastolic CHF  -Cardiology follows. She has no known hx of CHF.   -ProBNP elevated 3,988 with pleural effusions on imaging.    -Echo pending read.   -BID IV lasix, continue for now.   -Monitor strict I&O and daily weights.      Back Pain / Concern for Osteomyelitis/Discitis  -CT lumbar and thoracic spine have classic findings of advanced discitis and osteomyelitis at T11-12 and L1-2. Discussed this with Dr. Barrow who was consulted to see the patient.   -MRI lumbar and thoracic spine to be done.   -ID follows.   -IV abx per ID, currently on meropenem and vanco.   -Will likely require IR aspiration of the disc space.   -Blood cultures ordered and pending.   -CRP and ESR elevated.     Bacteremia  -ID on board.   -So far first set 1 of 2 with gram negative bacilli. Second set with no growth at this time- high suspicion this is from the urine.   -Continue broad spectrum IV abx.     UTI  -Has dysuria with leuks in UA.   -Urine culture pending with >100,000 enteric bacilli.    -IV abx per ID.      Hypokalemia / Hypomagnesemia  -Repalcement K and mag ordered.   -Monitor on telemetry.   -Monitor levels. Repeat K and mag this afternoon.      HTN  -Continue amlodipine. Recent adjustments to her cardiac medications last admission.   -Monitor BP.      HLD  -Continue statin.      DVT Risk  -Lovenox subcutaneous.      Plan  Cardio, Pulmonary, ID and Ortho Spine following.    Currently on IV lasix BID, continue for now, breathing improved and O2 down to 2L NC.   MRI lumbar and thoracic spine to be done.   Continue broad spectrum IV abx.   Awaiting cultures.   Echo pending reach.   May require thoracentesis, will need follow up CXR on Monday after diuresis.   Will likely require IR disc aspiration. Awaiting MRI results.   K and Mag replacements ordered.  Repeat labs this afternoon.  PT/OT evals pending the result of MRI and plan for her spine issue.   Awaiting bed at [place].         Suzi Wilcox, APRN-CNP

## 2024-10-19 NOTE — NURSING NOTE
Assumed care of patient, received report from off going RN. Patient resting in bed, awake and alert.  Bed locked and low, call light & personal belongings in reach.

## 2024-10-19 NOTE — CARE PLAN
The patient's goals for the shift include Breath better, rest    The clinical goals for the shift include monitor labs and vitals, manage pain, rest, comfort, safety      Problem: Safety - Adult  Goal: Free from fall injury  Outcome: Progressing     Problem: Discharge Planning  Goal: Discharge to home or other facility with appropriate resources  Outcome: Progressing     Problem: Chronic Conditions and Co-morbidities  Goal: Patient's chronic conditions and co-morbidity symptoms are monitored and maintained or improved  Outcome: Progressing     Problem: Fall/Injury  Goal: Not fall by end of shift  Outcome: Progressing  Goal: Be free from injury by end of the shift  Outcome: Progressing  Goal: Verbalize understanding of personal risk factors for fall in the hospital  Outcome: Progressing  Goal: Verbalize understanding of risk factor reduction measures to prevent injury from fall in the home  Outcome: Progressing  Goal: Use assistive devices by end of the shift  Outcome: Progressing  Goal: Pace activities to prevent fatigue by end of the shift  Outcome: Progressing     Problem: Heart Failure  Goal: Improved gas exchange this shift  Outcome: Progressing  Goal: Improved urinary output this shift  Outcome: Progressing  Goal: Reduction in peripheral edema within 24 hours  Outcome: Progressing  Goal: Report improvement of dyspnea/breathlessness this shift  Outcome: Progressing  Goal: Weight from fluid excess reduced over 2-3 days, then stabilize  Outcome: Progressing  Goal: Increase self care and/or family involvement in 24 hours  Outcome: Progressing     Problem: Nutrition  Goal: Oral intake greater 75%  Outcome: Progressing

## 2024-10-19 NOTE — PROGRESS NOTES
Angela Montelongo is a 74 y.o. female on day 1 of admission presenting with Acute on chronic diastolic congestive heart failure.    Subjective   Interval History:   Afebrile, no chills  Daughter present  Complains of low back pain  Afebrile, no chills  No chest pain or shortness of breath.  No nausea vomiting diarrhea        Review of Systems   All other systems reviewed and are negative.      Objective   Range of Vitals (last 24 hours)  Heart Rate:  [86-97]   Temp:  [36.1 °C (97 °F)-36.7 °C (98.1 °F)]   Resp:  [16-20]   BP: (135-161)/(66-90)   SpO2:  [92 %-99 %]   Daily Weight  10/18/24 : 111 kg (245 lb)    Body mass index is 43.4 kg/m².    Physical Exam  Constitutional:       Appearance: Normal appearance. She is obese.   HENT:      Head: Normocephalic and atraumatic.      Nose: Nose normal.      Mouth/Throat:      Mouth: Mucous membranes are moist.      Pharynx: Oropharynx is clear.   Eyes:      General: No scleral icterus.  Cardiovascular:      Rate and Rhythm: Normal rate and regular rhythm.   Pulmonary:      Comments: Decreased bilateral breath sounds   Abdominal:      General: Bowel sounds are normal.      Palpations: Abdomen is soft.   Musculoskeletal:         General: Normal range of motion.      Cervical back: Normal range of motion and neck supple.   Skin:     General: Skin is warm and dry.   Neurological:      Mental Status: She is alert.      Comments: Awake, alert   Psychiatric:         Mood and Affect: Mood normal.         Behavior: Behavior normal.     Antibiotics  meropenem - 1 gram/50 mL  vancomycin - 1 gram/200 mL    Relevant Results  Labs  Results from last 72 hours   Lab Units 10/19/24  0416 10/17/24  1818   WBC AUTO x10*3/uL 6.9 14.3*   HEMOGLOBIN g/dL 9.1* 10.7*   HEMATOCRIT % 29.0* 33.8*   PLATELETS AUTO x10*3/uL 293 391   NEUTROS PCT AUTO %  --  88.8   LYMPHS PCT AUTO %  --  5.9   MONOS PCT AUTO %  --  4.5   EOS PCT AUTO %  --  0.0     Results from last 72 hours   Lab Units 10/19/24  0416  10/18/24  0702 10/17/24  1818   SODIUM mmol/L 137 135* 134*   POTASSIUM mmol/L 2.9* 2.9* 3.3*   CHLORIDE mmol/L 97* 95* 94*   CO2 mmol/L 32 28 29   BUN mg/dL 18 17 16   CREATININE mg/dL 0.75 0.68 0.65   GLUCOSE mg/dL 86 104* 132*   CALCIUM mg/dL 8.6 8.8 8.8   ANION GAP mmol/L 11 15 14   EGFR mL/min/1.73m*2 83 90 >90     Results from last 72 hours   Lab Units 10/19/24  0416 10/17/24  1818   ALK PHOS U/L 90 107   BILIRUBIN TOTAL mg/dL 0.5 1.2   PROTEIN TOTAL g/dL 5.1* 6.5   ALT U/L 21 17   AST U/L 38 32   ALBUMIN g/dL 2.5* 3.0*     Estimated Creatinine Clearance: 76.4 mL/min (by C-G formula based on SCr of 0.75 mg/dL).  C-Reactive Protein   Date Value Ref Range Status   10/18/2024 18.71 (H) <1.00 mg/dL Final     CRP   Date Value Ref Range Status   03/07/2018 4.2 (H) 0 - 2.0 MG/DL Final     Comment:     Performed at [address] [address]     Microbiology  Susceptibility data from last 14 days.  Collected Specimen Info Organism   10/17/24 Urine from Clean Catch/Voided Enteric bacilli   10/17/24 Blood culture from Peripheral Venipuncture Escherichia coli       Imaging  Transthoracic Echo (TTE) Complete    Result Date: 10/19/2024            [place] [id number]             Phone [phone] TRANSTHORACIC ECHOCARDIOGRAM REPORT Patient Name:      BRUNA Burns Physician:    [id number] Cristina Wharton MD Study Date:        10/18/2024            Ordering Provider:    [id number] GARCIA MRN/PID:           [id number]              Fellow: Accession#:        [id number]          Nurse: Date of Birth/Age: 1950 / 74 years  Sonographer:          Audrey Barger RDCS Gender:            F                     Additional Staff:     Erlinda Merino                                                                 [facility] Height:            160.02 cm             Admit Date: Weight:            117.94 kg             Admission Status:     Inpatient -                                                                Routine BSA / BMI:         2.16 m2 / 46.06 kg/m2 Department Location:  [facility] Blood Pressure: 131 /65 mmHg Study Type:    TRANSTHORACIC ECHO (TTE) COMPLETE Diagnosis/ICD: Acute on chronic diastolic (congestive) heart failure                (CHF)-I50.33 Indication:    CHF, SOB CPT Codes:     Echo Complete w Full [id number] Patient History: Pertinent History: HLD, bradycardia, HTN, CHF. Study Detail: The following Echo studies were performed: 2D, M-Mode, Doppler and               color flow. Technically challenging study due to body habitus,               patient lying in supine position and prominent lung artifact.               Definity used as a contrast agent for endocardial border               definition. Total contrast used for this procedure was 4 mL via IV               push.  PHYSICIAN INTERPRETATION: Left Ventricle: The left ventricular systolic function is moderately decreased, with a visually estimated ejection fraction of 30-35%. There are no regional wall motion abnormalities. The left ventricular cavity size is normal. Abnormal (paradoxical) septal motion, consistent with an intraventricular conduction delay. Spectral Doppler shows a normal pattern of left ventricular diastolic filling. Left Atrium: The left atrium is normal in size. Right Ventricle: The right ventricle was not well visualized. There is reduced right ventricular systolic function. Right Atrium: The right atrium is normal in size. Aortic Valve: The aortic valve is trileaflet. The aortic valve dimensionless index is 0.78. There is no evidence of aortic valve regurgitation. The peak instantaneous gradient of the aortic valve is 7.2 mmHg. The mean gradient of the aortic valve is 4.0 mmHg.  Mitral Valve: The mitral valve is normal in structure. There is trace mitral valve regurgitation. Tricuspid Valve: The tricuspid valve was not well visualized. There is trace tricuspid regurgitation. The right ventricular systolic pressure is unable to be estimated. Pulmonic Valve: The pulmonic valve is not well visualized. There is physiologic pulmonic valve regurgitation. Pericardium: No pericardial effusion noted. Aorta: The aortic root is normal. Systemic Veins: The inferior vena cava appears normal in size, with IVC inspiratory collapse less than 50%.  CONCLUSIONS:  1. Poorly visualized anatomical structures due to suboptimal image quality.  2. The left ventricular systolic function is moderately decreased, with a visually estimated ejection fraction of 30-35%.  3. There is reduced right ventricular systolic function.  4. Trace mitral valve regurgitation.  5. Trace tricuspid regurgitation is visualized.  6. Limited images quality. Left ventricular ejection fraction appears reduced at 30 to 35% with interventricular conduction delay. QUANTITATIVE DATA SUMMARY:  LA VOLUME:                   Normal Ranges: LA Vol A4C:        69.7 ml   (22+/-6mL/m2) LA Vol Index A4C:  32.2ml/m2 LA Area A4C:       23.0 cm2 LA Major Axis A4C: 6.4 cm LA Vol A4C:        64.9 ml  M-MODE MEASUREMENTS:         Normal Ranges: Ao Root:             2.90 cm (2.0-3.7cm) LAs:                 4.00 cm (2.7-4.0cm)  AORTA MEASUREMENTS:         Normal Ranges: Ao Sinus, d:        2.92 cm (2.1-3.5cm) Ao STJ, d:          2.61 cm (1.7-3.4cm) Asc Ao, d:          3.40 cm (2.1-3.4cm)  LV SYSTOLIC FUNCTION BY 2D PLANIMETRY (MOD):                      Normal Ranges: EF-A4C View:    38 % (>=55%) EF-Visual:      33 % LV EF Reported: 33 %  LV DIASTOLIC FUNCTION:           Normal Ranges: MV Peak E:             0.73 m/s  (0.7-1.2 m/s) MV Peak A:             0.92 m/s  (0.42-0.7 m/s) E/A Ratio:             0.79      (1.0-2.2) MV e'                  0.066 m/s (>8.0)  MV lateral e'          0.07 m/s MV medial e'           0.06 m/s E/e' Ratio:            11.06     (<8.0)  MITRAL VALVE:         Normal Ranges: MV DT:        94 msec (150-240msec)  AORTIC VALVE:                     Normal Ranges: AoV Vmax:                1.34 m/s (<=1.7m/s) AoV Peak P.2 mmHg (<20mmHg) AoV Mean P.0 mmHg (1.7-11.5mmHg) LVOT Max Alex:            1.07 m/s (<=1.1m/s) AoV VTI:                 25.00 cm (18-25cm) LVOT VTI:                19.50 cm LVOT Diameter:           2.40 cm  (1.8-2.4cm) AoV Area, VTI:           3.53 cm2 (2.5-5.5cm2) AoV Area,Vmax:           3.61 cm2 (2.5-4.5cm2) AoV Dimensionless Index: 0.78  RIGHT VENTRICLE: TAPSE: 14.7 mm RV s'  0.12 m/s  TRICUSPID VALVE/RVSP:         Normal Ranges: IVC Diam:             1.59 cm  [id number] Cristina Wharton MD Electronically signed on 10/19/2024 at 4:28:53 PM  ** Final **     ECG 12 lead    Result Date: 10/18/2024  Sinus rhythm with Premature supraventricular complexes and with occasional and consecutive Premature ventricular complexes Right bundle branch block Left anterior fascicular block  Bifascicular block  Cannot rule out Anteroseptal infarct , age undetermined Abnormal ECG When compared with ECG of 30-AUG-2024 12:12, Premature supraventricular complexes are now Present Sinus rhythm is no longer with 2nd degree AV block (Mobitz II) Right bundle branch block has replaced Nonspecific intraventricular block Minimal criteria for Anteroseptal infarct are now Present    CT thoracic spine wo IV contrast    Result Date: 10/17/2024  Interpreted By:  Awais Ellis, STUDY: CT THORACIC SPINE WO IV CONTRAST; CT LUMBAR SPINE WO IV CONTRAST; 10/17/2024 10:15 pm   INDICATION: Signs/Symptoms:back pain, fall x 2 days ago.     COMPARISON: 2024   ACCESSION NUMBER(S): [id number]; [id number]   ORDERING CLINICIAN: KALYN RON   TECHNIQUE: Axial CT images of the thoracolumbar spine are obtained. Axial, coronal and sagittal  reconstructions are submitted for review.   FINDINGS:     Alignment: There is mild scoliosis noted.   Vertebrae/Intervertebral Discs: The thoracolumbar vertebral body heights are intact. There is severe loss of intervertebral disc space. There are severe intervertebral destructive changes noted of the T11-T12 and L2-L3 levels. Findings appear somewhat worsened when compared to the prior study. Question underlying discitis/osteomyelitis. Recommend MRI for further evaluation. There is multilevel spinal canal stenosis noted. There is facet arthrosis noted.   Paraspinous Soft Tissues: Within normal limits.         There are severe intervertebral destructive changes noted of the T11-T12 and L2-L3 levels. Findings appear somewhat worsened when compared to the prior study. Question underlying discitis/osteomyelitis. Recommend MRI for further evaluation. Recommend clinical correlation.   MACRO: None   Signed by: Awais Ellis 10/17/2024 10:32 PM Dictation workstation:   [id number]    CT lumbar spine wo IV contrast    Result Date: 10/17/2024  Interpreted By:  Awais Ellis, STUDY: CT THORACIC SPINE WO IV CONTRAST; CT LUMBAR SPINE WO IV CONTRAST; 10/17/2024 10:15 pm   INDICATION: Signs/Symptoms:back pain, fall x 2 days ago.     COMPARISON: 08/31/2024   ACCESSION NUMBER(S): [id number]; [id number]   ORDERING CLINICIAN: KALYN RON   TECHNIQUE: Axial CT images of the thoracolumbar spine are obtained. Axial, coronal and sagittal reconstructions are submitted for review.   FINDINGS:     Alignment: There is mild scoliosis noted.   Vertebrae/Intervertebral Discs: The thoracolumbar vertebral body heights are intact. There is severe loss of intervertebral disc space. There are severe intervertebral destructive changes noted of the T11-T12 and L2-L3 levels. Findings appear somewhat worsened when compared to the prior study. Question underlying discitis/osteomyelitis. Recommend MRI for further evaluation. There is multilevel  spinal canal stenosis noted. There is facet arthrosis noted.   Paraspinous Soft Tissues: Within normal limits.         There are severe intervertebral destructive changes noted of the T11-T12 and L2-L3 levels. Findings appear somewhat worsened when compared to the prior study. Question underlying discitis/osteomyelitis. Recommend MRI for further evaluation. Recommend clinical correlation.   MACRO: None   Signed by: Awais Ellis 10/17/2024 10:32 PM Dictation workstation:   [id number]    CT angio chest for pulmonary embolism    Result Date: 10/17/2024  Interpreted By:  Awais Ellis, STUDY: CT ANGIO CHEST FOR PULMONARY EMBOLISM;  10/17/2024 8:09 pm   INDICATION: Signs/Symptoms:shortness of breath and tachypnea.     COMPARISON: CT abdomen pelvis dated 08/31/2024   ACCESSION NUMBER(S): [id number]   ORDERING CLINICIAN: KALYN RON   TECHNIQUE: Helical data acquisition of the chest was obtained after intravenous administration of 75 ML Omnipaque 350, as per PE protocol. Images were reformatted in coronal and sagittal planes. Axial and coronal maximum intensity projection (MIP) images were created and reviewed.   FINDINGS: POTENTIAL LIMITATIONS OF THE STUDY: The assessment is limited by respiratory motion and suboptimal contrast opacification of pulmonary arteries.   HEART AND VESSELS: There is no main or lobar pulmonary embolus visualized. There is suboptimal opacification and respiratory motion noted limiting evaluation of the segmental and subsegmental branches. Main pulmonary artery and its branches are normal in caliber.   The thoracic aorta normal in course and caliber. Coronary artery calcifications are seen. Please note, the study is not optimized for evaluation of coronary arteries.   The cardiac chambers are not enlarged.   There is no pericardial effusion seen.   MEDIASTINUM AND SENG, LOWER NECK AND AXILLA: The visualized thyroid gland is within normal limits. No evidence of thoracic lymphadenopathy  by CT criteria. Esophagus appears within normal limits as seen.   LUNGS AND AIRWAYS: There are large bilateral pleural effusions with overlying atelectasis versus consolidation. There are scattered ground-glass airspace opacities noted.     UPPER ABDOMEN: The visualized subdiaphragmatic structures demonstrate no remarkable findings.       CHEST WALL AND OSSEOUS STRUCTURES: Chest wall is within normal limits. There is multilevel loss of intervertebral disc space. The endplate osteophytic changes noted.There are severe degenerative changes noted of the bilateral shoulder joints. Associated soft tissue lesions.There are destructive changes noted of the T11-T12 and L1-L2 disc spaces noted. Findings are unchanged from the prior study.       1. There is no main or lobar pulmonary embolus visualized. There is suboptimal opacification and respiratory motion noted limiting evaluation of the segmental and subsegmental branches. 2. There are large bilateral pleural effusions with overlying atelectasis versus consolidation. There are scattered ground-glass airspace opacities noted. 3. There are severe degenerative changes noted of the bilateral shoulder joints. Associated soft tissue lesions.   MACRO: None   Signed by: Awais Ellis 10/17/2024 8:28 PM Dictation workstation:   [id number]     Assessment/Plan   Discitis/osteomyelitis -seen on CT-awaiting MRI  Acute hypoxic respiratory failure, on 4 liters oxygen   Bilateral pleural effusion   Gram-negative urinary tract infection  E. coli bacteremia  Allergy to cephalosporin         IV meropenem-monitor for adverse reaction   IV vancomycin  Follow up Blood culture   Supplemental oxygen, wean as tolerated  Monitor temperature and WBC  Follow urine culture  Follow up echocardiogram   Follow-up MRI report once available  Pulmonary consult     Ryland Norwood MD

## 2024-10-19 NOTE — PROGRESS NOTES
Subjective Data:  Patient complaining of shortness of breath and edema.  Denies having chest pain.  Telemetry reviewed no events    Overnight Events:    No events overnight     Objective Data:  Last Recorded Vitals:  Vitals:    10/19/24 0419 10/19/24 0700 10/19/24 0828 10/19/24 1100   BP: 150/90 140/66  135/69   BP Location: Right arm Right arm  Right arm   Patient Position: Lying Lying  Lying   Pulse: 86 91  97   Resp: 20 18  18   Temp: 36.1 °C (97 °F) 36.7 °C (98.1 °F)  36.3 °C (97.3 °F)   TempSrc: Temporal Temporal  Temporal   SpO2: 98% 98% 99% 95%   Weight:       Height:           Last Labs:  CBC - 10/19/2024:  4:16 AM  6.9 9.1 293    29.0      CMP - 10/19/2024:  4:16 AM  8.6 5.1 38 --- 0.5   2.2 2.5 21 90      PTT - No results in last year.  _   _ _     BNP   Date/Time Value Ref Range Status   10/17/2024 06:18 PM 3,988 0 - 99 pg/mL Final     HGBA1C   Date/Time Value Ref Range Status   02/01/2019 07:03 PM 5.3 4.0 - 6.0 % Final     Comment:     Hemoglobin A1C levels are related to mean blood glucose during the   preceding 2-3 months. The relationship table below may be used as a   general guide. Each 1% increase in HGB A1C is a reflection of an   increase in mean glucose of approximately 30 mg/dl.   Reference: Diabetes Care, volume 29, supplement 1 Jan. 2006                        HGB A1C ................. Approx. Mean Glucose   _______________________________________________   6%   ...............................  120 mg/dl   7%   ...............................  150 mg/dl   8%   ...............................  180 mg/dl   9%   ...............................  210 mg/dl   10%  ...............................  240 mg/dl  Performed at [address] [address]       LDLCALC   Date/Time Value Ref Range Status   06/07/2022 03:01 PM 81 65 - 130 MG/DL Final   09/22/2020 03:00 PM 88 65 - 130 MG/DL Final   02/02/2019 05:20 AM 44 65 - 130 MG/DL Final      Last I/O:  I/O last 3 completed shifts:  In:  "1300 (11.7 mL/kg) [P.O.:750; I.V.:50 (0.4 mL/kg); IV Piggyback:500]  Out: 1200 (10.8 mL/kg) [Urine:1200 (0.3 mL/kg/hr)]  Weight: 111.1 kg     Past Cardiology Tests (Last 3 Years):  EKG:  ECG 12 lead 10/17/2024 (Preliminary)      ECG 12 lead 08/29/2024    Echo:  No results found for this or any previous visit from the past 1095 days.    Ejection Fractions:  No results found for: \"EF\"  Cath:  No results found for this or any previous visit from the past 1095 days.    Stress Test:  No results found for this or any previous visit from the past 1095 days.    Cardiac Imaging:  No results found for this or any previous visit from the past 1095 days.      Inpatient Medications:  Scheduled medications   Medication Dose Route Frequency    allopurinol  100 mg oral Daily    dapagliflozin propanediol  10 mg oral Daily    enoxaparin  40 mg subcutaneous q12h DYLAN    famotidine  20 mg oral BID    Or    famotidine  20 mg intravenous BID    furosemide  40 mg intravenous BID    gabapentin  200 mg oral TID    losartan  25 mg oral Daily    meropenem  1 g intravenous q8h    oxybutynin  5 mg oral BID    oxygen   inhalation q8h    perflutren protein A microsphere  0.5 mL intravenous Once in imaging    polyethylene glycol  17 g oral Daily    potassium chloride  20 mEq oral BID    simvastatin  40 mg oral Nightly    sulfur hexafluoride microsphr  2 mL intravenous Once in imaging    vancomycin  1 g intravenous q12h     PRN medications   Medication    acetaminophen    Or    acetaminophen    Or    acetaminophen    vancomycin     Continuous Medications   Medication Dose Last Rate       Physical Exam:  General: Patient is in no acute distress.  HEENT: atraumatic normocephalic.  Neck: is supple jugular venous pressure difficult to assess, no thyromegaly.  Cardiovascular regular rate and rhythm normal heart sounds no murmurs rubs or gallops.  Lungs: clear to auscultation bilaterally.  Abdomen: is soft nontender.  Extremities warm to touch 2+ " edema.  Neurologic examination: patient is awake alert oriented to person, place, date/time.  Psychiatric examination: patient has good insight denies feeling suicidal and depressed.  Pulses 2+ intact bilaterally     Assessment/Plan   #1 acute systolic heart failure ejection fraction of 40%.  Patient had just had an echocardiogram yesterday showing ejection fraction of 40% likely secondary in part to left bundle.  Patient before on hydrochlorothiazide which was stopped for hyponatremia.  Currently has severe hypokalemia as well as hypomagnesemia.  Recommend replacement of electrolytes.  We will start her on losartan 25 mg oral daily.  Patient with history of angioedema with lisinopril.  Will monitor closely for symptoms.  Stop amlodipine.  Will replace magnesium 2 g today.  I will also consider starting small dose of metoprolol tomorrow since patient has frequent PACs which could be related to hypokalemia and hypomagnesemia.  Will monitor closely.  Will add troponin.  TSH within normal limit.    2.  Hypertension.  Will stop amlodipine and started on losartan 25 mg oral daily.  Patient has history of angioedema with has moderate LV*dysfunction she will benefit of ARB's.  Will start her also on Farxiga for heart failure systolic dysfunction.  Will reassess renal function and electrolytes in AM.    3.  Obesity hypoventilation.    4.  Hypokalemia replaced with 40 mg of potassium today.    5.  Hypomagnesemia we will replace with 2 more grams of potassium IV.    Thank you for the consult  Peripheral IV 10/18/24 20 G Right;Anterior Forearm (Active)   Site Assessment Clean;Dry;Intact;Other (Comment) 10/19/24 0900   Dressing Status Clean;Dry;Occlusive 10/19/24 0900   Number of days: 1       Code Status:  Full Code        Cristina Wharton MD

## 2024-10-19 NOTE — PROGRESS NOTES
Vancomycin Dosing by Pharmacy- FOLLOW UP    Angela Montelongo is a 74 y.o. year old female who Pharmacy has been consulted for vancomycin dosing for osteomyelitis/septic arthritis. Based on the patient's indication and renal status this patient is being dosed based on a goal AUC of 400-600.     Renal function is currently declining.    Current vancomycin dose: 1250 mg given every 12 hours    Estimated vancomycin AUC on current dose: 625 mg/L.hr     Visit Vitals  /66 (BP Location: Right arm, Patient Position: Lying)   Pulse 91   Temp 36.7 °C (98.1 °F) (Temporal)   Resp 18        Lab Results   Component Value Date    CREATININE 0.75 10/19/2024    CREATININE 0.68 10/18/2024    CREATININE 0.65 10/17/2024    CREATININE 0.63 10/07/2024        Patient weight is as follows:   Vitals:    10/18/24 0653   Weight: 111 kg (245 lb)       Cultures:  Susceptibility data for the encounter in last 14 days.  Collected Specimen Info Organism   10/17/24 Urine from Clean Catch/Voided Enteric bacilli        I/O last 3 completed shifts:  In: 1300 (11.7 mL/kg) [P.O.:750; I.V.:50 (0.4 mL/kg); IV Piggyback:500]  Out: 1200 (10.8 mL/kg) [Urine:1200 (0.3 mL/kg/hr)]  Weight: 111.1 kg   I/O during current shift:  No intake/output data recorded.    Temp (24hrs), Av.4 °C (97.6 °F), Min:36.1 °C (97 °F), Max:36.8 °C (98.2 °F)      Assessment/Plan    Above goal AUC. Orders placed for new vancomcyin regimen of 1000 mg every 12 hours to begin at 1800 10/19.    This dosing regimen is predicted by [employer] to result in the following pharmacokinetic parameters:  Loading dose: N/A  Regimen: 1000 mg IV every 12 hours.  Start time: 17:17 on 10/19/2024  Exposure target: AUC24 (range)400-600 mg/L.hr   VAK57-08: 500 mg/L.hr  AUC24,ss: 501 mg/L.hr  Probability of AUC24 > 400: 82 %  Ctrough,ss: 13.9 mg/L  Probability of Ctrough,ss > 20: 23 %    The next level will be obtained on 10/20 at 0500. May be obtained sooner if clinically indicated.   Will continue  to monitor renal function daily while on vancomycin and order serum creatinine at least every 48 hours if not already ordered.  Follow for continued vancomycin needs, clinical response, and signs/symptoms of toxicity.       Lorrie Leach, PharmD

## 2024-10-19 NOTE — PROGRESS NOTES
Patient interviewed and examined.  She has a very long history of low back pain and a number of falls over the years.  She knows she has spinal stenosis. She has had worsening extreme back pain which limited her ability to stand and walk over the last week or 2.  She also has a longstanding weakness in her left leg with a partial foot drop.  She has significant edema in both lower extremities.  She has no pain to palpation.  No calf tenderness.  She retains normal sensation to touch and pressure.  She has weakness of her left anterior tib grade 3/5 and weakness of her left hip flexors.  The quad is good.  The right leg strength testing is all normal.  Blood cultures now positive for E. coli.  Presumably her osteomyelitis discitis is also from the same source.  MRI is pending.  MRI findings will determine whether there is any indication for surgical intervention, i.e. drainage of epidural abscess.

## 2024-10-19 NOTE — CARE PLAN
The patient's goals for the shift include Breath better, rest    The clinical goals for the shift include monitor labs and vitals, manage pain, rest, comfort, safety      Problem: Safety - Adult  Goal: Free from fall injury  10/19/2024 1215 by Amy Mckee RN  Outcome: Progressing  10/19/2024 1215 by Amy Mckee RN  Outcome: Progressing     Problem: Discharge Planning  Goal: Discharge to home or other facility with appropriate resources  10/19/2024 1215 by Amy Mckee RN  Outcome: Progressing  10/19/2024 1215 by Amy Mckee RN  Outcome: Progressing     Problem: Chronic Conditions and Co-morbidities  Goal: Patient's chronic conditions and co-morbidity symptoms are monitored and maintained or improved  10/19/2024 1215 by Amy Mckee RN  Outcome: Progressing  10/19/2024 1215 by Amy Mckee RN  Outcome: Progressing     Problem: Fall/Injury  Goal: Not fall by end of shift  10/19/2024 1215 by Amy Mckee RN  Outcome: Progressing  10/19/2024 1215 by Amy Mckee RN  Outcome: Progressing  Goal: Be free from injury by end of the shift  10/19/2024 1215 by Amy Mckee RN  Outcome: Progressing  10/19/2024 1215 by Amy Mckee RN  Outcome: Progressing  Goal: Verbalize understanding of personal risk factors for fall in the hospital  10/19/2024 1215 by Amy Mckee RN  Outcome: Progressing  10/19/2024 1215 by Amy Mckee RN  Outcome: Progressing  Goal: Verbalize understanding of risk factor reduction measures to prevent injury from fall in the home  10/19/2024 1215 by Amy Mckee RN  Outcome: Progressing  10/19/2024 1215 by Amy Mckee RN  Outcome: Progressing  Goal: Use assistive devices by end of the shift  10/19/2024 1215 by Amy Mckee RN  Outcome: Progressing  10/19/2024 1215 by Amy Mckee RN  Outcome: Progressing  Goal: Pace activities to prevent fatigue by end of the shift  10/19/2024 1215 by Amy Mckee RN  Outcome: Progressing  10/19/2024  1215 by Amy Mckee RN  Outcome: Progressing     Problem: Heart Failure  Goal: Improved gas exchange this shift  10/19/2024 1215 by Amy Mckee RN  Outcome: Progressing  10/19/2024 1215 by Amy Mckee RN  Outcome: Progressing  Goal: Improved urinary output this shift  10/19/2024 1215 by Amy Mckee RN  Outcome: Progressing  10/19/2024 1215 by Amy Mckee RN  Outcome: Progressing  Goal: Reduction in peripheral edema within 24 hours  10/19/2024 1215 by Amy Mckee RN  Outcome: Progressing  10/19/2024 1215 by Amy Mckee RN  Outcome: Progressing  Goal: Report improvement of dyspnea/breathlessness this shift  10/19/2024 1215 by Amy Mckee RN  Outcome: Progressing  10/19/2024 1215 by Amy Mckee RN  Outcome: Progressing  Goal: Weight from fluid excess reduced over 2-3 days, then stabilize  10/19/2024 1215 by Amy Mckee RN  Outcome: Progressing  10/19/2024 1215 by Amy Mckee RN  Outcome: Progressing  Goal: Increase self care and/or family involvement in 24 hours  10/19/2024 1215 by Amy Mckee RN  Outcome: Progressing  10/19/2024 1215 by Amy Mckee RN  Outcome: Progressing     Problem: Nutrition  Goal: Oral intake greater 75%  10/19/2024 1215 by Amy Mckee RN  Outcome: Progressing  10/19/2024 1215 by Amy Mckee RN  Outcome: Progressing     Problem: Skin  Goal: Decreased wound size/increased tissue granulation at next dressing change  10/19/2024 1215 by Amy Mckee RN  Outcome: Progressing  Flowsheets (Taken 10/19/2024 1215)  Decreased wound size/increased tissue granulation at next dressing change:   Promote sleep for wound healing   Utilize specialty bed per algorithm  10/19/2024 1215 by Amy Mckee RN  Outcome: Progressing  Flowsheets (Taken 10/19/2024 1215)  Decreased wound size/increased tissue granulation at next dressing change:   Promote sleep for wound healing   Utilize specialty bed per algorithm  Goal: Participates in  plan/prevention/treatment measures  10/19/2024 1215 by Amy Mckee RN  Outcome: Progressing  10/19/2024 1215 by Amy Mckee RN  Outcome: Progressing  Goal: Prevent/manage excess moisture  10/19/2024 1215 by Amy Mckee RN  Outcome: Progressing  10/19/2024 1215 by Amy Mckee RN  Outcome: Progressing  Goal: Prevent/minimize sheer/friction injuries  10/19/2024 1215 by Amy Mckee RN  Outcome: Progressing  10/19/2024 1215 by Amy Mckee RN  Outcome: Progressing  Goal: Promote/optimize nutrition  10/19/2024 1215 by Amy Mckee RN  Outcome: Progressing  10/19/2024 1215 by Amy Mckee RN  Outcome: Progressing  Goal: Promote skin healing  10/19/2024 1215 by Amy Mckee RN  Outcome: Progressing  10/19/2024 1215 by Amy Mckee RN  Outcome: Progressing

## 2024-10-19 NOTE — CARE PLAN
The patient's goals for the shift include Breath better, rest    The clinical goals for the shift include monitor labs and vitals, manage pain, rest, comfort, safety      Problem: Safety - Adult  Goal: Free from fall injury  Outcome: Progressing     Problem: Discharge Planning  Goal: Discharge to home or other facility with appropriate resources  Outcome: Progressing     Problem: Chronic Conditions and Co-morbidities  Goal: Patient's chronic conditions and co-morbidity symptoms are monitored and maintained or improved  Outcome: Progressing     Problem: Fall/Injury  Goal: Not fall by end of shift  Outcome: Progressing  Goal: Be free from injury by end of the shift  Outcome: Progressing  Goal: Verbalize understanding of personal risk factors for fall in the hospital  Outcome: Progressing  Goal: Verbalize understanding of risk factor reduction measures to prevent injury from fall in the home  Outcome: Progressing  Goal: Use assistive devices by end of the shift  Outcome: Progressing  Goal: Pace activities to prevent fatigue by end of the shift  Outcome: Progressing     Problem: Heart Failure  Goal: Improved gas exchange this shift  Outcome: Progressing  Goal: Improved urinary output this shift  Outcome: Progressing  Goal: Reduction in peripheral edema within 24 hours  Outcome: Progressing  Goal: Report improvement of dyspnea/breathlessness this shift  Outcome: Progressing  Goal: Weight from fluid excess reduced over 2-3 days, then stabilize  Outcome: Progressing  Goal: Increase self care and/or family involvement in 24 hours  Outcome: Progressing     Problem: Nutrition  Goal: Oral intake greater 75%  Outcome: Progressing     Problem: Skin  Goal: Decreased wound size/increased tissue granulation at next dressing change  Outcome: Progressing  Goal: Participates in plan/prevention/treatment measures  Outcome: Progressing  Goal: Prevent/manage excess moisture  Outcome: Progressing  Goal: Prevent/minimize sheer/friction  injuries  Outcome: Progressing  Goal: Promote/optimize nutrition  Outcome: Progressing  Goal: Promote skin healing  Outcome: Progressing

## 2024-10-20 VITALS
SYSTOLIC BLOOD PRESSURE: 120 MMHG | OXYGEN SATURATION: 92 % | DIASTOLIC BLOOD PRESSURE: 57 MMHG | HEIGHT: 63 IN | BODY MASS INDEX: 43.41 KG/M2 | HEART RATE: 82 BPM | TEMPERATURE: 96.8 F | WEIGHT: 245 LBS | RESPIRATION RATE: 16 BRPM

## 2024-10-20 LAB
ANION GAP SERPL CALCULATED.3IONS-SCNC: 12 MMOL/L (ref 10–20)
BACTERIA BLD AEROBE CULT: ABNORMAL
BACTERIA BLD CULT: ABNORMAL
BACTERIA BLD CULT: NORMAL
BACTERIA BLD CULT: NORMAL
BACTERIA UR CULT: ABNORMAL
BUN SERPL-MCNC: 16 MG/DL (ref 6–23)
CALCIUM SERPL-MCNC: 8.7 MG/DL (ref 8.6–10.3)
CHLORIDE SERPL-SCNC: 95 MMOL/L (ref 98–107)
CO2 SERPL-SCNC: 33 MMOL/L (ref 21–32)
CREAT SERPL-MCNC: 0.71 MG/DL (ref 0.5–1.05)
CRP SERPL-MCNC: 7.8 MG/DL
EGFRCR SERPLBLD CKD-EPI 2021: 88 ML/MIN/1.73M*2
ERYTHROCYTE [DISTWIDTH] IN BLOOD BY AUTOMATED COUNT: 15.5 % (ref 11.5–14.5)
GLUCOSE SERPL-MCNC: 94 MG/DL (ref 74–99)
GRAM STN SPEC: ABNORMAL
HCT VFR BLD AUTO: 31 % (ref 36–46)
HGB BLD-MCNC: 9.4 G/DL (ref 12–16)
MCH RBC QN AUTO: 27.7 PG (ref 26–34)
MCHC RBC AUTO-ENTMCNC: 30.3 G/DL (ref 32–36)
MCV RBC AUTO: 91 FL (ref 80–100)
NRBC BLD-RTO: 0 /100 WBCS (ref 0–0)
PLATELET # BLD AUTO: 298 X10*3/UL (ref 150–450)
POTASSIUM SERPL-SCNC: 3.5 MMOL/L (ref 3.5–5.3)
RBC # BLD AUTO: 3.39 X10*6/UL (ref 4–5.2)
SODIUM SERPL-SCNC: 136 MMOL/L (ref 136–145)
VANCOMYCIN SERPL-MCNC: 22.5 UG/ML (ref 5–20)
WBC # BLD AUTO: 6.7 X10*3/UL (ref 4.4–11.3)

## 2024-10-20 PROCEDURE — 2500000002 HC RX 250 W HCPCS SELF ADMINISTERED DRUGS (ALT 637 FOR MEDICARE OP, ALT 636 FOR OP/ED): Performed by: STUDENT IN AN ORGANIZED HEALTH CARE EDUCATION/TRAINING PROGRAM

## 2024-10-20 PROCEDURE — 2500000005 HC RX 250 GENERAL PHARMACY W/O HCPCS: Performed by: STUDENT IN AN ORGANIZED HEALTH CARE EDUCATION/TRAINING PROGRAM

## 2024-10-20 PROCEDURE — 99233 SBSQ HOSP IP/OBS HIGH 50: CPT | Performed by: INTERNAL MEDICINE

## 2024-10-20 PROCEDURE — 86140 C-REACTIVE PROTEIN: CPT | Performed by: INTERNAL MEDICINE

## 2024-10-20 PROCEDURE — 2500000004 HC RX 250 GENERAL PHARMACY W/ HCPCS (ALT 636 FOR OP/ED): Performed by: STUDENT IN AN ORGANIZED HEALTH CARE EDUCATION/TRAINING PROGRAM

## 2024-10-20 PROCEDURE — 99232 SBSQ HOSP IP/OBS MODERATE 35: CPT | Performed by: NURSE PRACTITIONER

## 2024-10-20 PROCEDURE — 36415 COLL VENOUS BLD VENIPUNCTURE: CPT | Performed by: NURSE PRACTITIONER

## 2024-10-20 PROCEDURE — 1200000002 HC GENERAL ROOM WITH TELEMETRY DAILY

## 2024-10-20 PROCEDURE — 2500000001 HC RX 250 WO HCPCS SELF ADMINISTERED DRUGS (ALT 637 FOR MEDICARE OP): Performed by: STUDENT IN AN ORGANIZED HEALTH CARE EDUCATION/TRAINING PROGRAM

## 2024-10-20 PROCEDURE — 2500000001 HC RX 250 WO HCPCS SELF ADMINISTERED DRUGS (ALT 637 FOR MEDICARE OP): Performed by: INTERNAL MEDICINE

## 2024-10-20 PROCEDURE — 85027 COMPLETE CBC AUTOMATED: CPT | Performed by: NURSE PRACTITIONER

## 2024-10-20 PROCEDURE — 80202 ASSAY OF VANCOMYCIN: CPT | Performed by: NURSE PRACTITIONER

## 2024-10-20 PROCEDURE — 2500000004 HC RX 250 GENERAL PHARMACY W/ HCPCS (ALT 636 FOR OP/ED): Mod: JZ | Performed by: NURSE PRACTITIONER

## 2024-10-20 PROCEDURE — 80048 BASIC METABOLIC PNL TOTAL CA: CPT | Performed by: NURSE PRACTITIONER

## 2024-10-20 RX ORDER — VANCOMYCIN 1.75 G/350ML
1250 INJECTION, SOLUTION INTRAVENOUS EVERY 24 HOURS
Status: DISCONTINUED | OUTPATIENT
Start: 2024-10-20 | End: 2024-10-21

## 2024-10-20 RX ADMIN — MEROPENEM AND SODIUM CHLORIDE 1 G: 1 INJECTION, SOLUTION INTRAVENOUS at 03:17

## 2024-10-20 RX ADMIN — OXYBUTYNIN CHLORIDE 5 MG: 5 TABLET ORAL at 08:20

## 2024-10-20 RX ADMIN — Medication 2 L/MIN: at 07:29

## 2024-10-20 RX ADMIN — MEROPENEM AND SODIUM CHLORIDE 1 G: 1 INJECTION, SOLUTION INTRAVENOUS at 11:58

## 2024-10-20 RX ADMIN — OXYBUTYNIN CHLORIDE 5 MG: 5 TABLET ORAL at 20:02

## 2024-10-20 RX ADMIN — GABAPENTIN 200 MG: 100 CAPSULE ORAL at 20:02

## 2024-10-20 RX ADMIN — POLYETHYLENE GLYCOL 3350 17 G: 17 POWDER, FOR SOLUTION ORAL at 08:21

## 2024-10-20 RX ADMIN — ENOXAPARIN SODIUM 40 MG: 40 INJECTION SUBCUTANEOUS at 20:02

## 2024-10-20 RX ADMIN — VANCOMYCIN 1250 MG: 1.75 INJECTION, SOLUTION INTRAVENOUS at 22:19

## 2024-10-20 RX ADMIN — POTASSIUM CHLORIDE 20 MEQ: 1.5 FOR SOLUTION ORAL at 17:36

## 2024-10-20 RX ADMIN — FUROSEMIDE 40 MG: 10 INJECTION, SOLUTION INTRAMUSCULAR; INTRAVENOUS at 15:22

## 2024-10-20 RX ADMIN — FAMOTIDINE 20 MG: 20 TABLET ORAL at 20:06

## 2024-10-20 RX ADMIN — FAMOTIDINE 20 MG: 20 TABLET ORAL at 08:20

## 2024-10-20 RX ADMIN — DAPAGLIFLOZIN 10 MG: 10 TABLET, FILM COATED ORAL at 08:20

## 2024-10-20 RX ADMIN — SIMVASTATIN 40 MG: 40 TABLET, FILM COATED ORAL at 20:02

## 2024-10-20 RX ADMIN — ACETAMINOPHEN 650 MG: 325 TABLET ORAL at 19:57

## 2024-10-20 RX ADMIN — GABAPENTIN 200 MG: 100 CAPSULE ORAL at 08:20

## 2024-10-20 RX ADMIN — ENOXAPARIN SODIUM 40 MG: 40 INJECTION SUBCUTANEOUS at 08:20

## 2024-10-20 RX ADMIN — GABAPENTIN 200 MG: 100 CAPSULE ORAL at 15:21

## 2024-10-20 RX ADMIN — Medication 2 L/MIN: at 14:22

## 2024-10-20 RX ADMIN — LOSARTAN POTASSIUM 25 MG: 25 TABLET, FILM COATED ORAL at 08:20

## 2024-10-20 RX ADMIN — ALLOPURINOL 100 MG: 100 TABLET ORAL at 08:20

## 2024-10-20 RX ADMIN — POTASSIUM CHLORIDE 20 MEQ: 1.5 FOR SOLUTION ORAL at 08:20

## 2024-10-20 RX ADMIN — FUROSEMIDE 40 MG: 10 INJECTION, SOLUTION INTRAMUSCULAR; INTRAVENOUS at 08:20

## 2024-10-20 RX ADMIN — MEROPENEM AND SODIUM CHLORIDE 1 G: 1 INJECTION, SOLUTION INTRAVENOUS at 19:57

## 2024-10-20 ASSESSMENT — COGNITIVE AND FUNCTIONAL STATUS - GENERAL
TOILETING: A LOT
PERSONAL GROOMING: A LOT
MOVING TO AND FROM BED TO CHAIR: A LOT
DRESSING REGULAR LOWER BODY CLOTHING: A LOT
WALKING IN HOSPITAL ROOM: TOTAL
DRESSING REGULAR UPPER BODY CLOTHING: A LOT
CLIMB 3 TO 5 STEPS WITH RAILING: TOTAL
HELP NEEDED FOR BATHING: A LOT
TURNING FROM BACK TO SIDE WHILE IN FLAT BAD: A LOT
DAILY ACTIVITIY SCORE: 14
STANDING UP FROM CHAIR USING ARMS: TOTAL
MOBILITY SCORE: 9
MOVING FROM LYING ON BACK TO SITTING ON SIDE OF FLAT BED WITH BEDRAILS: A LOT

## 2024-10-20 ASSESSMENT — PAIN SCALES - GENERAL
PAINLEVEL_OUTOF10: 3
PAINLEVEL_OUTOF10: 0 - NO PAIN
PAINLEVEL_OUTOF10: 3
PAINLEVEL_OUTOF10: 2

## 2024-10-20 ASSESSMENT — PAIN - FUNCTIONAL ASSESSMENT
PAIN_FUNCTIONAL_ASSESSMENT: 0-10
PAIN_FUNCTIONAL_ASSESSMENT: 0-10

## 2024-10-20 NOTE — PROGRESS NOTES
Vancomycin Dosing by Pharmacy- FOLLOW UP    Angela Montelongo is a 74 y.o. year old female who Pharmacy has been consulted for vancomycin dosing for osteomyelitis/septic arthritis. Based on the patient's indication and renal status this patient is being dosed based on a goal AUC of 400-600.     Renal function is currently stable.    Current vancomycin dose: 1000 mg given every 12 hours    Estimated vancomycin AUC on current dose: 753 mg/L.hr     Visit Vitals  /58 (BP Location: Right arm, Patient Position: Lying)   Pulse 79   Temp 36.6 °C (97.9 °F) (Temporal)   Resp 18        Lab Results   Component Value Date    CREATININE 0.71 10/20/2024    CREATININE 0.75 10/19/2024    CREATININE 0.68 10/18/2024    CREATININE 0.65 10/17/2024        Patient weight is as follows:   Vitals:    10/18/24 0653   Weight: 111 kg (245 lb)       Cultures:  Susceptibility data for the encounter in last 14 days.  Collected Specimen Info Organism Ampicillin Cefazolin Ciprofloxacin Gentamicin Levofloxacin Piperacillin/Tazobactam Trimethoprim/Sulfamethoxazole   10/17/24 Urine from Clean Catch/Voided Enteric bacilli          10/17/24 Blood culture from Peripheral Venipuncture Escherichia coli  S  S  S  S  S  S  S        I/O last 3 completed shifts:  In: 1200 (10.8 mL/kg) [P.O.:1200]  Out: 2450 (22 mL/kg) [Urine:2450 (0.6 mL/kg/hr)]  Weight: 111.1 kg   I/O during current shift:  No intake/output data recorded.    Temp (24hrs), Av.2 °C (97.2 °F), Min:35.8 °C (96.4 °F), Max:36.6 °C (97.9 °F)      Assessment/Plan    Above goal AUC. Orders placed for new vancomcyin regimen of 1250 mg every 24 hours to begin at 2200 10/20.    This dosing regimen is predicted by [employer] to result in the following pharmacokinetic parameters:  Loading dose: N/A  Regimen: 1250 mg IV every 24 hours.  Start time: 21:58 on 10/20/2024  Exposure target: AUC24 (range)400-600 mg/L.hr   OSP32-07: 520 mg/L.hr  AUC24,ss: 489 mg/L.hr  Probability of AUC24 > 400: 90  %  Ctrough,ss: 13.9 mg/L  Probability of Ctrough,ss > 20: 7 %    The next level will be obtained on 10/21 at 0500. May be obtained sooner if clinically indicated.   Will continue to monitor renal function daily while on vancomycin and order serum creatinine at least every 48 hours if not already ordered.  Follow for continued vancomycin needs, clinical response, and signs/symptoms of toxicity.       Lrorie Leach, PharmD

## 2024-10-20 NOTE — PROGRESS NOTES
Ortho spine  I have reviewed the images of the MRI scans of the thoracic and lumbosacral spines.  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  There is no central spinal stenosis.  No cord or conus compression.  No epidural abscess.  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    She has chronic severe spinal stenosis at L3-4 with a patent central canal and slightly less severe stenosis at L4-5.  There is no arachnoiditis.  There are no fractures.  There is a thin fluid collection in the disc spaces, which do not indicate discitis.  She also has a degenerative spondylolisthesis at L5-S1 with moderate recess/foraminal narrowing.    There is no evidence of fracture or acute instability.    There is no indication for urgent surgical intervention.  The current plan for medical management for osteomyelitis and discitis is appropriate.  With positive blood and urine cultures for E. coli it is highly likely that the discitis osteomyelitis is of the same etiology.

## 2024-10-20 NOTE — CARE PLAN
The patient's goals for the shift include Breath better, rest    The clinical goals for the shift include monitor labs and vitals, manage pain, rest, safety, comfort, IV antibiotics          Problem: Safety - Adult  Goal: Free from fall injury  10/19/2024 2258 by Ann Karimi RN  Outcome: Progressing  10/19/2024 2253 by Ann Karimi RN  Outcome: Progressing     Problem: Discharge Planning  Goal: Discharge to home or other facility with appropriate resources  10/19/2024 2258 by Ann Karimi RN  Outcome: Progressing  10/19/2024 2253 by Ann Karimi RN  Outcome: Progressing     Problem: Chronic Conditions and Co-morbidities  Goal: Patient's chronic conditions and co-morbidity symptoms are monitored and maintained or improved  10/19/2024 2258 by Ann Karimi RN  Outcome: Progressing  10/19/2024 2253 by Ann Karimi RN  Outcome: Progressing     Problem: Fall/Injury  Goal: Not fall by end of shift  10/19/2024 2258 by Ann Karimi RN  Outcome: Progressing  10/19/2024 2253 by Ann Karimi RN  Outcome: Progressing  Goal: Be free from injury by end of the shift  10/19/2024 2258 by Ann Karimi RN  Outcome: Progressing  10/19/2024 2253 by Ann Karimi RN  Outcome: Progressing  Goal: Verbalize understanding of personal risk factors for fall in the hospital  10/19/2024 2258 by Ann Karimi RN  Outcome: Progressing  10/19/2024 2253 by Ann Karimi RN  Outcome: Progressing  Goal: Verbalize understanding of risk factor reduction measures to prevent injury from fall in the home  10/19/2024 2258 by Ann Karimi RN  Outcome: Progressing  10/19/2024 2253 by Ann Karimi RN  Outcome: Progressing  Goal: Use assistive devices by end of the shift  10/19/2024 2258 by Ann Karimi RN  Outcome: Progressing  10/19/2024 2253 by Ann Karimi RN  Outcome: Progressing  Goal: Pace activities to prevent fatigue by end of the  shift  10/19/2024 2258 by Ann Karimi RN  Outcome: Progressing  10/19/2024 2253 by Ann Karimi RN  Outcome: Progressing     Problem: Heart Failure  Goal: Improved gas exchange this shift  10/19/2024 2258 by Ann Karimi RN  Outcome: Progressing  10/19/2024 2253 by Ann Karimi RN  Outcome: Progressing  Goal: Improved urinary output this shift  10/19/2024 2258 by Ann Karimi RN  Outcome: Progressing  10/19/2024 2253 by Ann Karimi RN  Outcome: Progressing  Goal: Reduction in peripheral edema within 24 hours  10/19/2024 2258 by Ann Karimi RN  Outcome: Progressing  10/19/2024 2253 by Ann Karimi RN  Outcome: Progressing  Goal: Report improvement of dyspnea/breathlessness this shift  10/19/2024 2258 by Ann Karimi RN  Outcome: Progressing  10/19/2024 2253 by Ann Karimi RN  Outcome: Progressing  Goal: Weight from fluid excess reduced over 2-3 days, then stabilize  10/19/2024 2258 by Ann Karimi RN  Outcome: Progressing  10/19/2024 2253 by Ann Karimi RN  Outcome: Progressing  Goal: Increase self care and/or family involvement in 24 hours  10/19/2024 2258 by Ann Karimi RN  Outcome: Progressing  10/19/2024 2253 by Ann Karimi RN  Outcome: Progressing

## 2024-10-20 NOTE — PROGRESS NOTES
Bruna Helton is a 74 y.o. female on day 2 of admission presenting with Acute on chronic diastolic congestive heart failure.      Subjective   Patient seen and examined. Resting in bed. States she is continuing to feel better. Denies any current SOB. Has some mild back pain Slept well overnight.       Objective     Last Recorded Vitals  /58 (BP Location: Right arm, Patient Position: Lying)   Pulse 79   Temp 36.6 °C (97.9 °F) (Temporal)   Resp 18   Wt 111 kg (245 lb)   SpO2 96%   Intake/Output last 3 Shifts:    Intake/Output Summary (Last 24 hours) at 10/20/2024 0856  Last data filed at 10/20/2024 0312  Gross per 24 hour   Intake 1200 ml   Output 2350 ml   Net -1150 ml       Admission Weight  Weight: 118 kg (260 lb) (10/17/24 1757)    Daily Weight  10/18/24 : 111 kg (245 lb)    Image Results  Transthoracic Echo (TTE) Complete              [facility]  [address] [place], [place] [address]              Phone [phone]    TRANSTHORACIC ECHOCARDIOGRAM REPORT    Patient Name:      BRUNA HELTON        Reading Physician:    [id number] Cristina Wharton MD  Study Date:        10/18/2024            Ordering Provider:    [id number] CHETAN GARCIA  MRN/PID:           [id number]              Fellow:  Accession#:        [id number]          Nurse:  Date of Birth/Age: 1950 / 74 years  Sonographer:          Audrey Barger RDCS  Gender:            F                     Additional Staff:     Erlinda Merino RDCS  Height:            160.02 cm             Admit Date:  Weight:            117.94 kg             Admission Status:     Inpatient -                                                                 Routine  BSA / BMI:         2.16 m2 / 46.06 kg/m2  Department Location:  [facility]  Blood Pressure: 131 /65 mmHg    Study Type:    TRANSTHORACIC ECHO (TTE) COMPLETE  Diagnosis/ICD: Acute on chronic diastolic (congestive) heart failure                 (CHF)-I50.33  Indication:    CHF, SOB  CPT Codes:     Echo Complete w Full Doppler-40092    Patient History:  Pertinent History: HLD, bradycardia, HTN, CHF.    Study Detail: The following Echo studies were performed: 2D, M-Mode, Doppler and                color flow. Technically challenging study due to body habitus,                patient lying in supine position and prominent lung artifact.                Definity used as a contrast agent for endocardial border                definition. Total contrast used for this procedure was 4 mL via IV                push.       PHYSICIAN INTERPRETATION:  Left Ventricle: The left ventricular systolic function is moderately decreased, with a visually estimated ejection fraction of 30-35%. There are no regional wall motion abnormalities. The left ventricular cavity size is normal. Abnormal (paradoxical) septal motion, consistent with an intraventricular conduction delay. Spectral Doppler shows a normal pattern of left ventricular diastolic filling.  Left Atrium: The left atrium is normal in size.  Right Ventricle: The right ventricle was not well visualized. There is reduced right ventricular systolic function.  Right Atrium: The right atrium is normal in size.  Aortic Valve: The aortic valve is trileaflet. The aortic valve dimensionless index is 0.78. There is no evidence of aortic valve regurgitation. The peak instantaneous gradient of the aortic valve is 7.2 mmHg. The mean gradient of the aortic valve is 4.0 mmHg.  Mitral Valve: The mitral valve is normal in structure. There is trace mitral valve regurgitation.  Tricuspid Valve: The tricuspid valve was not well visualized. There is trace tricuspid regurgitation. The right ventricular systolic pressure is unable to be  estimated.  Pulmonic Valve: The pulmonic valve is not well visualized. There is physiologic pulmonic valve regurgitation.  Pericardium: No pericardial effusion noted.  Aorta: The aortic root is normal.  Systemic Veins: The inferior vena cava appears normal in size, with IVC inspiratory collapse less than 50%.       CONCLUSIONS:   1. Poorly visualized anatomical structures due to suboptimal image quality.   2. The left ventricular systolic function is moderately decreased, with a visually estimated ejection fraction of 30-35%.   3. There is reduced right ventricular systolic function.   4. Trace mitral valve regurgitation.   5. Trace tricuspid regurgitation is visualized.   6. Limited images quality. Left ventricular ejection fraction appears reduced at 30 to 35% with interventricular conduction delay.    QUANTITATIVE DATA SUMMARY:     LA VOLUME:                   Normal Ranges:  LA Vol A4C:        69.7 ml   (22+/-6mL/m2)  LA Vol Index A4C:  32.2ml/m2  LA Area A4C:       23.0 cm2  LA Major Axis A4C: 6.4 cm  LA Vol A4C:        64.9 ml       M-MODE MEASUREMENTS:         Normal Ranges:  Ao Root:             2.90 cm (2.0-3.7cm)  LAs:                 4.00 cm (2.7-4.0cm)       AORTA MEASUREMENTS:         Normal Ranges:  Ao Sinus, d:        2.92 cm (2.1-3.5cm)  Ao STJ, d:          2.61 cm (1.7-3.4cm)  Asc Ao, d:          3.40 cm (2.1-3.4cm)       LV SYSTOLIC FUNCTION BY 2D PLANIMETRY (MOD):                       Normal Ranges:  EF-A4C View:    38 % (>=55%)  EF-Visual:      33 %  LV EF Reported: 33 %       LV DIASTOLIC FUNCTION:           Normal Ranges:  MV Peak E:             0.73 m/s  (0.7-1.2 m/s)  MV Peak A:             0.92 m/s  (0.42-0.7 m/s)  E/A Ratio:             0.79      (1.0-2.2)  MV e'                  0.066 m/s (>8.0)  MV lateral e'          0.07 m/s  MV medial e'           0.06 m/s  E/e' Ratio:            11.06     (<8.0)       MITRAL VALVE:         Normal Ranges:  MV DT:        94 msec (150-240msec)        AORTIC VALVE:                     Normal Ranges:  AoV Vmax:                1.34 m/s (<=1.7m/s)  AoV Peak P.2 mmHg (<20mmHg)  AoV Mean P.0 mmHg (1.7-11.5mmHg)  LVOT Max Alex:            1.07 m/s (<=1.1m/s)  AoV VTI:                 25.00 cm (18-25cm)  LVOT VTI:                19.50 cm  LVOT Diameter:           2.40 cm  (1.8-2.4cm)  AoV Area, VTI:           3.53 cm2 (2.5-5.5cm2)  AoV Area,Vmax:           3.61 cm2 (2.5-4.5cm2)  AoV Dimensionless Index: 0.78       RIGHT VENTRICLE:  TAPSE: 14.7 mm  RV s'  0.12 m/s       TRICUSPID VALVE/RVSP:         Normal Ranges:  IVC Diam:             1.59 cm       [id number] Cristina Wharton MD  Electronically signed on 10/19/2024 at 4:28:53 PM       ** Final **      Physical Exam    General: Alert and oriented x3, pleasant.   Cardiac: Regular rate and rhythm, S1/S2 , no murmur.   Pulmonary: Clear/Diminished on 2L NC.    Abdomen: Soft, obese, nontender. BS +x4.   Extremities: Some edema present to BLE worse on the L. Has general weakness.   Skin: No rashes or lesions.    Relevant Results    Scheduled medications  allopurinol, 100 mg, oral, Daily  dapagliflozin propanediol, 10 mg, oral, Daily  enoxaparin, 40 mg, subcutaneous, q12h DYLAN  famotidine, 20 mg, oral, BID   Or  famotidine, 20 mg, intravenous, BID  furosemide, 40 mg, intravenous, BID  gabapentin, 200 mg, oral, TID  losartan, 25 mg, oral, Daily  meropenem, 1 g, intravenous, q8h  oxybutynin, 5 mg, oral, BID  oxygen, , inhalation, q8h  perflutren protein A microsphere, 0.5 mL, intravenous, Once in imaging  polyethylene glycol, 17 g, oral, Daily  potassium chloride, 20 mEq, oral, BID  simvastatin, 40 mg, oral, Nightly  sulfur hexafluoride microsphr, 2 mL, intravenous, Once in imaging  vancomycin, 1,250 mg, intravenous, q24h      Continuous medications     PRN medications  PRN medications: acetaminophen **OR** acetaminophen **OR** acetaminophen, vancomycin     Results for orders placed or performed during the  hospital encounter of 10/17/24 (from the past 24 hours)   Potassium   Result Value Ref Range    Potassium 3.2 (L) 3.5 - 5.3 mmol/L   Magnesium   Result Value Ref Range    Magnesium 1.64 1.60 - 2.40 mg/dL   Troponin I, High Sensitivity   Result Value Ref Range    Troponin I, High Sensitivity 35 (H) 0 - 13 ng/L   CBC   Result Value Ref Range    WBC 6.7 4.4 - 11.3 x10*3/uL    nRBC 0.0 0.0 - 0.0 /100 WBCs    RBC 3.39 (L) 4.00 - 5.20 x10*6/uL    Hemoglobin 9.4 (L) 12.0 - 16.0 g/dL    Hematocrit 31.0 (L) 36.0 - 46.0 %    MCV 91 80 - 100 fL    MCH 27.7 26.0 - 34.0 pg    MCHC 30.3 (L) 32.0 - 36.0 g/dL    RDW 15.5 (H) 11.5 - 14.5 %    Platelets 298 150 - 450 x10*3/uL   Basic Metabolic Panel   Result Value Ref Range    Glucose 94 74 - 99 mg/dL    Sodium 136 136 - 145 mmol/L    Potassium 3.5 3.5 - 5.3 mmol/L    Chloride 95 (L) 98 - 107 mmol/L    Bicarbonate 33 (H) 21 - 32 mmol/L    Anion Gap 12 10 - 20 mmol/L    Urea Nitrogen 16 6 - 23 mg/dL    Creatinine 0.71 0.50 - 1.05 mg/dL    eGFR 88 >60 mL/min/1.73m*2    Calcium 8.7 8.6 - 10.3 mg/dL   Vancomycin   Result Value Ref Range    Vancomycin 22.5 (H) 5.0 - 20.0 ug/mL             Assessment/Plan      Acute Hypoxic Respiratory Failure / Bilateral Pleural Effusions  -Pulmonary follows.    -Likely secondary to the pleural effusions.   -Remains on 2L NC this AM. Continue to wean as able.   -CTA chest shows no PE. Does show large bilateral pleural effusions.   -IV lasix BID, continue for now, she continues to improve.   -May require thoracentesis, waiting to see how she tolerates diuresis. Repeat 2 view CXR to be done on Monday.      Acute Systolic CHF  -Cardiology follows. She has no known hx of CHF.   -ProBNP elevated 3,988 with pleural effusions on imaging.    -Echo shows decreased EF 30-35%.   -BID IV lasix, continue for now.   -Med adjustments made by cardio.   -Monitor strict I&O and daily weights. She is diuresing well with negative fluid balance.     Back Pain / Concern for  Osteomyelitis/Discitis  -CT lumbar and thoracic spine have classic findings of advanced discitis and osteomyelitis at T11-12 and L1-2.   -Ortho spine follows.   -MRI lumbar and thoracic spine done, pending read.   -ID follows.   -IV abx per ID, currently on meropenem and vanco.   -Will likely require IR aspiration of the disc space, awaiting MRI result.   -Blood cultures pending.   -CRP and ESR elevated.      Bacteremia  -ID on board.   -So far first set 1 of 2 with e.coli. Second set with no growth at this time- high suspicion this is from the urine.   -Continue broad spectrum IV abx.      UTI  -Has dysuria with leuks in UA.   -Urine culture with pansensitive e.coli.     -IV abx per ID.      Hypokalemia / Hypomagnesemia  -Repalcement K and mag ordered. Now normalized.  -Monitor on telemetry.   -Monitor levels.      HTN  -Continue current medications, adjustments made by cardio.   -Monitor BP.      HLD  -Continue statin.      DVT Risk  -Lovenox subcutaneous.      Plan  Cardio, Pulmonary, ID and Ortho Spine following.    Currently on IV lasix BID, continue for now, breathing improved, she is diuresing.   MRI lumbar and thoracic spine pending read.   Continue broad spectrum IV abx.   Awaiting cultures- so far blood culture 1 of 4 with e.coli and urine culture with e.coli.   Echo shows decreased EF, meds adjusted by cardio.  May require thoracentesis, will need follow up CXR on Monday after diuresis.   Will likely require IR disc aspiration. Awaiting final MRI results.   PT/OT owen pending the result of MRI and plan for her spine issue.   Awaiting bed at [place].   Will call to update tim Monroy today.    **UPDATE 11:50 called and updated tim Monroy. All questions answered. She states they have talked as a family and with the patient and would like the transfer to [facility] to be cancelled. Will call transfer center to cancel the transfer.         Suzi Wilcox, ANGELITO-CNP

## 2024-10-20 NOTE — CARE PLAN
The patient's goals for the shift include Breath better, rest    The clinical goals for the shift include monitor labs and vitals, manage pain, rest, safety, comfort, IV antibiotics      Problem: Safety - Adult  Goal: Free from fall injury  Outcome: Progressing     Problem: Discharge Planning  Goal: Discharge to home or other facility with appropriate resources  Outcome: Progressing     Problem: Chronic Conditions and Co-morbidities  Goal: Patient's chronic conditions and co-morbidity symptoms are monitored and maintained or improved  Outcome: Progressing     Problem: Fall/Injury  Goal: Not fall by end of shift  Outcome: Progressing  Goal: Be free from injury by end of the shift  Outcome: Progressing  Goal: Verbalize understanding of personal risk factors for fall in the hospital  Outcome: Progressing  Goal: Verbalize understanding of risk factor reduction measures to prevent injury from fall in the home  Outcome: Progressing  Goal: Use assistive devices by end of the shift  Outcome: Progressing  Goal: Pace activities to prevent fatigue by end of the shift  Outcome: Progressing     Problem: Heart Failure  Goal: Improved gas exchange this shift  Outcome: Progressing  Goal: Improved urinary output this shift  Outcome: Progressing  Goal: Reduction in peripheral edema within 24 hours  Outcome: Progressing  Goal: Report improvement of dyspnea/breathlessness this shift  Outcome: Progressing  Goal: Weight from fluid excess reduced over 2-3 days, then stabilize  Outcome: Progressing  Goal: Increase self care and/or family involvement in 24 hours  Outcome: Progressing     Problem: Nutrition  Goal: Oral intake greater 75%  Outcome: Progressing     Problem: Skin  Goal: Decreased wound size/increased tissue granulation at next dressing change  Outcome: Progressing  Goal: Participates in plan/prevention/treatment measures  Outcome: Progressing  Goal: Prevent/manage excess moisture  Outcome: Progressing  Goal: Prevent/minimize  sheer/friction injuries  Outcome: Progressing  Goal: Promote/optimize nutrition  Outcome: Progressing  Goal: Promote skin healing  Outcome: Progressing

## 2024-10-20 NOTE — PROGRESS NOTES
Angela Montelongo is a 74 y.o. female on day 2 of admission presenting with Acute on chronic diastolic congestive heart failure.    Subjective   Interval History:   Patient seen and examined  Daughter present  Afebrile, no chills  No chest pain or shortness of breath  No nausea vomiting or diarrhea        Review of Systems   All other systems reviewed and are negative.      Objective   Range of Vitals (last 24 hours)  Heart Rate:  [79-98]   Temp:  [35.8 °C (96.4 °F)-36.6 °C (97.9 °F)]   Resp:  [16-18]   BP: (129-140)/(56-79)   SpO2:  [93 %-100 %]   Daily Weight  10/18/24 : 111 kg (245 lb)    Body mass index is 43.4 kg/m².    Physical Exam  Constitutional:       Appearance: Normal appearance. She is obese.   HENT:      Head: Normocephalic and atraumatic.      Nose: Nose normal.      Mouth/Throat:      Mouth: Mucous membranes are moist.      Pharynx: Oropharynx is clear.   Eyes:      General: No scleral icterus.  Cardiovascular:      Rate and Rhythm: Normal rate and regular rhythm.   Pulmonary:      Comments: Decreased bilateral breath sounds   Abdominal:      General: Bowel sounds are normal.      Palpations: Abdomen is soft.   Musculoskeletal:         General: Normal range of motion.      Cervical back: Normal range of motion and neck supple.   Skin:     General: Skin is warm and dry.   Neurological:      Mental Status: She is alert.      Comments: Awake, alert   Psychiatric:         Mood and Affect: Mood normal.         Behavior: Behavior normal.     Antibiotics  meropenem - 1 gram/50 mL  vancomycin - 1.25 gram/250 mL    Relevant Results  Labs  Results from last 72 hours   Lab Units 10/20/24  0531 10/19/24  0416 10/17/24  1818   WBC AUTO x10*3/uL 6.7 6.9 14.3*   HEMOGLOBIN g/dL 9.4* 9.1* 10.7*   HEMATOCRIT % 31.0* 29.0* 33.8*   PLATELETS AUTO x10*3/uL 298 293 391   NEUTROS PCT AUTO %  --   --  88.8   LYMPHS PCT AUTO %  --   --  5.9   MONOS PCT AUTO %  --   --  4.5   EOS PCT AUTO %  --   --  0.0     Results from last  72 hours   Lab Units 10/20/24  0531 10/19/24  1435 10/19/24  0416 10/18/24  0702   SODIUM mmol/L 136  --  137 135*   POTASSIUM mmol/L 3.5 3.2* 2.9* 2.9*   CHLORIDE mmol/L 95*  --  97* 95*   CO2 mmol/L 33*  --  32 28   BUN mg/dL 16  --  18 17   CREATININE mg/dL 0.71  --  0.75 0.68   GLUCOSE mg/dL 94  --  86 104*   CALCIUM mg/dL 8.7  --  8.6 8.8   ANION GAP mmol/L 12  --  11 15   EGFR mL/min/1.73m*2 88  --  83 90     Results from last 72 hours   Lab Units 10/19/24  0416 10/17/24  1818   ALK PHOS U/L 90 107   BILIRUBIN TOTAL mg/dL 0.5 1.2   PROTEIN TOTAL g/dL 5.1* 6.5   ALT U/L 21 17   AST U/L 38 32   ALBUMIN g/dL 2.5* 3.0*     Estimated Creatinine Clearance: 80.7 mL/min (by C-G formula based on SCr of 0.71 mg/dL).  C-Reactive Protein   Date Value Ref Range Status   10/18/2024 18.71 (H) <1.00 mg/dL Final     CRP   Date Value Ref Range Status   03/07/2018 4.2 (H) 0 - 2.0 MG/DL Final     Comment:     Performed at [place] [id number]     Microbiology  Susceptibility data from last 14 days.  Collected Specimen Info Organism Ampicillin Cefazolin Cefazolin (uncomplicated UTIs only) Ciprofloxacin Gentamicin Levofloxacin Nitrofurantoin Piperacillin/Tazobactam Trimethoprim/Sulfamethoxazole   10/17/24 Urine from Clean Catch/Voided Escherichia coli  S  S  S  S  S   S  S  S   10/17/24 Blood culture from Peripheral Venipuncture Escherichia coli  S  S   S  S  S   S  S     Imaging  MR lumbar spine wo IV contrast    Result Date: 10/20/2024  Interpreted By:  Les Dumont, STUDY: MR THORACIC SPINE WO IV CONTRAST; MR LUMBAR SPINE WO IV CONTRAST; 10/19/2024 4:24 pm   INDICATION: Signs/Symptoms:Question discitis/osteomyelitis on CT imaging.     COMPARISON: CT thoracic and lumbar spine dated 10/17/2024.   ACCESSION NUMBER(S): [id number]; [id number]   ORDERING CLINICIAN: JORGITO GRIFFITH   TECHNIQUE: Noncontrast multiplanar multisequence MR imaging of the thoracic and lumbar spine was performed. Sagittal T1,  T2, STIR and axial T2 and T1 weighted MR images of the thoracic spine were obtained.   FINDINGS: The degree of motion artifact significantly degrades evaluation.   THORACIC SPINE:   Assessment of the upper thoracic spinal cord is limited by motion artifact. CSF flow related artifact also degrades evaluation on several sequences. Within these limitations, there is no definite cord signal abnormality or cord compression. There is concern for epidural abscess component within the lower thoracic region with suggestion of circumferential T2 hyperintensity surrounding the thecal sac measuring up to 3 mm in maximum thickness along the ventral aspect at T12 (series 9, image 27). Potential epidural fluid/thickening may extend as far cranially as T7.   There is abnormal marrow signal centered about the T11-T12 endplates and intervertebral disc with STIR hyperintensity and T1 hypointensity and irregular appearance of the disc and endplates as previously described on CT examination, concerning for discitis/osteomyelitis. Signal abnormality also extends to involve the left-greater-than-right facet joints and there is extensive signal abnormality within the adjacent soft tissues including suspected paraspinal abscess components such as a 1.8 x 1.7 cm fluid collection along the rightward aspect of T11-T12 adjacent to the neural foraminal region (series 9, image 24). There is resultant mild height loss of the T11 and T12 vertebral bodies. Presumed epidural components resulting in moderate spinal canal stenosis at T11-T12. Inflammatory components are also evident involving the T11-T12 neural foraminal regions bilaterally.   Allowing for motion artifact there is no additional evidence of discitis/osteomyelitis within the remainder of the thoracic spine. The remaining thoracic vertebral bodies demonstrate grossly preserved heights. There is trace degenerative appearing anterolisthesis of T1 on T2 and T3 on T4. There is unchanged  S-shaped scoliosis of the cervicothoracic spine with levo scoliosis of the upper cervicothoracic region and dextroscoliotic curvature of the thoracolumbar region. Multilevel type 2 Modic endplate signal change. There is moderate multilevel degenerative disc height loss throughout the thoracic regions. There is no additional significant spinal canal stenosis evident. Predominantly mild multilevel neural foraminal narrowing is suspected with evaluation limited by scoliosis and aforementioned motion artifact.   There is extensive fatty atrophy of the posterior paraspinal musculature. Bilateral pleural effusions and dependent parenchymal airspace opacity are again noted.     LUMBAR SPINE:   Normal segmentation.   Similar to the T11-T12 level, there is extensive marrow edema and signal abnormality centered about the L1-L2 endplates and intervertebral disc concerning for discitis/osteomyelitis. There is surrounding soft tissue signal abnormality with concern for small abscess components along the rightward aspect at L1-L2 measuring up to 2.2 cm in diameter (series 9, image 20). No discrete epidural abscess component is seen at this level. There is mild destructive endplate height loss involving the L1 and L2 vertebrae.   The cauda equina are otherwise unremarkable. The conus appears to terminate at the T12-L1 level with CSF flow related artifact obscuring evaluation.   Similar levo scoliosis of the lumbar spine. The remaining lumbar vertebral body heights are otherwise maintained. There is no additional suspicious STIR hyperintensity/marrow edema within the remaining lumbar levels. There is moderate degenerative disc height loss within the remainder of the lumbar spine with likely degenerative appearing fluid within portions of the discs.   Degenerative change:   T12-L1: No spinal canal stenosis. Mild right-greater-than-left neural foraminal narrowing suggested.   L1-2: Mild facet arthropathy. Inflammatory components  involving the right-greater-than-left paraspinal soft tissues with no definite spinal canal stenosis, suspected mild right lateral recess narrowing, and inflammatory components extending to the right-greater-than-left neural foraminal regions.   L2-3: Mild bilateral facet arthropathy. Mild disc bulge and endplate spurring, worse along the rightward aspect. No spinal canal stenosis with mild right lateral recess narrowing. Minimal left and mild-to-moderate right neural foraminal narrowing.   L3-4: Moderate to severe bilateral facet arthropathy. Posterior disc osteophyte components. Moderate to severe spinal canal stenosis suggested. Moderate bilateral neural foraminal narrowing.   L4-5: Severe left and mild-to-moderate right facet arthropathy with ligamentum flavum hypertrophy. Disc bulge and hypertrophic endplate spurring. Severe spinal canal stenosis. Mild right and moderate to severe left neural foraminal narrowing.   L5-S1: Severe facet arthropathy. Mild disc bulge/uncovering. Mild left lateral recess narrowing. No additional spinal canal stenosis moderate bilateral neural foraminal narrowing.   Extensive fatty atrophy of the posterior paraspinal and iliopsoas musculature.       1. Motion degraded examination. 2. Findings to suggest discitis/osteomyelitis involving the T11-T12 and L1-L2 levels. There is mild height loss related to endplate destruction at these levels with surrounding inflammatory soft tissue change including right-sided small paraspinal abscess components suspected. Additionally, there is concern for potential epidural phlegmon versus abscess centered at T11-T12 with resultant moderate spinal canal stenosis at this level. Inflammatory components also involve the neural foraminal regions at both levels. 3. No additional evidence of significant spinal canal stenosis or cord compression within the thoracic regions when accounting for motion limitations. There is concern for moderate to severe spinal  canal stenosis at L3-L4 and severe spinal canal stenosis at L4-L5 secondary to degenerative change. 4. Extensive scoliosis.   MACRO: None   Signed by: Les Dumont 10/20/2024 1:14 PM Dictation workstation:   [id number]    MR thoracic spine wo IV contrast    Result Date: 10/20/2024  Interpreted By:  Les Dumont, STUDY: MR THORACIC SPINE WO IV CONTRAST; MR LUMBAR SPINE WO IV CONTRAST; 10/19/2024 4:24 pm   INDICATION: Signs/Symptoms:Question discitis/osteomyelitis on CT imaging.     COMPARISON: CT thoracic and lumbar spine dated 10/17/2024.   ACCESSION NUMBER(S): [id number]; [id number]   ORDERING CLINICIAN: JORGITO GRIFFITH   TECHNIQUE: Noncontrast multiplanar multisequence MR imaging of the thoracic and lumbar spine was performed. Sagittal T1, T2, STIR and axial T2 and T1 weighted MR images of the thoracic spine were obtained.   FINDINGS: The degree of motion artifact significantly degrades evaluation.   THORACIC SPINE:   Assessment of the upper thoracic spinal cord is limited by motion artifact. CSF flow related artifact also degrades evaluation on several sequences. Within these limitations, there is no definite cord signal abnormality or cord compression. There is concern for epidural abscess component within the lower thoracic region with suggestion of circumferential T2 hyperintensity surrounding the thecal sac measuring up to 3 mm in maximum thickness along the ventral aspect at T12 (series 9, image 27). Potential epidural fluid/thickening may extend as far cranially as T7.   There is abnormal marrow signal centered about the T11-T12 endplates and intervertebral disc with STIR hyperintensity and T1 hypointensity and irregular appearance of the disc and endplates as previously described on CT examination, concerning for discitis/osteomyelitis. Signal abnormality also extends to involve the left-greater-than-right facet joints and there is extensive signal abnormality within the adjacent soft tissues  including suspected paraspinal abscess components such as a 1.8 x 1.7 cm fluid collection along the rightward aspect of T11-T12 adjacent to the neural foraminal region (series 9, image 24). There is resultant mild height loss of the T11 and T12 vertebral bodies. Presumed epidural components resulting in moderate spinal canal stenosis at T11-T12. Inflammatory components are also evident involving the T11-T12 neural foraminal regions bilaterally.   Allowing for motion artifact there is no additional evidence of discitis/osteomyelitis within the remainder of the thoracic spine. The remaining thoracic vertebral bodies demonstrate grossly preserved heights. There is trace degenerative appearing anterolisthesis of T1 on T2 and T3 on T4. There is unchanged S-shaped scoliosis of the cervicothoracic spine with levo scoliosis of the upper cervicothoracic region and dextroscoliotic curvature of the thoracolumbar region. Multilevel type 2 Modic endplate signal change. There is moderate multilevel degenerative disc height loss throughout the thoracic regions. There is no additional significant spinal canal stenosis evident. Predominantly mild multilevel neural foraminal narrowing is suspected with evaluation limited by scoliosis and aforementioned motion artifact.   There is extensive fatty atrophy of the posterior paraspinal musculature. Bilateral pleural effusions and dependent parenchymal airspace opacity are again noted.     LUMBAR SPINE:   Normal segmentation.   Similar to the T11-T12 level, there is extensive marrow edema and signal abnormality centered about the L1-L2 endplates and intervertebral disc concerning for discitis/osteomyelitis. There is surrounding soft tissue signal abnormality with concern for small abscess components along the rightward aspect at L1-L2 measuring up to 2.2 cm in diameter (series 9, image 20). No discrete epidural abscess component is seen at this level. There is mild destructive endplate  height loss involving the L1 and L2 vertebrae.   The cauda equina are otherwise unremarkable. The conus appears to terminate at the T12-L1 level with CSF flow related artifact obscuring evaluation.   Similar levo scoliosis of the lumbar spine. The remaining lumbar vertebral body heights are otherwise maintained. There is no additional suspicious STIR hyperintensity/marrow edema within the remaining lumbar levels. There is moderate degenerative disc height loss within the remainder of the lumbar spine with likely degenerative appearing fluid within portions of the discs.   Degenerative change:   T12-L1: No spinal canal stenosis. Mild right-greater-than-left neural foraminal narrowing suggested.   L1-2: Mild facet arthropathy. Inflammatory components involving the right-greater-than-left paraspinal soft tissues with no definite spinal canal stenosis, suspected mild right lateral recess narrowing, and inflammatory components extending to the right-greater-than-left neural foraminal regions.   L2-3: Mild bilateral facet arthropathy. Mild disc bulge and endplate spurring, worse along the rightward aspect. No spinal canal stenosis with mild right lateral recess narrowing. Minimal left and mild-to-moderate right neural foraminal narrowing.   L3-4: Moderate to severe bilateral facet arthropathy. Posterior disc osteophyte components. Moderate to severe spinal canal stenosis suggested. Moderate bilateral neural foraminal narrowing.   L4-5: Severe left and mild-to-moderate right facet arthropathy with ligamentum flavum hypertrophy. Disc bulge and hypertrophic endplate spurring. Severe spinal canal stenosis. Mild right and moderate to severe left neural foraminal narrowing.   L5-S1: Severe facet arthropathy. Mild disc bulge/uncovering. Mild left lateral recess narrowing. No additional spinal canal stenosis moderate bilateral neural foraminal narrowing.   Extensive fatty atrophy of the posterior paraspinal and iliopsoas  musculature.       1. Motion degraded examination. 2. Findings to suggest discitis/osteomyelitis involving the T11-T12 and L1-L2 levels. There is mild height loss related to endplate destruction at these levels with surrounding inflammatory soft tissue change including right-sided small paraspinal abscess components suspected. Additionally, there is concern for potential epidural phlegmon versus abscess centered at T11-T12 with resultant moderate spinal canal stenosis at this level. Inflammatory components also involve the neural foraminal regions at both levels. 3. No additional evidence of significant spinal canal stenosis or cord compression within the thoracic regions when accounting for motion limitations. There is concern for moderate to severe spinal canal stenosis at L3-L4 and severe spinal canal stenosis at L4-L5 secondary to degenerative change. 4. Extensive scoliosis.   MACRO: None   Signed by: Les Dumont 10/20/2024 1:14 PM Dictation workstation:   [id number]    Transthoracic Echo (TTE) Complete    Result Date: 10/19/2024            [facility] [address] [id number]             Phone [phone] TRANSTHORACIC ECHOCARDIOGRAM REPORT Patient Name:      BRUNA Burns Physician:    [id number] Cristina Wharton MD Study Date:        10/18/2024            Ordering Provider:    [id number] CHETAN GARCIA MRN/PID:           [id number]              Fellow: Accession#:        [id number]          Nurse: Date of Birth/Age: 1950 / 74 years  Sonographer:          Audrey Barger RDCS Gender:            F                     Additional Staff:     Erlinda Merino RDCS Height:            160.02 cm             Admit Date:  Weight:            117.94 kg             Admission Status:     Inpatient -                                                                Routine BSA / BMI:         2.16 m2 / 46.06 kg/m2 Department Location:  [facility] Blood Pressure: 131 /65 mmHg Study Type:    TRANSTHORACIC ECHO (TTE) COMPLETE Diagnosis/ICD: Acute on chronic diastolic (congestive) heart failure                (CHF)-I50.33 Indication:    CHF, SOB CPT Codes:     Echo Complete w Full Doppler-[id number] Patient History: Pertinent History: HLD, bradycardia, HTN, CHF. Study Detail: The following Echo studies were performed: 2D, M-Mode, Doppler and               color flow. Technically challenging study due to body habitus,               patient lying in supine position and prominent lung artifact.               Definity used as a contrast agent for endocardial border               definition. Total contrast used for this procedure was 4 mL via IV               push.  PHYSICIAN INTERPRETATION: Left Ventricle: The left ventricular systolic function is moderately decreased, with a visually estimated ejection fraction of 30-35%. There are no regional wall motion abnormalities. The left ventricular cavity size is normal. Abnormal (paradoxical) septal motion, consistent with an intraventricular conduction delay. Spectral Doppler shows a normal pattern of left ventricular diastolic filling. Left Atrium: The left atrium is normal in size. Right Ventricle: The right ventricle was not well visualized. There is reduced right ventricular systolic function. Right Atrium: The right atrium is normal in size. Aortic Valve: The aortic valve is trileaflet. The aortic valve dimensionless index is 0.78. There is no evidence of aortic valve regurgitation. The peak instantaneous gradient of the aortic valve is 7.2 mmHg. The mean gradient of the aortic valve is 4.0 mmHg. Mitral Valve: The mitral valve is normal in structure. There is trace mitral valve regurgitation. Tricuspid  Valve: The tricuspid valve was not well visualized. There is trace tricuspid regurgitation. The right ventricular systolic pressure is unable to be estimated. Pulmonic Valve: The pulmonic valve is not well visualized. There is physiologic pulmonic valve regurgitation. Pericardium: No pericardial effusion noted. Aorta: The aortic root is normal. Systemic Veins: The inferior vena cava appears normal in size, with IVC inspiratory collapse less than 50%.  CONCLUSIONS:  1. Poorly visualized anatomical structures due to suboptimal image quality.  2. The left ventricular systolic function is moderately decreased, with a visually estimated ejection fraction of 30-35%.  3. There is reduced right ventricular systolic function.  4. Trace mitral valve regurgitation.  5. Trace tricuspid regurgitation is visualized.  6. Limited images quality. Left ventricular ejection fraction appears reduced at 30 to 35% with interventricular conduction delay. QUANTITATIVE DATA SUMMARY:  LA VOLUME:                   Normal Ranges: LA Vol A4C:        69.7 ml   (22+/-6mL/m2) LA Vol Index A4C:  32.2ml/m2 LA Area A4C:       23.0 cm2 LA Major Axis A4C: 6.4 cm LA Vol A4C:        64.9 ml  M-MODE MEASUREMENTS:         Normal Ranges: Ao Root:             2.90 cm (2.0-3.7cm) LAs:                 4.00 cm (2.7-4.0cm)  AORTA MEASUREMENTS:         Normal Ranges: Ao Sinus, d:        2.92 cm (2.1-3.5cm) Ao STJ, d:          2.61 cm (1.7-3.4cm) Asc Ao, d:          3.40 cm (2.1-3.4cm)  LV SYSTOLIC FUNCTION BY 2D PLANIMETRY (MOD):                      Normal Ranges: EF-A4C View:    38 % (>=55%) EF-Visual:      33 % LV EF Reported: 33 %  LV DIASTOLIC FUNCTION:           Normal Ranges: MV Peak E:             0.73 m/s  (0.7-1.2 m/s) MV Peak A:             0.92 m/s  (0.42-0.7 m/s) E/A Ratio:             0.79      (1.0-2.2) MV e'                  0.066 m/s (>8.0) MV lateral e'          0.07 m/s MV medial e'           0.06 m/s E/e' Ratio:            11.06     (<8.0)   MITRAL VALVE:         Normal Ranges: MV DT:        94 msec (150-240msec)  AORTIC VALVE:                     Normal Ranges: AoV Vmax:                1.34 m/s (<=1.7m/s) AoV Peak P.2 mmHg (<20mmHg) AoV Mean P.0 mmHg (1.7-11.5mmHg) LVOT Max Alex:            1.07 m/s (<=1.1m/s) AoV VTI:                 25.00 cm (18-25cm) LVOT VTI:                19.50 cm LVOT Diameter:           2.40 cm  (1.8-2.4cm) AoV Area, VTI:           3.53 cm2 (2.5-5.5cm2) AoV Area,Vmax:           3.61 cm2 (2.5-4.5cm2) AoV Dimensionless Index: 0.78  RIGHT VENTRICLE: TAPSE: 14.7 mm RV s'  0.12 m/s  TRICUSPID VALVE/RVSP:         Normal Ranges: IVC Diam:             1.59 cm  [id number] Cristina Wharton MD Electronically signed on 10/19/2024 at 4:28:53 PM  ** Final **     ECG 12 lead    Result Date: 10/18/2024  Sinus rhythm with Premature supraventricular complexes and with occasional and consecutive Premature ventricular complexes Right bundle branch block Left anterior fascicular block  Bifascicular block  Cannot rule out Anteroseptal infarct , age undetermined Abnormal ECG When compared with ECG of 30-AUG-2024 12:12, Premature supraventricular complexes are now Present Sinus rhythm is no longer with 2nd degree AV block (Mobitz II) Right bundle branch block has replaced Nonspecific intraventricular block Minimal criteria for Anteroseptal infarct are now Present    CT thoracic spine wo IV contrast    Result Date: 10/17/2024  Interpreted By:  Awais Ellis, STUDY: CT THORACIC SPINE WO IV CONTRAST; CT LUMBAR SPINE WO IV CONTRAST; 10/17/2024 10:15 pm   INDICATION: Signs/Symptoms:back pain, fall x 2 days ago.     COMPARISON: 2024   ACCESSION NUMBER(S): [id number]; [id number]   ORDERING CLINICIAN: KALYN RON   TECHNIQUE: Axial CT images of the thoracolumbar spine are obtained. Axial, coronal and sagittal reconstructions are submitted for review.   FINDINGS:     Alignment: There is mild scoliosis noted.    Vertebrae/Intervertebral Discs: The thoracolumbar vertebral body heights are intact. There is severe loss of intervertebral disc space. There are severe intervertebral destructive changes noted of the T11-T12 and L2-L3 levels. Findings appear somewhat worsened when compared to the prior study. Question underlying discitis/osteomyelitis. Recommend MRI for further evaluation. There is multilevel spinal canal stenosis noted. There is facet arthrosis noted.   Paraspinous Soft Tissues: Within normal limits.         There are severe intervertebral destructive changes noted of the T11-T12 and L2-L3 levels. Findings appear somewhat worsened when compared to the prior study. Question underlying discitis/osteomyelitis. Recommend MRI for further evaluation. Recommend clinical correlation.   MACRO: None   Signed by: Awais Ellis 10/17/2024 10:32 PM Dictation workstation:   [id number]    CT lumbar spine wo IV contrast    Result Date: 10/17/2024  Interpreted By:  Awais Ellis, STUDY: CT THORACIC SPINE WO IV CONTRAST; CT LUMBAR SPINE WO IV CONTRAST; 10/17/2024 10:15 pm   INDICATION: Signs/Symptoms:back pain, fall x 2 days ago.     COMPARISON: 08/31/2024   ACCESSION NUMBER(S): [id number]; [id number]   ORDERING CLINICIAN: KALYN RON   TECHNIQUE: Axial CT images of the thoracolumbar spine are obtained. Axial, coronal and sagittal reconstructions are submitted for review.   FINDINGS:     Alignment: There is mild scoliosis noted.   Vertebrae/Intervertebral Discs: The thoracolumbar vertebral body heights are intact. There is severe loss of intervertebral disc space. There are severe intervertebral destructive changes noted of the T11-T12 and L2-L3 levels. Findings appear somewhat worsened when compared to the prior study. Question underlying discitis/osteomyelitis. Recommend MRI for further evaluation. There is multilevel spinal canal stenosis noted. There is facet arthrosis noted.   Paraspinous Soft Tissues: Within  normal limits.         There are severe intervertebral destructive changes noted of the T11-T12 and L2-L3 levels. Findings appear somewhat worsened when compared to the prior study. Question underlying discitis/osteomyelitis. Recommend MRI for further evaluation. Recommend clinical correlation.   MACRO: None   Signed by: Awais Ellis 10/17/2024 10:32 PM Dictation workstation:   [id number]    CT angio chest for pulmonary embolism    Result Date: 10/17/2024  Interpreted By:  Awais Ellis, STUDY: CT ANGIO CHEST FOR PULMONARY EMBOLISM;  10/17/2024 8:09 pm   INDICATION: Signs/Symptoms:shortness of breath and tachypnea.     COMPARISON: CT abdomen pelvis dated 08/31/2024   ACCESSION NUMBER(S): [id number]   ORDERING CLINICIAN: KALYN RON   TECHNIQUE: Helical data acquisition of the chest was obtained after intravenous administration of 75 ML Omnipaque 350, as per PE protocol. Images were reformatted in coronal and sagittal planes. Axial and coronal maximum intensity projection (MIP) images were created and reviewed.   FINDINGS: POTENTIAL LIMITATIONS OF THE STUDY: The assessment is limited by respiratory motion and suboptimal contrast opacification of pulmonary arteries.   HEART AND VESSELS: There is no main or lobar pulmonary embolus visualized. There is suboptimal opacification and respiratory motion noted limiting evaluation of the segmental and subsegmental branches. Main pulmonary artery and its branches are normal in caliber.   The thoracic aorta normal in course and caliber. Coronary artery calcifications are seen. Please note, the study is not optimized for evaluation of coronary arteries.   The cardiac chambers are not enlarged.   There is no pericardial effusion seen.   MEDIASTINUM AND SENG, LOWER NECK AND AXILLA: The visualized thyroid gland is within normal limits. No evidence of thoracic lymphadenopathy by CT criteria. Esophagus appears within normal limits as seen.   LUNGS AND AIRWAYS: There are  large bilateral pleural effusions with overlying atelectasis versus consolidation. There are scattered ground-glass airspace opacities noted.     UPPER ABDOMEN: The visualized subdiaphragmatic structures demonstrate no remarkable findings.       CHEST WALL AND OSSEOUS STRUCTURES: Chest wall is within normal limits. There is multilevel loss of intervertebral disc space. The endplate osteophytic changes noted.There are severe degenerative changes noted of the bilateral shoulder joints. Associated soft tissue lesions.There are destructive changes noted of the T11-T12 and L1-L2 disc spaces noted. Findings are unchanged from the prior study.       1. There is no main or lobar pulmonary embolus visualized. There is suboptimal opacification and respiratory motion noted limiting evaluation of the segmental and subsegmental branches. 2. There are large bilateral pleural effusions with overlying atelectasis versus consolidation. There are scattered ground-glass airspace opacities noted. 3. There are severe degenerative changes noted of the bilateral shoulder joints. Associated soft tissue lesions.   MACRO: None   Signed by: Awais Ellis 10/17/2024 8:28 PM Dictation workstation:   [id number]     Assessment/Plan   Discitis/osteomyelitis -seen on CT-awaiting MRI  Acute hypoxic respiratory failure, resolving, on 2 L  Bilateral pleural effusion   E. coli urinary tract infection  E. coli bacteremia  Allergy to cephalosporin         IV meropenem-monitor for adverse reaction   Discontinue vancomycin  Interventional radiology consult for CT-guided aspiration  Supplemental oxygen, wean as tolerated  Monitor temperature and WBC  Follow up echocardiogram   Follow-up MRI report once available  PICC line placement  Final recommendations after MRI report/IR aspiration      Ryland Norwood MD

## 2024-10-20 NOTE — PROGRESS NOTES
Subjective Data:  Patient doing better.  Diuresing okay.  Denies having chest pain.    Overnight Events:    Telemetry overnight reviewed no events     Objective Data:  Last Recorded Vitals:  Vitals:    10/20/24 0312 10/20/24 0703 10/20/24 0729 10/20/24 1102   BP: 132/56 132/58  129/68   BP Location: Right arm Right arm  Right arm   Patient Position: Lying Lying  Lying   Pulse: 92 79  87   Resp: 17 18  18   Temp: 36.2 °C (97.2 °F) 36.6 °C (97.9 °F)  36.6 °C (97.9 °F)   TempSrc: Temporal Temporal  Temporal   SpO2: 93% 95% 96% 95%   Weight:       Height:           Last Labs:  CBC - 10/20/2024:  5:31 AM  6.7 9.4 298    31.0      CMP - 10/20/2024:  5:31 AM  8.7 5.1 38 --- 0.5   2.2 2.5 21 90      PTT - No results in last year.  _   _ _     TROPHS   Date/Time Value Ref Range Status   10/19/2024 02:35 PM 35 0 - 13 ng/L Final     BNP   Date/Time Value Ref Range Status   10/17/2024 06:18 PM 3,988 0 - 99 pg/mL Final     HGBA1C   Date/Time Value Ref Range Status   02/01/2019 07:03 PM 5.3 4.0 - 6.0 % Final     Comment:     Hemoglobin A1C levels are related to mean blood glucose during the   preceding 2-3 months. The relationship table below may be used as a   general guide. Each 1% increase in HGB A1C is a reflection of an   increase in mean glucose of approximately 30 mg/dl.   Reference: Diabetes [employer], volume 29, supplement 1 Jan. 2006                        HGB A1C ................. Approx. Mean Glucose   _______________________________________________   6%   ...............................  120 mg/dl   7%   ...............................  150 mg/dl   8%   ...............................  180 mg/dl   9%   ...............................  210 mg/dl   10%  ...............................  240 mg/dl  Performed at [address] [address]       LDLCALC   Date/Time Value Ref Range Status   06/07/2022 03:01 PM 81 65 - 130 MG/DL Final   09/22/2020 03:00 PM 88 65 - 130 MG/DL Final   02/02/2019 05:20 AM 44 65 -  130 MG/DL Final      Last I/O:  I/O last 3 completed shifts:  In: 1200 (10.8 mL/kg) [P.O.:1200]  Out: 2450 (22 mL/kg) [Urine:2450 (0.6 mL/kg/hr)]  Weight: 111.1 kg     Past Cardiology Tests (Last 3 Years):  EKG:  ECG 12 lead 10/17/2024 (Preliminary)      ECG 12 lead 08/29/2024    Echo:  Transthoracic Echo (TTE) Complete 10/18/2024    Ejection Fractions:  EF   Date/Time Value Ref Range Status   10/18/2024 02:33 PM 33 %      Cath:  No results found for this or any previous visit from the past 1095 days.    Stress Test:  No results found for this or any previous visit from the past 1095 days.    Cardiac Imaging:  No results found for this or any previous visit from the past 1095 days.      Inpatient Medications:  Scheduled medications   Medication Dose Route Frequency    allopurinol  100 mg oral Daily    dapagliflozin propanediol  10 mg oral Daily    enoxaparin  40 mg subcutaneous q12h DYLAN    famotidine  20 mg oral BID    Or    famotidine  20 mg intravenous BID    furosemide  40 mg intravenous BID    gabapentin  200 mg oral TID    losartan  25 mg oral Daily    meropenem  1 g intravenous q8h    oxybutynin  5 mg oral BID    oxygen   inhalation q8h    perflutren protein A microsphere  0.5 mL intravenous Once in imaging    polyethylene glycol  17 g oral Daily    potassium chloride  20 mEq oral BID    simvastatin  40 mg oral Nightly    sulfur hexafluoride microsphr  2 mL intravenous Once in imaging    vancomycin  1,250 mg intravenous q24h     PRN medications   Medication    acetaminophen    Or    acetaminophen    Or    acetaminophen    vancomycin     Continuous Medications   Medication Dose Last Rate       Physical Exam:  General: Patient is in no acute distress.  HEENT: atraumatic normocephalic.  Neck: is supple jugular venous pressure difficult to assess, no thyromegaly.  Cardiovascular regular rate and rhythm normal heart sounds no murmurs rubs or gallops.  Lungs: clear to auscultation bilaterally.  Abdomen: is soft  nontender.  Extremities warm to touch + edema.          Assessment/Plan  #1 acute systolic heart failure ejection fraction of 35%.  Patient had just had an echocardiogram yesterday showing ejection fraction of 35% likely secondary in part to left bundle.  Patient before on hydrochlorothiazide which was stopped for hyponatremia.  Currently has severe hypokalemia as well as hypomagnesemia.  Will optimize her treatment for heart failure systolic dysfunction.  Patient may need lumbar puncture as a workup for gram-negative bacteremia in the blood.  She will need ischemic workup which could be deferred as an outpatient.  She would like to hold off cardiac catheterization for now.  Will optimize her treatment for heart failure systolic dysfunction follow-up as an outpatient in my office in few weeks.      2.  Hypertension.  Will stop amlodipine and started on losartan 25 mg oral daily.  Patient has history of angioedema with has moderate LV*dysfunction she will benefit of ARB's.  Will start her also on Farxiga for heart failure systolic dysfunction.  Will reassess renal function and electrolytes in AM.     3.  Obesity hypoventilation.     4.  Hypokalemia replaced with 40 mg of potassium today.     5.  Hypomagnesemia we will replace with 2 more grams of potassium IV.     Thank you for the consult  Peripheral IV 10/18/24 20 G Right;Anterior Forearm (Active)   Site Assessment Clean;Dry;Intact 10/20/24 0820   Dressing Status Clean;Dry;Occlusive 10/20/24 0820   Number of days: 2       Code Status:  Full Code      Cristina Wharton MD

## 2024-10-20 NOTE — CARE PLAN
The patient's goals for the shift include IV antibiotics, rest     The clinical goals for the shift include monitor labs and vitals, manage pain, rest, safety, comfort, IV antibiotics      Problem: Safety - Adult  Goal: Free from fall injury  Outcome: Progressing     Problem: Discharge Planning  Goal: Discharge to home or other facility with appropriate resources  Outcome: Progressing     Problem: Chronic Conditions and Co-morbidities  Goal: Patient's chronic conditions and co-morbidity symptoms are monitored and maintained or improved  Outcome: Progressing     Problem: Fall/Injury  Goal: Not fall by end of shift  Outcome: Progressing  Goal: Be free from injury by end of the shift  Outcome: Progressing  Goal: Verbalize understanding of personal risk factors for fall in the hospital  Outcome: Progressing  Goal: Verbalize understanding of risk factor reduction measures to prevent injury from fall in the home  Outcome: Progressing  Goal: Use assistive devices by end of the shift  Outcome: Progressing  Goal: Pace activities to prevent fatigue by end of the shift  Outcome: Progressing     Problem: Skin  Goal: Decreased wound size/increased tissue granulation at next dressing change  Outcome: Progressing  Goal: Participates in plan/prevention/treatment measures  Outcome: Progressing  Goal: Prevent/manage excess moisture  Outcome: Progressing  Goal: Prevent/minimize sheer/friction injuries  Outcome: Progressing  Goal: Promote/optimize nutrition  Outcome: Progressing  Goal: Promote skin healing  Outcome: Progressing

## 2024-10-20 NOTE — CARE PLAN
The patient's goals for the shift include Breath better, rest    The clinical goals for the shift include monitor labs and vitals, manage pain, rest, safety, comfort, IV antibiotics      Problem: Safety - Adult  Goal: Free from fall injury  Outcome: Progressing     Problem: Discharge Planning  Goal: Discharge to home or other facility with appropriate resources  Outcome: Progressing     Problem: Chronic Conditions and Co-morbidities  Goal: Patient's chronic conditions and co-morbidity symptoms are monitored and maintained or improved  Outcome: Progressing     Problem: Fall/Injury  Goal: Not fall by end of shift  Outcome: Progressing  Goal: Be free from injury by end of the shift  Outcome: Progressing  Goal: Verbalize understanding of personal risk factors for fall in the hospital  Outcome: Progressing  Goal: Verbalize understanding of risk factor reduction measures to prevent injury from fall in the home  Outcome: Progressing  Goal: Use assistive devices by end of the shift  Outcome: Progressing  Goal: Pace activities to prevent fatigue by end of the shift  Outcome: Progressing     Problem: Heart Failure  Goal: Improved gas exchange this shift  Outcome: Progressing  Goal: Improved urinary output this shift  Outcome: Progressing  Goal: Reduction in peripheral edema within 24 hours  Outcome: Progressing  Goal: Report improvement of dyspnea/breathlessness this shift  Outcome: Progressing  Goal: Weight from fluid excess reduced over 2-3 days, then stabilize  Outcome: Progressing  Goal: Increase self care and/or family involvement in 24 hours  Outcome: Progressing     Problem: Nutrition  Goal: Oral intake greater 75%  Outcome: Progressing     Problem: Skin  Goal: Decreased wound size/increased tissue granulation at next dressing change  Outcome: Progressing  Goal: Participates in plan/prevention/treatment measures  Outcome: Progressing  Goal: Prevent/manage excess moisture  10/20/2024 0736 by Amy Mckee  RN  Outcome: Progressing  Flowsheets (Taken 10/20/2024 0736)  Prevent/manage excess moisture: Cleanse incontinence/protect with barrier cream  10/20/2024 0735 by Amy Mckee RN  Outcome: Progressing  Goal: Prevent/minimize sheer/friction injuries  Outcome: Progressing  Goal: Promote/optimize nutrition  Outcome: Progressing  Goal: Promote skin healing  Outcome: Progressing

## 2024-10-21 ENCOUNTER — PATIENT OUTREACH (OUTPATIENT)
Dept: CASE MANAGEMENT | Facility: HOSPITAL | Age: 74
End: 2024-10-21
Payer: MEDICARE

## 2024-10-21 ENCOUNTER — APPOINTMENT (OUTPATIENT)
Dept: RADIOLOGY | Facility: HOSPITAL | Age: 74
DRG: 987 | End: 2024-10-21
Payer: MEDICARE

## 2024-10-21 ENCOUNTER — APPOINTMENT (OUTPATIENT)
Dept: CARDIOLOGY | Facility: HOSPITAL | Age: 74
DRG: 987 | End: 2024-10-21
Payer: MEDICARE

## 2024-10-21 LAB
ANION GAP SERPL CALCULATED.3IONS-SCNC: 11 MMOL/L (ref 10–20)
BUN SERPL-MCNC: 17 MG/DL (ref 6–23)
CALCIUM SERPL-MCNC: 8.9 MG/DL (ref 8.6–10.3)
CHLORIDE SERPL-SCNC: 91 MMOL/L (ref 98–107)
CO2 SERPL-SCNC: 34 MMOL/L (ref 21–32)
CREAT SERPL-MCNC: 0.59 MG/DL (ref 0.5–1.05)
EGFRCR SERPLBLD CKD-EPI 2021: >90 ML/MIN/1.73M*2
ERYTHROCYTE [DISTWIDTH] IN BLOOD BY AUTOMATED COUNT: 15.4 % (ref 11.5–14.5)
GLUCOSE SERPL-MCNC: 81 MG/DL (ref 74–99)
HCT VFR BLD AUTO: 32.5 % (ref 36–46)
HGB BLD-MCNC: 10 G/DL (ref 12–16)
MCH RBC QN AUTO: 27.9 PG (ref 26–34)
MCHC RBC AUTO-ENTMCNC: 30.8 G/DL (ref 32–36)
MCV RBC AUTO: 91 FL (ref 80–100)
NRBC BLD-RTO: 0 /100 WBCS (ref 0–0)
PLATELET # BLD AUTO: 292 X10*3/UL (ref 150–450)
POTASSIUM SERPL-SCNC: 3.3 MMOL/L (ref 3.5–5.3)
RBC # BLD AUTO: 3.58 X10*6/UL (ref 4–5.2)
SODIUM SERPL-SCNC: 133 MMOL/L (ref 136–145)
VANCOMYCIN SERPL-MCNC: 23.7 UG/ML (ref 5–20)
WBC # BLD AUTO: 6.4 X10*3/UL (ref 4.4–11.3)

## 2024-10-21 PROCEDURE — 2780000003 HC OR 278 NO HCPCS

## 2024-10-21 PROCEDURE — 87081 CULTURE SCREEN ONLY: CPT | Mod: TRILAB,WESLAB | Performed by: NURSE PRACTITIONER

## 2024-10-21 PROCEDURE — 99232 SBSQ HOSP IP/OBS MODERATE 35: CPT | Performed by: NURSE PRACTITIONER

## 2024-10-21 PROCEDURE — 99232 SBSQ HOSP IP/OBS MODERATE 35: CPT

## 2024-10-21 PROCEDURE — 36573 INSJ PICC RS&I 5 YR+: CPT

## 2024-10-21 PROCEDURE — 71046 X-RAY EXAM CHEST 2 VIEWS: CPT

## 2024-10-21 PROCEDURE — 2500000004 HC RX 250 GENERAL PHARMACY W/ HCPCS (ALT 636 FOR OP/ED): Mod: JZ | Performed by: NURSE PRACTITIONER

## 2024-10-21 PROCEDURE — 0QB03ZX EXCISION OF LUMBAR VERTEBRA, PERCUTANEOUS APPROACH, DIAGNOSTIC: ICD-10-PCS | Performed by: RADIOLOGY

## 2024-10-21 PROCEDURE — 2500000001 HC RX 250 WO HCPCS SELF ADMINISTERED DRUGS (ALT 637 FOR MEDICARE OP): Performed by: STUDENT IN AN ORGANIZED HEALTH CARE EDUCATION/TRAINING PROGRAM

## 2024-10-21 PROCEDURE — 2500000001 HC RX 250 WO HCPCS SELF ADMINISTERED DRUGS (ALT 637 FOR MEDICARE OP): Performed by: INTERNAL MEDICINE

## 2024-10-21 PROCEDURE — 97165 OT EVAL LOW COMPLEX 30 MIN: CPT | Mod: GO

## 2024-10-21 PROCEDURE — 02HV33Z INSERTION OF INFUSION DEVICE INTO SUPERIOR VENA CAVA, PERCUTANEOUS APPROACH: ICD-10-PCS | Performed by: NURSE PRACTITIONER

## 2024-10-21 PROCEDURE — 1100000001 HC PRIVATE ROOM DAILY

## 2024-10-21 PROCEDURE — 2500000005 HC RX 250 GENERAL PHARMACY W/O HCPCS: Performed by: STUDENT IN AN ORGANIZED HEALTH CARE EDUCATION/TRAINING PROGRAM

## 2024-10-21 PROCEDURE — 85027 COMPLETE CBC AUTOMATED: CPT | Performed by: NURSE PRACTITIONER

## 2024-10-21 PROCEDURE — 2500000004 HC RX 250 GENERAL PHARMACY W/ HCPCS (ALT 636 FOR OP/ED): Performed by: STUDENT IN AN ORGANIZED HEALTH CARE EDUCATION/TRAINING PROGRAM

## 2024-10-21 PROCEDURE — 2500000002 HC RX 250 W HCPCS SELF ADMINISTERED DRUGS (ALT 637 FOR MEDICARE OP, ALT 636 FOR OP/ED): Performed by: STUDENT IN AN ORGANIZED HEALTH CARE EDUCATION/TRAINING PROGRAM

## 2024-10-21 PROCEDURE — 36415 COLL VENOUS BLD VENIPUNCTURE: CPT | Performed by: NURSE PRACTITIONER

## 2024-10-21 PROCEDURE — C1751 CATH, INF, PER/CENT/MIDLINE: HCPCS

## 2024-10-21 PROCEDURE — 71046 X-RAY EXAM CHEST 2 VIEWS: CPT | Performed by: RADIOLOGY

## 2024-10-21 PROCEDURE — 80048 BASIC METABOLIC PNL TOTAL CA: CPT | Performed by: NURSE PRACTITIONER

## 2024-10-21 PROCEDURE — 97535 SELF CARE MNGMENT TRAINING: CPT | Mod: GO

## 2024-10-21 PROCEDURE — 80202 ASSAY OF VANCOMYCIN: CPT | Performed by: NURSE PRACTITIONER

## 2024-10-21 PROCEDURE — 97161 PT EVAL LOW COMPLEX 20 MIN: CPT | Mod: GP

## 2024-10-21 RX ORDER — FUROSEMIDE 40 MG/1
40 TABLET ORAL
Status: DISCONTINUED | OUTPATIENT
Start: 2024-10-21 | End: 2024-10-24

## 2024-10-21 RX ORDER — LOSARTAN POTASSIUM 50 MG/1
50 TABLET ORAL DAILY
Status: DISCONTINUED | OUTPATIENT
Start: 2024-10-22 | End: 2024-10-24 | Stop reason: HOSPADM

## 2024-10-21 RX ORDER — LIDOCAINE HYDROCHLORIDE 10 MG/ML
5 INJECTION, SOLUTION INFILTRATION; PERINEURAL ONCE
Status: DISCONTINUED | OUTPATIENT
Start: 2024-10-21 | End: 2024-10-24 | Stop reason: HOSPADM

## 2024-10-21 RX ORDER — CARVEDILOL 3.12 MG/1
3.12 TABLET ORAL 2 TIMES DAILY
Status: DISCONTINUED | OUTPATIENT
Start: 2024-10-21 | End: 2024-10-23

## 2024-10-21 RX ADMIN — OXYBUTYNIN CHLORIDE 5 MG: 5 TABLET ORAL at 20:04

## 2024-10-21 RX ADMIN — GABAPENTIN 200 MG: 100 CAPSULE ORAL at 20:04

## 2024-10-21 RX ADMIN — GABAPENTIN 200 MG: 100 CAPSULE ORAL at 16:44

## 2024-10-21 RX ADMIN — Medication 2 L/MIN: at 08:54

## 2024-10-21 RX ADMIN — Medication 2 L/MIN: at 17:52

## 2024-10-21 RX ADMIN — POTASSIUM CHLORIDE 20 MEQ: 1.5 FOR SOLUTION ORAL at 09:03

## 2024-10-21 RX ADMIN — FUROSEMIDE 40 MG: 10 INJECTION, SOLUTION INTRAMUSCULAR; INTRAVENOUS at 09:03

## 2024-10-21 RX ADMIN — ACETAMINOPHEN 650 MG: 325 TABLET ORAL at 21:49

## 2024-10-21 RX ADMIN — POLYETHYLENE GLYCOL 3350 17 G: 17 POWDER, FOR SOLUTION ORAL at 09:03

## 2024-10-21 RX ADMIN — FAMOTIDINE 20 MG: 20 TABLET ORAL at 20:04

## 2024-10-21 RX ADMIN — CARVEDILOL 3.12 MG: 3.12 TABLET, FILM COATED ORAL at 12:25

## 2024-10-21 RX ADMIN — MEROPENEM AND SODIUM CHLORIDE 1 G: 1 INJECTION, SOLUTION INTRAVENOUS at 12:25

## 2024-10-21 RX ADMIN — LOSARTAN POTASSIUM 25 MG: 25 TABLET, FILM COATED ORAL at 09:03

## 2024-10-21 RX ADMIN — FUROSEMIDE 40 MG: 40 TABLET ORAL at 16:44

## 2024-10-21 RX ADMIN — MEROPENEM AND SODIUM CHLORIDE 1 G: 1 INJECTION, SOLUTION INTRAVENOUS at 20:04

## 2024-10-21 RX ADMIN — GABAPENTIN 200 MG: 100 CAPSULE ORAL at 09:03

## 2024-10-21 RX ADMIN — DAPAGLIFLOZIN 10 MG: 10 TABLET, FILM COATED ORAL at 09:03

## 2024-10-21 RX ADMIN — POTASSIUM CHLORIDE 20 MEQ: 1.5 FOR SOLUTION ORAL at 16:44

## 2024-10-21 RX ADMIN — FAMOTIDINE 20 MG: 20 TABLET ORAL at 09:03

## 2024-10-21 RX ADMIN — MEROPENEM AND SODIUM CHLORIDE 1 G: 1 INJECTION, SOLUTION INTRAVENOUS at 03:49

## 2024-10-21 RX ADMIN — ENOXAPARIN SODIUM 40 MG: 40 INJECTION SUBCUTANEOUS at 20:04

## 2024-10-21 RX ADMIN — Medication 32 PERCENT: at 00:45

## 2024-10-21 RX ADMIN — OXYBUTYNIN CHLORIDE 5 MG: 5 TABLET ORAL at 09:03

## 2024-10-21 RX ADMIN — ALLOPURINOL 100 MG: 100 TABLET ORAL at 09:03

## 2024-10-21 RX ADMIN — CARVEDILOL 3.12 MG: 3.12 TABLET, FILM COATED ORAL at 20:04

## 2024-10-21 RX ADMIN — SIMVASTATIN 40 MG: 40 TABLET, FILM COATED ORAL at 20:04

## 2024-10-21 RX ADMIN — ENOXAPARIN SODIUM 40 MG: 40 INJECTION SUBCUTANEOUS at 09:03

## 2024-10-21 ASSESSMENT — PAIN - FUNCTIONAL ASSESSMENT
PAIN_FUNCTIONAL_ASSESSMENT: 0-10

## 2024-10-21 ASSESSMENT — COGNITIVE AND FUNCTIONAL STATUS - GENERAL
MOVING FROM LYING ON BACK TO SITTING ON SIDE OF FLAT BED WITH BEDRAILS: TOTAL
TOILETING: A LOT
MOVING TO AND FROM BED TO CHAIR: A LOT
STANDING UP FROM CHAIR USING ARMS: TOTAL
CLIMB 3 TO 5 STEPS WITH RAILING: TOTAL
MOVING TO AND FROM BED TO CHAIR: A LOT
HELP NEEDED FOR BATHING: A LOT
WALKING IN HOSPITAL ROOM: TOTAL
STANDING UP FROM CHAIR USING ARMS: TOTAL
PERSONAL GROOMING: A LOT
MOVING FROM LYING ON BACK TO SITTING ON SIDE OF FLAT BED WITH BEDRAILS: A LOT
HELP NEEDED FOR BATHING: A LOT
DRESSING REGULAR LOWER BODY CLOTHING: A LOT
DRESSING REGULAR UPPER BODY CLOTHING: A LOT
MOVING FROM LYING ON BACK TO SITTING ON SIDE OF FLAT BED WITH BEDRAILS: A LOT
DAILY ACTIVITIY SCORE: 14
WALKING IN HOSPITAL ROOM: TOTAL
PERSONAL GROOMING: A LOT
DRESSING REGULAR LOWER BODY CLOTHING: TOTAL
MOVING TO AND FROM BED TO CHAIR: TOTAL
CLIMB 3 TO 5 STEPS WITH RAILING: TOTAL
TURNING FROM BACK TO SIDE WHILE IN FLAT BAD: TOTAL
EATING MEALS: A LITTLE
DAILY ACTIVITIY SCORE: 11
DAILY ACTIVITIY SCORE: 14
TURNING FROM BACK TO SIDE WHILE IN FLAT BAD: A LOT
WALKING IN HOSPITAL ROOM: TOTAL
PERSONAL GROOMING: A LOT
DRESSING REGULAR LOWER BODY CLOTHING: A LOT
MOBILITY SCORE: 9
TURNING FROM BACK TO SIDE WHILE IN FLAT BAD: A LOT
STANDING UP FROM CHAIR USING ARMS: TOTAL
TOILETING: TOTAL
MOBILITY SCORE: 9
DRESSING REGULAR UPPER BODY CLOTHING: A LOT
CLIMB 3 TO 5 STEPS WITH RAILING: TOTAL
DRESSING REGULAR UPPER BODY CLOTHING: A LOT
TOILETING: A LOT
MOBILITY SCORE: 6
HELP NEEDED FOR BATHING: A LOT

## 2024-10-21 ASSESSMENT — ACTIVITIES OF DAILY LIVING (ADL)
ADL_ASSISTANCE: NEEDS ASSISTANCE
BATHING_ASSISTANCE: TOTAL
ADL_ASSISTANCE: NEEDS ASSISTANCE
HOME_MANAGEMENT_TIME_ENTRY: 10

## 2024-10-21 ASSESSMENT — PAIN SCALES - GENERAL
PAINLEVEL_OUTOF10: 3
PAINLEVEL_OUTOF10: 7
PAINLEVEL_OUTOF10: 7
PAINLEVEL_OUTOF10: 3
PAINLEVEL_OUTOF10: 0 - NO PAIN

## 2024-10-21 ASSESSMENT — PAIN DESCRIPTION - LOCATION: LOCATION: BACK

## 2024-10-21 NOTE — PROGRESS NOTES
Vancomycin Dosing by Pharmacy- FOLLOW UP    Angela Montelongo is a 74 y.o. year old female who Pharmacy has been consulted for vancomycin dosing for osteomyelitis/septic arthritis. Based on the patient's indication and renal status this patient is being dosed based on a goal AUC of 400-600.     Renal function is currently improving.    Current vancomycin dose: 1250 mg given every 24 hours    Estimated vancomycin AUC on current dose: 463 mg/L.hr     Visit Vitals  /68 (BP Location: Right arm, Patient Position: Lying)   Pulse 86   Temp 36 °C (96.8 °F) (Temporal)   Resp 15        Lab Results   Component Value Date    CREATININE 0.59 10/21/2024    CREATININE 0.71 10/20/2024    CREATININE 0.75 10/19/2024    CREATININE 0.68 10/18/2024        Patient weight is as follows:   Vitals:    10/18/24 0653   Weight: 111 kg (245 lb)       Cultures:  Susceptibility data for the encounter in last 14 days.  Collected Specimen Info Organism Ampicillin Cefazolin Cefazolin (uncomplicated UTIs only) Ciprofloxacin Gentamicin Levofloxacin Nitrofurantoin Piperacillin/Tazobactam Trimethoprim/Sulfamethoxazole   10/17/24 Urine from Clean Catch/Voided Escherichia coli  S  S  S  S  S   S  S  S   10/17/24 Blood culture from Peripheral Venipuncture Escherichia coli  S  S   S  S  S   S  S        I/O last 3 completed shifts:  In: 2042.5 (18.4 mL/kg) [P.O.:1260; I.V.:182.5 (1.6 mL/kg); IV Piggyback:600]  Out: 2875 (25.9 mL/kg) [Urine:2875 (0.7 mL/kg/hr)]  Weight: 111.1 kg   I/O during current shift:  No intake/output data recorded.    Temp (24hrs), Av.3 °C (97.3 °F), Min:36 °C (96.8 °F), Max:36.6 °C (97.9 °F)      Assessment/Plan    Within goal AUC range. Continue current vancomycin regimen.    This dosing regimen is predicted by [employer] to result in the following pharmacokinetic parameters:  Loading dose: N/A  Regimen: 1250 mg IV every 24 hours.  Start time: 22:19 on 10/21/2024  Exposure target: AUC24 (range)400-600 mg/L.hr   [id number]: 467  mg/L.hr  AUC24,ss: 463 mg/L.hr  Probability of AUC24 > 400: 91 %  Ctrough,ss: 13.4 mg/L  Probability of Ctrough,ss > 20: 1 %    The next level will be obtained on 10/28 at 0500. May be obtained sooner if clinically indicated.   Will continue to monitor renal function daily while on vancomycin and order serum creatinine at least every 48 hours if not already ordered.  Follow for continued vancomycin needs, clinical response, and signs/symptoms of toxicity.       DANISH ALAN, PharmD

## 2024-10-21 NOTE — PROGRESS NOTES
10/21/24 1055   Discharge Planning   Expected Discharge Disposition SNF  (List received pending choices)   Does the patient need discharge transport arranged? Yes   RoundTrip coordination needed? Yes   Has discharge transport been arranged? No     SNF list given.   IR at [facility] tomorrow for drainage.  Patient will not need precert.  Patient has had [facility] in past also.   Likely needs SNF

## 2024-10-21 NOTE — PROGRESS NOTES
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 On eval, she requires significant assist for all xfers, mobility and self care d/t profound deficits in strength, balance, activity tolerance and high pain levels. Skilled OT  services are required to maximize safety/IND prior to DC  Prognosis: Good  Barriers to Discharge: Other (Comment) (max-dep x2 for bed mobility)  Evaluation/Treatment Tolerance: Patient limited by pain  Medical Staff Made Aware: Yes  End of Session Communication: Bedside nurse  End of Session Patient Position: Bed, 3 rail up, Alarm on  Plan:  Treatment Interventions: ADL retraining, Functional transfer training, UE strengthening/ROM, Endurance training, Cognitive reorientation, Patient/family training, Equipment evaluation/education, Neuromuscular reeducation, Compensatory technique education  OT Frequency: 4 times per week  OT Discharge Recommendations: Moderate intensity level of continued care  Equipment Recommended upon Discharge: Lift  OT Recommended Transfer Status: Dependent, Assist of 2  OT - OK to Discharge: Yes    Subjective   Current Problem:  1. Shortness of breath  Transthoracic Echo (TTE) Complete    Transthoracic Echo (TTE) Complete      2. Hypoxia        3. Hypervolemia, unspecified hypervolemia type        4. Bilateral leg edema        5. Elevated brain natriuretic peptide (BNP) level        6. Hypokalemia        7. Hypomagnesemia        8. Acute on chronic diastolic congestive heart failure  Transthoracic Echo (TTE) Complete    Transthoracic Echo (TTE) Complete        General:  General  Reason for  "Referral: Decreased ADLS, acute on chronic heart failure, T11-12/L1-2 discitis/OM, UTI  Referred By: Penelope Mcgee APRN-CNP  Past Medical History Relevant to Rehab: HTN, CKD, low back pain, spinal stenosis, falls  Family/Caregiver Present: No  Co-Treatment: PT  Co-Treatment Reason: d/t increased need for skilled intervention to safely advance transfers  Prior to Session Communication: Bedside nurse  Patient Position Received: Bed, 3 rail up, Alarm on  Preferred Learning Style: auditory, visual, verbal  General Comment: Cleared by nsg, pt met in supine, agreeable to OT session    Precautions:  Hearing/Visual Limitations: vision and hearing WFL  Medical Precautions: Fall precautions, Spinal precautions  Precautions Comment: spine precautions for pain mgmt.  on 2L O2 via NC    Pain:  Pain Assessment  Pain Assessment: 0-10  0-10 (Numeric) Pain Score: 7  Pain Type: Chronic pain  Pain Location: Back    Objective   Cognition:  Overall Cognitive Status: Within Functional Limits  Cognition Comments: anxious tendencies at times.  motivated to participate as able    Home Living:  Type of Home: House  Lives With: Other (Comment) (dtr, son in law, granddtr)  Home Adaptive Equipment: Walker rolling or standard, Adaptive bed, Wheelchair-manual  Home Layout: One level  Home Access: Stairs to enter with rails  Entrance Stairs-Number of Steps: 7-8  Bathroom Accessibility: main level  Home Living Comments: reports she \"had to pay a lot of money for transportation help to get into the house\" and \"even then it was a struggle\".  reports that she \"does not think her w/c will fit through doorways\"     Prior Function:  Level of [place]: Needs assistance with ADLs, Needs assistance with homemaking, Needs assistance with functional transfers  Receives Help From: Family ([facility])  ADL Assistance: Needs assistance (bed level assist since returning from SNF ~1 week ago)  Homemaking Assistance: Needs assistance (family manages)  Ambulatory " Assistance: Needs assistance (has been essentially nonambulatory/bed bound at home)  Prior Function Comments: pt reports her mobility significantly declined after a fall in August.  was doing very short distances at SNF but since returning home has been essentially bed bound    ADL:  Eating Assistance: Stand by  Eating Deficit: Setup  Grooming Assistance: Maximal  Grooming Deficit: Brushing hair (for thorough hair brushing while seated EOB)  Bathing Assistance: Total  Bathing Deficit:  (anticipated)  UE Dressing Assistance: Moderate  UE Dressing Deficit:  (gown mgmt)  LE Dressing Assistance: Total  LE Dressing Deficit: Don/doff R sock, Don/doff L sock  Toileting Assistance with Device: Total  Toileting Deficit: Urinary Catheter    Activity Tolerance:  Endurance: Decreased tolerance for upright activites  Activity Tolerance Comments: limited by back pain    Bed Mobility/Transfers:   Bed Mobility  Bed Mobility: Yes  Bed Mobility 1  Bed Mobility 1: Supine to sitting  Level of Assistance 1: Maximum assistance, +2  Bed Mobility Comments 1: HOB elevated  Bed Mobility 2  Bed Mobility  2: Sitting to supine  Level of Assistance 2: Maximum assistance, +2  Bed Mobility Comments 2: for safe/controlled descent  Bed Mobility 3  Bed Mobility 3: Rolling right, Rolling left  Level of Assistance 3: Maximum assistance, +2  Bed Mobility Comments 3: max A x1-2 for lateral rolling in supine  Bed Mobility 4  Bed Mobility 4: Scooting  Level of Assistance 4: Dependent  Bed Mobility Comments 4: dep scoot towards HOB    Transfers  Transfer: No (d/t pain while sitting EOB)    Functional Mobility:  Functional Mobility  Functional Mobility Performed: No    Sitting Balance:  Static Sitting Balance  Static Sitting-Balance Support: Feet supported, Bilateral upper extremity supported  Static Sitting-Level of Assistance: Contact guard, Minimum assistance  Static Sitting-Comment/Number of Minutes: tendency for retropulsive lean    Vision: Vision -  Basic Assessment  Current Vision: No visual deficits  Sensation:  Light Touch: Severe deficits in the LLE  Strength:  Strength Comments: 3-/5 UEs grossly  Coordination:  Movements are Fluid and Coordinated: No  Upper Body Coordination: impaired  Lower Body Coordination: impaired  Trunk Coordination: impaired   Hand Function:  Hand Function  Gross Grasp: Impaired  Coordination: Impaired  Extremities: RUE   RUE : Exceptions to WFL (3-/5 grossly) and LUE   LUE: Exceptions to WFL (3-/5 grossly)    Outcome Measures: [facility] Daily Activity  Putting on and taking off regular lower body clothing: Total  Bathing (including washing, rinsing, drying): A lot  Putting on and taking off regular upper body clothing: A lot  Toileting, which includes using toilet, bedpan or urinal: Total  Taking care of personal grooming such as brushing teeth: A lot  Eating Meals: A little  Daily Activity - Total Score: 11      Education Documentation  Body Mechanics, taught by Joaquín Chase OT at 10/21/2024 11:56 AM.  Learner: Patient  Readiness: Acceptance  Method: Explanation  Response: Needs Reinforcement    Precautions, taught by Joaquín Chase OT at 10/21/2024 11:56 AM.  Learner: Patient  Readiness: Acceptance  Method: Explanation  Response: Needs Reinforcement    ADL Training, taught by Joaquín Chase OT at 10/21/2024 11:56 AM.  Learner: Patient  Readiness: Acceptance  Method: Explanation  Response: Needs Reinforcement    Education Comments  No comments found.      Goals:   Encounter Problems       Encounter Problems (Active)       OT Goals       ADLS (Progressing)       Start:  10/21/24    Expected End:  11/04/24       Patient will complete ADL tasks, with moderate assist using AE need in order to increase patient's safety and independence with self-care tasks.           Functional Transfers (Progressing)       Start:  10/21/24    Expected End:  11/04/24       Patient will complete functional transfers with moderate assist x2 using least  restrictive device, in order to increase patient's safety and independence with daily tasks.           Activity Tolerance (Progressing)       Start:  10/21/24    Expected End:  11/04/24       Patient will demonstrate the ability to participate in functional activity at least >/= 15 minutes in order to increase patient's safety and independence with daily tasks.           Sitting Balance (Progressing)       Start:  10/21/24    Expected End:  11/04/24       Patient will complete EOB activity for >/= 15 minutes with fair+ sitting balance in preparation for ADLS.

## 2024-10-21 NOTE — CONSULTS
Heart Failure Nurse Navigator    The role of the HF nurse navigator is to (1) characterize risk profiles of patients with heart failure transitioning from [id number] after hospitalization, (2) recommend interventions to improve care and reduce risks of worse post-hospitalization outcomes. Potential recommendations may touch base on patient/family education, additional consults that may bring value, and appointment scheduling.    Assessment    Met with Angela Montelongo  & her daughter at the bedside. Patient reports her daughter lives with her. Daughter helps make appointments & takes her to appointments. CHF is new Dx. She has scale @ home, but reports she will not be able to perform daily weights 2/2 her poor mobility since her fall couple months ago. Takes her medications as directed. Currently does not have a cardiologist. Daughter reports that she will make follow up appointment with her cardiologist @ [facility].     Patients Cardiologist(s): None    Patients Primary Care Provider: Danielito Roach    1. Medical Domain  What is the patient's most recent LVEF? 10/18/24  rEF 30-35 %  HFrEF (LVEF <= 40%) Quadruple therapy recommended  HFmrEF (LVEF 41-49%) Quadruple therapy recommended  HFpEF (LVEF >= 50%) Minimum recommendations: SGLT2i and MRA  Is the patient on GDMT for their condition? New CHF dx  ARNI/ACEI/ARB: No  BB: No  MRA: No  SGLT2i: No  Could this patient have advanced heart failure (Stage D heart failure)?: No   If yes, the potential markers of advanced heart failure include: Does not meet criteria    REFERENCE: Potential markers of advanced HF   Inotrope (dobutamine or milrinone) used during this admission?   LVEF<=25%?   >=2 hospitalizations for ADHF in the last year?   Severe symptoms of HF (fatigue, dyspnea, confusion, edema) despite medical therapy?   Downtitration of GDMT as compared to home medications?   Discontinuation of GDMT because of hypotension or renal intolerance?   Recurrent  "arrhythmias (AF, VT with ICD shocks)?   Cardiac cachexia (i.e., unintentional weight loss due to HF)?   High-risk biomarker profile (e.g., hyponatremia [Na<135], very elevated BNP, worsening kidney function)   Escalating doses of diuretics or persistent edema despite escalation     2. Mind and Emotion  Does this patient have possible cognitive impairment?: No (The Mini-Cog score 5/5)  Ask the patient to memorize these 3 words: banana, sunrise, chair  Ask the patient to draw a clock with hands pointing at \"20 minutes after 8\"  Ask the patient to recall the 3 words  Score: Add number of words recalled + clock drawing (0 points for any errors, 2 points if correct)  Interpretation: A score of 0-2 suggests cognitive impairment is present, a score of 3-5 suggests cognitive impairment is absent  Does this patient have major depression?: No       3. Physical Function  Could this patient be frail?: Yes   Defined by presence of all of these: slowness, weakness, shrinking, inactivity, exhaustion  Is this patient at risk for falling?: Yes   Defined by having experienced a fall in the last 12 months.    4. Social Determinants of Health  Transportation deficits?: No   Lack of insurance?: No   Poor financial resources?: No   Living conditions (homelessness, unstable home)?: No   Poor family/social support?: No   Poor personal care?: No   Food insecurity?: No   Lack of HCPOA?: No      Current Medications:  Beta blocker:  unknown @ this time  ACE inhibitor/ARB/ARNI: Losartan 25 mg daily  MRA:  unknown  SGLT2i:  dapaglifozin 10 mg daily  Diuretic:  unknown @ this time    Device none         Heart Failure Education   - Living With Heart Failure book/handouts provided & reviewed.  - CHF signs and symptoms, heart failure zones and when to call cardiologist.   - Controlling Heart Failure at Home: medication adherence, following up with cardiologist 1-2 weeks post hospital discharge, staying healthy and active, limiting sodium and fluid " intake as directed by cardiologist.  - Daily Weight Education. Patient verbalized an understanding of CHF education. Will continue to follow while IP & post hospital discharge. Discharge plan-SNF        (HF nurse navigator name and contact info)

## 2024-10-21 NOTE — PROCEDURES
Vascular Access Team Procedure Note     Visit Date: 10/21/2024      Patient Name: Angela Montelongo         MRN: [id number]             Procedure:  Single lumen picc placed in Basilic vein without difficulty, catheter 50 cm, arm circumference 30 cm, ext catheter 2 cm, flushes easily and with positive blood return, curos cap applied. Patient tolerated well, ECG confirms tip placement, RN aware of placement and ok to use.                          Radha Carpenter RN  10/21/2024  3:36 PM

## 2024-10-21 NOTE — PROGRESS NOTES
Angela Montelongo is a 74 y.o. female on day 3 of admission presenting with Acute on chronic diastolic congestive heart failure.    Subjective   Interval History:   Afebrile, no chills  On 2 liters oxygen   No chest pain or shortness of breath  No nausea vomiting or diarrhea          Objective   Range of Vitals (last 24 hours)  Heart Rate:  []   Temp:  [35.7 °C (96.3 °F)-36.6 °C (97.9 °F)]   Resp:  [15-18]   BP: (120-149)/(57-93)   SpO2:  [92 %-100 %]   Daily Weight  10/18/24 : 111 kg (245 lb)    Body mass index is 43.4 kg/m².    Physical Exam  Constitutional:       Appearance: Normal appearance. She is obese.   HENT:      Head: Normocephalic and atraumatic.      Nose: Nose normal.      Mouth/Throat:      Mouth: Mucous membranes are moist.      Pharynx: Oropharynx is clear.   Eyes:      General: No scleral icterus.  Cardiovascular:      Rate and Rhythm: Normal rate and regular rhythm.   Pulmonary:      Comments: Decreased bilateral breath sounds   Abdominal:      General: Bowel sounds are normal.      Palpations: Abdomen is soft.   Musculoskeletal:         General: Normal range of motion.      Cervical back: Normal range of motion and neck supple.   Skin:     General: Skin is warm and dry.   Neurological:      Mental Status: She is alert.      Comments: Awake, alert   Psychiatric:         Mood and Affect: Mood normal.         Behavior: Behavior normal.     Antibiotics  meropenem - 1 gram/50 mL    Relevant Results  Labs  Results from last 72 hours   Lab Units 10/21/24  0532 10/20/24  0531 10/19/24  0416   WBC AUTO x10*3/uL 6.4 6.7 6.9   HEMOGLOBIN g/dL 10.0* 9.4* 9.1*   HEMATOCRIT % 32.5* 31.0* 29.0*   PLATELETS AUTO x10*3/uL 292 298 293     Results from last 72 hours   Lab Units 10/21/24  0532 10/20/24  0531 10/19/24  1435 10/19/24  0416   SODIUM mmol/L 133* 136  --  137   POTASSIUM mmol/L 3.3* 3.5 3.2* 2.9*   CHLORIDE mmol/L 91* 95*  --  97*   CO2 mmol/L 34* 33*  --  32   BUN mg/dL 17 16  --  18   CREATININE  mg/dL 0.59 0.71  --  0.75   GLUCOSE mg/dL 81 94  --  86   CALCIUM mg/dL 8.9 8.7  --  8.6   ANION GAP mmol/L 11 12  --  11   EGFR mL/min/1.73m*2 >90 88  --  83     Results from last 72 hours   Lab Units 10/19/24  0416   ALK PHOS U/L 90   BILIRUBIN TOTAL mg/dL 0.5   PROTEIN TOTAL g/dL 5.1*   ALT U/L 21   AST U/L 38   ALBUMIN g/dL 2.5*     Estimated Creatinine Clearance: 97.1 mL/min (by C-G formula based on SCr of 0.59 mg/dL).  C-Reactive Protein   Date Value Ref Range Status   10/20/2024 7.80 (H) <1.00 mg/dL Final   10/18/2024 18.71 (H) <1.00 mg/dL Final     CRP   Date Value Ref Range Status   03/07/2018 4.2 (H) 0 - 2.0 MG/DL Final     Comment:     Performed at [place] [id number]     Microbiology  Susceptibility data from last 14 days.  Collected Specimen Info Organism Ampicillin Cefazolin Cefazolin (uncomplicated UTIs only) Ciprofloxacin Gentamicin Levofloxacin Nitrofurantoin Piperacillin/Tazobactam Trimethoprim/Sulfamethoxazole   10/17/24 Urine from Clean Catch/Voided Escherichia coli  S  S  S  S  S   S  S  S   10/17/24 Blood culture from Peripheral Venipuncture Escherichia coli  S  S   S  S  S   S  S     Imaging  MR lumbar spine wo IV contrast    Result Date: 10/20/2024  Interpreted By:  Les Dumont, STUDY: MR THORACIC SPINE WO IV CONTRAST; MR LUMBAR SPINE WO IV CONTRAST; 10/19/2024 4:24 pm   INDICATION: Signs/Symptoms:Question discitis/osteomyelitis on CT imaging.     COMPARISON: CT thoracic and lumbar spine dated 10/17/2024.   ACCESSION NUMBER(S): [id number]; [id number]   ORDERING CLINICIAN: JORGITO GRIFFITH   TECHNIQUE: Noncontrast multiplanar multisequence MR imaging of the thoracic and lumbar spine was performed. Sagittal T1, T2, STIR and axial T2 and T1 weighted MR images of the thoracic spine were obtained.   FINDINGS: The degree of motion artifact significantly degrades evaluation.   THORACIC SPINE:   Assessment of the upper thoracic spinal cord is limited by motion  artifact. CSF flow related artifact also degrades evaluation on several sequences. Within these limitations, there is no definite cord signal abnormality or cord compression. There is concern for epidural abscess component within the lower thoracic region with suggestion of circumferential T2 hyperintensity surrounding the thecal sac measuring up to 3 mm in maximum thickness along the ventral aspect at T12 (series 9, image 27). Potential epidural fluid/thickening may extend as far cranially as T7.   There is abnormal marrow signal centered about the T11-T12 endplates and intervertebral disc with STIR hyperintensity and T1 hypointensity and irregular appearance of the disc and endplates as previously described on CT examination, concerning for discitis/osteomyelitis. Signal abnormality also extends to involve the left-greater-than-right facet joints and there is extensive signal abnormality within the adjacent soft tissues including suspected paraspinal abscess components such as a 1.8 x 1.7 cm fluid collection along the rightward aspect of T11-T12 adjacent to the neural foraminal region (series 9, image 24). There is resultant mild height loss of the T11 and T12 vertebral bodies. Presumed epidural components resulting in moderate spinal canal stenosis at T11-T12. Inflammatory components are also evident involving the T11-T12 neural foraminal regions bilaterally.   Allowing for motion artifact there is no additional evidence of discitis/osteomyelitis within the remainder of the thoracic spine. The remaining thoracic vertebral bodies demonstrate grossly preserved heights. There is trace degenerative appearing anterolisthesis of T1 on T2 and T3 on T4. There is unchanged S-shaped scoliosis of the cervicothoracic spine with levo scoliosis of the upper cervicothoracic region and dextroscoliotic curvature of the thoracolumbar region. Multilevel type 2 Modic endplate signal change. There is moderate multilevel degenerative  disc height loss throughout the thoracic regions. There is no additional significant spinal canal stenosis evident. Predominantly mild multilevel neural foraminal narrowing is suspected with evaluation limited by scoliosis and aforementioned motion artifact.   There is extensive fatty atrophy of the posterior paraspinal musculature. Bilateral pleural effusions and dependent parenchymal airspace opacity are again noted.     LUMBAR SPINE:   Normal segmentation.   Similar to the T11-T12 level, there is extensive marrow edema and signal abnormality centered about the L1-L2 endplates and intervertebral disc concerning for discitis/osteomyelitis. There is surrounding soft tissue signal abnormality with concern for small abscess components along the rightward aspect at L1-L2 measuring up to 2.2 cm in diameter (series 9, image 20). No discrete epidural abscess component is seen at this level. There is mild destructive endplate height loss involving the L1 and L2 vertebrae.   The cauda equina are otherwise unremarkable. The conus appears to terminate at the T12-L1 level with CSF flow related artifact obscuring evaluation.   Similar levo scoliosis of the lumbar spine. The remaining lumbar vertebral body heights are otherwise maintained. There is no additional suspicious STIR hyperintensity/marrow edema within the remaining lumbar levels. There is moderate degenerative disc height loss within the remainder of the lumbar spine with likely degenerative appearing fluid within portions of the discs.   Degenerative change:   T12-L1: No spinal canal stenosis. Mild right-greater-than-left neural foraminal narrowing suggested.   L1-2: Mild facet arthropathy. Inflammatory components involving the right-greater-than-left paraspinal soft tissues with no definite spinal canal stenosis, suspected mild right lateral recess narrowing, and inflammatory components extending to the right-greater-than-left neural foraminal regions.   L2-3: Mild  bilateral facet arthropathy. Mild disc bulge and endplate spurring, worse along the rightward aspect. No spinal canal stenosis with mild right lateral recess narrowing. Minimal left and mild-to-moderate right neural foraminal narrowing.   L3-4: Moderate to severe bilateral facet arthropathy. Posterior disc osteophyte components. Moderate to severe spinal canal stenosis suggested. Moderate bilateral neural foraminal narrowing.   L4-5: Severe left and mild-to-moderate right facet arthropathy with ligamentum flavum hypertrophy. Disc bulge and hypertrophic endplate spurring. Severe spinal canal stenosis. Mild right and moderate to severe left neural foraminal narrowing.   L5-S1: Severe facet arthropathy. Mild disc bulge/uncovering. Mild left lateral recess narrowing. No additional spinal canal stenosis moderate bilateral neural foraminal narrowing.   Extensive fatty atrophy of the posterior paraspinal and iliopsoas musculature.       1. Motion degraded examination. 2. Findings to suggest discitis/osteomyelitis involving the T11-T12 and L1-L2 levels. There is mild height loss related to endplate destruction at these levels with surrounding inflammatory soft tissue change including right-sided small paraspinal abscess components suspected. Additionally, there is concern for potential epidural phlegmon versus abscess centered at T11-T12 with resultant moderate spinal canal stenosis at this level. Inflammatory components also involve the neural foraminal regions at both levels. 3. No additional evidence of significant spinal canal stenosis or cord compression within the thoracic regions when accounting for motion limitations. There is concern for moderate to severe spinal canal stenosis at L3-L4 and severe spinal canal stenosis at L4-L5 secondary to degenerative change. 4. Extensive scoliosis.   MACRO: None   Signed by: Les Dumont 10/20/2024 1:14 PM Dictation workstation:   [id number]    MR thoracic spine wo IV  contrast    Result Date: 10/20/2024  Interpreted By:  Les Dumont, STUDY: MR THORACIC SPINE WO IV CONTRAST; MR LUMBAR SPINE WO IV CONTRAST; 10/19/2024 4:24 pm   INDICATION: Signs/Symptoms:Question discitis/osteomyelitis on CT imaging.     COMPARISON: CT thoracic and lumbar spine dated 10/17/2024.   ACCESSION NUMBER(S): [id number]; [id number]   ORDERING CLINICIAN: JORGITO GRIFFITH   TECHNIQUE: Noncontrast multiplanar multisequence MR imaging of the thoracic and lumbar spine was performed. Sagittal T1, T2, STIR and axial T2 and T1 weighted MR images of the thoracic spine were obtained.   FINDINGS: The degree of motion artifact significantly degrades evaluation.   THORACIC SPINE:   Assessment of the upper thoracic spinal cord is limited by motion artifact. CSF flow related artifact also degrades evaluation on several sequences. Within these limitations, there is no definite cord signal abnormality or cord compression. There is concern for epidural abscess component within the lower thoracic region with suggestion of circumferential T2 hyperintensity surrounding the thecal sac measuring up to 3 mm in maximum thickness along the ventral aspect at T12 (series 9, image 27). Potential epidural fluid/thickening may extend as far cranially as T7.   There is abnormal marrow signal centered about the T11-T12 endplates and intervertebral disc with STIR hyperintensity and T1 hypointensity and irregular appearance of the disc and endplates as previously described on CT examination, concerning for discitis/osteomyelitis. Signal abnormality also extends to involve the left-greater-than-right facet joints and there is extensive signal abnormality within the adjacent soft tissues including suspected paraspinal abscess components such as a 1.8 x 1.7 cm fluid collection along the rightward aspect of T11-T12 adjacent to the neural foraminal region (series 9, image 24). There is resultant mild height loss of the T11 and T12  vertebral bodies. Presumed epidural components resulting in moderate spinal canal stenosis at T11-T12. Inflammatory components are also evident involving the T11-T12 neural foraminal regions bilaterally.   Allowing for motion artifact there is no additional evidence of discitis/osteomyelitis within the remainder of the thoracic spine. The remaining thoracic vertebral bodies demonstrate grossly preserved heights. There is trace degenerative appearing anterolisthesis of T1 on T2 and T3 on T4. There is unchanged S-shaped scoliosis of the cervicothoracic spine with levo scoliosis of the upper cervicothoracic region and dextroscoliotic curvature of the thoracolumbar region. Multilevel type 2 Modic endplate signal change. There is moderate multilevel degenerative disc height loss throughout the thoracic regions. There is no additional significant spinal canal stenosis evident. Predominantly mild multilevel neural foraminal narrowing is suspected with evaluation limited by scoliosis and aforementioned motion artifact.   There is extensive fatty atrophy of the posterior paraspinal musculature. Bilateral pleural effusions and dependent parenchymal airspace opacity are again noted.     LUMBAR SPINE:   Normal segmentation.   Similar to the T11-T12 level, there is extensive marrow edema and signal abnormality centered about the L1-L2 endplates and intervertebral disc concerning for discitis/osteomyelitis. There is surrounding soft tissue signal abnormality with concern for small abscess components along the rightward aspect at L1-L2 measuring up to 2.2 cm in diameter (series 9, image 20). No discrete epidural abscess component is seen at this level. There is mild destructive endplate height loss involving the L1 and L2 vertebrae.   The cauda equina are otherwise unremarkable. The conus appears to terminate at the T12-L1 level with CSF flow related artifact obscuring evaluation.   Similar levo scoliosis of the lumbar spine. The  remaining lumbar vertebral body heights are otherwise maintained. There is no additional suspicious STIR hyperintensity/marrow edema within the remaining lumbar levels. There is moderate degenerative disc height loss within the remainder of the lumbar spine with likely degenerative appearing fluid within portions of the discs.   Degenerative change:   T12-L1: No spinal canal stenosis. Mild right-greater-than-left neural foraminal narrowing suggested.   L1-2: Mild facet arthropathy. Inflammatory components involving the right-greater-than-left paraspinal soft tissues with no definite spinal canal stenosis, suspected mild right lateral recess narrowing, and inflammatory components extending to the right-greater-than-left neural foraminal regions.   L2-3: Mild bilateral facet arthropathy. Mild disc bulge and endplate spurring, worse along the rightward aspect. No spinal canal stenosis with mild right lateral recess narrowing. Minimal left and mild-to-moderate right neural foraminal narrowing.   L3-4: Moderate to severe bilateral facet arthropathy. Posterior disc osteophyte components. Moderate to severe spinal canal stenosis suggested. Moderate bilateral neural foraminal narrowing.   L4-5: Severe left and mild-to-moderate right facet arthropathy with ligamentum flavum hypertrophy. Disc bulge and hypertrophic endplate spurring. Severe spinal canal stenosis. Mild right and moderate to severe left neural foraminal narrowing.   L5-S1: Severe facet arthropathy. Mild disc bulge/uncovering. Mild left lateral recess narrowing. No additional spinal canal stenosis moderate bilateral neural foraminal narrowing.   Extensive fatty atrophy of the posterior paraspinal and iliopsoas musculature.       1. Motion degraded examination. 2. Findings to suggest discitis/osteomyelitis involving the T11-T12 and L1-L2 levels. There is mild height loss related to endplate destruction at these levels with surrounding inflammatory soft tissue  change including right-sided small paraspinal abscess components suspected. Additionally, there is concern for potential epidural phlegmon versus abscess centered at T11-T12 with resultant moderate spinal canal stenosis at this level. Inflammatory components also involve the neural foraminal regions at both levels. 3. No additional evidence of significant spinal canal stenosis or cord compression within the thoracic regions when accounting for motion limitations. There is concern for moderate to severe spinal canal stenosis at L3-L4 and severe spinal canal stenosis at L4-L5 secondary to degenerative change. 4. Extensive scoliosis.   MACRO: None   Signed by: Les Dumont 10/20/2024 1:14 PM Dictation workstation:   [id number]    Transthoracic Echo (TTE) Complete    Result Date: 10/19/2024            [place] [address] [address] , [place] [id number]             Phone [phone] TRANSTHORACIC ECHOCARDIOGRAM REPORT Patient Name:      BRUNA GANNON DANIE        Reading Physician:    [id number] Cristina Wharton MD Study Date:        10/18/2024            Ordering Provider:    [id number] CHETAN GARCIA MRN/PID:           [id number]              Fellow: Accession#:        [id number]          Nurse: Date of Birth/Age: 1950 / 74 years  Sonographer:          Audrey Barger RDCS Gender:            F                     Additional Staff:     Erlinda Merino RDCS Height:            160.02 cm             Admit Date: Weight:            117.94 kg             Admission Status:     Inpatient -                                                                Routine BSA / BMI:         2.16 m2 / 46.06 kg/m2 Department Location:  [facility] Blood Pressure: 131 /65 mmHg Study  Type:    TRANSTHORACIC ECHO (TTE) COMPLETE Diagnosis/ICD: Acute on chronic diastolic (congestive) heart failure                (CHF)-I50.33 Indication:    CHF, SOB CPT Codes:     Echo Complete w Full Doppler-[id number] Patient History: Pertinent History: HLD, bradycardia, HTN, CHF. Study Detail: The following Echo studies were performed: 2D, M-Mode, Doppler and               color flow. Technically challenging study due to body habitus,               patient lying in supine position and prominent lung artifact.               Definity used as a contrast agent for endocardial border               definition. Total contrast used for this procedure was 4 mL via IV               push.  PHYSICIAN INTERPRETATION: Left Ventricle: The left ventricular systolic function is moderately decreased, with a visually estimated ejection fraction of 30-35%. There are no regional wall motion abnormalities. The left ventricular cavity size is normal. Abnormal (paradoxical) septal motion, consistent with an intraventricular conduction delay. Spectral Doppler shows a normal pattern of left ventricular diastolic filling. Left Atrium: The left atrium is normal in size. Right Ventricle: The right ventricle was not well visualized. There is reduced right ventricular systolic function. Right Atrium: The right atrium is normal in size. Aortic Valve: The aortic valve is trileaflet. The aortic valve dimensionless index is 0.78. There is no evidence of aortic valve regurgitation. The peak instantaneous gradient of the aortic valve is 7.2 mmHg. The mean gradient of the aortic valve is 4.0 mmHg. Mitral Valve: The mitral valve is normal in structure. There is trace mitral valve regurgitation. Tricuspid Valve: The tricuspid valve was not well visualized. There is trace tricuspid regurgitation. The right ventricular systolic pressure is unable to be estimated. Pulmonic Valve: The pulmonic valve is not well visualized. There is physiologic pulmonic valve  regurgitation. Pericardium: No pericardial effusion noted. Aorta: The aortic root is normal. Systemic Veins: The inferior vena cava appears normal in size, with IVC inspiratory collapse less than 50%.  CONCLUSIONS:  1. Poorly visualized anatomical structures due to suboptimal image quality.  2. The left ventricular systolic function is moderately decreased, with a visually estimated ejection fraction of 30-35%.  3. There is reduced right ventricular systolic function.  4. Trace mitral valve regurgitation.  5. Trace tricuspid regurgitation is visualized.  6. Limited images quality. Left ventricular ejection fraction appears reduced at 30 to 35% with interventricular conduction delay. QUANTITATIVE DATA SUMMARY:  LA VOLUME:                   Normal Ranges: LA Vol A4C:        69.7 ml   (22+/-6mL/m2) LA Vol Index A4C:  32.2ml/m2 LA Area A4C:       23.0 cm2 LA Major Axis A4C: 6.4 cm LA Vol A4C:        64.9 ml  M-MODE MEASUREMENTS:         Normal Ranges: Ao Root:             2.90 cm (2.0-3.7cm) LAs:                 4.00 cm (2.7-4.0cm)  AORTA MEASUREMENTS:         Normal Ranges: Ao Sinus, d:        2.92 cm (2.1-3.5cm) Ao STJ, d:          2.61 cm (1.7-3.4cm) Asc Ao, d:          3.40 cm (2.1-3.4cm)  LV SYSTOLIC FUNCTION BY 2D PLANIMETRY (MOD):                      Normal Ranges: EF-A4C View:    38 % (>=55%) EF-Visual:      33 % LV EF Reported: 33 %  LV DIASTOLIC FUNCTION:           Normal Ranges: MV Peak E:             0.73 m/s  (0.7-1.2 m/s) MV Peak A:             0.92 m/s  (0.42-0.7 m/s) E/A Ratio:             0.79      (1.0-2.2) MV e'                  0.066 m/s (>8.0) MV lateral e'          0.07 m/s MV medial e'           0.06 m/s E/e' Ratio:            11.06     (<8.0)  MITRAL VALVE:         Normal Ranges: MV DT:        94 msec (150-240msec)  AORTIC VALVE:                     Normal Ranges: AoV Vmax:                1.34 m/s (<=1.7m/s) AoV Peak P.2 mmHg (<20mmHg) AoV Mean P.0 mmHg  (1.7-11.5mmHg) LVOT Max Alex:            1.07 m/s (<=1.1m/s) AoV VTI:                 25.00 cm (18-25cm) LVOT VTI:                19.50 cm LVOT Diameter:           2.40 cm  (1.8-2.4cm) AoV Area, VTI:           3.53 cm2 (2.5-5.5cm2) AoV Area,Vmax:           3.61 cm2 (2.5-4.5cm2) AoV Dimensionless Index: 0.78  RIGHT VENTRICLE: TAPSE: 14.7 mm RV s'  0.12 m/s  TRICUSPID VALVE/RVSP:         Normal Ranges: IVC Diam:             1.59 cm  [id number] Cristina Wharton MD Electronically signed on 10/19/2024 at 4:28:53 PM  ** Final **     ECG 12 lead    Result Date: 10/18/2024  Sinus rhythm with Premature supraventricular complexes and with occasional and consecutive Premature ventricular complexes Right bundle branch block Left anterior fascicular block  Bifascicular block  Cannot rule out Anteroseptal infarct , age undetermined Abnormal ECG When compared with ECG of 30-AUG-2024 12:12, Premature supraventricular complexes are now Present Sinus rhythm is no longer with 2nd degree AV block (Mobitz II) Right bundle branch block has replaced Nonspecific intraventricular block Minimal criteria for Anteroseptal infarct are now Present    CT thoracic spine wo IV contrast    Result Date: 10/17/2024  Interpreted By:  Awais Ellis, STUDY: CT THORACIC SPINE WO IV CONTRAST; CT LUMBAR SPINE WO IV CONTRAST; 10/17/2024 10:15 pm   INDICATION: Signs/Symptoms:back pain, fall x 2 days ago.     COMPARISON: 08/31/2024   ACCESSION NUMBER(S): [id number]; [id number]   ORDERING CLINICIAN: KALYN RON   TECHNIQUE: Axial CT images of the thoracolumbar spine are obtained. Axial, coronal and sagittal reconstructions are submitted for review.   FINDINGS:     Alignment: There is mild scoliosis noted.   Vertebrae/Intervertebral Discs: The thoracolumbar vertebral body heights are intact. There is severe loss of intervertebral disc space. There are severe intervertebral destructive changes noted of the T11-T12 and L2-L3 levels. Findings appear somewhat  worsened when compared to the prior study. Question underlying discitis/osteomyelitis. Recommend MRI for further evaluation. There is multilevel spinal canal stenosis noted. There is facet arthrosis noted.   Paraspinous Soft Tissues: Within normal limits.         There are severe intervertebral destructive changes noted of the T11-T12 and L2-L3 levels. Findings appear somewhat worsened when compared to the prior study. Question underlying discitis/osteomyelitis. Recommend MRI for further evaluation. Recommend clinical correlation.   MACRO: None   Signed by: Awais Ellis 10/17/2024 10:32 PM Dictation workstation:   [id number]    CT lumbar spine wo IV contrast    Result Date: 10/17/2024  Interpreted By:  Awais Ellis, STUDY: CT THORACIC SPINE WO IV CONTRAST; CT LUMBAR SPINE WO IV CONTRAST; 10/17/2024 10:15 pm   INDICATION: Signs/Symptoms:back pain, fall x 2 days ago.     COMPARISON: 08/31/2024   ACCESSION NUMBER(S): [id number]; [id number]   ORDERING CLINICIAN: KALYN RON   TECHNIQUE: Axial CT images of the thoracolumbar spine are obtained. Axial, coronal and sagittal reconstructions are submitted for review.   FINDINGS:     Alignment: There is mild scoliosis noted.   Vertebrae/Intervertebral Discs: The thoracolumbar vertebral body heights are intact. There is severe loss of intervertebral disc space. There are severe intervertebral destructive changes noted of the T11-T12 and L2-L3 levels. Findings appear somewhat worsened when compared to the prior study. Question underlying discitis/osteomyelitis. Recommend MRI for further evaluation. There is multilevel spinal canal stenosis noted. There is facet arthrosis noted.   Paraspinous Soft Tissues: Within normal limits.         There are severe intervertebral destructive changes noted of the T11-T12 and L2-L3 levels. Findings appear somewhat worsened when compared to the prior study. Question underlying discitis/osteomyelitis. Recommend MRI for further  evaluation. Recommend clinical correlation.   MACRO: None   Signed by: Awais Ellis 10/17/2024 10:32 PM Dictation workstation:   [id number]    CT angio chest for pulmonary embolism    Result Date: 10/17/2024  Interpreted By:  Awais Ellis, STUDY: CT ANGIO CHEST FOR PULMONARY EMBOLISM;  10/17/2024 8:09 pm   INDICATION: Signs/Symptoms:shortness of breath and tachypnea.     COMPARISON: CT abdomen pelvis dated 08/31/2024   ACCESSION NUMBER(S): [id number]   ORDERING CLINICIAN: KALYN RON   TECHNIQUE: Helical data acquisition of the chest was obtained after intravenous administration of 75 ML Omnipaque 350, as per PE protocol. Images were reformatted in coronal and sagittal planes. Axial and coronal maximum intensity projection (MIP) images were created and reviewed.   FINDINGS: POTENTIAL LIMITATIONS OF THE STUDY: The assessment is limited by respiratory motion and suboptimal contrast opacification of pulmonary arteries.   HEART AND VESSELS: There is no main or lobar pulmonary embolus visualized. There is suboptimal opacification and respiratory motion noted limiting evaluation of the segmental and subsegmental branches. Main pulmonary artery and its branches are normal in caliber.   The thoracic aorta normal in course and caliber. Coronary artery calcifications are seen. Please note, the study is not optimized for evaluation of coronary arteries.   The cardiac chambers are not enlarged.   There is no pericardial effusion seen.   MEDIASTINUM AND SENG, LOWER NECK AND AXILLA: The visualized thyroid gland is within normal limits. No evidence of thoracic lymphadenopathy by CT criteria. Esophagus appears within normal limits as seen.   LUNGS AND AIRWAYS: There are large bilateral pleural effusions with overlying atelectasis versus consolidation. There are scattered ground-glass airspace opacities noted.     UPPER ABDOMEN: The visualized subdiaphragmatic structures demonstrate no remarkable findings.       CHEST  WALL AND OSSEOUS STRUCTURES: Chest wall is within normal limits. There is multilevel loss of intervertebral disc space. The endplate osteophytic changes noted.There are severe degenerative changes noted of the bilateral shoulder joints. Associated soft tissue lesions.There are destructive changes noted of the T11-T12 and L1-L2 disc spaces noted. Findings are unchanged from the prior study.       1. There is no main or lobar pulmonary embolus visualized. There is suboptimal opacification and respiratory motion noted limiting evaluation of the segmental and subsegmental branches. 2. There are large bilateral pleural effusions with overlying atelectasis versus consolidation. There are scattered ground-glass airspace opacities noted. 3. There are severe degenerative changes noted of the bilateral shoulder joints. Associated soft tissue lesions.   MACRO: None   Signed by: Awais Ellis 10/17/2024 8:28 PM Dictation workstation:   [id number]     Assessment/Plan   Discitis/osteomyelitis -seen on CT/MRI-paraspinal abscess-awaiting IR aspiration  Acute hypoxic respiratory failure, resolving, on 2 L  Bilateral pleural effusion   E. coli urinary tract infection  E. coli bacteremia  Allergy to cephalosporin -tolerating meropenem         IV meropenem-monitor for adverse reaction   Interventional radiology consult for CT-guided aspiration  Supplemental oxygen, wean as tolerated  Monitor temperature and WBC  Follow up echocardiogram   PICC line placement  Final recommendations after IR aspiration    Total time spent caring for the patient today was 25 minutes. This includes time spent before the visit reviewing the chart, time spent during the visit, and time spent after the visit on documentation.     Lalitha Yancey, APRN-CNP

## 2024-10-21 NOTE — PROGRESS NOTES
Angela Montelongo is a 74 y.o. female on day 3 of admission presenting with Acute on chronic diastolic congestive heart failure.      Subjective   Resting comfortably. On O2 2 liters with high O2 sats. Nurse to wean. Denies dyspnea. On IV Lasix however per documentation of I&O  does not seem to be diuresing. CXR this am shows improvement in bilateral effusions. Awaiting IR aspiration of spinal abscess.       Objective     Last Recorded Vitals  /75 (BP Location: Right arm, Patient Position: Lying)   Pulse 75   Temp 35.7 °C (96.3 °F) (Temporal)   Resp 18   Wt 111 kg (245 lb)   SpO2 100%   Intake/Output last 3 Shifts:    Intake/Output Summary (Last 24 hours) at 10/21/2024 1105  Last data filed at 10/21/2024 0900  Gross per 24 hour   Intake 1832.5 ml   Output 1225 ml   Net 607.5 ml       Admission Weight  Weight: 118 kg (260 lb) (10/17/24 1757)    Daily Weight  10/18/24 : 111 kg (245 lb)    Image Results  XR chest 2 views  Narrative: Interpreted By:  Jagdeep Morataya,   STUDY:  XR CHEST 2 VIEWS; 10/21/2024 7:51 am      INDICATION:  Signs/Symptoms:Follow-up      COMPARISON:  10/17/2024 and February 2019.      ACCESSION NUMBER(S):  [id number]      ORDERING CLINICIAN:  KENYON PELAYO      TECHNIQUE:  Number of films: Two-view radiographs of the chest were obtained.      FINDINGS:  The cardiac silhouette is normal in size for the AP technique.  The aorta is ectatic and tortuous.  There are improved bilateral pleural effusions and bibasilar  infiltrates/atelectatic changes. No pneumothorax is seen.  Extensive degenerative changes involve the shoulders.      Impression: Improving bilateral effusions and bibasilar infiltrates/atelectatic  changes.      Signed by: Jagdeep Morataya 10/21/2024 10:10 AM  Dictation workstation:   [id number]      Physical Exam  Constitutional:       Appearance: She is obese.   HENT:      Head: Normocephalic and atraumatic.      Nose: Nose normal.      Mouth/Throat:      Mouth: Mucous  membranes are moist.      Pharynx: Oropharynx is clear.   Eyes:      Extraocular Movements: Extraocular movements intact.      Pupils: Pupils are equal, round, and reactive to light.   Cardiovascular:      Rate and Rhythm: Normal rate and regular rhythm.      Pulses: Normal pulses.      Comments: 1+ edema bilaterally  Pulmonary:      Effort: Pulmonary effort is normal.      Breath sounds: Normal breath sounds.      Comments: Clear/diminished  Abdominal:      General: Abdomen is flat. Bowel sounds are normal.      Palpations: Abdomen is soft.   Musculoskeletal:         General: Normal range of motion.      Comments: Generalized weakness, non-ambulatory due to back pain   Skin:     General: Skin is warm and dry.      Capillary Refill: Capillary refill takes less than 2 seconds.   Neurological:      General: No focal deficit present.      Mental Status: She is oriented to person, place, and time.   Psychiatric:         Mood and Affect: Mood normal.       Relevant Results    Results for orders placed or performed during the hospital encounter of 10/17/24 (from the past 24 hours)   Vancomycin   Result Value Ref Range    Vancomycin 23.7 (H) 5.0 - 20.0 ug/mL   CBC   Result Value Ref Range    WBC 6.4 4.4 - 11.3 x10*3/uL    nRBC 0.0 0.0 - 0.0 /100 WBCs    RBC 3.58 (L) 4.00 - 5.20 x10*6/uL    Hemoglobin 10.0 (L) 12.0 - 16.0 g/dL    Hematocrit 32.5 (L) 36.0 - 46.0 %    MCV 91 80 - 100 fL    MCH 27.9 26.0 - 34.0 pg    MCHC 30.8 (L) 32.0 - 36.0 g/dL    RDW 15.4 (H) 11.5 - 14.5 %    Platelets 292 150 - 450 x10*3/uL   Basic Metabolic Panel   Result Value Ref Range    Glucose 81 74 - 99 mg/dL    Sodium 133 (L) 136 - 145 mmol/L    Potassium 3.3 (L) 3.5 - 5.3 mmol/L    Chloride 91 (L) 98 - 107 mmol/L    Bicarbonate 34 (H) 21 - 32 mmol/L    Anion Gap 11 10 - 20 mmol/L    Urea Nitrogen 17 6 - 23 mg/dL    Creatinine 0.59 0.50 - 1.05 mg/dL    eGFR >90 >60 mL/min/1.73m*2    Calcium 8.9 8.6 - 10.3 mg/dL       Assessment/Plan      Acute  Hypoxic Respiratory Failure / Bilateral Pleural Effusions  -Pulmonology follows.    -Cardiology follows.  -Likely secondary to pleural effusions.   -On 2L with high O2 sats, wean as able.   -Continue IV lasix BID.  -Repeat CXR today shows improvement in bilateral effusions.       Acute Systolic CHF  -Cardiology follows. She has no known hx of CHF.   -ProBNP elevated 3,988 with pleural effusions on imaging.    -Echo shows decreased EF 30-35%.   -BID IV lasix, continue.   -Med adjustments made by cardio.   -Monitor strict I&O and daily weights.      Back Pain / Concern for Osteomyelitis/Discitis  -CT lumbar and thoracic spine have classic findings of advanced discitis and osteomyelitis at T11-12 and L1-2.   -MRI lumbar and thoracic spine shows discitis/OM T11-T12   -Ortho spine follows. Reviewed MRI and no indication for surgery    -ID follows.   -IV abx per ID, currently on meropenem. Alexia patel'elle  -IR consulted for aspiration of spinal abscess.   -Blood cultures NGTD.      Bacteremia  -ID on board.   -So far first set 1 of 2 with e.coli. Second set with no growth at this time- high suspicion this is from the urine.   -Continue broad spectrum IV abx.      UTI  -Urine culture with EColi.     -IV abx per ID.      Hypokalemia / Hypomagnesemia  -Resolved, monitor   -Continue K+ supplementation while being diuresed     HTN  -Continue current medications, adjustments made by cardio.   -BP stable, monitor.      HLD  -Continue statin.      DVT Risk  -Lovenox subcutaneous.      Dispo  To SNF on discharge. Will likely need PICC and IV Abx on discharge.      ANGELITO Blevins-CNP

## 2024-10-21 NOTE — CARE PLAN
The patient's goals for the shift include Breath better, rest    The clinical goals for the shift include monitor labs, monitor vitals, patient safety    Over the shift, the patient did not make progress toward the following goals. Barriers to progression include . Recommendations to address these barriers include .

## 2024-10-21 NOTE — PROGRESS NOTES
"Angela Montelongo is a 74 y.o. female on day 3 of admission follow-up for acute hypoxic respiratory failure, bilateral pleural effusions    Subjective   On room air; O2 sats 100%.  No respiratory complaints.  Denies pain.  Afebrile.       Objective     Physical Exam  Vitals and nursing note reviewed.   Constitutional:       Appearance: She is obese.   HENT:      Head: Normocephalic and atraumatic.      Nose: Nose normal.      Mouth/Throat:      Mouth: Mucous membranes are moist.   Eyes:      Extraocular Movements: Extraocular movements intact.      Conjunctiva/sclera: Conjunctivae normal.      Pupils: Pupils are equal, round, and reactive to light.   Cardiovascular:      Rate and Rhythm: Normal rate and regular rhythm.      Pulses: Normal pulses.      Heart sounds: Normal heart sounds.   Pulmonary:      Effort: Pulmonary effort is normal.      Comments: Lungs diminished but clear.   Abdominal:      General: Bowel sounds are normal.      Palpations: Abdomen is soft.   Musculoskeletal:         General: Normal range of motion.      Right lower leg: Edema present.      Left lower leg: Edema present.   Skin:     General: Skin is warm and dry.      Capillary Refill: Capillary refill takes less than 2 seconds.   Neurological:      General: No focal deficit present.      Mental Status: She is alert and oriented to person, place, and time.   Psychiatric:         Mood and Affect: Mood normal.         Behavior: Behavior normal.       Last Recorded Vitals  Blood pressure 135/75, pulse 75, temperature 35.7 °C (96.3 °F), temperature source Temporal, resp. rate 18, height 1.6 m (5' 3\"), weight 111 kg (245 lb), SpO2 100%.  Intake/Output last 3 Shifts:  I/O last 3 completed shifts:  In: 2042.5 (18.4 mL/kg) [P.O.:1260; I.V.:182.5 (1.6 mL/kg); IV Piggyback:600]  Out: 2875 (25.9 mL/kg) [Urine:2875 (0.7 mL/kg/hr)]  Weight: 111.1 kg       Intake/Output Summary (Last 24 hours) at 10/21/2024 0918  Last data filed at 10/21/2024 0900  Gross per " 24 hour   Intake 1892.5 ml   Output 1225 ml   Net 667.5 ml      Scheduled medications:  allopurinol, 100 mg, oral, Daily  dapagliflozin propanediol, 10 mg, oral, Daily  enoxaparin, 40 mg, subcutaneous, q12h DYLAN  famotidine, 20 mg, oral, BID   Or  famotidine, 20 mg, intravenous, BID  furosemide, 40 mg, intravenous, BID  gabapentin, 200 mg, oral, TID  losartan, 25 mg, oral, Daily  meropenem, 1 g, intravenous, q8h  oxybutynin, 5 mg, oral, BID  oxygen, , inhalation, q8h  perflutren protein A microsphere, 0.5 mL, intravenous, Once in imaging  polyethylene glycol, 17 g, oral, Daily  potassium chloride, 20 mEq, oral, BID  simvastatin, 40 mg, oral, Nightly  sulfur hexafluoride microsphr, 2 mL, intravenous, Once in imaging  vancomycin, 1,250 mg, intravenous, q24h       PRN medications: acetaminophen **OR** acetaminophen **OR** acetaminophen, vancomycin       Relevant Results  Results for orders placed or performed during the hospital encounter of 10/17/24 (from the past 24 hours)   Vancomycin   Result Value Ref Range    Vancomycin 23.7 (H) 5.0 - 20.0 ug/mL   CBC   Result Value Ref Range    WBC 6.4 4.4 - 11.3 x10*3/uL    nRBC 0.0 0.0 - 0.0 /100 WBCs    RBC 3.58 (L) 4.00 - 5.20 x10*6/uL    Hemoglobin 10.0 (L) 12.0 - 16.0 g/dL    Hematocrit 32.5 (L) 36.0 - 46.0 %    MCV 91 80 - 100 fL    MCH 27.9 26.0 - 34.0 pg    MCHC 30.8 (L) 32.0 - 36.0 g/dL    RDW 15.4 (H) 11.5 - 14.5 %    Platelets 292 150 - 450 x10*3/uL   Basic Metabolic Panel   Result Value Ref Range    Glucose 81 74 - 99 mg/dL    Sodium 133 (L) 136 - 145 mmol/L    Potassium 3.3 (L) 3.5 - 5.3 mmol/L    Chloride 91 (L) 98 - 107 mmol/L    Bicarbonate 34 (H) 21 - 32 mmol/L    Anion Gap 11 10 - 20 mmol/L    Urea Nitrogen 17 6 - 23 mg/dL    Creatinine 0.59 0.50 - 1.05 mg/dL    eGFR >90 >60 mL/min/1.73m*2    Calcium 8.9 8.6 - 10.3 mg/dL      XR chest 2 views  Result Date: 10/21/2024  FINDINGS: The cardiac silhouette is normal in size for the AP technique. The aorta is ectatic  and tortuous. There are improved bilateral pleural effusions and bibasilar infiltrates/atelectatic changes. No pneumothorax is seen. Extensive degenerative changes involve the shoulders. IMPRESSION: Improving bilateral effusions and bibasilar infiltrates/atelectatic changes.    MR thoracic spine wo IV contrast  Result Date: 10/20/2024  FINDINGS: The degree of motion artifact significantly degrades evaluation. THORACIC SPINE: Assessment of the upper thoracic spinal cord is limited by motion  artifact. CSF flow related artifact also degrades evaluation on several sequences. Within these limitations, there is no definite cord signal abnormality or cord compression. There is concern for epidural abscess component within the lower thoracic region with suggestion of circumferential T2 hyperintensity surrounding the thecal sac measuring up to 3 mm in maximum thickness along the ventral aspect at T12 (series 9, image 27). Potential epidural fluid/thickening may extend as far cranially as T7. There is abnormal marrow signal centered about the T11-T12 endplates and intervertebral disc with STIR hyperintensity and T1 hypointensity and irregular appearance of the disc and endplates as previously described on CT examination, concerning for discitis/osteomyelitis.  Signal abnormality also extends to involve the left-greater-than-right facet joints and there is extensive signal abnormality within the adjacent soft tissues including suspected paraspinal abscess components such as a 1.8 x 1.7 cm fluid collection along the rightward aspect of T11-T12 adjacent to the neural foraminal region (series 9, image 24). There is resultant mild height loss of the T11 and T12 vertebral bodies. Presumed epidural Inflammatory components are also evident involving the T11-T12 neural foraminal regions bilaterally. Allowing for motion artifact there is no additional evidence of discitis/osteomyelitis within the remainder of the thoracic spine. The  remaining thoracic vertebral bodies demonstrate grossly preserved heights. There is trace degenerative appearing anterolisthesis of T1 on T2 and T3 on T4. There is unchanged S-shaped scoliosis of the cervicothoracic spine with levo scoliosis of the upper cervicothoracic region and dextroscoliotic curvature of the thoracolumbar region. Multilevel type 2 Modic endplate signal change. There is moderate multilevel degenerative disc height loss throughout the thoracic regions. There is no additional significant spinal canal stenosis evident. Predominantly mild multilevel neural foraminal narrowing is suspected with evaluation limited by scoliosis and aforementioned motion artifact. There is extensive fatty atrophy of the posterior paraspinal musculature. Bilateral pleural effusions and dependent parenchymal airspace opacity are again noted. LUMBAR SPINE: Normal segmentation. Similar to the T11-T12 level, there is extensive marrow edema and signal abnormality centered about the L1-L2 endplates and intervertebral disc concerning for discitis/osteomyelitis. There is  surrounding soft tissue signal abnormality with concern for small abscess components along the rightward aspect at L1-L2 measuring up to 2.2 cm in diameter (series 9, image 20). No discrete epidural abscess component is seen at this level. There is mild destructive endplate height loss involving the L1 and L2 vertebrae. The cauda equina are otherwise unremarkable. The conus appears to terminate at the T12-L1 level with CSF flow related artifact obscuring evaluation. Similar levo scoliosis of the lumbar spine. The remaining lumbar  vertebral body heights are otherwise maintained. There is no additional suspicious STIR hyperintensity/marrow edema within the remaining lumbar levels. There is moderate degenerative disc height loss within the remainder of the lumbar spine with likely degenerative appearing fluid within portions of the discs. Degenerative change:  T12-L1: No spinal canal stenosis. Mild right-greater-than-left neural foraminal narrowing suggested. L1-2: Mild facet arthropathy. Inflammatory components involving the right-greater-than-left paraspinal soft tissues with no definite spinal canal stenosis, suspected mild right lateral recess narrowing and inflammatory components extending to the right-greater-than-left neural foraminal regions. L2-3: Mild bilateral facet arthropathy. Mild disc bulge and endplate spurring, worse along the rightward aspect. No spinal canal stenosis with mild right lateral recess narrowing. Minimal left and mild-to-moderate right neural foraminal narrowing. L3-4: Moderate to severe bilateral facet arthropathy. Posterior disc osteophyte components. Moderate to severe spinal canal stenosis suggested. Moderate bilateral neural foraminal narrowing. L4-5: Severe left and mild-to-moderate right facet arthropathy with ligamentum flavum hypertrophy. Disc bulge and hypertrophic endplate spurring. Severe spinal canal stenosis. Mild right and moderate to severe left neural foraminal narrowing. L5-S1: Severe facet arthropathy. Mild disc bulge/uncovering. Mild left lateral recess narrowing. No additional spinal canal stenosis moderate bilateral neural foraminal narrowing. Extensive fatty atrophy of the posterior paraspinal and iliopsoas  musculature. IMPRESSION: 1. Motion degraded examination. 2. Findings to suggest discitis/osteomyelitis involving the T11-T12 and L1-L2 levels. There is mild height loss related to endplate destruction at these levels with surrounding inflammatory soft tissue change including right-sided small paraspinal abscess components suspected. Additionally, there is concern for potential epidural phlegmon versus abscess centered at T11-T12 with resultant moderate spinal canal stenosis at this level. Inflammatory components also  involve the neural foraminal regions at both levels. 3. No additional evidence of significant  spinal canal stenosis or cord compression within the thoracic regions when accounting for motion limitations. There is concern for moderate to severe spinal canal stenosis at L3-L4 and severe spinal canal stenosis at L4-L5  secondary to degenerative change. 4. Extensive scoliosis.      MR lumbar spine wo IV contrast  Result Date: 10/20/2024  FINDINGS: The degree of motion artifact significantly degrades evaluation.   THORACIC SPINE:   Assessment of the upper thoracic spinal cord is limited by motion artifact. CSF flow related artifact also degrades evaluation on several sequences. Within these limitations, there is no definite cord signal abnormality or cord compression. There is concern for epidural abscess component within the lower thoracic region with suggestion of circumferential T2 hyperintensity surrounding the thecal sac measuring up to 3 mm in maximum thickness along the ventral aspect at T12 (series 9, image 27). Potential epidural fluid/thickening may extend as far cranially as T7.   There is abnormal marrow signal centered about the T11-T12 endplates and intervertebral disc with STIR hyperintensity and T1 hypointensity and irregular appearance of the disc and endplates as previously described on CT examination, concerning for discitis/osteomyelitis. Signal abnormality also extends to involve the left-greater-than-right facet joints and there is extensive signal abnormality within the adjacent soft tissues including suspected paraspinal abscess components such as a 1.8 x 1.7 cm fluid collection along the rightward aspect of T11-T12 adjacent to the neural foraminal region (series 9, image 24). There is resultant mild height loss of the T11 and T12 vertebral bodies. Presumed epidural components resulting in moderate spinal canal stenosis at T11-T12. Inflammatory components are also evident involving the T11-T12 neural foraminal regions bilaterally.   Allowing for motion artifact there is no additional  evidence of discitis/osteomyelitis within the remainder of the thoracic spine. The remaining thoracic vertebral bodies demonstrate grossly preserved heights. There is trace degenerative appearing anterolisthesis of T1 on T2 and T3 on T4. There is unchanged S-shaped scoliosis of the cervicothoracic spine with levo scoliosis of the upper cervicothoracic region and dextroscoliotic curvature of the thoracolumbar region. Multilevel type 2 Modic endplate signal change. There is moderate multilevel degenerative disc height loss throughout the thoracic regions. There is no additional significant spinal canal stenosis evident. Predominantly mild multilevel neural foraminal narrowing is suspected with evaluation limited by scoliosis and aforementioned motion artifact.   There is extensive fatty atrophy of the posterior paraspinal musculature. Bilateral pleural effusions and dependent parenchymal airspace opacity are again noted.     LUMBAR SPINE:   Normal segmentation.   Similar to the T11-T12 level, there is extensive marrow edema and signal abnormality centered about the L1-L2 endplates and intervertebral disc concerning for discitis/osteomyelitis. There is surrounding soft tissue signal abnormality with concern for small abscess components along the rightward aspect at L1-L2 measuring up to 2.2 cm in diameter (series 9, image 20). No discrete epidural abscess component is seen at this level. There is mild destructive endplate height loss involving the L1 and L2 vertebrae.   The cauda equina are otherwise unremarkable. The conus appears to terminate at the T12-L1 level with CSF flow related artifact obscuring evaluation.   Similar levo scoliosis of the lumbar spine. The remaining lumbar vertebral body heights are otherwise maintained. There is no additional suspicious STIR hyperintensity/marrow edema within the remaining lumbar levels. There is moderate degenerative disc height loss within the remainder of the lumbar spine  with likely degenerative appearing fluid within portions of the discs.   Degenerative change:   T12-L1: No spinal canal stenosis. Mild right-greater-than-left neural foraminal narrowing suggested.   L1-2: Mild facet arthropathy. Inflammatory components involving the right-greater-than-left paraspinal soft tissues with no definite spinal canal stenosis, suspected mild right lateral recess narrowing, and inflammatory components extending to the right-greater-than-left neural foraminal regions.   L2-3: Mild bilateral facet arthropathy. Mild disc bulge and endplate spurring, worse along the rightward aspect. No spinal canal stenosis with mild right lateral recess narrowing. Minimal left and mild-to-moderate right neural foraminal narrowing.   L3-4: Moderate to severe bilateral facet arthropathy. Posterior disc osteophyte components. Moderate to severe spinal canal stenosis suggested. Moderate bilateral neural foraminal narrowing.   L4-5: Severe left and mild-to-moderate right facet arthropathy with ligamentum flavum hypertrophy. Disc bulge and hypertrophic endplate spurring. Severe spinal canal stenosis. Mild right and moderate to severe left neural foraminal narrowing.   L5-S1: Severe facet arthropathy. Mild disc bulge/uncovering. Mild left lateral recess narrowing. No additional spinal canal stenosis moderate bilateral neural foraminal narrowing.   Extensive fatty atrophy of the posterior paraspinal and iliopsoas musculature.     1. Motion degraded examination. 2. Findings to suggest discitis/osteomyelitis involving the T11-T12 and L1-L2 levels. There is mild height loss related to endplate destruction at these levels with surrounding inflammatory soft tissue change including right-sided small paraspinal abscess components suspected. Additionally, there is concern for potential epidural phlegmon versus abscess centered at T11-T12 with resultant moderate spinal canal stenosis at this level. Inflammatory components also  involve the neural foraminal regions at both levels. 3. No additional evidence of significant spinal canal stenosis or cord compression within the thoracic regions when accounting for motion limitations. There is concern for moderate to severe spinal canal stenosis at L3-L4 and severe spinal canal stenosis at L4-L5 secondary to degenerative change. 4. Extensive scoliosis.      MR thoracic spine wo IV contrast  Result Date: 10/20/2024  FINDINGS: The degree of motion artifact significantly degrades evaluation.   THORACIC SPINE:   Assessment of the upper thoracic spinal cord is limited by motion artifact. CSF flow related artifact also degrades evaluation on several sequences. Within these limitations, there is no definite cord signal abnormality or cord compression. There is concern for epidural abscess component within the lower thoracic region with suggestion of circumferential T2 hyperintensity surrounding the thecal sac measuring up to 3 mm in maximum thickness along the ventral aspect at T12 (series 9, image 27). Potential epidural fluid/thickening may extend as far cranially as T7.   There is abnormal marrow signal centered about the T11-T12 endplates and intervertebral disc with STIR hyperintensity and T1 hypointensity and irregular appearance of the disc and endplates as previously described on CT examination, concerning for discitis/osteomyelitis. Signal abnormality also extends to involve the left-greater-than-right facet joints and there is extensive signal abnormality within the adjacent soft tissues including suspected paraspinal abscess components such as a 1.8 x 1.7 cm fluid collection along the rightward aspect of T11-T12 adjacent to the neural foraminal region (series 9, image 24). There is resultant mild height loss of the T11 and T12 vertebral bodies. Presumed epidural components resulting in moderate spinal canal stenosis at T11-T12. Inflammatory components are also evident involving the T11-T12  neural foraminal regions bilaterally.   Allowing for motion artifact there is no additional evidence of discitis/osteomyelitis within the remainder of the thoracic spine. The remaining thoracic vertebral bodies demonstrate grossly preserved heights. There is trace degenerative appearing anterolisthesis of T1 on T2 and T3 on T4. There is unchanged S-shaped scoliosis of the cervicothoracic spine with levo scoliosis of the upper cervicothoracic region and dextroscoliotic curvature of the thoracolumbar region. Multilevel type 2 Modic endplate signal change. There is moderate multilevel degenerative disc height loss throughout the thoracic regions. There is no additional significant spinal canal stenosis evident. Predominantly mild multilevel neural foraminal narrowing is suspected with evaluation limited by scoliosis and aforementioned motion artifact.   There is extensive fatty atrophy of the posterior paraspinal musculature. Bilateral pleural effusions and dependent parenchymal airspace opacity are again noted.     LUMBAR SPINE:   Normal segmentation.   Similar to the T11-T12 level, there is extensive marrow edema and signal abnormality centered about the L1-L2 endplates and intervertebral disc concerning for discitis/osteomyelitis. There is surrounding soft tissue signal abnormality with concern for small abscess components along the rightward aspect at L1-L2 measuring up to 2.2 cm in diameter (series 9, image 20). No discrete epidural abscess component is seen at this level. There is mild destructive endplate height loss involving the L1 and L2 vertebrae.   The cauda equina are otherwise unremarkable. The conus appears to terminate at the T12-L1 level with CSF flow related artifact obscuring evaluation.   Similar levo scoliosis of the lumbar spine. The remaining lumbar vertebral body heights are otherwise maintained. There is no additional suspicious STIR hyperintensity/marrow edema within the remaining lumbar  levels. There is moderate degenerative disc height loss within the remainder of the lumbar spine with likely degenerative appearing fluid within portions of the discs.   Degenerative change:   T12-L1: No spinal canal stenosis. Mild right-greater-than-left neural foraminal narrowing suggested.   L1-2: Mild facet arthropathy. Inflammatory components involving the right-greater-than-left paraspinal soft tissues with no definite spinal canal stenosis, suspected mild right lateral recess narrowing, and inflammatory components extending to the right-greater-than-left neural foraminal regions.   L2-3: Mild bilateral facet arthropathy. Mild disc bulge and endplate spurring, worse along the rightward aspect. No spinal canal stenosis with mild right lateral recess narrowing. Minimal left and mild-to-moderate right neural foraminal narrowing.   L3-4: Moderate to severe bilateral facet arthropathy. Posterior disc osteophyte components. Moderate to severe spinal canal stenosis suggested. Moderate bilateral neural foraminal narrowing.   L4-5: Severe left and mild-to-moderate right facet arthropathy with ligamentum flavum hypertrophy. Disc bulge and hypertrophic endplate spurring. Severe spinal canal stenosis. Mild right and moderate to severe left neural foraminal narrowing.   L5-S1: Severe facet arthropathy. Mild disc bulge/uncovering. Mild left lateral recess narrowing. No additional spinal canal stenosis moderate bilateral neural foraminal narrowing.   Extensive fatty atrophy of the posterior paraspinal and iliopsoas musculature.  1. Motion degraded examination. 2. Findings to suggest discitis/osteomyelitis involving the T11-T12 and L1-L2 levels. There is mild height loss related to endplate destruction at these levels with surrounding inflammatory soft tissue change including right-sided small paraspinal abscess components suspected. Additionally, there is concern for potential epidural phlegmon versus abscess centered at  T11-T12 with resultant moderate spinal canal stenosis at this level. Inflammatory components also involve the neural foraminal regions at both levels. 3. No additional evidence of significant spinal canal stenosis or cord compression within the thoracic regions when accounting for motion limitations. There is concern for moderate to severe spinal canal stenosis at L3-L4 and severe spinal canal stenosis at L4-L5 secondary to degenerative change. 4. Extensive scoliosis.     Transthoracic Echo (TTE) Complete  Result Date: 10/19/2024  CONCLUSIONS:  1. Poorly visualized anatomical structures due to suboptimal image quality.  2. The left ventricular systolic function is moderately decreased, with a visually estimated ejection fraction of 30-35%.  3. There is reduced right ventricular systolic function.  4. Trace mitral valve regurgitation.  5. Trace tricuspid regurgitation is visualized.  6. Limited images quality. Left ventricular ejection fraction appears reduced at 30 to 35% with interventricular conduction delay.     CT thoracic spine wo IV contrast  Result Date: 10/17/2024  FINDINGS:     Alignment: There is mild scoliosis noted.   Vertebrae/Intervertebral Discs: The thoracolumbar vertebral body heights are intact. There is severe loss of intervertebral disc space. There are severe intervertebral destructive changes noted of the T11-T12 and L2-L3 levels. Findings appear somewhat worsened when compared to the prior study. Question underlying discitis/osteomyelitis. Recommend MRI for further evaluation. There is multilevel spinal canal stenosis noted. There is facet arthrosis noted.   Paraspinous Soft Tissues: Within normal limits.  There are severe intervertebral destructive changes noted of the T11-T12 and L2-L3 levels. Findings appear somewhat worsened when compared to the prior study. Question underlying discitis/osteomyelitis. Recommend MRI for further evaluation. Recommend clinical correlation.       CT lumbar  spine wo IV contrast  Result Date: 10/17/2024  FINDINGS:     Alignment: There is mild scoliosis noted.   Vertebrae/Intervertebral Discs: The thoracolumbar vertebral body heights are intact. There is severe loss of intervertebral disc space. There are severe intervertebral destructive changes noted of the T11-T12 and L2-L3 levels. Findings appear somewhat worsened when compared to the prior study. Question underlying discitis/osteomyelitis. Recommend MRI for further evaluation. There is multilevel spinal canal stenosis noted. There is facet arthrosis noted.   Paraspinous Soft Tissues: Within normal limits.       There are severe intervertebral destructive changes noted of the T11-T12 and L2-L3 levels. Findings appear somewhat worsened when compared to the prior study. Question underlying discitis/osteomyelitis. Recommend MRI for further evaluation. Recommend clinical correlation.      CT angio chest for pulmonary embolism  Result Date: 10/17/2024  FINDINGS: POTENTIAL LIMITATIONS OF THE STUDY: The assessment is limited by respiratory motion and suboptimal contrast opacification of pulmonary arteries.   HEART AND VESSELS: There is no main or lobar pulmonary embolus visualized. There is suboptimal opacification and respiratory motion noted limiting evaluation of the segmental and subsegmental branches. Main pulmonary artery and its branches are normal in caliber.   The thoracic aorta normal in course and caliber. Coronary artery calcifications are seen. Please note, the study is not optimized for evaluation of coronary arteries.   The cardiac chambers are not enlarged.   There is no pericardial effusion seen.   MEDIASTINUM AND SENG, LOWER NECK AND AXILLA: The visualized thyroid gland is within normal limits. No evidence of thoracic lymphadenopathy by CT criteria. Esophagus appears within normal limits as seen.   LUNGS AND AIRWAYS: There are large bilateral pleural effusions with overlying atelectasis versus  consolidation. There are scattered ground-glass airspace opacities noted.     UPPER ABDOMEN: The visualized subdiaphragmatic structures demonstrate no remarkable findings.       CHEST WALL AND OSSEOUS STRUCTURES: Chest wall is within normal limits. There is multilevel loss of intervertebral disc space. The endplate osteophytic changes noted.There are severe degenerative changes noted of the bilateral shoulder joints. Associated soft tissue lesions.There are destructive changes noted of the T11-T12 and L1-L2 disc spaces noted. Findings are unchanged from the prior study.  1. There is no main or lobar pulmonary embolus visualized. There is suboptimal opacification and respiratory motion noted limiting evaluation of the segmental and subsegmental branches. 2. There are large bilateral pleural effusions with overlying atelectasis versus consolidation. There are scattered ground-glass airspace opacities noted. 3. There are severe degenerative changes noted of the bilateral shoulder joints. Associated soft tissue lesions.         Assessment/Plan   Acute hypoxic respiratory failure  Wean oxygen as sats allow  Continue as needed nebulized bronchodilator  Continuous pulse oximetry  Incentive spirometry/pulmonary hygiene     Bilateral pleural effusions  PA lateral chest x-ray today shows improving bilateral effusions and bibasilar infiltrates/atelectatic changes  Monitor response to diuresis    Acute on chronic diastolic congestive heart failure  40 mg oral Lasix twice/daily  Strict I's and O's  Cardiology follow-up      Discitis/osteomyelitis  Follow-up cultures  Analgesia  IR consulted for CT-guided aspiration  PICC line placement  Orthopedic Surgery follow-up  Infectious Disease follow-up     Urinary tract infection/Bacteremia  Urine culture + E. coli  IV meropenem  Follow-up cultures  Infectious disease follow-up     Morbid obesity  Polysomnogram as outpatient    Discharge planning-SNF recommended, facilities in  review  Nakia Maxwell, APRN-CNP

## 2024-10-21 NOTE — CARE PLAN
The patient's goals for the shift include Breath better, rest    The clinical goals for the shift include IV antibiotic, manage pain, remain free from falls    Over the shift, the patient did not make progress toward the following goals. Barriers to progression include ***. Recommendations to address these barriers include ***.

## 2024-10-21 NOTE — PROGRESS NOTES
Physical Therapy    Physical Therapy Evaluation    Patient Name: Angela Montelongo  MRN: [id number]  Department: [address]  Room: [address] [place], [place] [address]  Today's Date: 10/21/2024   Time Calculation  Start Time: 1121  Stop Time: 1140  Time Calculation (min): 19 min    Assessment/Plan   PT Assessment  PT Assessment Results: Decreased strength, Decreased endurance, Impaired balance, Decreased mobility, Decreased safety awareness, Obesity, Impaired sensation, Pain, Orthopedic restrictions  Rehab Prognosis: Fair  Barriers to Discharge: assist x 2  Evaluation/Treatment Tolerance: Patient limited by fatigue  Medical Staff Made Aware: Yes  End of Session Communication: Bedside nurse  End of Session Patient Position: Bed, 3 rail up, Alarm on      Assessment Comment: 75 y/o female who presented to ED with worsening SOB. Has been home from rehab for short period of time. Has been primarily immobile since being home. Is currently an increased falls risk due to impaired balance, decreased strength and decreased activity tolerance. Would benefit from cont PT services while in-house to maximize safe mobility.        IP OR SWING BED PT PLAN  Inpatient or Swing Bed: Inpatient  PT Plan  Treatment/Interventions: Transfer training, Bed mobility, Gait training, Balance training, Neuromuscular re-education, Strengthening, Endurance training, Therapeutic exercise, Positioning, Therapeutic activity, Postural re-education  PT Plan: Ongoing PT  PT Frequency: 3 times per week  PT Discharge Recommendations: Moderate intensity level of continued care  Equipment Recommended upon Discharge: Lift  PT Recommended Transfer Status: Total assist  PT - OK to Discharge: Yes    Subjective   General Visit Information:  General  Reason for Referral: impaired mobility; acute on chronic heart failure, T11-12/L1-2 discitis/OM, UTI  Past Medical History Relevant to Rehab: HTN, CKD, low back pain, spinal stenosis, falls  Family/Caregiver Present: No  Co-Treatment: OT  Co-Treatment  "Reason: safety for mobility  Prior to Session Communication: Bedside nurse  Patient Position Received: Bed, 3 rail up, Alarm on  Preferred Learning Style: auditory, visual, verbal  General Comment: 75 y/o female supine in bed upon arrival. OT at bedside. Pt agreeable to participate. Sophia.  Home Living:  Home Living  Type of Home: House  Lives With: Other (Comment) (dtr, son in law, granddtr)  Home Adaptive Equipment: Walker rolling or standard, Adaptive bed, Wheelchair-manual  Home Layout: One level  Home Access: Stairs to enter with rails  Entrance Stairs-Number of Steps: 7-8  Bathroom Accessibility: main level  Home Living Comments: reports she \"had to pay a lot of money for transportation help to get into the house\" and \"even then it was a struggle\". reports that she \"does not think her w/c will fit through doorways\".  Has an adjustable bed, not a hospital bed with rails.  Prior Level of Function:  Prior Function Per Pt/Caregiver Report  Level of [place]: Needs assistance with ADLs, Needs assistance with homemaking, Needs assistance with functional transfers  Receives Help From: Family ([facility])  ADL Assistance: Needs assistance (bed level assist since returning from SNF ~1 week ago)  Homemaking Assistance: Needs assistance (family manages)  Ambulatory Assistance: Needs assistance (has been essentially nonambulatory/bed bound at home)  Prior Function Comments: pt reports her mobility significantly declined after a fall in August.  was doing very short distances at SNF but since returning home has been essentially bed bound. Has not stood on feet or ambulated in about 8 days.  Precautions:  Precautions  Medical Precautions: Fall precautions, Spinal precautions  Precautions Comment: spine precautions for pain mgmt.  on 2L O2 via NC        Objective   Pain:  Pain Assessment  Pain Assessment: 0-10  0-10 (Numeric) Pain Score: 7  Pain Type: Chronic pain  Pain Location: Back  Pain Interventions: Repositioned (RN " aware)  Cognition:  Cognition  Cognition Comments: able to follow commands. willing to participate. anxious tendencies at times.    General Assessments:  General Observation  General Observation: pleasant/cooperative. willing to participate     Activity Tolerance  Endurance: Decreased tolerance for upright activites  Activity Tolerance Comments: limited by back pain    Sensation  Sensation Comment: decreased sensation to LLE/foot    Strength  Strength Comments: LLE grossly weaker than RLE       Postural Control  Posture Comment: rounded shoulders, fwd head, flexed posture    Static Sitting Balance  Static Sitting-Level of Assistance: Minimum assistance  Static Sitting-Comment/Number of Minutes: sat on EOB about 7-8 minutes with supervision to min assist. Increased posterior lean, flexed posture. Requires UE support and cueing to shift wt anteriorly.     Functional Assessments:  Bed Mobility 1  Bed Mobility 1: Supine to sitting  Level of Assistance 1: Maximum assistance, +2  Bed Mobility Comments 1: HOB elevated, partial log roll technique for pain management.  Bed Mobility 2  Bed Mobility  2: Sitting to supine  Level of Assistance 2: Maximum assistance, +2  Bed Mobility Comments 2: reverse log roll technique  Bed Mobility 3  Bed Mobility 3: Rolling right, Rolling left  Level of Assistance 3: Maximum assistance, +2  Bed Mobility Comments 3: to allow for linen change    Transfers  Transfer: No (Pt declined attempt to stand this date)       Extremity/Trunk Assessments:  RLE   RLE : Exceptions to WFL  Strength RLE  RLE Overall Strength: Greater than or equal to 3/5 as evidenced by functional mobility  LLE   LLE : Exceptions to WFL  Strength LLE  LLE Overall Strength:  (Grossly < 3/5 observed. Unable to complete through full range for MMT assessment)  Outcome Measures:  [facility] Basic Mobility  Turning from your back to your side while in a flat bed without using bedrails: Total  Moving from lying on your back to sitting on  the side of a flat bed without using bedrails: Total  Moving to and from bed to chair (including a wheelchair): Total  Standing up from a chair using your arms (e.g. wheelchair or bedside chair): Total  To walk in hospital room: Total  Climbing 3-5 steps with railing: Total  Basic Mobility - Total Score: 6    Encounter Problems       Encounter Problems (Active)       Balance       sitting (Progressing)       Start:  10/21/24    Expected End:  11/04/24       Pt will sit on EOB with supervision and perform dynamic tasks without LOB         standing (Progressing)       Start:  10/21/24    Expected End:  11/04/24       Pt will stand with UE support of walker and min assist, no LOB            Mobility       bed mobility (Progressing)       Start:  10/21/24    Expected End:  11/04/24       Pt will perform sup to/from sit transfer with min assist            PT Transfers       sit to stand (Progressing)       Start:  10/21/24    Expected End:  11/04/24       Pt will perform sit to stand transfer with walker and min assist         bed to chair (Progressing)       Start:  10/21/24    Expected End:  11/04/24       Pt will perform bed to chair transfer with walker and min assist            Safety       LTG - Patient will utilize safety techniques (Progressing)       Start:  10/21/24    Expected End:  11/04/24                   Education Documentation  Precautions, taught by Torie Winslow PT at 10/21/2024  2:24 PM.  Learner: Patient  Readiness: Acceptance  Method: Explanation  Response: Needs Reinforcement    Body Mechanics, taught by Torie Winslow PT at 10/21/2024  2:24 PM.  Learner: Patient  Readiness: Acceptance  Method: Explanation  Response: Needs Reinforcement    Mobility Training, taught by Torie Winslow PT at 10/21/2024  2:24 PM.  Learner: Patient  Readiness: Acceptance  Method: Explanation  Response: Needs Reinforcement    Education Comments  No comments found.

## 2024-10-21 NOTE — CARE PLAN
The patient's goals for the shift include Breath better, rest    The clinical goals for the shift include IV antibiotic, manage pain, remain free from falls      Problem: Safety - Adult  Goal: Free from fall injury  Outcome: Progressing     Problem: Discharge Planning  Goal: Discharge to home or other facility with appropriate resources  Outcome: Progressing     Problem: Chronic Conditions and Co-morbidities  Goal: Patient's chronic conditions and co-morbidity symptoms are monitored and maintained or improved  Outcome: Progressing     Problem: Fall/Injury  Goal: Not fall by end of shift  Outcome: Progressing  Goal: Be free from injury by end of the shift  Outcome: Progressing  Goal: Verbalize understanding of personal risk factors for fall in the hospital  Outcome: Progressing  Goal: Verbalize understanding of risk factor reduction measures to prevent injury from fall in the home  Outcome: Progressing  Goal: Use assistive devices by end of the shift  Outcome: Progressing  Goal: Pace activities to prevent fatigue by end of the shift  Outcome: Progressing     Problem: Heart Failure  Goal: Improved gas exchange this shift  Outcome: Progressing  Goal: Improved urinary output this shift  Outcome: Progressing  Goal: Reduction in peripheral edema within 24 hours  Outcome: Progressing  Goal: Report improvement of dyspnea/breathlessness this shift  Outcome: Progressing  Goal: Weight from fluid excess reduced over 2-3 days, then stabilize  Outcome: Progressing  Goal: Increase self care and/or family involvement in 24 hours  Outcome: Progressing     Problem: Skin  Goal: Decreased wound size/increased tissue granulation at next dressing change  Outcome: Progressing  Goal: Participates in plan/prevention/treatment measures  Outcome: Progressing  Goal: Prevent/manage excess moisture  Outcome: Progressing  Goal: Prevent/minimize sheer/friction injuries  Outcome: Progressing  Goal: Promote/optimize nutrition  Outcome:  Progressing  Goal: Promote skin healing  Outcome: Progressing

## 2024-10-21 NOTE — PROGRESS NOTES
"Angela Montelongo is a 74 y.o. female on day 3 of admission presenting with Acute on chronic diastolic congestive heart failure.    Subjective   Patient upright in bed. No complaints at this time. Tells me her breathing is much better. Denies chest pain, palpitations.       Objective     Physical Exam  Constitutional:       Appearance: She is obese. She is ill-appearing.   HENT:      Head: Normocephalic.   Cardiovascular:      Rate and Rhythm: Regular rhythm. Tachycardia present.      Pulses: Normal pulses.      Heart sounds: Normal heart sounds. No murmur heard.     No gallop.   Pulmonary:      Effort: Pulmonary effort is normal.      Breath sounds: Normal breath sounds.   Musculoskeletal:      Right lower leg: Edema present.   Skin:     General: Skin is warm and dry.   Neurological:      Mental Status: She is alert and oriented to person, place, and time.       Last Recorded Vitals  Blood pressure 135/75, pulse 75, temperature 35.7 °C (96.3 °F), temperature source Temporal, resp. rate 18, height 1.6 m (5' 3\"), weight 111 kg (245 lb), SpO2 100%.  Intake/Output last 3 Shifts:  I/O last 3 completed shifts:  In: 2042.5 (18.4 mL/kg) [P.O.:1260; I.V.:182.5 (1.6 mL/kg); IV Piggyback:600]  Out: 2875 (25.9 mL/kg) [Urine:2875 (0.7 mL/kg/hr)]  Weight: 111.1 kg     Relevant Results      Results for orders placed or performed during the hospital encounter of 10/17/24 (from the past 24 hours)   Vancomycin   Result Value Ref Range    Vancomycin 23.7 (H) 5.0 - 20.0 ug/mL   CBC   Result Value Ref Range    WBC 6.4 4.4 - 11.3 x10*3/uL    nRBC 0.0 0.0 - 0.0 /100 WBCs    RBC 3.58 (L) 4.00 - 5.20 x10*6/uL    Hemoglobin 10.0 (L) 12.0 - 16.0 g/dL    Hematocrit 32.5 (L) 36.0 - 46.0 %    MCV 91 80 - 100 fL    MCH 27.9 26.0 - 34.0 pg    MCHC 30.8 (L) 32.0 - 36.0 g/dL    RDW 15.4 (H) 11.5 - 14.5 %    Platelets 292 150 - 450 x10*3/uL   Basic Metabolic Panel   Result Value Ref Range    Glucose 81 74 - 99 mg/dL    Sodium 133 (L) 136 - 145 mmol/L "    Potassium 3.3 (L) 3.5 - 5.3 mmol/L    Chloride 91 (L) 98 - 107 mmol/L    Bicarbonate 34 (H) 21 - 32 mmol/L    Anion Gap 11 10 - 20 mmol/L    Urea Nitrogen 17 6 - 23 mg/dL    Creatinine 0.59 0.50 - 1.05 mg/dL    eGFR >90 >60 mL/min/1.73m*2    Calcium 8.9 8.6 - 10.3 mg/dL              Assessment/Plan   Assessment & Plan  Acute on chronic diastolic congestive heart failure    Hyperlipidemia    Essential hypertension    Chronic low back pain    Shortness of breath    Hypomagnesemia    Acute hypoxic respiratory failure (Multi)    #1 acute systolic heart failure ejection fraction of 35%.  Patient had just had an echocardiogram yesterday showing ejection fraction of 35% likely secondary in part to left bundle.  Patient before on hydrochlorothiazide which was stopped for hyponatremia.  Currently has severe hypokalemia as well as hypomagnesemia.  Will optimize her treatment for heart failure systolic dysfunction.  Patient may need lumbar puncture as a workup for gram-negative bacteremia in the blood.  She will need ischemic workup which could be deferred as an outpatient.  She would like to hold off cardiac catheterization for now.  Will optimize her treatment for heart failure systolic dysfunction follow-up as an outpatient in my office in few weeks.      2.  Hypertension.  Will stop amlodipine and started on losartan 25 mg oral daily.  Patient has history of angioedema with has moderate LV*dysfunction she will benefit of ARB's.  Will start her also on Farxiga for heart failure systolic dysfunction.  Will reassess renal function and electrolytes in AM.     3.  Obesity hypoventilation.     4.  Hypokalemia replaced with 40 mg of potassium today.     5.  Hypomagnesemia we will replace with 2 more grams of potassium IV.    10/21: As above.  Patient to have paraspinal abscess drainage and potentially thoracentesis tomorrow at [facility].  For now we will continue with IV diuretic at Lasix 40 mg twice daily.  She tells me she  is diuresing well although documented input and output do not demonstrate this.  Suspect that these readings are incorrect.  She remains in normal sinus rhythm without significant ectopy on telemetry.  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  Will continue to optimize her from a heart failure standpoint with diuresis, before undergoing ischemic workup.  She was started on losartan yesterday.  Pressures are stable most recent reading 135/75.  She remains on 2 L of O2 with saturations at 100%.  Lab work this morning reveals a sodium of 133, potassium of 3.3, creatinine 0.59, hemoglobin 10.0 hematocrit 32.5.  Potassium supplementation has been ordered.  She remains on IV antibiotics for discitis.  Will continue to diurese and follow with you.          I spent 20 minutes in the professional and overall care of this patient.      Tana Lassiter PA-C

## 2024-10-21 NOTE — PROGRESS NOTES
Vancomycin Dosing by Pharmacy- Cessation of Therapy    Consult to pharmacy for vancomycin dosing has been discontinued by the prescriber, pharmacy will sign off at this time.    Please call pharmacy if there are further questions or re-enter a consult if vancomycin is resumed.     DANISH ALAN, PharmD

## 2024-10-22 ENCOUNTER — APPOINTMENT (OUTPATIENT)
Dept: PRIMARY CARE | Facility: CLINIC | Age: 74
End: 2024-10-22
Payer: MEDICARE

## 2024-10-22 ENCOUNTER — HOSPITAL ENCOUNTER (OUTPATIENT)
Dept: RADIOLOGY | Facility: HOSPITAL | Age: 74
Discharge: HOME | End: 2024-10-22
Payer: MEDICARE

## 2024-10-22 VITALS
DIASTOLIC BLOOD PRESSURE: 74 MMHG | HEART RATE: 89 BPM | RESPIRATION RATE: 18 BRPM | SYSTOLIC BLOOD PRESSURE: 109 MMHG | OXYGEN SATURATION: 96 %

## 2024-10-22 LAB
ANION GAP SERPL CALCULATED.3IONS-SCNC: 9 MMOL/L (ref 10–20)
BACTERIA BLD CULT: NORMAL
BACTERIA BLD CULT: NORMAL
BUN SERPL-MCNC: 20 MG/DL (ref 6–23)
CALCIUM SERPL-MCNC: 9.2 MG/DL (ref 8.6–10.3)
CHLORIDE SERPL-SCNC: 89 MMOL/L (ref 98–107)
CO2 SERPL-SCNC: 39 MMOL/L (ref 21–32)
CREAT SERPL-MCNC: 0.69 MG/DL (ref 0.5–1.05)
EGFRCR SERPLBLD CKD-EPI 2021: 90 ML/MIN/1.73M*2
ERYTHROCYTE [DISTWIDTH] IN BLOOD BY AUTOMATED COUNT: 15.5 % (ref 11.5–14.5)
GLUCOSE SERPL-MCNC: 82 MG/DL (ref 74–99)
HCT VFR BLD AUTO: 32.3 % (ref 36–46)
HGB BLD-MCNC: 9.9 G/DL (ref 12–16)
MCH RBC QN AUTO: 27.8 PG (ref 26–34)
MCHC RBC AUTO-ENTMCNC: 30.7 G/DL (ref 32–36)
MCV RBC AUTO: 91 FL (ref 80–100)
NRBC BLD-RTO: 0 /100 WBCS (ref 0–0)
PLATELET # BLD AUTO: 301 X10*3/UL (ref 150–450)
POTASSIUM SERPL-SCNC: 3.4 MMOL/L (ref 3.5–5.3)
RBC # BLD AUTO: 3.56 X10*6/UL (ref 4–5.2)
SODIUM SERPL-SCNC: 134 MMOL/L (ref 136–145)
WBC # BLD AUTO: 7.5 X10*3/UL (ref 4.4–11.3)

## 2024-10-22 PROCEDURE — 2500000005 HC RX 250 GENERAL PHARMACY W/O HCPCS: Performed by: STUDENT IN AN ORGANIZED HEALTH CARE EDUCATION/TRAINING PROGRAM

## 2024-10-22 PROCEDURE — 99232 SBSQ HOSP IP/OBS MODERATE 35: CPT | Performed by: NURSE PRACTITIONER

## 2024-10-22 PROCEDURE — 10009 FNA BX W/CT GDN 1ST LES: CPT

## 2024-10-22 PROCEDURE — 2500000004 HC RX 250 GENERAL PHARMACY W/ HCPCS (ALT 636 FOR OP/ED): Performed by: RADIOLOGY

## 2024-10-22 PROCEDURE — 99152 MOD SED SAME PHYS/QHP 5/>YRS: CPT

## 2024-10-22 PROCEDURE — 2500000001 HC RX 250 WO HCPCS SELF ADMINISTERED DRUGS (ALT 637 FOR MEDICARE OP): Performed by: INTERNAL MEDICINE

## 2024-10-22 PROCEDURE — 77012 CT SCAN FOR NEEDLE BIOPSY: CPT | Performed by: RADIOLOGY

## 2024-10-22 PROCEDURE — 9420000001 HC RT PATIENT EDUCATION 5 MIN

## 2024-10-22 PROCEDURE — 87070 CULTURE OTHR SPECIMN AEROBIC: CPT | Mod: WESLAB | Performed by: RADIOLOGY

## 2024-10-22 PROCEDURE — 80048 BASIC METABOLIC PNL TOTAL CA: CPT | Performed by: NURSE PRACTITIONER

## 2024-10-22 PROCEDURE — 2500000004 HC RX 250 GENERAL PHARMACY W/ HCPCS (ALT 636 FOR OP/ED): Mod: JZ | Performed by: NURSE PRACTITIONER

## 2024-10-22 PROCEDURE — 1100000001 HC PRIVATE ROOM DAILY

## 2024-10-22 PROCEDURE — 2500000001 HC RX 250 WO HCPCS SELF ADMINISTERED DRUGS (ALT 637 FOR MEDICARE OP): Performed by: STUDENT IN AN ORGANIZED HEALTH CARE EDUCATION/TRAINING PROGRAM

## 2024-10-22 PROCEDURE — 7100000010 HC PHASE TWO TIME - EACH INCREMENTAL 1 MINUTE

## 2024-10-22 PROCEDURE — 2720000007 HC OR 272 NO HCPCS

## 2024-10-22 PROCEDURE — 2500000004 HC RX 250 GENERAL PHARMACY W/ HCPCS (ALT 636 FOR OP/ED): Performed by: STUDENT IN AN ORGANIZED HEALTH CARE EDUCATION/TRAINING PROGRAM

## 2024-10-22 PROCEDURE — 2500000002 HC RX 250 W HCPCS SELF ADMINISTERED DRUGS (ALT 637 FOR MEDICARE OP, ALT 636 FOR OP/ED): Performed by: STUDENT IN AN ORGANIZED HEALTH CARE EDUCATION/TRAINING PROGRAM

## 2024-10-22 PROCEDURE — 20225 BONE BIOPSY TROCAR/NDL DEEP: CPT | Performed by: RADIOLOGY

## 2024-10-22 PROCEDURE — 7100000009 HC PHASE TWO TIME - INITIAL BASE CHARGE

## 2024-10-22 PROCEDURE — 94762 N-INVAS EAR/PLS OXIMTRY CONT: CPT

## 2024-10-22 PROCEDURE — 85027 COMPLETE CBC AUTOMATED: CPT | Performed by: NURSE PRACTITIONER

## 2024-10-22 PROCEDURE — 99232 SBSQ HOSP IP/OBS MODERATE 35: CPT

## 2024-10-22 RX ORDER — FENTANYL CITRATE 50 UG/ML
INJECTION, SOLUTION INTRAMUSCULAR; INTRAVENOUS
Status: COMPLETED | OUTPATIENT
Start: 2024-10-22 | End: 2024-10-22

## 2024-10-22 RX ORDER — LIDOCAINE HYDROCHLORIDE 10 MG/ML
INJECTION, SOLUTION EPIDURAL; INFILTRATION; INTRACAUDAL; PERINEURAL
Status: COMPLETED | OUTPATIENT
Start: 2024-10-22 | End: 2024-10-22

## 2024-10-22 RX ORDER — MIDAZOLAM HYDROCHLORIDE 1 MG/ML
INJECTION, SOLUTION INTRAMUSCULAR; INTRAVENOUS
Status: COMPLETED | OUTPATIENT
Start: 2024-10-22 | End: 2024-10-22

## 2024-10-22 RX ADMIN — Medication 32 PERCENT: at 00:02

## 2024-10-22 RX ADMIN — FAMOTIDINE 20 MG: 20 TABLET ORAL at 09:09

## 2024-10-22 RX ADMIN — POTASSIUM CHLORIDE 20 MEQ: 1.5 FOR SOLUTION ORAL at 16:16

## 2024-10-22 RX ADMIN — ENOXAPARIN SODIUM 40 MG: 40 INJECTION SUBCUTANEOUS at 20:54

## 2024-10-22 RX ADMIN — OXYBUTYNIN CHLORIDE 5 MG: 5 TABLET ORAL at 20:55

## 2024-10-22 RX ADMIN — GABAPENTIN 200 MG: 100 CAPSULE ORAL at 20:54

## 2024-10-22 RX ADMIN — GABAPENTIN 200 MG: 100 CAPSULE ORAL at 16:16

## 2024-10-22 RX ADMIN — GABAPENTIN 200 MG: 100 CAPSULE ORAL at 09:08

## 2024-10-22 RX ADMIN — Medication 2 L/MIN: at 08:00

## 2024-10-22 RX ADMIN — MEROPENEM AND SODIUM CHLORIDE 1 G: 1 INJECTION, SOLUTION INTRAVENOUS at 14:02

## 2024-10-22 RX ADMIN — DAPAGLIFLOZIN 10 MG: 10 TABLET, FILM COATED ORAL at 09:08

## 2024-10-22 RX ADMIN — SIMVASTATIN 40 MG: 40 TABLET, FILM COATED ORAL at 20:57

## 2024-10-22 RX ADMIN — MEROPENEM AND SODIUM CHLORIDE 1 G: 1 INJECTION, SOLUTION INTRAVENOUS at 20:53

## 2024-10-22 RX ADMIN — ACETAMINOPHEN 650 MG: 325 TABLET ORAL at 22:09

## 2024-10-22 RX ADMIN — OXYBUTYNIN CHLORIDE 5 MG: 5 TABLET ORAL at 09:08

## 2024-10-22 RX ADMIN — CARVEDILOL 3.12 MG: 3.12 TABLET, FILM COATED ORAL at 20:54

## 2024-10-22 RX ADMIN — LOSARTAN POTASSIUM 50 MG: 50 TABLET, FILM COATED ORAL at 09:09

## 2024-10-22 RX ADMIN — ALLOPURINOL 100 MG: 100 TABLET ORAL at 09:08

## 2024-10-22 RX ADMIN — CARVEDILOL 3.12 MG: 3.12 TABLET, FILM COATED ORAL at 09:08

## 2024-10-22 RX ADMIN — FUROSEMIDE 40 MG: 40 TABLET ORAL at 09:08

## 2024-10-22 RX ADMIN — FUROSEMIDE 40 MG: 40 TABLET ORAL at 16:16

## 2024-10-22 RX ADMIN — FAMOTIDINE 20 MG: 20 TABLET ORAL at 20:55

## 2024-10-22 RX ADMIN — MEROPENEM AND SODIUM CHLORIDE 1 G: 1 INJECTION, SOLUTION INTRAVENOUS at 04:19

## 2024-10-22 RX ADMIN — Medication 21 PERCENT: at 13:36

## 2024-10-22 ASSESSMENT — PAIN - FUNCTIONAL ASSESSMENT
PAIN_FUNCTIONAL_ASSESSMENT: 0-10

## 2024-10-22 ASSESSMENT — COGNITIVE AND FUNCTIONAL STATUS - GENERAL
HELP NEEDED FOR BATHING: A LOT
TURNING FROM BACK TO SIDE WHILE IN FLAT BAD: A LOT
WALKING IN HOSPITAL ROOM: A LOT
MOVING TO AND FROM BED TO CHAIR: A LOT
CLIMB 3 TO 5 STEPS WITH RAILING: TOTAL
MOBILITY SCORE: 11
MOVING FROM LYING ON BACK TO SITTING ON SIDE OF FLAT BED WITH BEDRAILS: A LOT
WALKING IN HOSPITAL ROOM: A LOT
STANDING UP FROM CHAIR USING ARMS: A LOT
STANDING UP FROM CHAIR USING ARMS: A LOT
PERSONAL GROOMING: A LOT
DRESSING REGULAR UPPER BODY CLOTHING: A LOT
MOBILITY SCORE: 11
DAILY ACTIVITIY SCORE: 13
CLIMB 3 TO 5 STEPS WITH RAILING: TOTAL
EATING MEALS: A LITTLE
MOVING FROM LYING ON BACK TO SITTING ON SIDE OF FLAT BED WITH BEDRAILS: A LOT
DRESSING REGULAR LOWER BODY CLOTHING: A LOT
TURNING FROM BACK TO SIDE WHILE IN FLAT BAD: A LOT
TOILETING: A LOT
MOVING TO AND FROM BED TO CHAIR: A LOT

## 2024-10-22 ASSESSMENT — PAIN SCALES - GENERAL
PAINLEVEL_OUTOF10: 3
PAINLEVEL_OUTOF10: 2
PAINLEVEL_OUTOF10: 0 - NO PAIN
PAINLEVEL_OUTOF10: 4
PAINLEVEL_OUTOF10: 0 - NO PAIN
PAINLEVEL_OUTOF10: 3
PAINLEVEL_OUTOF10: 0 - NO PAIN
PAINLEVEL_OUTOF10: 0 - NO PAIN

## 2024-10-22 NOTE — PROGRESS NOTES
Angela oMntelongo is a 74 y.o. female on day 4 of admission presenting with Acute on chronic diastolic congestive heart failure.    Subjective   Interval History:   Went CT-guided fine needle aspiration of spine with IR today  Remains Afebrile, no chills  On 2 liters oxygen   No chest pain or shortness of breath  No nausea vomiting or diarrhea          Objective   Range of Vitals (last 24 hours)  Heart Rate:  [70-97]   Temp:  [36 °C (96.8 °F)-36.6 °C (97.9 °F)]   Resp:  [14-20]   BP: ()/(56-74)   SpO2:  [93 %-99 %]   Daily Weight  10/18/24 : 111 kg (245 lb)    Body mass index is 43.4 kg/m².    Physical Exam  Constitutional:       Appearance: Normal appearance. She is obese.   HENT:      Head: Normocephalic and atraumatic.      Nose: Nose normal.      Mouth/Throat:      Mouth: Mucous membranes are moist.      Pharynx: Oropharynx is clear.   Eyes:      General: No scleral icterus.  Cardiovascular:      Rate and Rhythm: Normal rate and regular rhythm.   Pulmonary:      Comments: Decreased bilateral breath sounds   Abdominal:      General: Bowel sounds are normal.      Palpations: Abdomen is soft.   Musculoskeletal:         General: Normal range of motion.      Cervical back: Normal range of motion and neck supple.   Skin:     General: Skin is warm and dry.   Neurological:      Mental Status: She is alert.      Comments: Awake, alert   Psychiatric:         Mood and Affect: Mood normal.         Behavior: Behavior normal.     Antibiotics  meropenem - 1 gram/50 mL    Relevant Results  Labs  Results from last 72 hours   Lab Units 10/22/24  0421 10/21/24  0532 10/20/24  0531   WBC AUTO x10*3/uL 7.5 6.4 6.7   HEMOGLOBIN g/dL 9.9* 10.0* 9.4*   HEMATOCRIT % 32.3* 32.5* 31.0*   PLATELETS AUTO x10*3/uL 301 292 298     Results from last 72 hours   Lab Units 10/22/24  0421 10/21/24  0532 10/20/24  0531   SODIUM mmol/L 134* 133* 136   POTASSIUM mmol/L 3.4* 3.3* 3.5   CHLORIDE mmol/L 89* 91* 95*   CO2 mmol/L 39* 34* 33*   BUN  mg/dL 20 17 16   CREATININE mg/dL 0.69 0.59 0.71   GLUCOSE mg/dL 82 81 94   CALCIUM mg/dL 9.2 8.9 8.7   ANION GAP mmol/L 9* 11 12   EGFR mL/min/1.73m*2 90 >90 88           Estimated Creatinine Clearance: 83 mL/min (by C-G formula based on SCr of 0.69 mg/dL).  C-Reactive Protein   Date Value Ref Range Status   10/20/2024 7.80 (H) <1.00 mg/dL Final   10/18/2024 18.71 (H) <1.00 mg/dL Final     CRP   Date Value Ref Range Status   03/07/2018 4.2 (H) 0 - 2.0 MG/DL Final     Comment:     Performed at [address] [address]     Microbiology  Susceptibility data from last 14 days.  Collected Specimen Info Organism Ampicillin Cefazolin Cefazolin (uncomplicated UTIs only) Ciprofloxacin Gentamicin Levofloxacin Nitrofurantoin Piperacillin/Tazobactam Trimethoprim/Sulfamethoxazole   10/17/24 Urine from Clean Catch/Voided Escherichia coli  S  S  S  S  S   S  S  S   10/17/24 Blood culture from Peripheral Venipuncture Escherichia coli            10/17/24 Blood culture from Peripheral Venipuncture Escherichia coli  S  S   S  S  S   S  S     Imaging  MR lumbar spine wo IV contrast    Result Date: 10/20/2024  Interpreted By:  Les Dumont, STUDY: MR THORACIC SPINE WO IV CONTRAST; MR LUMBAR SPINE WO IV CONTRAST; 10/19/2024 4:24 pm   INDICATION: Signs/Symptoms:Question discitis/osteomyelitis on CT imaging.     COMPARISON: CT thoracic and lumbar spine dated 10/17/2024.   ACCESSION NUMBER(S): [id number]; [id number]   ORDERING CLINICIAN: JORGITO GRIFFITH   TECHNIQUE: Noncontrast multiplanar multisequence MR imaging of the thoracic and lumbar spine was performed. Sagittal T1, T2, STIR and axial T2 and T1 weighted MR images of the thoracic spine were obtained.   FINDINGS: The degree of motion artifact significantly degrades evaluation.   THORACIC SPINE:   Assessment of the upper thoracic spinal cord is limited by motion artifact. CSF flow related artifact also degrades evaluation on several sequences. Within these  limitations, there is no definite cord signal abnormality or cord compression. There is concern for epidural abscess component within the lower thoracic region with suggestion of circumferential T2 hyperintensity surrounding the thecal sac measuring up to 3 mm in maximum thickness along the ventral aspect at T12 (series 9, image 27). Potential epidural fluid/thickening may extend as far cranially as T7.   There is abnormal marrow signal centered about the T11-T12 endplates and intervertebral disc with STIR hyperintensity and T1 hypointensity and irregular appearance of the disc and endplates as previously described on CT examination, concerning for discitis/osteomyelitis. Signal abnormality also extends to involve the left-greater-than-right facet joints and there is extensive signal abnormality within the adjacent soft tissues including suspected paraspinal abscess components such as a 1.8 x 1.7 cm fluid collection along the rightward aspect of T11-T12 adjacent to the neural foraminal region (series 9, image 24). There is resultant mild height loss of the T11 and T12 vertebral bodies. Presumed epidural components resulting in moderate spinal canal stenosis at T11-T12. Inflammatory components are also evident involving the T11-T12 neural foraminal regions bilaterally.   Allowing for motion artifact there is no additional evidence of discitis/osteomyelitis within the remainder of the thoracic spine. The remaining thoracic vertebral bodies demonstrate grossly preserved heights. There is trace degenerative appearing anterolisthesis of T1 on T2 and T3 on T4. There is unchanged S-shaped scoliosis of the cervicothoracic spine with levo scoliosis of the upper cervicothoracic region and dextroscoliotic curvature of the thoracolumbar region. Multilevel type 2 Modic endplate signal change. There is moderate multilevel degenerative disc height loss throughout the thoracic regions. There is no additional significant spinal  canal stenosis evident. Predominantly mild multilevel neural foraminal narrowing is suspected with evaluation limited by scoliosis and aforementioned motion artifact.   There is extensive fatty atrophy of the posterior paraspinal musculature. Bilateral pleural effusions and dependent parenchymal airspace opacity are again noted.     LUMBAR SPINE:   Normal segmentation.   Similar to the T11-T12 level, there is extensive marrow edema and signal abnormality centered about the L1-L2 endplates and intervertebral disc concerning for discitis/osteomyelitis. There is surrounding soft tissue signal abnormality with concern for small abscess components along the rightward aspect at L1-L2 measuring up to 2.2 cm in diameter (series 9, image 20). No discrete epidural abscess component is seen at this level. There is mild destructive endplate height loss involving the L1 and L2 vertebrae.   The cauda equina are otherwise unremarkable. The conus appears to terminate at the T12-L1 level with CSF flow related artifact obscuring evaluation.   Similar levo scoliosis of the lumbar spine. The remaining lumbar vertebral body heights are otherwise maintained. There is no additional suspicious STIR hyperintensity/marrow edema within the remaining lumbar levels. There is moderate degenerative disc height loss within the remainder of the lumbar spine with likely degenerative appearing fluid within portions of the discs.   Degenerative change:   T12-L1: No spinal canal stenosis. Mild right-greater-than-left neural foraminal narrowing suggested.   L1-2: Mild facet arthropathy. Inflammatory components involving the right-greater-than-left paraspinal soft tissues with no definite spinal canal stenosis, suspected mild right lateral recess narrowing, and inflammatory components extending to the right-greater-than-left neural foraminal regions.   L2-3: Mild bilateral facet arthropathy. Mild disc bulge and endplate spurring, worse along the  rightward aspect. No spinal canal stenosis with mild right lateral recess narrowing. Minimal left and mild-to-moderate right neural foraminal narrowing.   L3-4: Moderate to severe bilateral facet arthropathy. Posterior disc osteophyte components. Moderate to severe spinal canal stenosis suggested. Moderate bilateral neural foraminal narrowing.   L4-5: Severe left and mild-to-moderate right facet arthropathy with ligamentum flavum hypertrophy. Disc bulge and hypertrophic endplate spurring. Severe spinal canal stenosis. Mild right and moderate to severe left neural foraminal narrowing.   L5-S1: Severe facet arthropathy. Mild disc bulge/uncovering. Mild left lateral recess narrowing. No additional spinal canal stenosis moderate bilateral neural foraminal narrowing.   Extensive fatty atrophy of the posterior paraspinal and iliopsoas musculature.       1. Motion degraded examination. 2. Findings to suggest discitis/osteomyelitis involving the T11-T12 and L1-L2 levels. There is mild height loss related to endplate destruction at these levels with surrounding inflammatory soft tissue change including right-sided small paraspinal abscess components suspected. Additionally, there is concern for potential epidural phlegmon versus abscess centered at T11-T12 with resultant moderate spinal canal stenosis at this level. Inflammatory components also involve the neural foraminal regions at both levels. 3. No additional evidence of significant spinal canal stenosis or cord compression within the thoracic regions when accounting for motion limitations. There is concern for moderate to severe spinal canal stenosis at L3-L4 and severe spinal canal stenosis at L4-L5 secondary to degenerative change. 4. Extensive scoliosis.   MACRO: None   Signed by: Les Dumont 10/20/2024 1:14 PM Dictation workstation:   [id number]    MR thoracic spine wo IV contrast    Result Date: 10/20/2024  Interpreted By:  Les Dumont, STUDY: MR THORACIC  SPINE WO IV CONTRAST; MR LUMBAR SPINE WO IV CONTRAST; 10/19/2024 4:24 pm   INDICATION: Signs/Symptoms:Question discitis/osteomyelitis on CT imaging.     COMPARISON: CT thoracic and lumbar spine dated 10/17/2024.   ACCESSION NUMBER(S): [id number]; [id number]   ORDERING CLINICIAN: JORGITO GRIFFITH   TECHNIQUE: Noncontrast multiplanar multisequence MR imaging of the thoracic and lumbar spine was performed. Sagittal T1, T2, STIR and axial T2 and T1 weighted MR images of the thoracic spine were obtained.   FINDINGS: The degree of motion artifact significantly degrades evaluation.   THORACIC SPINE:   Assessment of the upper thoracic spinal cord is limited by motion artifact. CSF flow related artifact also degrades evaluation on several sequences. Within these limitations, there is no definite cord signal abnormality or cord compression. There is concern for epidural abscess component within the lower thoracic region with suggestion of circumferential T2 hyperintensity surrounding the thecal sac measuring up to 3 mm in maximum thickness along the ventral aspect at T12 (series 9, image 27). Potential epidural fluid/thickening may extend as far cranially as T7.   There is abnormal marrow signal centered about the T11-T12 endplates and intervertebral disc with STIR hyperintensity and T1 hypointensity and irregular appearance of the disc and endplates as previously described on CT examination, concerning for discitis/osteomyelitis. Signal abnormality also extends to involve the left-greater-than-right facet joints and there is extensive signal abnormality within the adjacent soft tissues including suspected paraspinal abscess components such as a 1.8 x 1.7 cm fluid collection along the rightward aspect of T11-T12 adjacent to the neural foraminal region (series 9, image 24). There is resultant mild height loss of the T11 and T12 vertebral bodies. Presumed epidural components resulting in moderate spinal canal stenosis at  T11-T12. Inflammatory components are also evident involving the T11-T12 neural foraminal regions bilaterally.   Allowing for motion artifact there is no additional evidence of discitis/osteomyelitis within the remainder of the thoracic spine. The remaining thoracic vertebral bodies demonstrate grossly preserved heights. There is trace degenerative appearing anterolisthesis of T1 on T2 and T3 on T4. There is unchanged S-shaped scoliosis of the cervicothoracic spine with levo scoliosis of the upper cervicothoracic region and dextroscoliotic curvature of the thoracolumbar region. Multilevel type 2 Modic endplate signal change. There is moderate multilevel degenerative disc height loss throughout the thoracic regions. There is no additional significant spinal canal stenosis evident. Predominantly mild multilevel neural foraminal narrowing is suspected with evaluation limited by scoliosis and aforementioned motion artifact.   There is extensive fatty atrophy of the posterior paraspinal musculature. Bilateral pleural effusions and dependent parenchymal airspace opacity are again noted.     LUMBAR SPINE:   Normal segmentation.   Similar to the T11-T12 level, there is extensive marrow edema and signal abnormality centered about the L1-L2 endplates and intervertebral disc concerning for discitis/osteomyelitis. There is surrounding soft tissue signal abnormality with concern for small abscess components along the rightward aspect at L1-L2 measuring up to 2.2 cm in diameter (series 9, image 20). No discrete epidural abscess component is seen at this level. There is mild destructive endplate height loss involving the L1 and L2 vertebrae.   The cauda equina are otherwise unremarkable. The conus appears to terminate at the T12-L1 level with CSF flow related artifact obscuring evaluation.   Similar levo scoliosis of the lumbar spine. The remaining lumbar vertebral body heights are otherwise maintained. There is no additional  suspicious STIR hyperintensity/marrow edema within the remaining lumbar levels. There is moderate degenerative disc height loss within the remainder of the lumbar spine with likely degenerative appearing fluid within portions of the discs.   Degenerative change:   T12-L1: No spinal canal stenosis. Mild right-greater-than-left neural foraminal narrowing suggested.   L1-2: Mild facet arthropathy. Inflammatory components involving the right-greater-than-left paraspinal soft tissues with no definite spinal canal stenosis, suspected mild right lateral recess narrowing, and inflammatory components extending to the right-greater-than-left neural foraminal regions.   L2-3: Mild bilateral facet arthropathy. Mild disc bulge and endplate spurring, worse along the rightward aspect. No spinal canal stenosis with mild right lateral recess narrowing. Minimal left and mild-to-moderate right neural foraminal narrowing.   L3-4: Moderate to severe bilateral facet arthropathy. Posterior disc osteophyte components. Moderate to severe spinal canal stenosis suggested. Moderate bilateral neural foraminal narrowing.   L4-5: Severe left and mild-to-moderate right facet arthropathy with ligamentum flavum hypertrophy. Disc bulge and hypertrophic endplate spurring. Severe spinal canal stenosis. Mild right and moderate to severe left neural foraminal narrowing.   L5-S1: Severe facet arthropathy. Mild disc bulge/uncovering. Mild left lateral recess narrowing. No additional spinal canal stenosis moderate bilateral neural foraminal narrowing.   Extensive fatty atrophy of the posterior paraspinal and iliopsoas musculature.       1. Motion degraded examination. 2. Findings to suggest discitis/osteomyelitis involving the T11-T12 and L1-L2 levels. There is mild height loss related to endplate destruction at these levels with surrounding inflammatory soft tissue change including right-sided small paraspinal abscess components suspected. Additionally,  there is concern for potential epidural phlegmon versus abscess centered at T11-T12 with resultant moderate spinal canal stenosis at this level. Inflammatory components also involve the neural foraminal regions at both levels. 3. No additional evidence of significant spinal canal stenosis or cord compression within the thoracic regions when accounting for motion limitations. There is concern for moderate to severe spinal canal stenosis at L3-L4 and severe spinal canal stenosis at L4-L5 secondary to degenerative change. 4. Extensive scoliosis.   MACRO: None   Signed by: Les Dumont 10/20/2024 1:14 PM Dictation workstation:   [id number]    Transthoracic Echo (TTE) Complete    Result Date: 10/19/2024            [facility] [address], [place] [id number]             Phone [phone] TRANSTHORACIC ECHOCARDIOGRAM REPORT Patient Name:      BRUNA Burns Physician:    [id number] Cristina Wharton MD Study Date:        10/18/2024            Ordering Provider:    [id number] CHETAN GARCIA MRN/PID:           [id number]              Fellow: Accession#:        [id number]          Nurse: Date of Birth/Age: 1950 / 74 years  Sonographer:          Audrey Barger RDCS Gender:            F                     Additional Staff:     Erlinda Merino RDCS Height:            160.02 cm             Admit Date: Weight:            117.94 kg             Admission Status:     Inpatient -                                                                Routine BSA / BMI:         2.16 m2 / 46.06 kg/m2 Department Location:  [facility] Blood Pressure: 131 /65 mmHg Study Type:    TRANSTHORACIC ECHO (TTE) COMPLETE Diagnosis/ICD: Acute on chronic diastolic  (congestive) heart failure                (CHF)-I50.33 Indication:    CHF, SOB CPT Codes:     Echo Complete w Full Doppler-[id number] Patient History: Pertinent History: HLD, bradycardia, HTN, CHF. Study Detail: The following Echo studies were performed: 2D, M-Mode, Doppler and               color flow. Technically challenging study due to body habitus,               patient lying in supine position and prominent lung artifact.               Definity used as a contrast agent for endocardial border               definition. Total contrast used for this procedure was 4 mL via IV               push.  PHYSICIAN INTERPRETATION: Left Ventricle: The left ventricular systolic function is moderately decreased, with a visually estimated ejection fraction of 30-35%. There are no regional wall motion abnormalities. The left ventricular cavity size is normal. Abnormal (paradoxical) septal motion, consistent with an intraventricular conduction delay. Spectral Doppler shows a normal pattern of left ventricular diastolic filling. Left Atrium: The left atrium is normal in size. Right Ventricle: The right ventricle was not well visualized. There is reduced right ventricular systolic function. Right Atrium: The right atrium is normal in size. Aortic Valve: The aortic valve is trileaflet. The aortic valve dimensionless index is 0.78. There is no evidence of aortic valve regurgitation. The peak instantaneous gradient of the aortic valve is 7.2 mmHg. The mean gradient of the aortic valve is 4.0 mmHg. Mitral Valve: The mitral valve is normal in structure. There is trace mitral valve regurgitation. Tricuspid Valve: The tricuspid valve was not well visualized. There is trace tricuspid regurgitation. The right ventricular systolic pressure is unable to be estimated. Pulmonic Valve: The pulmonic valve is not well visualized. There is physiologic pulmonic valve regurgitation. Pericardium: No pericardial effusion noted. Aorta: The aortic root is  normal. Systemic Veins: The inferior vena cava appears normal in size, with IVC inspiratory collapse less than 50%.  CONCLUSIONS:  1. Poorly visualized anatomical structures due to suboptimal image quality.  2. The left ventricular systolic function is moderately decreased, with a visually estimated ejection fraction of 30-35%.  3. There is reduced right ventricular systolic function.  4. Trace mitral valve regurgitation.  5. Trace tricuspid regurgitation is visualized.  6. Limited images quality. Left ventricular ejection fraction appears reduced at 30 to 35% with interventricular conduction delay. QUANTITATIVE DATA SUMMARY:  LA VOLUME:                   Normal Ranges: LA Vol A4C:        69.7 ml   (22+/-6mL/m2) LA Vol Index A4C:  32.2ml/m2 LA Area A4C:       23.0 cm2 LA Major Axis A4C: 6.4 cm LA Vol A4C:        64.9 ml  M-MODE MEASUREMENTS:         Normal Ranges: Ao Root:             2.90 cm (2.0-3.7cm) LAs:                 4.00 cm (2.7-4.0cm)  AORTA MEASUREMENTS:         Normal Ranges: Ao Sinus, d:        2.92 cm (2.1-3.5cm) Ao STJ, d:          2.61 cm (1.7-3.4cm) Asc Ao, d:          3.40 cm (2.1-3.4cm)  LV SYSTOLIC FUNCTION BY 2D PLANIMETRY (MOD):                      Normal Ranges: EF-A4C View:    38 % (>=55%) EF-Visual:      33 % LV EF Reported: 33 %  LV DIASTOLIC FUNCTION:           Normal Ranges: MV Peak E:             0.73 m/s  (0.7-1.2 m/s) MV Peak A:             0.92 m/s  (0.42-0.7 m/s) E/A Ratio:             0.79      (1.0-2.2) MV e'                  0.066 m/s (>8.0) MV lateral e'          0.07 m/s MV medial e'           0.06 m/s E/e' Ratio:            11.06     (<8.0)  MITRAL VALVE:         Normal Ranges: MV DT:        94 msec (150-240msec)  AORTIC VALVE:                     Normal Ranges: AoV Vmax:                1.34 m/s (<=1.7m/s) AoV Peak P.2 mmHg (<20mmHg) AoV Mean P.0 mmHg (1.7-11.5mmHg) LVOT Max Alex:            1.07 m/s (<=1.1m/s) AoV VTI:                 25.00 cm  (18-25cm) LVOT VTI:                19.50 cm LVOT Diameter:           2.40 cm  (1.8-2.4cm) AoV Area, VTI:           3.53 cm2 (2.5-5.5cm2) AoV Area,Vmax:           3.61 cm2 (2.5-4.5cm2) AoV Dimensionless Index: 0.78  RIGHT VENTRICLE: TAPSE: 14.7 mm RV s'  0.12 m/s  TRICUSPID VALVE/RVSP:         Normal Ranges: IVC Diam:             1.59 cm  [id number] Cristina Wharton MD Electronically signed on 10/19/2024 at 4:28:53 PM  ** Final **     ECG 12 lead    Result Date: 10/18/2024  Sinus rhythm with Premature supraventricular complexes and with occasional and consecutive Premature ventricular complexes Right bundle branch block Left anterior fascicular block  Bifascicular block  Cannot rule out Anteroseptal infarct , age undetermined Abnormal ECG When compared with ECG of 30-AUG-2024 12:12, Premature supraventricular complexes are now Present Sinus rhythm is no longer with 2nd degree AV block (Mobitz II) Right bundle branch block has replaced Nonspecific intraventricular block Minimal criteria for Anteroseptal infarct are now Present    CT thoracic spine wo IV contrast    Result Date: 10/17/2024  Interpreted By:  Awais Ellis, STUDY: CT THORACIC SPINE WO IV CONTRAST; CT LUMBAR SPINE WO IV CONTRAST; 10/17/2024 10:15 pm   INDICATION: Signs/Symptoms:back pain, fall x 2 days ago.     COMPARISON: 08/31/2024   ACCESSION NUMBER(S): [id number]; [id number]   ORDERING CLINICIAN: KALYN RON   TECHNIQUE: Axial CT images of the thoracolumbar spine are obtained. Axial, coronal and sagittal reconstructions are submitted for review.   FINDINGS:     Alignment: There is mild scoliosis noted.   Vertebrae/Intervertebral Discs: The thoracolumbar vertebral body heights are intact. There is severe loss of intervertebral disc space. There are severe intervertebral destructive changes noted of the T11-T12 and L2-L3 levels. Findings appear somewhat worsened when compared to the prior study. Question underlying discitis/osteomyelitis. Recommend MRI  for further evaluation. There is multilevel spinal canal stenosis noted. There is facet arthrosis noted.   Paraspinous Soft Tissues: Within normal limits.         There are severe intervertebral destructive changes noted of the T11-T12 and L2-L3 levels. Findings appear somewhat worsened when compared to the prior study. Question underlying discitis/osteomyelitis. Recommend MRI for further evaluation. Recommend clinical correlation.   MACRO: None   Signed by: Awais Ellis 10/17/2024 10:32 PM Dictation workstation:   [id number]    CT lumbar spine wo IV contrast    Result Date: 10/17/2024  Interpreted By:  Awais Ellis, STUDY: CT THORACIC SPINE WO IV CONTRAST; CT LUMBAR SPINE WO IV CONTRAST; 10/17/2024 10:15 pm   INDICATION: Signs/Symptoms:back pain, fall x 2 days ago.     COMPARISON: 08/31/2024   ACCESSION NUMBER(S): [id number]; [id number]   ORDERING CLINICIAN: KALYN RON   TECHNIQUE: Axial CT images of the thoracolumbar spine are obtained. Axial, coronal and sagittal reconstructions are submitted for review.   FINDINGS:     Alignment: There is mild scoliosis noted.   Vertebrae/Intervertebral Discs: The thoracolumbar vertebral body heights are intact. There is severe loss of intervertebral disc space. There are severe intervertebral destructive changes noted of the T11-T12 and L2-L3 levels. Findings appear somewhat worsened when compared to the prior study. Question underlying discitis/osteomyelitis. Recommend MRI for further evaluation. There is multilevel spinal canal stenosis noted. There is facet arthrosis noted.   Paraspinous Soft Tissues: Within normal limits.         There are severe intervertebral destructive changes noted of the T11-T12 and L2-L3 levels. Findings appear somewhat worsened when compared to the prior study. Question underlying discitis/osteomyelitis. Recommend MRI for further evaluation. Recommend clinical correlation.   MACRO: None   Signed by: Awais Ellis 10/17/2024 10:32 PM  Dictation workstation:   [id number]    CT angio chest for pulmonary embolism    Result Date: 10/17/2024  Interpreted By:  Awais Ellis, STUDY: CT ANGIO CHEST FOR PULMONARY EMBOLISM;  10/17/2024 8:09 pm   INDICATION: Signs/Symptoms:shortness of breath and tachypnea.     COMPARISON: CT abdomen pelvis dated 08/31/2024   ACCESSION NUMBER(S): [id number]   ORDERING CLINICIAN: KALYN RON   TECHNIQUE: Helical data acquisition of the chest was obtained after intravenous administration of 75 ML Omnipaque 350, as per PE protocol. Images were reformatted in coronal and sagittal planes. Axial and coronal maximum intensity projection (MIP) images were created and reviewed.   FINDINGS: POTENTIAL LIMITATIONS OF THE STUDY: The assessment is limited by respiratory motion and suboptimal contrast opacification of pulmonary arteries.   HEART AND VESSELS: There is no main or lobar pulmonary embolus visualized. There is suboptimal opacification and respiratory motion noted limiting evaluation of the segmental and subsegmental branches. Main pulmonary artery and its branches are normal in caliber.   The thoracic aorta normal in course and caliber. Coronary artery calcifications are seen. Please note, the study is not optimized for evaluation of coronary arteries.   The cardiac chambers are not enlarged.   There is no pericardial effusion seen.   MEDIASTINUM AND SENG, LOWER NECK AND AXILLA: The visualized thyroid gland is within normal limits. No evidence of thoracic lymphadenopathy by CT criteria. Esophagus appears within normal limits as seen.   LUNGS AND AIRWAYS: There are large bilateral pleural effusions with overlying atelectasis versus consolidation. There are scattered ground-glass airspace opacities noted.     UPPER ABDOMEN: The visualized subdiaphragmatic structures demonstrate no remarkable findings.       CHEST WALL AND OSSEOUS STRUCTURES: Chest wall is within normal limits. There is multilevel loss of intervertebral  disc space. The endplate osteophytic changes noted.There are severe degenerative changes noted of the bilateral shoulder joints. Associated soft tissue lesions.There are destructive changes noted of the T11-T12 and L1-L2 disc spaces noted. Findings are unchanged from the prior study.       1. There is no main or lobar pulmonary embolus visualized. There is suboptimal opacification and respiratory motion noted limiting evaluation of the segmental and subsegmental branches. 2. There are large bilateral pleural effusions with overlying atelectasis versus consolidation. There are scattered ground-glass airspace opacities noted. 3. There are severe degenerative changes noted of the bilateral shoulder joints. Associated soft tissue lesions.   MACRO: None   Signed by: Awais Ellis 10/17/2024 8:28 PM Dictation workstation:   [id number]     Assessment/Plan   Discitis/osteomyelitis -seen on CT/MRI-paraspinal abscess-awaiting IR aspiration  Acute hypoxic respiratory failure, resolving, on 2 L  Bilateral pleural effusion   E. coli urinary tract infection  E. coli bacteremia  Allergy to cephalosporin -tolerating meropenem         IV meropenem-monitor for adverse reaction   Follow up fine needle aspiration  Supplemental oxygen, wean as tolerated  Monitor temperature and WBC  PICC line placed  Final recommendations after IR aspiration    Total time spent caring for the patient today was 20 minutes. This includes time spent before the visit reviewing the chart, time spent during the visit, and time spent after the visit on documentation.     ANGELITO Bey-CNP

## 2024-10-22 NOTE — PROGRESS NOTES
"Nutrition Follow up Note    Nutrition Assessment      At [facility] for aspiration of spinal abscess. PICC placed for IV abx. CHF RN met with pt on 10/21.     Nutrition History:  Energy Intake: Fair 50-75 %    Anthropometrics:  Ht: 160 cm (5' 3\"), Wt: 111 kg (245 lb), BMI: 43.41  IBW/kg (Dietitian Calculated): 52 kg  Percent of IBW: 213 %  Adjusted Body Weight (kg): 67 kg    Weight Change:  Daily Weight  10/18/24 : 111 kg (245 lb)  10/11/24 : 114 kg (252 lb 3.2 oz)  10/07/24 : 120 kg (264 lb 8 oz)  10/04/24 : 121 kg (266 lb 12.8 oz)  09/30/24 : 121 kg (266 lb 8 oz)  09/27/24 : 121 kg (266 lb 8 oz)  09/23/24 : 120 kg (264 lb 3.2 oz)  09/16/24 : 120 kg (264 lb 6.4 oz)  09/13/24 : 120 kg (264 lb 6.4 oz)  09/09/24 : 121 kg (266 lb 12.8 oz)  09/06/24 : 121 kg (266 lb 12.8 oz)  09/05/24 : 121 kg (266 lb 12.8 oz)  08/31/24 : 121 kg (267 lb 6.7 oz)  08/13/24 : 120 kg (264 lb)  07/29/24 : 119 kg (263 lb)  07/24/23 : 124 kg (273 lb)  06/01/23 : 125 kg (276 lb)  07/29/22 : 124 kg (273 lb)  06/07/22 : 124 kg (273 lb)  07/30/21 : 127 kg (281 lb)     Weight History / % Weight Change: per wt hx, 19# (7.2%) wt loss over ~1 month (9/16-10/18). question if this is fluid related or actual wt loss.  Significant Weight Loss: Yes  Interpretation of Weight Loss: >5% in 1 month    Nutrition Focused Physical Exam Findings:   Edema  Edema: +2 mild  Edema Location: BLE    Nutrition Significant Labs:  Lab Results   Component Value Date    WBC 7.5 10/22/2024    HGB 9.9 (L) 10/22/2024    HCT 32.3 (L) 10/22/2024     10/22/2024    CHOL 164 06/07/2022    TRIG 91 06/07/2022    HDL 65 06/07/2022    ALT 21 10/19/2024    AST 38 10/19/2024     (L) 10/22/2024    K 3.4 (L) 10/22/2024    CL 89 (L) 10/22/2024    CREATININE 0.69 10/22/2024    BUN 20 10/22/2024    CO2 39 (H) 10/22/2024    TSH 2.31 08/29/2024    INR 1.0 02/01/2019    HGBA1C 5.3 02/01/2019     Nutrition Specific Medications:  allopurinol, 100 mg, oral, Daily  carvedilol, 3.125 mg, oral, " BID  dapagliflozin propanediol, 10 mg, oral, Daily  enoxaparin, 40 mg, subcutaneous, q12h DYLAN  famotidine, 20 mg, oral, BID   Or  famotidine, 20 mg, intravenous, BID  furosemide, 40 mg, oral, BID  gabapentin, 200 mg, oral, TID  lidocaine, 5 mL, infiltration, Once  losartan, 50 mg, oral, Daily  meropenem, 1 g, intravenous, q8h  oxybutynin, 5 mg, oral, BID  oxygen, , inhalation, q8h  perflutren protein A microsphere, 0.5 mL, intravenous, Once in imaging  polyethylene glycol, 17 g, oral, Daily  potassium chloride, 20 mEq, oral, BID  simvastatin, 40 mg, oral, Nightly  sulfur hexafluoride microsphr, 2 mL, intravenous, Once in imaging      Dietary Orders (From admission, onward)       Start     Ordered    10/22/24 0001  NPO Diet; Effective midnight  Diet effective midnight         10/21/24 1258    10/18/24 0708  May Participate in Room Service  ( ROOM SERVICE MAY PARTICIPATE)  Once        Question:  .  Answer:  Yes    10/18/24 0707                  Estimated Needs:   Estimated Energy Needs  Total Energy Estimated Needs (kCal): 1675 kCal  Total Estimated Energy Need per Day (kCal/kg): 25 kCal/kg  Method for Estimating Needs: adj wt    Estimated Protein Needs  Total Protein Estimated Needs (g): 80 g  Total Protein Estimated Needs (g/kg): 1.2 g/kg  Method for Estimating Needs: adj wt    Estimated Fluid Needs  Total Fluid Estimated Needs (mL): 1675 mL  Method for Estimating Needs: 1ml/kcal        Nutrition Diagnosis   Nutrition Diagnosis:  Nutrition Diagnosis  Patient has Nutrition Diagnosis: Yes  Diagnosis Status (1): New  Nutrition Diagnosis 1: Increased nutrient needs  Related to (1): increased demand for nutrients  As Evidenced by (1): conditions associated with diagnosis       Nutrition Interventions/Recommendations   Nutrition Interventions and Recommendations:    Nutrition Prescription:  Individualized Nutrition Prescription Provided for : 1675 kcals and 80g protein to be provided when diet advanced    Nutrition  Interventions:   Food and/or Nutrient Delivery Interventions  Interventions: Meals and snacks  Meals and Snacks: Fat-modified diet, Mineral-modified diet  Goal: cardiac diet when able to advance  Additional Interventions: low Na+ handout left at bedside on 10/18.    Education Documentation  No documentation found.           Nutrition Monitoring and Evaluation   Monitoring/Evaluation:   Food/Nutrient Related History Monitoring  Monitoring and Evaluation Plan: Energy intake  Energy Intake: Estimated energy intake  Criteria: pt to consume >/= 75% estimated needs    Body Composition/Growth/Weight History  Monitoring and Evaluation Plan: Weight  Weight: Measured weight  Criteria: pt will maintain non fluid related wt at this time       Time Spent/Follow-up:   Follow Up  Time Spent (min): 30 minutes  Last Date of Nutrition Visit: 10/22/24  Nutrition Follow-Up Needed?: 3-5 days  Follow up Comment: 10/25/24

## 2024-10-22 NOTE — CARE PLAN
The patient's goals for the shift include Breath better, rest    The clinical goals for the shift include IV antibiotics, monitor labs and vitals      Problem: Safety - Adult  Goal: Free from fall injury  Outcome: Progressing     Problem: Discharge Planning  Goal: Discharge to home or other facility with appropriate resources  Outcome: Progressing     Problem: Chronic Conditions and Co-morbidities  Goal: Patient's chronic conditions and co-morbidity symptoms are monitored and maintained or improved  Outcome: Progressing     Problem: Fall/Injury  Goal: Not fall by end of shift  Outcome: Progressing  Goal: Be free from injury by end of the shift  Outcome: Progressing  Goal: Verbalize understanding of personal risk factors for fall in the hospital  Outcome: Progressing  Goal: Verbalize understanding of risk factor reduction measures to prevent injury from fall in the home  Outcome: Progressing  Goal: Use assistive devices by end of the shift  Outcome: Progressing  Goal: Pace activities to prevent fatigue by end of the shift  Outcome: Progressing     Problem: Heart Failure  Goal: Improved gas exchange this shift  Outcome: Progressing  Goal: Improved urinary output this shift  Outcome: Progressing  Goal: Reduction in peripheral edema within 24 hours  Outcome: Progressing  Goal: Report improvement of dyspnea/breathlessness this shift  Outcome: Progressing  Goal: Weight from fluid excess reduced over 2-3 days, then stabilize  Outcome: Progressing  Goal: Increase self care and/or family involvement in 24 hours  Outcome: Progressing     Problem: Nutrition  Goal: Oral intake greater 75%  Outcome: Progressing     Problem: Skin  Goal: Decreased wound size/increased tissue granulation at next dressing change  Outcome: Progressing  Flowsheets (Taken 10/22/2024 0356)  Decreased wound size/increased tissue granulation at next dressing change: Protective dressings over bony prominences  Goal: Participates in  plan/prevention/treatment measures  Outcome: Progressing  Flowsheets (Taken 10/22/2024 0356)  Participates in plan/prevention/treatment measures: Elevate heels  Goal: Prevent/manage excess moisture  Outcome: Progressing  Flowsheets (Taken 10/22/2024 0356)  Prevent/manage excess moisture:   Moisturize dry skin   Cleanse incontinence/protect with barrier cream  Goal: Prevent/minimize sheer/friction injuries  Outcome: Progressing  Flowsheets (Taken 10/22/2024 0356)  Prevent/minimize sheer/friction injuries:   Use pull sheet   HOB 30 degrees or less  Goal: Promote/optimize nutrition  Outcome: Progressing  Flowsheets (Taken 10/22/2024 0356)  Promote/optimize nutrition:   Monitor/record intake including meals   Consume > 50% meals/supplements   Offer water/supplements/favorite foods  Goal: Promote skin healing  Outcome: Progressing  Flowsheets (Taken 10/22/2024 0356)  Promote skin healing: Assess skin/pad under line(s)/device(s)

## 2024-10-22 NOTE — PROGRESS NOTES
10/22/24 1030   Discharge Planning   Expected Discharge Disposition SNF   Does the patient need discharge transport arranged? Yes   RoundTrip coordination needed? Yes   Has discharge transport been arranged? No     Went to [facility] for biopsy and thoracentesis.

## 2024-10-22 NOTE — CARE PLAN
The patient's goals for the shift include undergo needle aspiration.    The clinical goals for the shift include comfort, pain management, Q2H turn and reposition while in bed, increase in mobility tolerance, maintain safety, and monitor labs/VS.    Problem: Safety - Adult  Goal: Free from fall injury  Outcome: Progressing     Problem: Fall/Injury  Goal: Not fall by end of shift  Outcome: Progressing  Goal: Be free from injury by end of the shift  Outcome: Progressing  Goal: Verbalize understanding of personal risk factors for fall in the hospital  Outcome: Progressing  Goal: Verbalize understanding of risk factor reduction measures to prevent injury from fall in the home  Outcome: Progressing  Goal: Use assistive devices by end of the shift  Outcome: Progressing  Goal: Pace activities to prevent fatigue by end of the shift  Outcome: Progressing     Problem: Chronic Conditions and Co-morbidities  Goal: Patient's chronic conditions and co-morbidity symptoms are monitored and maintained or improved  Outcome: Progressing     Problem: Heart Failure  Goal: Improved gas exchange this shift  Outcome: Progressing  Goal: Improved urinary output this shift  Outcome: Progressing  Goal: Reduction in peripheral edema within 24 hours  Outcome: Progressing  Goal: Report improvement of dyspnea/breathlessness this shift  Outcome: Progressing  Goal: Weight from fluid excess reduced over 2-3 days, then stabilize  Outcome: Progressing  Goal: Increase self care and/or family involvement in 24 hours  Outcome: Progressing     Problem: Nutrition  Goal: Oral intake greater 75%  Outcome: Progressing     Problem: Skin  Goal: Decreased wound size/increased tissue granulation at next dressing change  Outcome: Progressing  Goal: Participates in plan/prevention/treatment measures  Outcome: Progressing  Goal: Prevent/manage excess moisture  Outcome: Progressing  Goal: Prevent/minimize sheer/friction injuries  Outcome: Progressing  Goal:  Promote/optimize nutrition  Outcome: Progressing  Goal: Promote skin healing  Outcome: Progressing     Problem: Deep Vein Thrombosis  Goal: I will remain free from complications of deep vein thrombosis and maintain current level of mobility  Outcome: Progressing     Problem: Pain  Goal: Takes deep breaths with improved pain control throughout the shift  Outcome: Progressing  Goal: Turns in bed with improved pain control throughout the shift  Outcome: Progressing  Goal: Walks with improved pain control throughout the shift  Outcome: Progressing  Goal: Performs ADL's with improved pain control throughout shift  Outcome: Progressing  Goal: Participates in PT with improved pain control throughout the shift  Outcome: Progressing  Goal: Free from opioid side effects throughout the shift  Outcome: Progressing  Goal: Free from acute confusion related to pain meds throughout the shift  Outcome: Progressing     Problem: Discharge Planning  Goal: Discharge to home or other facility with appropriate resources  Outcome: Progressing

## 2024-10-22 NOTE — NURSING NOTE
Agree with previous assessment. Patient awake resting in bed watching TV. Call light and belongings within reach. No needs or concerns voiced at this time. Will continue plan of care.

## 2024-10-22 NOTE — PROGRESS NOTES
"Angela Montelongo is a 74 y.o. female on day 4 of admission presenting with Acute on chronic diastolic congestive heart failure.    Subjective   Patient just returned from [facility] where she had CT-guided biopsy of spinal abscess. Patient denies shortness of breath, chest pain or palpitations.        Objective     Physical Exam  Constitutional:       Appearance: She is obese.   Cardiovascular:      Rate and Rhythm: Normal rate and regular rhythm.      Pulses: Normal pulses.      Heart sounds: Normal heart sounds.   Pulmonary:      Effort: Pulmonary effort is normal.      Breath sounds: Normal breath sounds.   Abdominal:      General: Abdomen is flat.      Palpations: Abdomen is soft.   Musculoskeletal:      Right lower leg: Edema present.      Left lower leg: Edema present.   Skin:     General: Skin is warm and dry.   Neurological:      Mental Status: She is alert and oriented to person, place, and time.         Last Recorded Vitals  Blood pressure 142/61, pulse 97, temperature 36.1 °C (97 °F), temperature source Temporal, resp. rate 16, height 1.6 m (5' 3\"), weight 111 kg (245 lb), SpO2 95%.  Intake/Output last 3 Shifts:  I/O last 3 completed shifts:  In: 2332.5 (21 mL/kg) [P.O.:1800; I.V.:182.5 (1.6 mL/kg); IV Piggyback:350]  Out: 2775 (25 mL/kg) [Urine:2775 (0.7 mL/kg/hr)]  Weight: 111.1 kg     Relevant Results  Results for orders placed or performed during the hospital encounter of 10/17/24 (from the past 24 hours)   Basic metabolic panel   Result Value Ref Range    Glucose 82 74 - 99 mg/dL    Sodium 134 (L) 136 - 145 mmol/L    Potassium 3.4 (L) 3.5 - 5.3 mmol/L    Chloride 89 (L) 98 - 107 mmol/L    Bicarbonate 39 (H) 21 - 32 mmol/L    Anion Gap 9 (L) 10 - 20 mmol/L    Urea Nitrogen 20 6 - 23 mg/dL    Creatinine 0.69 0.50 - 1.05 mg/dL    eGFR 90 >60 mL/min/1.73m*2    Calcium 9.2 8.6 - 10.3 mg/dL   CBC   Result Value Ref Range    WBC 7.5 4.4 - 11.3 x10*3/uL    nRBC 0.0 0.0 - 0.0 /100 WBCs    RBC 3.56 (L) 4.00 - " 5.20 x10*6/uL    Hemoglobin 9.9 (L) 12.0 - 16.0 g/dL    Hematocrit 32.3 (L) 36.0 - 46.0 %    MCV 91 80 - 100 fL    MCH 27.8 26.0 - 34.0 pg    MCHC 30.7 (L) 32.0 - 36.0 g/dL    RDW 15.5 (H) 11.5 - 14.5 %    Platelets 301 150 - 450 x10*3/uL       Transthoracic Echo (TTE) Complete    Result Date: 10/19/2024            [facility] [address], [place] [id number]             Phone [phone] TRANSTHORACIC ECHOCARDIOGRAM REPORT Patient Name:      BRUNA HELTON        Reading Physician:    [id number] Cristina Wharton MD Study Date:        10/18/2024            Ordering Provider:    [id number] GARCIA MRN/PID:           [id number]              Fellow: Accession#:        [id number]          Nurse: Date of Birth/Age: 1950 / 74 years  Sonographer:          Audrey Barger RDCS Gender:            F                     Additional Staff:     Erlinda Merino RDCS Height:            160.02 cm             Admit Date: Weight:            117.94 kg             Admission Status:     Inpatient -                                                                Routine BSA / BMI:         2.16 m2 / 46.06 kg/m2 Department Location:  [facility] Blood Pressure: 131 /65 mmHg Study Type:    TRANSTHORACIC ECHO (TTE) COMPLETE Diagnosis/ICD: Acute on chronic diastolic (congestive) heart failure                (CHF)-I50.33 Indication:    CHF, SOB CPT Codes:     Echo Complete w Full [id number] Patient History: Pertinent History: HLD, bradycardia, HTN, CHF. Study Detail: The following Echo studies were performed: 2D, M-Mode, Doppler and               color flow. Technically challenging study due to body habitus,               patient lying in supine position and prominent  lung artifact.               Definity used as a contrast agent for endocardial border               definition. Total contrast used for this procedure was 4 mL via IV               push.  PHYSICIAN INTERPRETATION: Left Ventricle: The left ventricular systolic function is moderately decreased, with a visually estimated ejection fraction of 30-35%. There are no regional wall motion abnormalities. The left ventricular cavity size is normal. Abnormal (paradoxical) septal motion, consistent with an intraventricular conduction delay. Spectral Doppler shows a normal pattern of left ventricular diastolic filling. Left Atrium: The left atrium is normal in size. Right Ventricle: The right ventricle was not well visualized. There is reduced right ventricular systolic function. Right Atrium: The right atrium is normal in size. Aortic Valve: The aortic valve is trileaflet. The aortic valve dimensionless index is 0.78. There is no evidence of aortic valve regurgitation. The peak instantaneous gradient of the aortic valve is 7.2 mmHg. The mean gradient of the aortic valve is 4.0 mmHg. Mitral Valve: The mitral valve is normal in structure. There is trace mitral valve regurgitation. Tricuspid Valve: The tricuspid valve was not well visualized. There is trace tricuspid regurgitation. The right ventricular systolic pressure is unable to be estimated. Pulmonic Valve: The pulmonic valve is not well visualized. There is physiologic pulmonic valve regurgitation. Pericardium: No pericardial effusion noted. Aorta: The aortic root is normal. Systemic Veins: The inferior vena cava appears normal in size, with IVC inspiratory collapse less than 50%.  CONCLUSIONS:  1. Poorly visualized anatomical structures due to suboptimal image quality.  2. The left ventricular systolic function is moderately decreased, with a visually estimated ejection fraction of 30-35%.  3. There is reduced right ventricular systolic function.  4. Trace mitral valve  regurgitation.  5. Trace tricuspid regurgitation is visualized.  6. Limited images quality. Left ventricular ejection fraction appears reduced at 30 to 35% with interventricular conduction delay. QUANTITATIVE DATA SUMMARY:  LA VOLUME:                   Normal Ranges: LA Vol A4C:        69.7 ml   (22+/-6mL/m2) LA Vol Index A4C:  32.2ml/m2 LA Area A4C:       23.0 cm2 LA Major Axis A4C: 6.4 cm LA Vol A4C:        64.9 ml  M-MODE MEASUREMENTS:         Normal Ranges: Ao Root:             2.90 cm (2.0-3.7cm) LAs:                 4.00 cm (2.7-4.0cm)  AORTA MEASUREMENTS:         Normal Ranges: Ao Sinus, d:        2.92 cm (2.1-3.5cm) Ao STJ, d:          2.61 cm (1.7-3.4cm) Asc Ao, d:          3.40 cm (2.1-3.4cm)  LV SYSTOLIC FUNCTION BY 2D PLANIMETRY (MOD):                      Normal Ranges: EF-A4C View:    38 % (>=55%) EF-Visual:      33 % LV EF Reported: 33 %  LV DIASTOLIC FUNCTION:           Normal Ranges: MV Peak E:             0.73 m/s  (0.7-1.2 m/s) MV Peak A:             0.92 m/s  (0.42-0.7 m/s) E/A Ratio:             0.79      (1.0-2.2) MV e'                  0.066 m/s (>8.0) MV lateral e'          0.07 m/s MV medial e'           0.06 m/s E/e' Ratio:            11.06     (<8.0)  MITRAL VALVE:         Normal Ranges: MV DT:        94 msec (150-240msec)  AORTIC VALVE:                     Normal Ranges: AoV Vmax:                1.34 m/s (<=1.7m/s) AoV Peak P.2 mmHg (<20mmHg) AoV Mean P.0 mmHg (1.7-11.5mmHg) LVOT Max Alex:            1.07 m/s (<=1.1m/s) AoV VTI:                 25.00 cm (18-25cm) LVOT VTI:                19.50 cm LVOT Diameter:           2.40 cm  (1.8-2.4cm) AoV Area, VTI:           3.53 cm2 (2.5-5.5cm2) AoV Area,Vmax:           3.61 cm2 (2.5-4.5cm2) AoV Dimensionless Index: 0.78  RIGHT VENTRICLE: TAPSE: 14.7 mm RV s'  0.12 m/s  TRICUSPID VALVE/RVSP:         Normal Ranges: IVC Diam:             1.59 cm  [id number] Cristina Wharton MD Electronically signed on 10/19/2024 at 4:28:53 PM   ** Final **                 Assessment/Plan   Assessment & Plan  Acute on chronic diastolic congestive heart failure    Hyperlipidemia    Essential hypertension    Chronic low back pain    Shortness of breath    Hypomagnesemia    Acute hypoxic respiratory failure (Multi)    #1 acute systolic heart failure ejection fraction of 35%.  Patient had just had an echocardiogram yesterday showing ejection fraction of 35% likely secondary in part to left bundle.  Patient before on hydrochlorothiazide which was stopped for hyponatremia.  Currently has severe hypokalemia as well as hypomagnesemia.  Will optimize her treatment for heart failure systolic dysfunction.  Patient may need lumbar puncture as a workup for gram-negative bacteremia in the blood.  She will need ischemic workup which could be deferred as an outpatient.  She would like to hold off cardiac catheterization for now.  Will optimize her treatment for heart failure systolic dysfunction follow-up as an outpatient in my office in few weeks.      2.  Hypertension.  Will stop amlodipine and started on losartan 25 mg oral daily.  Patient has history of angioedema with has moderate LV*dysfunction she will benefit of ARB's.  Will start her also on Farxiga for heart failure systolic dysfunction.  Will reassess renal function and electrolytes in AM.     3.  Obesity hypoventilation.     4.  Hypokalemia replaced with 40 mg of potassium today.     5.  Hypomagnesemia we will replace with 2 more grams of potassium IV.     10/21: As above.  Patient to have paraspinal abscess drainage and potentially thoracentesis tomorrow at [facility].  For now we will continue with IV diuretic at Lasix 40 mg twice daily.  She tells me she is diuresing well although documented input and output do not demonstrate this.  Suspect that these readings are incorrect.  She remains in normal sinus rhythm without significant ectopy on telemetry.  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  Will continue to optimize her from a heart failure standpoint with diuresis, before undergoing ischemic workup.  She was started on losartan yesterday.  Pressures are stable most recent reading 135/75.  She remains on 2 L of O2 with saturations at 100%.  Lab work this morning reveals a sodium of 133, potassium of 3.3, creatinine 0.59, hemoglobin 10.0 hematocrit 32.5.  Potassium supplementation has been ordered.  She remains on IV antibiotics for discitis.  Will continue to diurese and follow with you.    10/22: As Above. Patient just returned from [facility] [place], operated by [facility] for spinal abscess biopsy. She has no complaints. She was transitioned to oral diuretic yesterday, reports good diuretic effect. I&Os not yet recorded today. She was transitioned to Coreg 3.125 mg yesterday as well. Tolerating this well. Continue Losartan 50 mg. Patient has been weaned off O2, saturations currently 95%, continue to monitor. Lab work reveals sodium 134, potassium 3.4, creatinine 0.69, hemoglobin 9.9 and hematocrit 32.3. She remains in normal sinus rhythm on telemetry. Blood pressures are stable most recent reading 109/74. Will continue to follow with you.        I spent 20 minutes in the professional and overall care of this patient.      Tana Lassiter PA-C

## 2024-10-22 NOTE — PROGRESS NOTES
"Angela Montelongo is a 74 y.o. female on day 4 of admission follow-up for acute hypoxic respiratory failure, bilateral pleural effusions    Subjective   On room air; O2 sats 9%.  No respiratory complaints.  Denies pain.  Afebrile.       Objective     Physical Exam  Vitals and nursing note reviewed.   Constitutional:       Appearance: She is obese.   HENT:      Head: Normocephalic and atraumatic.      Nose: Nose normal.      Mouth/Throat:      Mouth: Mucous membranes are moist.   Eyes:      Extraocular Movements: Extraocular movements intact.      Conjunctiva/sclera: Conjunctivae normal.      Pupils: Pupils are equal, round, and reactive to light.   Cardiovascular:      Rate and Rhythm: Normal rate and regular rhythm.      Pulses: Normal pulses.      Heart sounds: Normal heart sounds.   Pulmonary:      Effort: Pulmonary effort is normal.      Breath sounds: Normal breath sounds.      Comments: Lungs diminished but clear.   Abdominal:      General: Bowel sounds are normal.      Palpations: Abdomen is soft.   Musculoskeletal:         General: Normal range of motion.      Right lower leg: Edema present.      Left lower leg: Edema present.   Skin:     General: Skin is warm and dry.      Capillary Refill: Capillary refill takes less than 2 seconds.   Neurological:      General: No focal deficit present.      Mental Status: She is alert and oriented to person, place, and time.   Psychiatric:         Mood and Affect: Mood normal.         Behavior: Behavior normal.       Last Recorded Vitals  Blood pressure 142/61, pulse 97, temperature 36.1 °C (97 °F), temperature source Temporal, resp. rate 16, height 1.6 m (5' 3\"), weight 111 kg (245 lb), SpO2 95%.  Intake/Output last 3 Shifts:  I/O last 3 completed shifts:  In: 2332.5 (21 mL/kg) [P.O.:1800; I.V.:182.5 (1.6 mL/kg); IV Piggyback:350]  Out: 2775 (25 mL/kg) [Urine:2775 (0.7 mL/kg/hr)]  Weight: 111.1 kg       Intake/Output Summary (Last 24 hours) at 10/22/2024 0953  Last data " filed at 10/21/2024 2241  Gross per 24 hour   Intake 550 ml   Output 1550 ml   Net -1000 ml      Scheduled medications:  allopurinol, 100 mg, oral, Daily  carvedilol, 3.125 mg, oral, BID  dapagliflozin propanediol, 10 mg, oral, Daily  enoxaparin, 40 mg, subcutaneous, q12h DYLAN  famotidine, 20 mg, oral, BID   Or  famotidine, 20 mg, intravenous, BID  furosemide, 40 mg, oral, BID  gabapentin, 200 mg, oral, TID  lidocaine, 5 mL, infiltration, Once  losartan, 50 mg, oral, Daily  meropenem, 1 g, intravenous, q8h  oxybutynin, 5 mg, oral, BID  oxygen, , inhalation, q8h  perflutren protein A microsphere, 0.5 mL, intravenous, Once in imaging  polyethylene glycol, 17 g, oral, Daily  potassium chloride, 20 mEq, oral, BID  simvastatin, 40 mg, oral, Nightly  sulfur hexafluoride microsphr, 2 mL, intravenous, Once in imaging       PRN medications: acetaminophen **OR** acetaminophen **OR** acetaminophen, alteplase       Relevant Results  Results for orders placed or performed during the hospital encounter of 10/17/24 (from the past 24 hours)   Basic metabolic panel   Result Value Ref Range    Glucose 82 74 - 99 mg/dL    Sodium 134 (L) 136 - 145 mmol/L    Potassium 3.4 (L) 3.5 - 5.3 mmol/L    Chloride 89 (L) 98 - 107 mmol/L    Bicarbonate 39 (H) 21 - 32 mmol/L    Anion Gap 9 (L) 10 - 20 mmol/L    Urea Nitrogen 20 6 - 23 mg/dL    Creatinine 0.69 0.50 - 1.05 mg/dL    eGFR 90 >60 mL/min/1.73m*2    Calcium 9.2 8.6 - 10.3 mg/dL   CBC   Result Value Ref Range    WBC 7.5 4.4 - 11.3 x10*3/uL    nRBC 0.0 0.0 - 0.0 /100 WBCs    RBC 3.56 (L) 4.00 - 5.20 x10*6/uL    Hemoglobin 9.9 (L) 12.0 - 16.0 g/dL    Hematocrit 32.3 (L) 36.0 - 46.0 %    MCV 91 80 - 100 fL    MCH 27.8 26.0 - 34.0 pg    MCHC 30.7 (L) 32.0 - 36.0 g/dL    RDW 15.5 (H) 11.5 - 14.5 %    Platelets 301 150 - 450 x10*3/uL      XR chest 2 views  Result Date: 10/21/2024  FINDINGS: The cardiac silhouette is normal in size for the AP technique. The aorta is ectatic and tortuous. There are  improved bilateral pleural effusions and bibasilar infiltrates/atelectatic changes. No pneumothorax is seen. Extensive degenerative changes involve the shoulders. IMPRESSION: Improving bilateral effusions and bibasilar infiltrates/atelectatic changes.    MR thoracic spine wo IV contrast  Result Date: 10/20/2024  FINDINGS: The degree of motion artifact significantly degrades evaluation. THORACIC SPINE: Assessment of the upper thoracic spinal cord is limited by motion  artifact. CSF flow related artifact also degrades evaluation on several sequences. Within these limitations, there is no definite cord signal abnormality or cord compression. There is concern for epidural abscess component within the lower thoracic region with suggestion of circumferential T2 hyperintensity surrounding the thecal sac measuring up to 3 mm in maximum thickness along the ventral aspect at T12 (series 9, image 27). Potential epidural fluid/thickening may extend as far cranially as T7. There is abnormal marrow signal centered about the T11-T12 endplates and intervertebral disc with STIR hyperintensity and T1 hypointensity and irregular appearance of the disc and endplates as previously described on CT examination, concerning for discitis/osteomyelitis.  Signal abnormality also extends to involve the left-greater-than-right facet joints and there is extensive signal abnormality within the adjacent soft tissues including suspected paraspinal abscess components such as a 1.8 x 1.7 cm fluid collection along the rightward aspect of T11-T12 adjacent to the neural foraminal region (series 9, image 24). There is resultant mild height loss of the T11 and T12 vertebral bodies. Presumed epidural Inflammatory components are also evident involving the T11-T12 neural foraminal regions bilaterally. Allowing for motion artifact there is no additional evidence of discitis/osteomyelitis within the remainder of the thoracic spine. The remaining thoracic  vertebral bodies demonstrate grossly preserved heights. There is trace degenerative appearing anterolisthesis of T1 on T2 and T3 on T4. There is unchanged S-shaped scoliosis of the cervicothoracic spine with levo scoliosis of the upper cervicothoracic region and dextroscoliotic curvature of the thoracolumbar region. Multilevel type 2 Modic endplate signal change. There is moderate multilevel degenerative disc height loss throughout the thoracic regions. There is no additional significant spinal canal stenosis evident. Predominantly mild multilevel neural foraminal narrowing is suspected with evaluation limited by scoliosis and aforementioned motion artifact. There is extensive fatty atrophy of the posterior paraspinal musculature. Bilateral pleural effusions and dependent parenchymal airspace opacity are again noted. LUMBAR SPINE: Normal segmentation. Similar to the T11-T12 level, there is extensive marrow edema and signal abnormality centered about the L1-L2 endplates and intervertebral disc concerning for discitis/osteomyelitis. There is  surrounding soft tissue signal abnormality with concern for small abscess components along the rightward aspect at L1-L2 measuring up to 2.2 cm in diameter (series 9, image 20). No discrete epidural abscess component is seen at this level. There is mild destructive endplate height loss involving the L1 and L2 vertebrae. The cauda equina are otherwise unremarkable. The conus appears to terminate at the T12-L1 level with CSF flow related artifact obscuring evaluation. Similar levo scoliosis of the lumbar spine. The remaining lumbar  vertebral body heights are otherwise maintained. There is no additional suspicious STIR hyperintensity/marrow edema within the remaining lumbar levels. There is moderate degenerative disc height loss within the remainder of the lumbar spine with likely degenerative appearing fluid within portions of the discs. Degenerative change: T12-L1: No spinal  canal stenosis. Mild right-greater-than-left neural foraminal narrowing suggested. L1-2: Mild facet arthropathy. Inflammatory components involving the right-greater-than-left paraspinal soft tissues with no definite spinal canal stenosis, suspected mild right lateral recess narrowing and inflammatory components extending to the right-greater-than-left neural foraminal regions. L2-3: Mild bilateral facet arthropathy. Mild disc bulge and endplate spurring, worse along the rightward aspect. No spinal canal stenosis with mild right lateral recess narrowing. Minimal left and mild-to-moderate right neural foraminal narrowing. L3-4: Moderate to severe bilateral facet arthropathy. Posterior disc osteophyte components. Moderate to severe spinal canal stenosis suggested. Moderate bilateral neural foraminal narrowing. L4-5: Severe left and mild-to-moderate right facet arthropathy with ligamentum flavum hypertrophy. Disc bulge and hypertrophic endplate spurring. Severe spinal canal stenosis. Mild right and moderate to severe left neural foraminal narrowing. L5-S1: Severe facet arthropathy. Mild disc bulge/uncovering. Mild left lateral recess narrowing. No additional spinal canal stenosis moderate bilateral neural foraminal narrowing. Extensive fatty atrophy of the posterior paraspinal and iliopsoas  musculature. IMPRESSION: 1. Motion degraded examination. 2. Findings to suggest discitis/osteomyelitis involving the T11-T12 and L1-L2 levels. There is mild height loss related to endplate destruction at these levels with surrounding inflammatory soft tissue change including right-sided small paraspinal abscess components suspected. Additionally, there is concern for potential epidural phlegmon versus abscess centered at T11-T12 with resultant moderate spinal canal stenosis at this level. Inflammatory components also  involve the neural foraminal regions at both levels. 3. No additional evidence of significant spinal canal stenosis  or cord compression within the thoracic regions when accounting for motion limitations. There is concern for moderate to severe spinal canal stenosis at L3-L4 and severe spinal canal stenosis at L4-L5  secondary to degenerative change. 4. Extensive scoliosis.      MR lumbar spine wo IV contrast  Result Date: 10/20/2024  FINDINGS: The degree of motion artifact significantly degrades evaluation.   THORACIC SPINE:   Assessment of the upper thoracic spinal cord is limited by motion artifact. CSF flow related artifact also degrades evaluation on several sequences. Within these limitations, there is no definite cord signal abnormality or cord compression. There is concern for epidural abscess component within the lower thoracic region with suggestion of circumferential T2 hyperintensity surrounding the thecal sac measuring up to 3 mm in maximum thickness along the ventral aspect at T12 (series 9, image 27). Potential epidural fluid/thickening may extend as far cranially as T7.   There is abnormal marrow signal centered about the T11-T12 endplates and intervertebral disc with STIR hyperintensity and T1 hypointensity and irregular appearance of the disc and endplates as previously described on CT examination, concerning for discitis/osteomyelitis. Signal abnormality also extends to involve the left-greater-than-right facet joints and there is extensive signal abnormality within the adjacent soft tissues including suspected paraspinal abscess components such as a 1.8 x 1.7 cm fluid collection along the rightward aspect of T11-T12 adjacent to the neural foraminal region (series 9, image 24). There is resultant mild height loss of the T11 and T12 vertebral bodies. Presumed epidural components resulting in moderate spinal canal stenosis at T11-T12. Inflammatory components are also evident involving the T11-T12 neural foraminal regions bilaterally.   Allowing for motion artifact there is no additional evidence of  discitis/osteomyelitis within the remainder of the thoracic spine. The remaining thoracic vertebral bodies demonstrate grossly preserved heights. There is trace degenerative appearing anterolisthesis of T1 on T2 and T3 on T4. There is unchanged S-shaped scoliosis of the cervicothoracic spine with levo scoliosis of the upper cervicothoracic region and dextroscoliotic curvature of the thoracolumbar region. Multilevel type 2 Modic endplate signal change. There is moderate multilevel degenerative disc height loss throughout the thoracic regions. There is no additional significant spinal canal stenosis evident. Predominantly mild multilevel neural foraminal narrowing is suspected with evaluation limited by scoliosis and aforementioned motion artifact.   There is extensive fatty atrophy of the posterior paraspinal musculature. Bilateral pleural effusions and dependent parenchymal airspace opacity are again noted.     LUMBAR SPINE:   Normal segmentation.   Similar to the T11-T12 level, there is extensive marrow edema and signal abnormality centered about the L1-L2 endplates and intervertebral disc concerning for discitis/osteomyelitis. There is surrounding soft tissue signal abnormality with concern for small abscess components along the rightward aspect at L1-L2 measuring up to 2.2 cm in diameter (series 9, image 20). No discrete epidural abscess component is seen at this level. There is mild destructive endplate height loss involving the L1 and L2 vertebrae.   The cauda equina are otherwise unremarkable. The conus appears to terminate at the T12-L1 level with CSF flow related artifact obscuring evaluation.   Similar levo scoliosis of the lumbar spine. The remaining lumbar vertebral body heights are otherwise maintained. There is no additional suspicious STIR hyperintensity/marrow edema within the remaining lumbar levels. There is moderate degenerative disc height loss within the remainder of the lumbar spine with likely  degenerative appearing fluid within portions of the discs.   Degenerative change:   T12-L1: No spinal canal stenosis. Mild right-greater-than-left neural foraminal narrowing suggested.   L1-2: Mild facet arthropathy. Inflammatory components involving the right-greater-than-left paraspinal soft tissues with no definite spinal canal stenosis, suspected mild right lateral recess narrowing, and inflammatory components extending to the right-greater-than-left neural foraminal regions.   L2-3: Mild bilateral facet arthropathy. Mild disc bulge and endplate spurring, worse along the rightward aspect. No spinal canal stenosis with mild right lateral recess narrowing. Minimal left and mild-to-moderate right neural foraminal narrowing.   L3-4: Moderate to severe bilateral facet arthropathy. Posterior disc osteophyte components. Moderate to severe spinal canal stenosis suggested. Moderate bilateral neural foraminal narrowing.   L4-5: Severe left and mild-to-moderate right facet arthropathy with ligamentum flavum hypertrophy. Disc bulge and hypertrophic endplate spurring. Severe spinal canal stenosis. Mild right and moderate to severe left neural foraminal narrowing.   L5-S1: Severe facet arthropathy. Mild disc bulge/uncovering. Mild left lateral recess narrowing. No additional spinal canal stenosis moderate bilateral neural foraminal narrowing.   Extensive fatty atrophy of the posterior paraspinal and iliopsoas musculature.     1. Motion degraded examination. 2. Findings to suggest discitis/osteomyelitis involving the T11-T12 and L1-L2 levels. There is mild height loss related to endplate destruction at these levels with surrounding inflammatory soft tissue change including right-sided small paraspinal abscess components suspected. Additionally, there is concern for potential epidural phlegmon versus abscess centered at T11-T12 with resultant moderate spinal canal stenosis at this level. Inflammatory components also involve the  neural foraminal regions at both levels. 3. No additional evidence of significant spinal canal stenosis or cord compression within the thoracic regions when accounting for motion limitations. There is concern for moderate to severe spinal canal stenosis at L3-L4 and severe spinal canal stenosis at L4-L5 secondary to degenerative change. 4. Extensive scoliosis.      MR thoracic spine wo IV contrast  Result Date: 10/20/2024  FINDINGS: The degree of motion artifact significantly degrades evaluation.   THORACIC SPINE:   Assessment of the upper thoracic spinal cord is limited by motion artifact. CSF flow related artifact also degrades evaluation on several sequences. Within these limitations, there is no definite cord signal abnormality or cord compression. There is concern for epidural abscess component within the lower thoracic region with suggestion of circumferential T2 hyperintensity surrounding the thecal sac measuring up to 3 mm in maximum thickness along the ventral aspect at T12 (series 9, image 27). Potential epidural fluid/thickening may extend as far cranially as T7.   There is abnormal marrow signal centered about the T11-T12 endplates and intervertebral disc with STIR hyperintensity and T1 hypointensity and irregular appearance of the disc and endplates as previously described on CT examination, concerning for discitis/osteomyelitis. Signal abnormality also extends to involve the left-greater-than-right facet joints and there is extensive signal abnormality within the adjacent soft tissues including suspected paraspinal abscess components such as a 1.8 x 1.7 cm fluid collection along the rightward aspect of T11-T12 adjacent to the neural foraminal region (series 9, image 24). There is resultant mild height loss of the T11 and T12 vertebral bodies. Presumed epidural components resulting in moderate spinal canal stenosis at T11-T12. Inflammatory components are also evident involving the T11-T12 neural  foraminal regions bilaterally.   Allowing for motion artifact there is no additional evidence of discitis/osteomyelitis within the remainder of the thoracic spine. The remaining thoracic vertebral bodies demonstrate grossly preserved heights. There is trace degenerative appearing anterolisthesis of T1 on T2 and T3 on T4. There is unchanged S-shaped scoliosis of the cervicothoracic spine with levo scoliosis of the upper cervicothoracic region and dextroscoliotic curvature of the thoracolumbar region. Multilevel type 2 Modic endplate signal change. There is moderate multilevel degenerative disc height loss throughout the thoracic regions. There is no additional significant spinal canal stenosis evident. Predominantly mild multilevel neural foraminal narrowing is suspected with evaluation limited by scoliosis and aforementioned motion artifact.   There is extensive fatty atrophy of the posterior paraspinal musculature. Bilateral pleural effusions and dependent parenchymal airspace opacity are again noted.     LUMBAR SPINE:   Normal segmentation.   Similar to the T11-T12 level, there is extensive marrow edema and signal abnormality centered about the L1-L2 endplates and intervertebral disc concerning for discitis/osteomyelitis. There is surrounding soft tissue signal abnormality with concern for small abscess components along the rightward aspect at L1-L2 measuring up to 2.2 cm in diameter (series 9, image 20). No discrete epidural abscess component is seen at this level. There is mild destructive endplate height loss involving the L1 and L2 vertebrae.   The cauda equina are otherwise unremarkable. The conus appears to terminate at the T12-L1 level with CSF flow related artifact obscuring evaluation.   Similar levo scoliosis of the lumbar spine. The remaining lumbar vertebral body heights are otherwise maintained. There is no additional suspicious STIR hyperintensity/marrow edema within the remaining lumbar levels.  There is moderate degenerative disc height loss within the remainder of the lumbar spine with likely degenerative appearing fluid within portions of the discs.   Degenerative change:   T12-L1: No spinal canal stenosis. Mild right-greater-than-left neural foraminal narrowing suggested.   L1-2: Mild facet arthropathy. Inflammatory components involving the right-greater-than-left paraspinal soft tissues with no definite spinal canal stenosis, suspected mild right lateral recess narrowing, and inflammatory components extending to the right-greater-than-left neural foraminal regions.   L2-3: Mild bilateral facet arthropathy. Mild disc bulge and endplate spurring, worse along the rightward aspect. No spinal canal stenosis with mild right lateral recess narrowing. Minimal left and mild-to-moderate right neural foraminal narrowing.   L3-4: Moderate to severe bilateral facet arthropathy. Posterior disc osteophyte components. Moderate to severe spinal canal stenosis suggested. Moderate bilateral neural foraminal narrowing.   L4-5: Severe left and mild-to-moderate right facet arthropathy with ligamentum flavum hypertrophy. Disc bulge and hypertrophic endplate spurring. Severe spinal canal stenosis. Mild right and moderate to severe left neural foraminal narrowing.   L5-S1: Severe facet arthropathy. Mild disc bulge/uncovering. Mild left lateral recess narrowing. No additional spinal canal stenosis moderate bilateral neural foraminal narrowing.   Extensive fatty atrophy of the posterior paraspinal and iliopsoas musculature.  1. Motion degraded examination. 2. Findings to suggest discitis/osteomyelitis involving the T11-T12 and L1-L2 levels. There is mild height loss related to endplate destruction at these levels with surrounding inflammatory soft tissue change including right-sided small paraspinal abscess components suspected. Additionally, there is concern for potential epidural phlegmon versus abscess centered at T11-T12  with resultant moderate spinal canal stenosis at this level. Inflammatory components also involve the neural foraminal regions at both levels. 3. No additional evidence of significant spinal canal stenosis or cord compression within the thoracic regions when accounting for motion limitations. There is concern for moderate to severe spinal canal stenosis at L3-L4 and severe spinal canal stenosis at L4-L5 secondary to degenerative change. 4. Extensive scoliosis.     Transthoracic Echo (TTE) Complete  Result Date: 10/19/2024  CONCLUSIONS:  1. Poorly visualized anatomical structures due to suboptimal image quality.  2. The left ventricular systolic function is moderately decreased, with a visually estimated ejection fraction of 30-35%.  3. There is reduced right ventricular systolic function.  4. Trace mitral valve regurgitation.  5. Trace tricuspid regurgitation is visualized.  6. Limited images quality. Left ventricular ejection fraction appears reduced at 30 to 35% with interventricular conduction delay.     CT thoracic spine wo IV contrast  Result Date: 10/17/2024  FINDINGS:     Alignment: There is mild scoliosis noted.   Vertebrae/Intervertebral Discs: The thoracolumbar vertebral body heights are intact. There is severe loss of intervertebral disc space. There are severe intervertebral destructive changes noted of the T11-T12 and L2-L3 levels. Findings appear somewhat worsened when compared to the prior study. Question underlying discitis/osteomyelitis. Recommend MRI for further evaluation. There is multilevel spinal canal stenosis noted. There is facet arthrosis noted.   Paraspinous Soft Tissues: Within normal limits.  There are severe intervertebral destructive changes noted of the T11-T12 and L2-L3 levels. Findings appear somewhat worsened when compared to the prior study. Question underlying discitis/osteomyelitis. Recommend MRI for further evaluation. Recommend clinical correlation.       CT lumbar spine wo IV  contrast  Result Date: 10/17/2024  FINDINGS:     Alignment: There is mild scoliosis noted.   Vertebrae/Intervertebral Discs: The thoracolumbar vertebral body heights are intact. There is severe loss of intervertebral disc space. There are severe intervertebral destructive changes noted of the T11-T12 and L2-L3 levels. Findings appear somewhat worsened when compared to the prior study. Question underlying discitis/osteomyelitis. Recommend MRI for further evaluation. There is multilevel spinal canal stenosis noted. There is facet arthrosis noted.   Paraspinous Soft Tissues: Within normal limits.       There are severe intervertebral destructive changes noted of the T11-T12 and L2-L3 levels. Findings appear somewhat worsened when compared to the prior study. Question underlying discitis/osteomyelitis. Recommend MRI for further evaluation. Recommend clinical correlation.      CT angio chest for pulmonary embolism  Result Date: 10/17/2024  FINDINGS: POTENTIAL LIMITATIONS OF THE STUDY: The assessment is limited by respiratory motion and suboptimal contrast opacification of pulmonary arteries.   HEART AND VESSELS: There is no main or lobar pulmonary embolus visualized. There is suboptimal opacification and respiratory motion noted limiting evaluation of the segmental and subsegmental branches. Main pulmonary artery and its branches are normal in caliber.   The thoracic aorta normal in course and caliber. Coronary artery calcifications are seen. Please note, the study is not optimized for evaluation of coronary arteries.   The cardiac chambers are not enlarged.   There is no pericardial effusion seen.   MEDIASTINUM AND SENG, LOWER NECK AND AXILLA: The visualized thyroid gland is within normal limits. No evidence of thoracic lymphadenopathy by CT criteria. Esophagus appears within normal limits as seen.   LUNGS AND AIRWAYS: There are large bilateral pleural effusions with overlying atelectasis versus consolidation. There are  scattered ground-glass airspace opacities noted.     UPPER ABDOMEN: The visualized subdiaphragmatic structures demonstrate no remarkable findings.       CHEST WALL AND OSSEOUS STRUCTURES: Chest wall is within normal limits. There is multilevel loss of intervertebral disc space. The endplate osteophytic changes noted.There are severe degenerative changes noted of the bilateral shoulder joints. Associated soft tissue lesions.There are destructive changes noted of the T11-T12 and L1-L2 disc spaces noted. Findings are unchanged from the prior study.  1. There is no main or lobar pulmonary embolus visualized. There is suboptimal opacification and respiratory motion noted limiting evaluation of the segmental and subsegmental branches. 2. There are large bilateral pleural effusions with overlying atelectasis versus consolidation. There are scattered ground-glass airspace opacities noted. 3. There are severe degenerative changes noted of the bilateral shoulder joints. Associated soft tissue lesions.         Assessment/Plan   Acute hypoxic respiratory failure  Stable on room air  Nocturnal pulse oximetry tonight   Continue as needed nebulized bronchodilator  Continuous pulse oximetry  Incentive spirometry/pulmonary hygiene     Bilateral pleural effusions  PA lateral chest x-ray today shows improving bilateral effusions and bibasilar infiltrates/atelectatic changes  Monitor response to diuresis    Acute on chronic diastolic congestive heart failure  40 mg oral Lasix twice/daily  Strict I's and O's  Cardiology follow-up      Discitis/osteomyelitis  Follow-up cultures  Analgesia  Status post CT-guided aspiration via IR  PICC line placement  Orthopedic Surgery follow-up  Infectious Disease follow-up     Urinary tract infection/Bacteremia  Urine culture + E. coli  IV meropenem  Follow-up cultures  Infectious disease follow-up     Morbid obesity  Polysomnogram as outpatient    Discharge planning-[facility] recommended, facilities in  review  Nakia Maxwell, APRN-CNP

## 2024-10-22 NOTE — NURSING NOTE
Pt has a single lumen picc line to R upper arm, drsg dry and intact (dated 10/21) without any redness, swelling or drainage, brisk blood return noted and flushes easily with NS, curos cap applied.

## 2024-10-22 NOTE — NURSING NOTE
Patient has returned from [facility] from procedure in stable condition. Patient requesting diet order, denies any pain at this time. Bed alarm on. Call light and belongings within reach. Will continue plan of care.

## 2024-10-22 NOTE — PROGRESS NOTES
Occupational Therapy                 Therapy Communication Note    Patient Name: Angela Montelongo  MRN: [id number]  Department: [address]  Room: 437/437-A  Today's Date: 10/22/2024     Discipline: Occupational Therapy    Missed Visit Reason: Missed Visit Reason: Other (Comment) (OT entered pt room at 0905; transport arriving to [facility] pt to [place] for procedure. Treatment deferred.)    Missed Time: Attempt    Comment: am cx 10/22

## 2024-10-22 NOTE — PROGRESS NOTES
Physical Therapy                 Therapy Communication Note    Patient Name: Angela Montelongo  MRN: [id number]  Department: [address]  Room: [address] [place], [place] [address]  Today's Date: 10/22/2024     Discipline: Physical Therapy    Missed Visit Reason:      Missed Time: Attempt    Comment: Pt off floor, having a procedure at [place] today.

## 2024-10-22 NOTE — PROGRESS NOTES
Angela Montelongo is a 74 y.o. female on day 4 of admission presenting with Acute on chronic diastolic congestive heart failure.      Subjective   No acute issues. To go to [facility] this am for IR biopsy of spinal abscess. Pt denies pain or dyspnea. Lasix has been switched to oral.     Objective     Last Recorded Vitals  /61 (BP Location: Right arm, Patient Position: Lying)   Pulse 97   Temp 36.1 °C (97 °F) (Temporal)   Resp 16   Wt 111 kg (245 lb)   SpO2 95%   Intake/Output last 3 Shifts:    Intake/Output Summary (Last 24 hours) at 10/22/2024 1013  Last data filed at 10/21/2024 2241  Gross per 24 hour   Intake 550 ml   Output 1550 ml   Net -1000 ml       Admission Weight  Weight: 118 kg (260 lb) (10/17/24 1757)    Daily Weight  10/18/24 : 111 kg (245 lb)    Image Results  Bedside PICC Imaging  These images are not reportable by radiology and will not be interpreted   by  Radiologists.  XR chest 2 views  Narrative: Interpreted By:  Jagdeep Morataya,   STUDY:  XR CHEST 2 VIEWS; 10/21/2024 7:51 am      INDICATION:  Signs/Symptoms:Follow-up      COMPARISON:  10/17/2024 and February 2019.      ACCESSION NUMBER(S):  [id number]      ORDERING CLINICIAN:  KENYON PELAYO      TECHNIQUE:  Number of films: Two-view radiographs of the chest were obtained.      FINDINGS:  The cardiac silhouette is normal in size for the AP technique.  The aorta is ectatic and tortuous.  There are improved bilateral pleural effusions and bibasilar  infiltrates/atelectatic changes. No pneumothorax is seen.  Extensive degenerative changes involve the shoulders.      Impression: Improving bilateral effusions and bibasilar infiltrates/atelectatic  changes.      Signed by: Jagdeep Morataya 10/21/2024 10:10 AM  Dictation workstation:   [id number]      Physical Exam  Constitutional:       Appearance: She is obese.   HENT:      Head: Normocephalic and atraumatic.      Nose: Nose normal.      Mouth/Throat:      Mouth: Mucous membranes are moist.       Pharynx: Oropharynx is clear.   Eyes:      Extraocular Movements: Extraocular movements intact.      Pupils: Pupils are equal, round, and reactive to light.   Cardiovascular:      Rate and Rhythm: Normal rate and regular rhythm.      Pulses: Normal pulses.      Comments: Trace edema bilaterally  Pulmonary:      Effort: Pulmonary effort is normal.      Breath sounds: Normal breath sounds.      Comments: Clear/diminished  Abdominal:      General: Abdomen is flat. Bowel sounds are normal.      Palpations: Abdomen is soft.   Musculoskeletal:         General: Normal range of motion.      Comments: Generalized weakness, non-ambulatory due to back pain   Skin:     General: Skin is warm and dry.      Capillary Refill: Capillary refill takes less than 2 seconds.   Neurological:      General: No focal deficit present.      Mental Status: She is oriented to person, place, and time.   Psychiatric:         Mood and Affect: Mood normal.       Relevant Results    Results for orders placed or performed during the hospital encounter of 10/17/24 (from the past 24 hours)   Basic metabolic panel   Result Value Ref Range    Glucose 82 74 - 99 mg/dL    Sodium 134 (L) 136 - 145 mmol/L    Potassium 3.4 (L) 3.5 - 5.3 mmol/L    Chloride 89 (L) 98 - 107 mmol/L    Bicarbonate 39 (H) 21 - 32 mmol/L    Anion Gap 9 (L) 10 - 20 mmol/L    Urea Nitrogen 20 6 - 23 mg/dL    Creatinine 0.69 0.50 - 1.05 mg/dL    eGFR 90 >60 mL/min/1.73m*2    Calcium 9.2 8.6 - 10.3 mg/dL   CBC   Result Value Ref Range    WBC 7.5 4.4 - 11.3 x10*3/uL    nRBC 0.0 0.0 - 0.0 /100 WBCs    RBC 3.56 (L) 4.00 - 5.20 x10*6/uL    Hemoglobin 9.9 (L) 12.0 - 16.0 g/dL    Hematocrit 32.3 (L) 36.0 - 46.0 %    MCV 91 80 - 100 fL    MCH 27.8 26.0 - 34.0 pg    MCHC 30.7 (L) 32.0 - 36.0 g/dL    RDW 15.5 (H) 11.5 - 14.5 %    Platelets 301 150 - 450 x10*3/uL       Assessment/Plan      Acute Hypoxic Respiratory Failure / Bilateral Pleural Effusions  -Pulmonology follows.    -Cardiology  follows.  -Likely secondary to pleural effusions.   -On 2L with high O2 sats, wean as able.   -Continue Lasix BID, switched to oral.  -Repeat CXR 10/21 showed improvement in bilateral effusions.       Acute Systolic CHF  -Cardiology follows. She has no known hx of CHF.   -ProBNP elevated 3,988 with pleural effusions on imaging.    -Echo shows decreased EF 30-35%.   -BID lasix, continue. Switched to oral.   -Med adjustments made by cardio.   -Monitor strict I&O and daily weights.      Back Pain / Concern for Osteomyelitis/Discitis  -CT lumbar and thoracic spine have classic findings of advanced discitis and osteomyelitis at T11-12 and L1-2.   -MRI lumbar and thoracic spine shows discitis/OM T11-T12   -Ortho spine follows. Reviewed MRI and no indication for surgery    -ID follows.   -IV abx per ID, currently on meropenem. Alexia smith  -IR consulted for aspiration of spinal abscess. To be done today.  -Blood cultures with EColi. Repeat blood cultures with NGTD.      Bacteremia  -ID on board.   -Blood cultures with e.coli. Second set with no growth. High suspicion this is from the urine.   -Continue broad spectrum IV abx.      UTI  -Urine culture with EColi.     -IV abx per ID.      Hypokalemia / Hypomagnesemia  -Resolved, monitor   -Continue K+ supplementation while being diuresed     HTN  -Continue current medications, adjustments made by cardio.   -BP stable, monitor.      HLD  -Continue statin.      DVT Risk  -Lovenox subcutaneous.      Dispo  To SNF on discharge. Will likely need PICC and IV Abx on discharge.      ANGELITO Blevins-CNP

## 2024-10-22 NOTE — NURSING NOTE
CHF education/book given to patient. Discussed low sodium diet, importance of daily weights, following up with physician appointments, taking medications as prescribed. Discussed cardiac rehab with patient. Cardiac rehab not appropriate due to decrease mobility/bed bound.

## 2024-10-22 NOTE — TELEPHONE ENCOUNTER
Patient admitted to [facility] 10/17/24 with acute hypoxic respiratory failure, acute on chronic diastolic CHF, chronic LBP. Per today's progress note: CT lumbar and thoracic spine have classic findings of advanced discitis and osteomyelitis at T11-12 and L1-2. MRI lumbar and thoracic spine shows discitis/OM T11-T12   Ortho spine follows. Reviewed MRI and no indication for surgery. ID follows. IV ATB per ID. IR consulted for aspiration of spinal abscess. To be done today at [facility].  Blood cultures with EColi. Repeat blood cultures with NGTD.

## 2024-10-23 LAB
ATRIAL RATE: 88 BPM
BACTERIA BLD AEROBE CULT: ABNORMAL
BACTERIA BLD AEROBE CULT: ABNORMAL
BACTERIA BLD CULT: ABNORMAL
BACTERIA BLD CULT: ABNORMAL
GRAM STN SPEC: ABNORMAL
GRAM STN SPEC: ABNORMAL
P AXIS: 44 DEGREES
P OFFSET: 181 MS
P ONSET: 113 MS
PR INTERVAL: 176 MS
Q ONSET: 201 MS
QRS COUNT: 14 BEATS
QRS DURATION: 148 MS
QT INTERVAL: 414 MS
QTC CALCULATION(BAZETT): 500 MS
QTC FREDERICIA: 470 MS
R AXIS: -52 DEGREES
STAPHYLOCOCCUS SPEC CULT: NORMAL
T AXIS: 49 DEGREES
T OFFSET: 408 MS
VENTRICULAR RATE: 88 BPM

## 2024-10-23 PROCEDURE — 97535 SELF CARE MNGMENT TRAINING: CPT | Mod: GO

## 2024-10-23 PROCEDURE — 99232 SBSQ HOSP IP/OBS MODERATE 35: CPT

## 2024-10-23 PROCEDURE — 2500000005 HC RX 250 GENERAL PHARMACY W/O HCPCS: Performed by: NURSE PRACTITIONER

## 2024-10-23 PROCEDURE — 2500000005 HC RX 250 GENERAL PHARMACY W/O HCPCS: Performed by: STUDENT IN AN ORGANIZED HEALTH CARE EDUCATION/TRAINING PROGRAM

## 2024-10-23 PROCEDURE — 97530 THERAPEUTIC ACTIVITIES: CPT | Mod: GO

## 2024-10-23 PROCEDURE — 1100000001 HC PRIVATE ROOM DAILY

## 2024-10-23 PROCEDURE — 97110 THERAPEUTIC EXERCISES: CPT | Mod: GP,CQ

## 2024-10-23 PROCEDURE — 2500000001 HC RX 250 WO HCPCS SELF ADMINISTERED DRUGS (ALT 637 FOR MEDICARE OP): Performed by: INTERNAL MEDICINE

## 2024-10-23 PROCEDURE — 97530 THERAPEUTIC ACTIVITIES: CPT | Mod: GP,CQ

## 2024-10-23 PROCEDURE — 2500000004 HC RX 250 GENERAL PHARMACY W/ HCPCS (ALT 636 FOR OP/ED): Performed by: STUDENT IN AN ORGANIZED HEALTH CARE EDUCATION/TRAINING PROGRAM

## 2024-10-23 PROCEDURE — 2500000002 HC RX 250 W HCPCS SELF ADMINISTERED DRUGS (ALT 637 FOR MEDICARE OP, ALT 636 FOR OP/ED): Performed by: STUDENT IN AN ORGANIZED HEALTH CARE EDUCATION/TRAINING PROGRAM

## 2024-10-23 PROCEDURE — 99232 SBSQ HOSP IP/OBS MODERATE 35: CPT | Performed by: NURSE PRACTITIONER

## 2024-10-23 PROCEDURE — 2500000001 HC RX 250 WO HCPCS SELF ADMINISTERED DRUGS (ALT 637 FOR MEDICARE OP): Performed by: STUDENT IN AN ORGANIZED HEALTH CARE EDUCATION/TRAINING PROGRAM

## 2024-10-23 PROCEDURE — 2500000001 HC RX 250 WO HCPCS SELF ADMINISTERED DRUGS (ALT 637 FOR MEDICARE OP)

## 2024-10-23 PROCEDURE — 2500000004 HC RX 250 GENERAL PHARMACY W/ HCPCS (ALT 636 FOR OP/ED): Mod: JZ | Performed by: NURSE PRACTITIONER

## 2024-10-23 RX ORDER — CARVEDILOL 6.25 MG/1
6.25 TABLET ORAL 2 TIMES DAILY
Status: DISCONTINUED | OUTPATIENT
Start: 2024-10-23 | End: 2024-10-23

## 2024-10-23 RX ORDER — CARVEDILOL 12.5 MG/1
12.5 TABLET ORAL 2 TIMES DAILY
Status: DISCONTINUED | OUTPATIENT
Start: 2024-10-23 | End: 2024-10-24 | Stop reason: HOSPADM

## 2024-10-23 RX ADMIN — FAMOTIDINE 20 MG: 20 TABLET ORAL at 21:10

## 2024-10-23 RX ADMIN — GABAPENTIN 200 MG: 100 CAPSULE ORAL at 10:11

## 2024-10-23 RX ADMIN — Medication 2 L/MIN: at 07:41

## 2024-10-23 RX ADMIN — FAMOTIDINE 20 MG: 20 TABLET ORAL at 10:11

## 2024-10-23 RX ADMIN — ACETAMINOPHEN 650 MG: 325 TABLET ORAL at 06:57

## 2024-10-23 RX ADMIN — GABAPENTIN 200 MG: 100 CAPSULE ORAL at 21:10

## 2024-10-23 RX ADMIN — FUROSEMIDE 40 MG: 40 TABLET ORAL at 16:10

## 2024-10-23 RX ADMIN — ALLOPURINOL 100 MG: 100 TABLET ORAL at 10:11

## 2024-10-23 RX ADMIN — Medication 2 L/MIN: at 23:27

## 2024-10-23 RX ADMIN — DAPAGLIFLOZIN 10 MG: 10 TABLET, FILM COATED ORAL at 10:11

## 2024-10-23 RX ADMIN — POTASSIUM CHLORIDE 20 MEQ: 1.5 FOR SOLUTION ORAL at 10:07

## 2024-10-23 RX ADMIN — ENOXAPARIN SODIUM 40 MG: 40 INJECTION SUBCUTANEOUS at 21:10

## 2024-10-23 RX ADMIN — ENOXAPARIN SODIUM 40 MG: 40 INJECTION SUBCUTANEOUS at 10:07

## 2024-10-23 RX ADMIN — FUROSEMIDE 40 MG: 40 TABLET ORAL at 10:07

## 2024-10-23 RX ADMIN — CARVEDILOL 6.25 MG: 3.12 TABLET, FILM COATED ORAL at 10:15

## 2024-10-23 RX ADMIN — CARVEDILOL 12.5 MG: 12.5 TABLET, FILM COATED ORAL at 21:10

## 2024-10-23 RX ADMIN — Medication 2 L/MIN: at 23:29

## 2024-10-23 RX ADMIN — MEROPENEM AND SODIUM CHLORIDE 1 G: 1 INJECTION, SOLUTION INTRAVENOUS at 20:42

## 2024-10-23 RX ADMIN — SIMVASTATIN 40 MG: 40 TABLET, FILM COATED ORAL at 21:10

## 2024-10-23 RX ADMIN — MEROPENEM AND SODIUM CHLORIDE 1 G: 1 INJECTION, SOLUTION INTRAVENOUS at 12:26

## 2024-10-23 RX ADMIN — OXYBUTYNIN CHLORIDE 5 MG: 5 TABLET ORAL at 21:10

## 2024-10-23 RX ADMIN — POTASSIUM CHLORIDE 20 MEQ: 1.5 FOR SOLUTION ORAL at 16:10

## 2024-10-23 RX ADMIN — OXYBUTYNIN CHLORIDE 5 MG: 5 TABLET ORAL at 10:15

## 2024-10-23 RX ADMIN — GABAPENTIN 200 MG: 100 CAPSULE ORAL at 16:10

## 2024-10-23 RX ADMIN — Medication 32 PERCENT: at 05:43

## 2024-10-23 RX ADMIN — Medication 2 L/MIN: at 15:58

## 2024-10-23 RX ADMIN — MEROPENEM AND SODIUM CHLORIDE 1 G: 1 INJECTION, SOLUTION INTRAVENOUS at 03:21

## 2024-10-23 ASSESSMENT — COGNITIVE AND FUNCTIONAL STATUS - GENERAL
MOBILITY SCORE: 7
TURNING FROM BACK TO SIDE WHILE IN FLAT BAD: TOTAL
TOILETING: A LOT
DAILY ACTIVITIY SCORE: 13
DRESSING REGULAR LOWER BODY CLOTHING: A LOT
WALKING IN HOSPITAL ROOM: TOTAL
MOVING TO AND FROM BED TO CHAIR: TOTAL
DAILY ACTIVITIY SCORE: 11
TOILETING: TOTAL
PERSONAL GROOMING: A LOT
MOBILITY SCORE: 11
EATING MEALS: A LITTLE
MOVING TO AND FROM BED TO CHAIR: A LOT
TOILETING: A LOT
TURNING FROM BACK TO SIDE WHILE IN FLAT BAD: A LOT
CLIMB 3 TO 5 STEPS WITH RAILING: TOTAL
EATING MEALS: A LITTLE
DRESSING REGULAR UPPER BODY CLOTHING: A LOT
MOBILITY SCORE: 11
DRESSING REGULAR UPPER BODY CLOTHING: A LOT
DRESSING REGULAR UPPER BODY CLOTHING: A LOT
CLIMB 3 TO 5 STEPS WITH RAILING: TOTAL
STANDING UP FROM CHAIR USING ARMS: TOTAL
DAILY ACTIVITIY SCORE: 14
WALKING IN HOSPITAL ROOM: A LOT
TURNING FROM BACK TO SIDE WHILE IN FLAT BAD: A LOT
MOVING FROM LYING ON BACK TO SITTING ON SIDE OF FLAT BED WITH BEDRAILS: A LOT
PERSONAL GROOMING: A LOT
WALKING IN HOSPITAL ROOM: A LOT
STANDING UP FROM CHAIR USING ARMS: A LOT
CLIMB 3 TO 5 STEPS WITH RAILING: TOTAL
HELP NEEDED FOR BATHING: A LOT
PERSONAL GROOMING: A LOT
MOVING FROM LYING ON BACK TO SITTING ON SIDE OF FLAT BED WITH BEDRAILS: A LOT
HELP NEEDED FOR BATHING: A LOT
STANDING UP FROM CHAIR USING ARMS: A LOT
MOVING FROM LYING ON BACK TO SITTING ON SIDE OF FLAT BED WITH BEDRAILS: A LOT
MOVING TO AND FROM BED TO CHAIR: A LOT
DRESSING REGULAR LOWER BODY CLOTHING: A LOT
HELP NEEDED FOR BATHING: A LOT
DRESSING REGULAR LOWER BODY CLOTHING: TOTAL

## 2024-10-23 ASSESSMENT — PAIN - FUNCTIONAL ASSESSMENT
PAIN_FUNCTIONAL_ASSESSMENT: 0-10

## 2024-10-23 ASSESSMENT — PAIN DESCRIPTION - ORIENTATION: ORIENTATION: LOWER

## 2024-10-23 ASSESSMENT — ACTIVITIES OF DAILY LIVING (ADL)
HOME_MANAGEMENT_TIME_ENTRY: 15
BATHING_LEVEL_OF_ASSISTANCE: DEPENDENT
BATHING_WHERE_ASSESSED: BED LEVEL

## 2024-10-23 ASSESSMENT — PAIN SCALES - GENERAL
PAINLEVEL_OUTOF10: 0 - NO PAIN
PAINLEVEL_OUTOF10: 7
PAINLEVEL_OUTOF10: 7
PAINLEVEL_OUTOF10: 0 - NO PAIN
PAINLEVEL_OUTOF10: 0 - NO PAIN
PAINLEVEL_OUTOF10: 7
PAINLEVEL_OUTOF10: 0 - NO PAIN

## 2024-10-23 ASSESSMENT — PAIN DESCRIPTION - LOCATION: LOCATION: BACK

## 2024-10-23 NOTE — PROGRESS NOTES
Music Therapy Note    Angela Montelongo     Therapy Session  Referral Type: Pain rounds  Visit Type: New visit  Session Start Time: 1415  Intervention Delivery: In-person  Conflict of Service: Working with other staff               Treatment/Interventions            Narrative  Follow-up: MT will follow-up if Pt remains admitted.    Education Documentation  No documentation found.

## 2024-10-23 NOTE — PROGRESS NOTES
"Angela Montelongo is a 74 y.o. female on day 5 of admission follow-up for acute hypoxic respiratory failure, bilateral pleural effusions    Subjective   On room air; oxygen sats 99%. No respiratory complaints.  Denies pain.  Afebrile.       Objective     Physical Exam  Vitals and nursing note reviewed.   Constitutional:       Appearance: She is obese.   HENT:      Head: Normocephalic and atraumatic.      Nose: Nose normal.      Mouth/Throat:      Mouth: Mucous membranes are moist.   Eyes:      Extraocular Movements: Extraocular movements intact.      Conjunctiva/sclera: Conjunctivae normal.      Pupils: Pupils are equal, round, and reactive to light.   Cardiovascular:      Rate and Rhythm: Normal rate and regular rhythm.      Pulses: Normal pulses.      Heart sounds: Normal heart sounds.   Pulmonary:      Effort: Pulmonary effort is normal.      Breath sounds: Normal breath sounds.      Comments: Lungs diminished but clear.   Abdominal:      General: Bowel sounds are normal.      Palpations: Abdomen is soft.   Musculoskeletal:         General: Normal range of motion.      Right lower leg: Edema present.      Left lower leg: Edema present.   Skin:     General: Skin is warm and dry.      Capillary Refill: Capillary refill takes less than 2 seconds.   Neurological:      General: No focal deficit present.      Mental Status: She is alert and oriented to person, place, and time.   Psychiatric:         Mood and Affect: Mood normal.         Behavior: Behavior normal.       Last Recorded Vitals  Blood pressure 123/58, pulse 89, temperature 36.4 °C (97.5 °F), temperature source Temporal, resp. rate 18, height 1.6 m (5' 3\"), weight 111 kg (245 lb), SpO2 96%.  Intake/Output last 3 Shifts:  I/O last 3 completed shifts:  In: 900 (8.1 mL/kg) [P.O.:750; IV Piggyback:150]  Out: 2200 (19.8 mL/kg) [Urine:2200 (0.5 mL/kg/hr)]  Weight: 111.1 kg       Intake/Output Summary (Last 24 hours) at 10/23/2024 0952  Last data filed at 10/23/2024 " 0718  Gross per 24 hour   Intake 900 ml   Output 1800 ml   Net -900 ml      Scheduled medications:  allopurinol, 100 mg, oral, Daily  carvedilol, 3.125 mg, oral, BID  dapagliflozin propanediol, 10 mg, oral, Daily  enoxaparin, 40 mg, subcutaneous, q12h DYLAN  famotidine, 20 mg, oral, BID   Or  famotidine, 20 mg, intravenous, BID  furosemide, 40 mg, oral, BID  gabapentin, 200 mg, oral, TID  lidocaine, 5 mL, infiltration, Once  losartan, 50 mg, oral, Daily  meropenem, 1 g, intravenous, q8h  oxybutynin, 5 mg, oral, BID  oxygen, , inhalation, q8h  perflutren protein A microsphere, 0.5 mL, intravenous, Once in imaging  polyethylene glycol, 17 g, oral, Daily  potassium chloride, 20 mEq, oral, BID  simvastatin, 40 mg, oral, Nightly  sulfur hexafluoride microsphr, 2 mL, intravenous, Once in imaging       PRN medications: acetaminophen **OR** acetaminophen **OR** acetaminophen, alteplase       Relevant Results  XR chest 2 views  Result Date: 10/21/2024  FINDINGS: The cardiac silhouette is normal in size for the AP technique. The aorta is ectatic and tortuous. There are improved bilateral pleural effusions and bibasilar infiltrates/atelectatic changes. No pneumothorax is seen. Extensive degenerative changes involve the shoulders. IMPRESSION: Improving bilateral effusions and bibasilar infiltrates/atelectatic changes.    MR thoracic spine wo IV contrast  Result Date: 10/20/2024  FINDINGS: The degree of motion artifact significantly degrades evaluation. THORACIC SPINE: Assessment of the upper thoracic spinal cord is limited by motion  artifact. CSF flow related artifact also degrades evaluation on several sequences. Within these limitations, there is no definite cord signal abnormality or cord compression. There is concern for epidural abscess component within the lower thoracic region with suggestion of circumferential T2 hyperintensity surrounding the thecal sac measuring up to 3 mm in maximum thickness along the ventral aspect  at T12 (series 9, image 27). Potential epidural fluid/thickening may extend as far cranially as T7. There is abnormal marrow signal centered about the T11-T12 endplates and intervertebral disc with STIR hyperintensity and T1 hypointensity and irregular appearance of the disc and endplates as previously described on CT examination, concerning for discitis/osteomyelitis.  Signal abnormality also extends to involve the left-greater-than-right facet joints and there is extensive signal abnormality within the adjacent soft tissues including suspected paraspinal abscess components such as a 1.8 x 1.7 cm fluid collection along the rightward aspect of T11-T12 adjacent to the neural foraminal region (series 9, image 24). There is resultant mild height loss of the T11 and T12 vertebral bodies. Presumed epidural Inflammatory components are also evident involving the T11-T12 neural foraminal regions bilaterally. Allowing for motion artifact there is no additional evidence of discitis/osteomyelitis within the remainder of the thoracic spine. The remaining thoracic vertebral bodies demonstrate grossly preserved heights. There is trace degenerative appearing anterolisthesis of T1 on T2 and T3 on T4. There is unchanged S-shaped scoliosis of the cervicothoracic spine with levo scoliosis of the upper cervicothoracic region and dextroscoliotic curvature of the thoracolumbar region. Multilevel type 2 Modic endplate signal change. There is moderate multilevel degenerative disc height loss throughout the thoracic regions. There is no additional significant spinal canal stenosis evident. Predominantly mild multilevel neural foraminal narrowing is suspected with evaluation limited by scoliosis and aforementioned motion artifact. There is extensive fatty atrophy of the posterior paraspinal musculature. Bilateral pleural effusions and dependent parenchymal airspace opacity are again noted. LUMBAR SPINE: Normal segmentation. Similar to the  T11-T12 level, there is extensive marrow edema and signal abnormality centered about the L1-L2 endplates and intervertebral disc concerning for discitis/osteomyelitis. There is  surrounding soft tissue signal abnormality with concern for small abscess components along the rightward aspect at L1-L2 measuring up to 2.2 cm in diameter (series 9, image 20). No discrete epidural abscess component is seen at this level. There is mild destructive endplate height loss involving the L1 and L2 vertebrae. The cauda equina are otherwise unremarkable. The conus appears to terminate at the T12-L1 level with CSF flow related artifact obscuring evaluation. Similar levo scoliosis of the lumbar spine. The remaining lumbar  vertebral body heights are otherwise maintained. There is no additional suspicious STIR hyperintensity/marrow edema within the remaining lumbar levels. There is moderate degenerative disc height loss within the remainder of the lumbar spine with likely degenerative appearing fluid within portions of the discs. Degenerative change: T12-L1: No spinal canal stenosis. Mild right-greater-than-left neural foraminal narrowing suggested. L1-2: Mild facet arthropathy. Inflammatory components involving the right-greater-than-left paraspinal soft tissues with no definite spinal canal stenosis, suspected mild right lateral recess narrowing and inflammatory components extending to the right-greater-than-left neural foraminal regions. L2-3: Mild bilateral facet arthropathy. Mild disc bulge and endplate spurring, worse along the rightward aspect. No spinal canal stenosis with mild right lateral recess narrowing. Minimal left and mild-to-moderate right neural foraminal narrowing. L3-4: Moderate to severe bilateral facet arthropathy. Posterior disc osteophyte components. Moderate to severe spinal canal stenosis suggested. Moderate bilateral neural foraminal narrowing. L4-5: Severe left and mild-to-moderate right facet arthropathy  with ligamentum flavum hypertrophy. Disc bulge and hypertrophic endplate spurring. Severe spinal canal stenosis. Mild right and moderate to severe left neural foraminal narrowing. L5-S1: Severe facet arthropathy. Mild disc bulge/uncovering. Mild left lateral recess narrowing. No additional spinal canal stenosis moderate bilateral neural foraminal narrowing. Extensive fatty atrophy of the posterior paraspinal and iliopsoas  musculature. IMPRESSION: 1. Motion degraded examination. 2. Findings to suggest discitis/osteomyelitis involving the T11-T12 and L1-L2 levels. There is mild height loss related to endplate destruction at these levels with surrounding inflammatory soft tissue change including right-sided small paraspinal abscess components suspected. Additionally, there is concern for potential epidural phlegmon versus abscess centered at T11-T12 with resultant moderate spinal canal stenosis at this level. Inflammatory components also  involve the neural foraminal regions at both levels. 3. No additional evidence of significant spinal canal stenosis or cord compression within the thoracic regions when accounting for motion limitations. There is concern for moderate to severe spinal canal stenosis at L3-L4 and severe spinal canal stenosis at L4-L5  secondary to degenerative change. 4. Extensive scoliosis.      MR lumbar spine wo IV contrast  Result Date: 10/20/2024  FINDINGS: The degree of motion artifact significantly degrades evaluation.   THORACIC SPINE:   Assessment of the upper thoracic spinal cord is limited by motion artifact. CSF flow related artifact also degrades evaluation on several sequences. Within these limitations, there is no definite cord signal abnormality or cord compression. There is concern for epidural abscess component within the lower thoracic region with suggestion of circumferential T2 hyperintensity surrounding the thecal sac measuring up to 3 mm in maximum thickness along the ventral  aspect at T12 (series 9, image 27). Potential epidural fluid/thickening may extend as far cranially as T7.   There is abnormal marrow signal centered about the T11-T12 endplates and intervertebral disc with STIR hyperintensity and T1 hypointensity and irregular appearance of the disc and endplates as previously described on CT examination, concerning for discitis/osteomyelitis. Signal abnormality also extends to involve the left-greater-than-right facet joints and there is extensive signal abnormality within the adjacent soft tissues including suspected paraspinal abscess components such as a 1.8 x 1.7 cm fluid collection along the rightward aspect of T11-T12 adjacent to the neural foraminal region (series 9, image 24). There is resultant mild height loss of the T11 and T12 vertebral bodies. Presumed epidural components resulting in moderate spinal canal stenosis at T11-T12. Inflammatory components are also evident involving the T11-T12 neural foraminal regions bilaterally.   Allowing for motion artifact there is no additional evidence of discitis/osteomyelitis within the remainder of the thoracic spine. The remaining thoracic vertebral bodies demonstrate grossly preserved heights. There is trace degenerative appearing anterolisthesis of T1 on T2 and T3 on T4. There is unchanged S-shaped scoliosis of the cervicothoracic spine with levo scoliosis of the upper cervicothoracic region and dextroscoliotic curvature of the thoracolumbar region. Multilevel type 2 Modic endplate signal change. There is moderate multilevel degenerative disc height loss throughout the thoracic regions. There is no additional significant spinal canal stenosis evident. Predominantly mild multilevel neural foraminal narrowing is suspected with evaluation limited by scoliosis and aforementioned motion artifact.   There is extensive fatty atrophy of the posterior paraspinal musculature. Bilateral pleural effusions and dependent parenchymal  airspace opacity are again noted.     LUMBAR SPINE:   Normal segmentation.   Similar to the T11-T12 level, there is extensive marrow edema and signal abnormality centered about the L1-L2 endplates and intervertebral disc concerning for discitis/osteomyelitis. There is surrounding soft tissue signal abnormality with concern for small abscess components along the rightward aspect at L1-L2 measuring up to 2.2 cm in diameter (series 9, image 20). No discrete epidural abscess component is seen at this level. There is mild destructive endplate height loss involving the L1 and L2 vertebrae.   The cauda equina are otherwise unremarkable. The conus appears to terminate at the T12-L1 level with CSF flow related artifact obscuring evaluation.   Similar levo scoliosis of the lumbar spine. The remaining lumbar vertebral body heights are otherwise maintained. There is no additional suspicious STIR hyperintensity/marrow edema within the remaining lumbar levels. There is moderate degenerative disc height loss within the remainder of the lumbar spine with likely degenerative appearing fluid within portions of the discs.   Degenerative change:   T12-L1: No spinal canal stenosis. Mild right-greater-than-left neural foraminal narrowing suggested.   L1-2: Mild facet arthropathy. Inflammatory components involving the right-greater-than-left paraspinal soft tissues with no definite spinal canal stenosis, suspected mild right lateral recess narrowing, and inflammatory components extending to the right-greater-than-left neural foraminal regions.   L2-3: Mild bilateral facet arthropathy. Mild disc bulge and endplate spurring, worse along the rightward aspect. No spinal canal stenosis with mild right lateral recess narrowing. Minimal left and mild-to-moderate right neural foraminal narrowing.   L3-4: Moderate to severe bilateral facet arthropathy. Posterior disc osteophyte components. Moderate to severe spinal canal stenosis suggested.  Moderate bilateral neural foraminal narrowing.   L4-5: Severe left and mild-to-moderate right facet arthropathy with ligamentum flavum hypertrophy. Disc bulge and hypertrophic endplate spurring. Severe spinal canal stenosis. Mild right and moderate to severe left neural foraminal narrowing.   L5-S1: Severe facet arthropathy. Mild disc bulge/uncovering. Mild left lateral recess narrowing. No additional spinal canal stenosis moderate bilateral neural foraminal narrowing.   Extensive fatty atrophy of the posterior paraspinal and iliopsoas musculature.     1. Motion degraded examination. 2. Findings to suggest discitis/osteomyelitis involving the T11-T12 and L1-L2 levels. There is mild height loss related to endplate destruction at these levels with surrounding inflammatory soft tissue change including right-sided small paraspinal abscess components suspected. Additionally, there is concern for potential epidural phlegmon versus abscess centered at T11-T12 with resultant moderate spinal canal stenosis at this level. Inflammatory components also involve the neural foraminal regions at both levels. 3. No additional evidence of significant spinal canal stenosis or cord compression within the thoracic regions when accounting for motion limitations. There is concern for moderate to severe spinal canal stenosis at L3-L4 and severe spinal canal stenosis at L4-L5 secondary to degenerative change. 4. Extensive scoliosis.      MR thoracic spine wo IV contrast  Result Date: 10/20/2024  FINDINGS: The degree of motion artifact significantly degrades evaluation.   THORACIC SPINE:   Assessment of the upper thoracic spinal cord is limited by motion artifact. CSF flow related artifact also degrades evaluation on several sequences. Within these limitations, there is no definite cord signal abnormality or cord compression. There is concern for epidural abscess component within the lower thoracic region with suggestion of circumferential  T2 hyperintensity surrounding the thecal sac measuring up to 3 mm in maximum thickness along the ventral aspect at T12 (series 9, image 27). Potential epidural fluid/thickening may extend as far cranially as T7.   There is abnormal marrow signal centered about the T11-T12 endplates and intervertebral disc with STIR hyperintensity and T1 hypointensity and irregular appearance of the disc and endplates as previously described on CT examination, concerning for discitis/osteomyelitis. Signal abnormality also extends to involve the left-greater-than-right facet joints and there is extensive signal abnormality within the adjacent soft tissues including suspected paraspinal abscess components such as a 1.8 x 1.7 cm fluid collection along the rightward aspect of T11-T12 adjacent to the neural foraminal region (series 9, image 24). There is resultant mild height loss of the T11 and T12 vertebral bodies. Presumed epidural components resulting in moderate spinal canal stenosis at T11-T12. Inflammatory components are also evident involving the T11-T12 neural foraminal regions bilaterally.   Allowing for motion artifact there is no additional evidence of discitis/osteomyelitis within the remainder of the thoracic spine. The remaining thoracic vertebral bodies demonstrate grossly preserved heights. There is trace degenerative appearing anterolisthesis of T1 on T2 and T3 on T4. There is unchanged S-shaped scoliosis of the cervicothoracic spine with levo scoliosis of the upper cervicothoracic region and dextroscoliotic curvature of the thoracolumbar region. Multilevel type 2 Modic endplate signal change. There is moderate multilevel degenerative disc height loss throughout the thoracic regions. There is no additional significant spinal canal stenosis evident. Predominantly mild multilevel neural foraminal narrowing is suspected with evaluation limited by scoliosis and aforementioned motion artifact.   There is extensive fatty  atrophy of the posterior paraspinal musculature. Bilateral pleural effusions and dependent parenchymal airspace opacity are again noted.     LUMBAR SPINE:   Normal segmentation.   Similar to the T11-T12 level, there is extensive marrow edema and signal abnormality centered about the L1-L2 endplates and intervertebral disc concerning for discitis/osteomyelitis. There is surrounding soft tissue signal abnormality with concern for small abscess components along the rightward aspect at L1-L2 measuring up to 2.2 cm in diameter (series 9, image 20). No discrete epidural abscess component is seen at this level. There is mild destructive endplate height loss involving the L1 and L2 vertebrae.   The cauda equina are otherwise unremarkable. The conus appears to terminate at the T12-L1 level with CSF flow related artifact obscuring evaluation.   Similar levo scoliosis of the lumbar spine. The remaining lumbar vertebral body heights are otherwise maintained. There is no additional suspicious STIR hyperintensity/marrow edema within the remaining lumbar levels. There is moderate degenerative disc height loss within the remainder of the lumbar spine with likely degenerative appearing fluid within portions of the discs.   Degenerative change:   T12-L1: No spinal canal stenosis. Mild right-greater-than-left neural foraminal narrowing suggested.   L1-2: Mild facet arthropathy. Inflammatory components involving the right-greater-than-left paraspinal soft tissues with no definite spinal canal stenosis, suspected mild right lateral recess narrowing, and inflammatory components extending to the right-greater-than-left neural foraminal regions.   L2-3: Mild bilateral facet arthropathy. Mild disc bulge and endplate spurring, worse along the rightward aspect. No spinal canal stenosis with mild right lateral recess narrowing. Minimal left and mild-to-moderate right neural foraminal narrowing.   L3-4: Moderate to severe bilateral facet  arthropathy. Posterior disc osteophyte components. Moderate to severe spinal canal stenosis suggested. Moderate bilateral neural foraminal narrowing.   L4-5: Severe left and mild-to-moderate right facet arthropathy with ligamentum flavum hypertrophy. Disc bulge and hypertrophic endplate spurring. Severe spinal canal stenosis. Mild right and moderate to severe left neural foraminal narrowing.   L5-S1: Severe facet arthropathy. Mild disc bulge/uncovering. Mild left lateral recess narrowing. No additional spinal canal stenosis moderate bilateral neural foraminal narrowing.   Extensive fatty atrophy of the posterior paraspinal and iliopsoas musculature.  1. Motion degraded examination. 2. Findings to suggest discitis/osteomyelitis involving the T11-T12 and L1-L2 levels. There is mild height loss related to endplate destruction at these levels with surrounding inflammatory soft tissue change including right-sided small paraspinal abscess components suspected. Additionally, there is concern for potential epidural phlegmon versus abscess centered at T11-T12 with resultant moderate spinal canal stenosis at this level. Inflammatory components also involve the neural foraminal regions at both levels. 3. No additional evidence of significant spinal canal stenosis or cord compression within the thoracic regions when accounting for motion limitations. There is concern for moderate to severe spinal canal stenosis at L3-L4 and severe spinal canal stenosis at L4-L5 secondary to degenerative change. 4. Extensive scoliosis.     Transthoracic Echo (TTE) Complete  Result Date: 10/19/2024  CONCLUSIONS:  1. Poorly visualized anatomical structures due to suboptimal image quality.  2. The left ventricular systolic function is moderately decreased, with a visually estimated ejection fraction of 30-35%.  3. There is reduced right ventricular systolic function.  4. Trace mitral valve regurgitation.  5. Trace tricuspid regurgitation is  visualized.  6. Limited images quality. Left ventricular ejection fraction appears reduced at 30 to 35% with interventricular conduction delay.     CT thoracic spine wo IV contrast  Result Date: 10/17/2024  FINDINGS:     Alignment: There is mild scoliosis noted.   Vertebrae/Intervertebral Discs: The thoracolumbar vertebral body heights are intact. There is severe loss of intervertebral disc space. There are severe intervertebral destructive changes noted of the T11-T12 and L2-L3 levels. Findings appear somewhat worsened when compared to the prior study. Question underlying discitis/osteomyelitis. Recommend MRI for further evaluation. There is multilevel spinal canal stenosis noted. There is facet arthrosis noted.   Paraspinous Soft Tissues: Within normal limits.  There are severe intervertebral destructive changes noted of the T11-T12 and L2-L3 levels. Findings appear somewhat worsened when compared to the prior study. Question underlying discitis/osteomyelitis. Recommend MRI for further evaluation. Recommend clinical correlation.       CT lumbar spine wo IV contrast  Result Date: 10/17/2024  FINDINGS:     Alignment: There is mild scoliosis noted.   Vertebrae/Intervertebral Discs: The thoracolumbar vertebral body heights are intact. There is severe loss of intervertebral disc space. There are severe intervertebral destructive changes noted of the T11-T12 and L2-L3 levels. Findings appear somewhat worsened when compared to the prior study. Question underlying discitis/osteomyelitis. Recommend MRI for further evaluation. There is multilevel spinal canal stenosis noted. There is facet arthrosis noted.   Paraspinous Soft Tissues: Within normal limits.       There are severe intervertebral destructive changes noted of the T11-T12 and L2-L3 levels. Findings appear somewhat worsened when compared to the prior study. Question underlying discitis/osteomyelitis. Recommend MRI for further evaluation. Recommend clinical  correlation.      CT angio chest for pulmonary embolism  Result Date: 10/17/2024  FINDINGS: POTENTIAL LIMITATIONS OF THE STUDY: The assessment is limited by respiratory motion and suboptimal contrast opacification of pulmonary arteries.   HEART AND VESSELS: There is no main or lobar pulmonary embolus visualized. There is suboptimal opacification and respiratory motion noted limiting evaluation of the segmental and subsegmental branches. Main pulmonary artery and its branches are normal in caliber.   The thoracic aorta normal in course and caliber. Coronary artery calcifications are seen. Please note, the study is not optimized for evaluation of coronary arteries.   The cardiac chambers are not enlarged.   There is no pericardial effusion seen.   MEDIASTINUM AND SENG, LOWER NECK AND AXILLA: The visualized thyroid gland is within normal limits. No evidence of thoracic lymphadenopathy by CT criteria. Esophagus appears within normal limits as seen.   LUNGS AND AIRWAYS: There are large bilateral pleural effusions with overlying atelectasis versus consolidation. There are scattered ground-glass airspace opacities noted.     UPPER ABDOMEN: The visualized subdiaphragmatic structures demonstrate no remarkable findings.       CHEST WALL AND OSSEOUS STRUCTURES: Chest wall is within normal limits. There is multilevel loss of intervertebral disc space. The endplate osteophytic changes noted.There are severe degenerative changes noted of the bilateral shoulder joints. Associated soft tissue lesions.There are destructive changes noted of the T11-T12 and L1-L2 disc spaces noted. Findings are unchanged from the prior study.  1. There is no main or lobar pulmonary embolus visualized. There is suboptimal opacification and respiratory motion noted limiting evaluation of the segmental and subsegmental branches. 2. There are large bilateral pleural effusions with overlying atelectasis versus consolidation. There are scattered  ground-glass airspace opacities noted. 3. There are severe degenerative changes noted of the bilateral shoulder joints. Associated soft tissue lesions.         Assessment/Plan   Acute hypoxic respiratory failure  Stable on room air  Patient qualified for 2 L nasal cannula oxygen nightly  Continue as needed nebulized bronchodilator  Continuous pulse oximetry  Incentive spirometry/pulmonary hygiene     Bilateral pleural effusions  PA lateral chest x-ray today shows improving bilateral effusions and bibasilar infiltrates/atelectatic changes  Monitor response to diuresis    Acute on chronic diastolic congestive heart failure  40 mg oral Lasix twice/daily  Strict I's and O's  Cardiology follow-up      Discitis/osteomyelitis  Follow-up cultures  Analgesia  Status post CT-guided aspiration via IR  PICC line placed  Orthopedic Surgery follow-up  Infectious Disease follow-up     Urinary tract infection/Bacteremia  Urine culture + E. coli  IV meropenem  Follow-up cultures  Infectious disease follow-up     Morbid obesity  Polysomnogram as outpatient    Discharge planning-SNF recommended, facilities in review  Nakia Maxwell, APRN-CNP

## 2024-10-23 NOTE — PROGRESS NOTES
Physical Therapy    Physical Therapy Treatment    Patient Name: Angela Montelongo  MRN: [id number]  Department: [address]  Room: [address] [place], [place] [address]  Today's Date: 10/23/2024  Time Calculation  Start Time: 0848  Stop Time: 0923  Returned from [id number] to get pt back to bed.  Time Calculation (min): 35 min         Assessment/Plan   PT Assessment  PT Assessment Results: Decreased strength, Decreased endurance, Impaired balance, Decreased mobility, Decreased safety awareness, Obesity, Impaired sensation, Pain, Orthopedic restrictions  Rehab Prognosis: Fair  Barriers to Discharge: assist x 2  Evaluation/Treatment Tolerance: Patient tolerated treatment well  End of Session Communication: PCT/NA/CTA  End of Session Patient Position: Up in chair, Alarm on     PT Plan  Treatment/Interventions: Transfer training, Endurance training, Strengthening, Therapeutic exercise, Therapeutic activity  PT Plan: Ongoing PT  PT Frequency: 3 times per week  PT Discharge Recommendations: Moderate intensity level of continued care  Equipment Recommended upon Discharge: Lift  PT Recommended Transfer Status: Assist x2, Assistive device  PT - OK to Discharge: Yes      General Visit Information:   PT  Visit  PT Received On: 10/23/24  General  Reason for Referral: Impaired mobility  Referred By: ANGELITO Blevins-CNP  Past Medical History Relevant to Rehab: HTN, CKD, low back pain, spinal stenosis, falls  Co-Treatment: OT  Co-Treatment Reason: partial co treat with OT for safe advancement of mobility  Prior to Session Communication: Bedside nurse  Patient Position Received:  (Pt sitting at edge of bed with OT upon arrival.)  Preferred Learning Style: visual, verbal  General Comment: Pt agreeable to therapy.    Subjective   Precautions:  Precautions  Hearing/Visual Limitations: vision and hearing WFL  Medical Precautions: Fall precautions, Oxygen therapy device and L/min, Spinal precautions (2L oxygen via nc)  Post-Surgical Precautions: Spinal  precautions      Objective   Pain:  Pain Assessment  Pain Assessment: 0-10  0-10 (Numeric) Pain Score: 7  Pain Type: Chronic pain  Pain Location: Back  Cognition:  Cognition  Overall Cognitive Status: Within Functional Limits     Postural Control:  Static Sitting Balance  Static Sitting-Balance Support: Feet supported  Static Sitting-Level of Assistance: Close supervision  Static Sitting-Comment/Number of Minutes: Sitting edge of bed.     Treatments:  Therapeutic Exercise  Therapeutic Exercise Performed: Yes  Therapeutic Exercise Activity 1: Pt performed seated bilat LE ankle pumps, heel raises, glute sets, LAQ, hip flexion, mirna hip adduction and resisted hip abduction x15 reps each. Pt struggled a bit with left LE but able to complete without assist.    Transfer 1  Transfer From 1: Bed to  Transfer to 1: Stand, Sit  Technique 1: Sit to stand, Stand to sit  Transfer Device 1: Walker, Gait belt  Transfer Level of Assistance 1: Moderate assistance, +2  Trials/Comments 1: Mod assist of 2 sit to stand from edge of bed. Pt able to static stand ~10 seconds, third person to block bilat knees. Noted left knee hyperextending.  Transfers 2  Transfer From 2: Bed to  Transfer to 2: Stand  Technique 2: Sit to stand  Transfer Device 2: Danyel Stedy  Transfer Level of Assistance 2: Moderate assistance, +2  Trials/Comments 2: Mod assist of 2 sit to  danyel stedy. Danyel stedy used to transfer pt to chair.  Transfers 3  Transfer From 3: Stand to  Transfer to 3: Sit, Chair with arms (Recliner chair)  Technique 3: Stand to sit  Transfer Device 3: Danyel Stedy  Transfer Level of Assistance 3: Moderate assistance, +2  Trials/Comments 3: Mod assist to control descent stand to sit from danyel stedy to recliner chair.  Transfers 4  Transfer From 4: Chair with arms to  Transfer to 4: Stand  Technique 4: Sit to stand  Transfer Device 4: Danyel Stedy  Transfer Level of Assistance 4: Maximum assistance, +2  Trials/Comments 4: Danyel stedy used to  transfer pt to bedside.  Transfers 5  Transfer From 5: Stand to  Transfer to 5: Sit, Bed  Technique 5: Stand to sit  Transfer Device 5: Tessa Durham  Transfer Level of Assistance 5: Moderate assistance x1.    Outcome Measures:  [facility] Basic Mobility  Turning from your back to your side while in a flat bed without using bedrails: A lot  Moving from lying on your back to sitting on the side of a flat bed without using bedrails: Total  Moving to and from bed to chair (including a wheelchair): Total  Standing up from a chair using your arms (e.g. wheelchair or bedside chair): Total  To walk in hospital room: Total  Climbing 3-5 steps with railing: Total  Basic Mobility - Total Score: 7    Education Documentation  Precautions, taught by Praveena Chamorro PTA at 10/23/2024 10:21 AM.  Learner: Patient  Readiness: Acceptance  Method: Explanation  Response: Verbalizes Understanding, Needs Reinforcement    Body Mechanics, taught by Praveena Chamorro PTA at 10/23/2024 10:21 AM.  Learner: Patient  Readiness: Acceptance  Method: Explanation  Response: Verbalizes Understanding, Needs Reinforcement    Home Exercise Program, taught by Praveena Chamorro PTA at 10/23/2024 10:21 AM.  Learner: Patient  Readiness: Acceptance  Method: Explanation  Response: Verbalizes Understanding, Needs Reinforcement    Mobility Training, taught by Praveena Chamorro PTA at 10/23/2024 10:21 AM.  Learner: Patient  Readiness: Acceptance  Method: Explanation  Response: Verbalizes Understanding, Needs Reinforcement    Education Comments  No comments found.        OP EDUCATION:       Encounter Problems       Encounter Problems (Active)       Balance       sitting (Progressing)       Start:  10/21/24    Expected End:  11/04/24       Pt will sit on EOB with supervision and perform dynamic tasks without LOB         standing (Progressing)       Start:  10/21/24    Expected End:  11/04/24       Pt will stand with UE support of walker and min assist, no LOB            Mobility        bed mobility (Progressing)       Start:  10/21/24    Expected End:  11/04/24       Pt will perform sup to/from sit transfer with min assist            PT Transfers       sit to stand (Progressing)       Start:  10/21/24    Expected End:  11/04/24       Pt will perform sit to stand transfer with walker and min assist         bed to chair (Progressing)       Start:  10/21/24    Expected End:  11/04/24       Pt will perform bed to chair transfer with walker and min assist            Safety       LTG - Patient will utilize safety techniques (Progressing)       Start:  10/21/24    Expected End:  11/04/24

## 2024-10-23 NOTE — PROGRESS NOTES
Angela Montelongo is a 74 y.o. female on day 5 of admission presenting with Acute on chronic diastolic congestive heart failure.    Subjective   Interval History:   Sitting up in chair  Worked with therapy  Remains Afebrile, no chills  On 2 liters oxygen   No chest pain or shortness of breath  No nausea vomiting or diarrhea        Objective   Range of Vitals (last 24 hours)  Heart Rate:  [70-96]   Temp:  [36 °C (96.8 °F)-36.5 °C (97.7 °F)]   Resp:  [14-18]   BP: ()/(58-74)   SpO2:  [90 %-99 %]   Daily Weight  10/18/24 : 111 kg (245 lb)    Body mass index is 43.4 kg/m².    Physical Exam  Constitutional:       Appearance: Normal appearance. She is obese.   HENT:      Head: Normocephalic and atraumatic.      Nose: Nose normal.      Mouth/Throat:      Mouth: Mucous membranes are moist.      Pharynx: Oropharynx is clear.   Eyes:      General: No scleral icterus.  Cardiovascular:      Rate and Rhythm: Normal rate and regular rhythm.   Pulmonary:      Comments: Decreased bilateral breath sounds   Abdominal:      General: Bowel sounds are normal.      Palpations: Abdomen is soft.   Musculoskeletal:         General: Normal range of motion.      Cervical back: Normal range of motion and neck supple.   Skin:     General: Skin is warm and dry.   Neurological:      Mental Status: She is alert.      Comments: Awake, alert   Psychiatric:         Mood and Affect: Mood normal.         Behavior: Behavior normal.     Antibiotics  meropenem - 1 gram/50 mL    Relevant Results  Labs  Results from last 72 hours   Lab Units 10/22/24  0421 10/21/24  0532   WBC AUTO x10*3/uL 7.5 6.4   HEMOGLOBIN g/dL 9.9* 10.0*   HEMATOCRIT % 32.3* 32.5*   PLATELETS AUTO x10*3/uL 301 292     Results from last 72 hours   Lab Units 10/22/24  0421 10/21/24  0532   SODIUM mmol/L 134* 133*   POTASSIUM mmol/L 3.4* 3.3*   CHLORIDE mmol/L 89* 91*   CO2 mmol/L 39* 34*   BUN mg/dL 20 17   CREATININE mg/dL 0.69 0.59   GLUCOSE mg/dL 82 81   CALCIUM mg/dL 9.2 8.9    ANION GAP mmol/L 9* 11   EGFR mL/min/1.73m*2 90 >90           Estimated Creatinine Clearance: 83 mL/min (by C-G formula based on SCr of 0.69 mg/dL).  C-Reactive Protein   Date Value Ref Range Status   10/20/2024 7.80 (H) <1.00 mg/dL Final   10/18/2024 18.71 (H) <1.00 mg/dL Final     CRP   Date Value Ref Range Status   03/07/2018 4.2 (H) 0 - 2.0 MG/DL Final     Comment:     Performed at [place] [id number]     Microbiology  Susceptibility data from last 14 days.  Collected Specimen Info Organism Ampicillin Cefazolin Cefazolin (uncomplicated UTIs only) Ciprofloxacin Gentamicin Levofloxacin Nitrofurantoin Piperacillin/Tazobactam Trimethoprim/Sulfamethoxazole   10/17/24 Urine from Clean Catch/Voided Escherichia coli  S  S  S  S  S   S  S  S   10/17/24 Blood culture from Peripheral Venipuncture Escherichia coli            10/17/24 Blood culture from Peripheral Venipuncture Escherichia coli  S  S   S  S  S   S  S     Imaging  MR lumbar spine wo IV contrast    Result Date: 10/20/2024  Interpreted By:  Les Dumont, STUDY: MR THORACIC SPINE WO IV CONTRAST; MR LUMBAR SPINE WO IV CONTRAST; 10/19/2024 4:24 pm   INDICATION: Signs/Symptoms:Question discitis/osteomyelitis on CT imaging.     COMPARISON: CT thoracic and lumbar spine dated 10/17/2024.   ACCESSION NUMBER(S): [id number]; [id number]   ORDERING CLINICIAN: JORGITO GRIFFITH   TECHNIQUE: Noncontrast multiplanar multisequence MR imaging of the thoracic and lumbar spine was performed. Sagittal T1, T2, STIR and axial T2 and T1 weighted MR images of the thoracic spine were obtained.   FINDINGS: The degree of motion artifact significantly degrades evaluation.   THORACIC SPINE:   Assessment of the upper thoracic spinal cord is limited by motion artifact. CSF flow related artifact also degrades evaluation on several sequences. Within these limitations, there is no definite cord signal abnormality or cord compression. There is concern for  epidural abscess component within the lower thoracic region with suggestion of circumferential T2 hyperintensity surrounding the thecal sac measuring up to 3 mm in maximum thickness along the ventral aspect at T12 (series 9, image 27). Potential epidural fluid/thickening may extend as far cranially as T7.   There is abnormal marrow signal centered about the T11-T12 endplates and intervertebral disc with STIR hyperintensity and T1 hypointensity and irregular appearance of the disc and endplates as previously described on CT examination, concerning for discitis/osteomyelitis. Signal abnormality also extends to involve the left-greater-than-right facet joints and there is extensive signal abnormality within the adjacent soft tissues including suspected paraspinal abscess components such as a 1.8 x 1.7 cm fluid collection along the rightward aspect of T11-T12 adjacent to the neural foraminal region (series 9, image 24). There is resultant mild height loss of the T11 and T12 vertebral bodies. Presumed epidural components resulting in moderate spinal canal stenosis at T11-T12. Inflammatory components are also evident involving the T11-T12 neural foraminal regions bilaterally.   Allowing for motion artifact there is no additional evidence of discitis/osteomyelitis within the remainder of the thoracic spine. The remaining thoracic vertebral bodies demonstrate grossly preserved heights. There is trace degenerative appearing anterolisthesis of T1 on T2 and T3 on T4. There is unchanged S-shaped scoliosis of the cervicothoracic spine with levo scoliosis of the upper cervicothoracic region and dextroscoliotic curvature of the thoracolumbar region. Multilevel type 2 Modic endplate signal change. There is moderate multilevel degenerative disc height loss throughout the thoracic regions. There is no additional significant spinal canal stenosis evident. Predominantly mild multilevel neural foraminal narrowing is suspected with  evaluation limited by scoliosis and aforementioned motion artifact.   There is extensive fatty atrophy of the posterior paraspinal musculature. Bilateral pleural effusions and dependent parenchymal airspace opacity are again noted.     LUMBAR SPINE:   Normal segmentation.   Similar to the T11-T12 level, there is extensive marrow edema and signal abnormality centered about the L1-L2 endplates and intervertebral disc concerning for discitis/osteomyelitis. There is surrounding soft tissue signal abnormality with concern for small abscess components along the rightward aspect at L1-L2 measuring up to 2.2 cm in diameter (series 9, image 20). No discrete epidural abscess component is seen at this level. There is mild destructive endplate height loss involving the L1 and L2 vertebrae.   The cauda equina are otherwise unremarkable. The conus appears to terminate at the T12-L1 level with CSF flow related artifact obscuring evaluation.   Similar levo scoliosis of the lumbar spine. The remaining lumbar vertebral body heights are otherwise maintained. There is no additional suspicious STIR hyperintensity/marrow edema within the remaining lumbar levels. There is moderate degenerative disc height loss within the remainder of the lumbar spine with likely degenerative appearing fluid within portions of the discs.   Degenerative change:   T12-L1: No spinal canal stenosis. Mild right-greater-than-left neural foraminal narrowing suggested.   L1-2: Mild facet arthropathy. Inflammatory components involving the right-greater-than-left paraspinal soft tissues with no definite spinal canal stenosis, suspected mild right lateral recess narrowing, and inflammatory components extending to the right-greater-than-left neural foraminal regions.   L2-3: Mild bilateral facet arthropathy. Mild disc bulge and endplate spurring, worse along the rightward aspect. No spinal canal stenosis with mild right lateral recess narrowing. Minimal left and  mild-to-moderate right neural foraminal narrowing.   L3-4: Moderate to severe bilateral facet arthropathy. Posterior disc osteophyte components. Moderate to severe spinal canal stenosis suggested. Moderate bilateral neural foraminal narrowing.   L4-5: Severe left and mild-to-moderate right facet arthropathy with ligamentum flavum hypertrophy. Disc bulge and hypertrophic endplate spurring. Severe spinal canal stenosis. Mild right and moderate to severe left neural foraminal narrowing.   L5-S1: Severe facet arthropathy. Mild disc bulge/uncovering. Mild left lateral recess narrowing. No additional spinal canal stenosis moderate bilateral neural foraminal narrowing.   Extensive fatty atrophy of the posterior paraspinal and iliopsoas musculature.       1. Motion degraded examination. 2. Findings to suggest discitis/osteomyelitis involving the T11-T12 and L1-L2 levels. There is mild height loss related to endplate destruction at these levels with surrounding inflammatory soft tissue change including right-sided small paraspinal abscess components suspected. Additionally, there is concern for potential epidural phlegmon versus abscess centered at T11-T12 with resultant moderate spinal canal stenosis at this level. Inflammatory components also involve the neural foraminal regions at both levels. 3. No additional evidence of significant spinal canal stenosis or cord compression within the thoracic regions when accounting for motion limitations. There is concern for moderate to severe spinal canal stenosis at L3-L4 and severe spinal canal stenosis at L4-L5 secondary to degenerative change. 4. Extensive scoliosis.   MACRO: None   Signed by: Les Dumont 10/20/2024 1:14 PM Dictation workstation:   [id number]    MR thoracic spine wo IV contrast    Result Date: 10/20/2024  Interpreted By:  Les Dumont, STUDY: MR THORACIC SPINE WO IV CONTRAST; MR LUMBAR SPINE WO IV CONTRAST; 10/19/2024 4:24 pm   INDICATION:  Signs/Symptoms:Question discitis/osteomyelitis on CT imaging.     COMPARISON: CT thoracic and lumbar spine dated 10/17/2024.   ACCESSION NUMBER(S): [id number]; [id number]   ORDERING CLINICIAN: JORGITO GRIFFITH   TECHNIQUE: Noncontrast multiplanar multisequence MR imaging of the thoracic and lumbar spine was performed. Sagittal T1, T2, STIR and axial T2 and T1 weighted MR images of the thoracic spine were obtained.   FINDINGS: The degree of motion artifact significantly degrades evaluation.   THORACIC SPINE:   Assessment of the upper thoracic spinal cord is limited by motion artifact. CSF flow related artifact also degrades evaluation on several sequences. Within these limitations, there is no definite cord signal abnormality or cord compression. There is concern for epidural abscess component within the lower thoracic region with suggestion of circumferential T2 hyperintensity surrounding the thecal sac measuring up to 3 mm in maximum thickness along the ventral aspect at T12 (series 9, image 27). Potential epidural fluid/thickening may extend as far cranially as T7.   There is abnormal marrow signal centered about the T11-T12 endplates and intervertebral disc with STIR hyperintensity and T1 hypointensity and irregular appearance of the disc and endplates as previously described on CT examination, concerning for discitis/osteomyelitis. Signal abnormality also extends to involve the left-greater-than-right facet joints and there is extensive signal abnormality within the adjacent soft tissues including suspected paraspinal abscess components such as a 1.8 x 1.7 cm fluid collection along the rightward aspect of T11-T12 adjacent to the neural foraminal region (series 9, image 24). There is resultant mild height loss of the T11 and T12 vertebral bodies. Presumed epidural components resulting in moderate spinal canal stenosis at T11-T12. Inflammatory components are also evident involving the T11-T12 neural foraminal  regions bilaterally.   Allowing for motion artifact there is no additional evidence of discitis/osteomyelitis within the remainder of the thoracic spine. The remaining thoracic vertebral bodies demonstrate grossly preserved heights. There is trace degenerative appearing anterolisthesis of T1 on T2 and T3 on T4. There is unchanged S-shaped scoliosis of the cervicothoracic spine with levo scoliosis of the upper cervicothoracic region and dextroscoliotic curvature of the thoracolumbar region. Multilevel type 2 Modic endplate signal change. There is moderate multilevel degenerative disc height loss throughout the thoracic regions. There is no additional significant spinal canal stenosis evident. Predominantly mild multilevel neural foraminal narrowing is suspected with evaluation limited by scoliosis and aforementioned motion artifact.   There is extensive fatty atrophy of the posterior paraspinal musculature. Bilateral pleural effusions and dependent parenchymal airspace opacity are again noted.     LUMBAR SPINE:   Normal segmentation.   Similar to the T11-T12 level, there is extensive marrow edema and signal abnormality centered about the L1-L2 endplates and intervertebral disc concerning for discitis/osteomyelitis. There is surrounding soft tissue signal abnormality with concern for small abscess components along the rightward aspect at L1-L2 measuring up to 2.2 cm in diameter (series 9, image 20). No discrete epidural abscess component is seen at this level. There is mild destructive endplate height loss involving the L1 and L2 vertebrae.   The cauda equina are otherwise unremarkable. The conus appears to terminate at the T12-L1 level with CSF flow related artifact obscuring evaluation.   Similar levo scoliosis of the lumbar spine. The remaining lumbar vertebral body heights are otherwise maintained. There is no additional suspicious STIR hyperintensity/marrow edema within the remaining lumbar levels. There is  moderate degenerative disc height loss within the remainder of the lumbar spine with likely degenerative appearing fluid within portions of the discs.   Degenerative change:   T12-L1: No spinal canal stenosis. Mild right-greater-than-left neural foraminal narrowing suggested.   L1-2: Mild facet arthropathy. Inflammatory components involving the right-greater-than-left paraspinal soft tissues with no definite spinal canal stenosis, suspected mild right lateral recess narrowing, and inflammatory components extending to the right-greater-than-left neural foraminal regions.   L2-3: Mild bilateral facet arthropathy. Mild disc bulge and endplate spurring, worse along the rightward aspect. No spinal canal stenosis with mild right lateral recess narrowing. Minimal left and mild-to-moderate right neural foraminal narrowing.   L3-4: Moderate to severe bilateral facet arthropathy. Posterior disc osteophyte components. Moderate to severe spinal canal stenosis suggested. Moderate bilateral neural foraminal narrowing.   L4-5: Severe left and mild-to-moderate right facet arthropathy with ligamentum flavum hypertrophy. Disc bulge and hypertrophic endplate spurring. Severe spinal canal stenosis. Mild right and moderate to severe left neural foraminal narrowing.   L5-S1: Severe facet arthropathy. Mild disc bulge/uncovering. Mild left lateral recess narrowing. No additional spinal canal stenosis moderate bilateral neural foraminal narrowing.   Extensive fatty atrophy of the posterior paraspinal and iliopsoas musculature.       1. Motion degraded examination. 2. Findings to suggest discitis/osteomyelitis involving the T11-T12 and L1-L2 levels. There is mild height loss related to endplate destruction at these levels with surrounding inflammatory soft tissue change including right-sided small paraspinal abscess components suspected. Additionally, there is concern for potential epidural phlegmon versus abscess centered at T11-T12 with  resultant moderate spinal canal stenosis at this level. Inflammatory components also involve the neural foraminal regions at both levels. 3. No additional evidence of significant spinal canal stenosis or cord compression within the thoracic regions when accounting for motion limitations. There is concern for moderate to severe spinal canal stenosis at L3-L4 and severe spinal canal stenosis at L4-L5 secondary to degenerative change. 4. Extensive scoliosis.   MACRO: None   Signed by: Les Dumont 10/20/2024 1:14 PM Dictation workstation:   [id number]    Transthoracic Echo (TTE) Complete    Result Date: 10/19/2024            [facility] [address], [place] [id number]             Phone [phone] TRANSTHORACIC ECHOCARDIOGRAM REPORT Patient Name:      BRUNA Burns Physician:    [id number] Cristina Wharton MD Study Date:        10/18/2024            Ordering Provider:    [id number] GARCIA MRN/PID:           [id number]              Fellow: Accession#:        [id number]          Nurse: Date of Birth/Age: 1950 / 74 years  Sonographer:          Audrey Barger RD Gender:            F                     Additional Staff:     Erlinda Merino RDCS Height:            160.02 cm             Admit Date: Weight:            117.94 kg             Admission Status:     Inpatient -                                                                Routine BSA / BMI:         2.16 m2 / 46.06 kg/m2 Department Location:  [facility] Blood Pressure: 131 /65 mmHg Study Type:    TRANSTHORACIC ECHO (TTE) COMPLETE Diagnosis/ICD: Acute on chronic diastolic (congestive) heart failure                (CHF)-I50.33 Indication:    CHF, SOB CPT Codes:      Echo Complete w Full [id number] Patient History: Pertinent History: HLD, bradycardia, HTN, CHF. Study Detail: The following Echo studies were performed: 2D, M-Mode, Doppler and               color flow. Technically challenging study due to body habitus,               patient lying in supine position and prominent lung artifact.               Definity used as a contrast agent for endocardial border               definition. Total contrast used for this procedure was 4 mL via IV               push.  PHYSICIAN INTERPRETATION: Left Ventricle: The left ventricular systolic function is moderately decreased, with a visually estimated ejection fraction of 30-35%. There are no regional wall motion abnormalities. The left ventricular cavity size is normal. Abnormal (paradoxical) septal motion, consistent with an intraventricular conduction delay. Spectral Doppler shows a normal pattern of left ventricular diastolic filling. Left Atrium: The left atrium is normal in size. Right Ventricle: The right ventricle was not well visualized. There is reduced right ventricular systolic function. Right Atrium: The right atrium is normal in size. Aortic Valve: The aortic valve is trileaflet. The aortic valve dimensionless index is 0.78. There is no evidence of aortic valve regurgitation. The peak instantaneous gradient of the aortic valve is 7.2 mmHg. The mean gradient of the aortic valve is 4.0 mmHg. Mitral Valve: The mitral valve is normal in structure. There is trace mitral valve regurgitation. Tricuspid Valve: The tricuspid valve was not well visualized. There is trace tricuspid regurgitation. The right ventricular systolic pressure is unable to be estimated. Pulmonic Valve: The pulmonic valve is not well visualized. There is physiologic pulmonic valve regurgitation. Pericardium: No pericardial effusion noted. Aorta: The aortic root is normal. Systemic Veins: The inferior vena cava appears normal in size, with IVC inspiratory  collapse less than 50%.  CONCLUSIONS:  1. Poorly visualized anatomical structures due to suboptimal image quality.  2. The left ventricular systolic function is moderately decreased, with a visually estimated ejection fraction of 30-35%.  3. There is reduced right ventricular systolic function.  4. Trace mitral valve regurgitation.  5. Trace tricuspid regurgitation is visualized.  6. Limited images quality. Left ventricular ejection fraction appears reduced at 30 to 35% with interventricular conduction delay. QUANTITATIVE DATA SUMMARY:  LA VOLUME:                   Normal Ranges: LA Vol A4C:        69.7 ml   (22+/-6mL/m2) LA Vol Index A4C:  32.2ml/m2 LA Area A4C:       23.0 cm2 LA Major Axis A4C: 6.4 cm LA Vol A4C:        64.9 ml  M-MODE MEASUREMENTS:         Normal Ranges: Ao Root:             2.90 cm (2.0-3.7cm) LAs:                 4.00 cm (2.7-4.0cm)  AORTA MEASUREMENTS:         Normal Ranges: Ao Sinus, d:        2.92 cm (2.1-3.5cm) Ao STJ, d:          2.61 cm (1.7-3.4cm) Asc Ao, d:          3.40 cm (2.1-3.4cm)  LV SYSTOLIC FUNCTION BY 2D PLANIMETRY (MOD):                      Normal Ranges: EF-A4C View:    38 % (>=55%) EF-Visual:      33 % LV EF Reported: 33 %  LV DIASTOLIC FUNCTION:           Normal Ranges: MV Peak E:             0.73 m/s  (0.7-1.2 m/s) MV Peak A:             0.92 m/s  (0.42-0.7 m/s) E/A Ratio:             0.79      (1.0-2.2) MV e'                  0.066 m/s (>8.0) MV lateral e'          0.07 m/s MV medial e'           0.06 m/s E/e' Ratio:            11.06     (<8.0)  MITRAL VALVE:         Normal Ranges: MV DT:        94 msec (150-240msec)  AORTIC VALVE:                     Normal Ranges: AoV Vmax:                1.34 m/s (<=1.7m/s) AoV Peak P.2 mmHg (<20mmHg) AoV Mean P.0 mmHg (1.7-11.5mmHg) LVOT Max Alex:            1.07 m/s (<=1.1m/s) AoV VTI:                 25.00 cm (18-25cm) LVOT VTI:                19.50 cm LVOT Diameter:           2.40 cm  (1.8-2.4cm) AoV  Area, VTI:           3.53 cm2 (2.5-5.5cm2) AoV Area,Vmax:           3.61 cm2 (2.5-4.5cm2) AoV Dimensionless Index: 0.78  RIGHT VENTRICLE: TAPSE: 14.7 mm RV s'  0.12 m/s  TRICUSPID VALVE/RVSP:         Normal Ranges: IVC Diam:             1.59 cm  [id number] Cristina Wharton MD Electronically signed on 10/19/2024 at 4:28:53 PM  ** Final **     ECG 12 lead    Result Date: 10/18/2024  Sinus rhythm with Premature supraventricular complexes and with occasional and consecutive Premature ventricular complexes Right bundle branch block Left anterior fascicular block  Bifascicular block  Cannot rule out Anteroseptal infarct , age undetermined Abnormal ECG When compared with ECG of 30-AUG-2024 12:12, Premature supraventricular complexes are now Present Sinus rhythm is no longer with 2nd degree AV block (Mobitz II) Right bundle branch block has replaced Nonspecific intraventricular block Minimal criteria for Anteroseptal infarct are now Present    CT thoracic spine wo IV contrast    Result Date: 10/17/2024  Interpreted By:  Awais Ellis, STUDY: CT THORACIC SPINE WO IV CONTRAST; CT LUMBAR SPINE WO IV CONTRAST; 10/17/2024 10:15 pm   INDICATION: Signs/Symptoms:back pain, fall x 2 days ago.     COMPARISON: 08/31/2024   ACCESSION NUMBER(S): [id number]; [id number]   ORDERING CLINICIAN: KALYN RON   TECHNIQUE: Axial CT images of the thoracolumbar spine are obtained. Axial, coronal and sagittal reconstructions are submitted for review.   FINDINGS:     Alignment: There is mild scoliosis noted.   Vertebrae/Intervertebral Discs: The thoracolumbar vertebral body heights are intact. There is severe loss of intervertebral disc space. There are severe intervertebral destructive changes noted of the T11-T12 and L2-L3 levels. Findings appear somewhat worsened when compared to the prior study. Question underlying discitis/osteomyelitis. Recommend MRI for further evaluation. There is multilevel spinal canal stenosis noted. There is facet  arthrosis noted.   Paraspinous Soft Tissues: Within normal limits.         There are severe intervertebral destructive changes noted of the T11-T12 and L2-L3 levels. Findings appear somewhat worsened when compared to the prior study. Question underlying discitis/osteomyelitis. Recommend MRI for further evaluation. Recommend clinical correlation.   MACRO: None   Signed by: Awais Ellis 10/17/2024 10:32 PM Dictation workstation:   [id number]    CT lumbar spine wo IV contrast    Result Date: 10/17/2024  Interpreted By:  Awais Ellis, STUDY: CT THORACIC SPINE WO IV CONTRAST; CT LUMBAR SPINE WO IV CONTRAST; 10/17/2024 10:15 pm   INDICATION: Signs/Symptoms:back pain, fall x 2 days ago.     COMPARISON: 08/31/2024   ACCESSION NUMBER(S): [id number]; [id number]   ORDERING CLINICIAN: KALYN RON   TECHNIQUE: Axial CT images of the thoracolumbar spine are obtained. Axial, coronal and sagittal reconstructions are submitted for review.   FINDINGS:     Alignment: There is mild scoliosis noted.   Vertebrae/Intervertebral Discs: The thoracolumbar vertebral body heights are intact. There is severe loss of intervertebral disc space. There are severe intervertebral destructive changes noted of the T11-T12 and L2-L3 levels. Findings appear somewhat worsened when compared to the prior study. Question underlying discitis/osteomyelitis. Recommend MRI for further evaluation. There is multilevel spinal canal stenosis noted. There is facet arthrosis noted.   Paraspinous Soft Tissues: Within normal limits.         There are severe intervertebral destructive changes noted of the T11-T12 and L2-L3 levels. Findings appear somewhat worsened when compared to the prior study. Question underlying discitis/osteomyelitis. Recommend MRI for further evaluation. Recommend clinical correlation.   MACRO: None   Signed by: Awais Ellis 10/17/2024 10:32 PM Dictation workstation:   [id number]    CT angio chest for pulmonary embolism    Result  Date: 10/17/2024  Interpreted By:  Awais Ellis, STUDY: CT ANGIO CHEST FOR PULMONARY EMBOLISM;  10/17/2024 8:09 pm   INDICATION: Signs/Symptoms:shortness of breath and tachypnea.     COMPARISON: CT abdomen pelvis dated 08/31/2024   ACCESSION NUMBER(S): [id number]   ORDERING CLINICIAN: KALYN RON   TECHNIQUE: Helical data acquisition of the chest was obtained after intravenous administration of 75 ML Omnipaque 350, as per PE protocol. Images were reformatted in coronal and sagittal planes. Axial and coronal maximum intensity projection (MIP) images were created and reviewed.   FINDINGS: POTENTIAL LIMITATIONS OF THE STUDY: The assessment is limited by respiratory motion and suboptimal contrast opacification of pulmonary arteries.   HEART AND VESSELS: There is no main or lobar pulmonary embolus visualized. There is suboptimal opacification and respiratory motion noted limiting evaluation of the segmental and subsegmental branches. Main pulmonary artery and its branches are normal in caliber.   The thoracic aorta normal in course and caliber. Coronary artery calcifications are seen. Please note, the study is not optimized for evaluation of coronary arteries.   The cardiac chambers are not enlarged.   There is no pericardial effusion seen.   MEDIASTINUM AND SENG, LOWER NECK AND AXILLA: The visualized thyroid gland is within normal limits. No evidence of thoracic lymphadenopathy by CT criteria. Esophagus appears within normal limits as seen.   LUNGS AND AIRWAYS: There are large bilateral pleural effusions with overlying atelectasis versus consolidation. There are scattered ground-glass airspace opacities noted.     UPPER ABDOMEN: The visualized subdiaphragmatic structures demonstrate no remarkable findings.       CHEST WALL AND OSSEOUS STRUCTURES: Chest wall is within normal limits. There is multilevel loss of intervertebral disc space. The endplate osteophytic changes noted.There are severe degenerative changes  noted of the bilateral shoulder joints. Associated soft tissue lesions.There are destructive changes noted of the T11-T12 and L1-L2 disc spaces noted. Findings are unchanged from the prior study.       1. There is no main or lobar pulmonary embolus visualized. There is suboptimal opacification and respiratory motion noted limiting evaluation of the segmental and subsegmental branches. 2. There are large bilateral pleural effusions with overlying atelectasis versus consolidation. There are scattered ground-glass airspace opacities noted. 3. There are severe degenerative changes noted of the bilateral shoulder joints. Associated soft tissue lesions.   MACRO: None   Signed by: Awais Ellis 10/17/2024 8:28 PM Dictation workstation:   [id number]     Assessment/Plan   Discitis/osteomyelitis -seen on CT/MRI-paraspinal abscess-awaiting IR aspiration  Acute hypoxic respiratory failure, resolving, on 2 L  Bilateral pleural effusion   E. coli urinary tract infection  E. coli bacteremia  Allergy to cephalosporin -tolerating meropenem         IV meropenem-monitor for adverse reaction   Follow up fine needle aspiration  Supplemental oxygen, wean as tolerated  Monitor temperature and WBC  PICC line placed  Final recommendations after IR aspiration    Total time spent caring for the patient today was 20 minutes. This includes time spent before the visit reviewing the chart, time spent during the visit, and time spent after the visit on documentation.     Lalitha Yancey, ANGELITO-CNP

## 2024-10-23 NOTE — CARE PLAN
Problem: Safety - Adult  Goal: Free from fall injury  10/23/2024 0804 by Juan Pablo Keith RN  Outcome: Progressing  10/23/2024 0803 by Juan Pablo Keith RN  Outcome: Progressing     Problem: Discharge Planning  Goal: Discharge to home or other facility with appropriate resources  10/23/2024 0804 by Juan Pablo Keith RN  Outcome: Progressing  10/23/2024 0803 by Juan Pablo Keith RN  Outcome: Progressing     Problem: Chronic Conditions and Co-morbidities  Goal: Patient's chronic conditions and co-morbidity symptoms are monitored and maintained or improved  10/23/2024 0804 by Juan Pablo Keith RN  Outcome: Progressing  10/23/2024 0803 by Juan Pablo Keith RN  Outcome: Progressing     Problem: Fall/Injury  Goal: Not fall by end of shift  10/23/2024 0804 by Juan Pablo Keith RN  Outcome: Progressing  10/23/2024 0803 by Juan Pablo Keith RN  Outcome: Progressing  Goal: Be free from injury by end of the shift  10/23/2024 0804 by Juan Pablo Keith RN  Outcome: Progressing  10/23/2024 0803 by Juan Pablo Keith RN  Outcome: Progressing  Goal: Verbalize understanding of personal risk factors for fall in the hospital  10/23/2024 0804 by Juan Pablo Keith RN  Outcome: Progressing  10/23/2024 0803 by Juan Pablo Keith RN  Outcome: Progressing  Goal: Verbalize understanding of risk factor reduction measures to prevent injury from fall in the home  10/23/2024 0804 by Juan Pablo Keith RN  Outcome: Progressing  10/23/2024 0803 by Juan Pablo Keith RN  Outcome: Progressing  Goal: Use assistive devices by end of the shift  10/23/2024 0804 by Juan Pablo Keith RN  Outcome: Progressing  10/23/2024 0803 by Juan Pablo Keith RN  Outcome: Progressing  Goal: Pace activities to prevent fatigue by end of the shift  10/23/2024 0804 by Juan Pablo Kieth RN  Outcome: Progressing  10/23/2024 0803 by Juan Pablo Keith RN  Outcome: Progressing     Problem: Heart Failure  Goal: Improved gas exchange this shift  10/23/2024 0804 by Juan Pablo Keith RN  Outcome: Progressing  10/23/2024 0803 by Juan Pablo Keith RN  Outcome: Progressing  Goal: Improved urinary output  this shift  10/23/2024 0804 by Juan Pablo Keith RN  Outcome: Progressing  10/23/2024 0803 by Juan Pablo Keith RN  Outcome: Progressing  Goal: Reduction in peripheral edema within 24 hours  10/23/2024 0804 by Juan Pablo Keith RN  Outcome: Progressing  10/23/2024 0803 by Juan Pablo Keith RN  Outcome: Progressing  Goal: Report improvement of dyspnea/breathlessness this shift  10/23/2024 0804 by Juan Pablo Keith RN  Outcome: Progressing  10/23/2024 0803 by Juan Pablo Keith RN  Outcome: Progressing  Goal: Weight from fluid excess reduced over 2-3 days, then stabilize  10/23/2024 0804 by Juan Pablo Keith RN  Outcome: Progressing  10/23/2024 0803 by Juan Pablo Keith RN  Outcome: Progressing  Goal: Increase self care and/or family involvement in 24 hours  10/23/2024 0804 by Juan Pablo Keith RN  Outcome: Progressing  10/23/2024 0803 by Juan Pablo Keith RN  Outcome: Progressing     Problem: Nutrition  Goal: Oral intake greater 75%  10/23/2024 0804 by Juan Pablo Keith RN  Outcome: Progressing  10/23/2024 0803 by Juan Pablo Keith RN  Outcome: Progressing     Problem: Skin  Goal: Decreased wound size/increased tissue granulation at next dressing change  10/23/2024 0804 by Juan Pablo Keith RN  Outcome: Progressing  Flowsheets (Taken 10/23/2024 0804)  Decreased wound size/increased tissue granulation at next dressing change: Promote sleep for wound healing  10/23/2024 0803 by Juan Pablo Keith RN  Outcome: Progressing  Goal: Participates in plan/prevention/treatment measures  10/23/2024 0804 by Juan Pablo Keith RN  Outcome: Progressing  Flowsheets (Taken 10/23/2024 0804)  Participates in plan/prevention/treatment measures: Increase activity/out of bed for meals  10/23/2024 0803 by Juan Pablo Keith RN  Outcome: Progressing  Goal: Prevent/manage excess moisture  10/23/2024 0804 by Juan Pablo Keith RN  Outcome: Progressing  Flowsheets (Taken 10/23/2024 0804)  Prevent/manage excess moisture: Follow provider orders for dressing changes  10/23/2024 0803 by Juan Pablo Keith RN  Outcome: Progressing  Goal: Prevent/minimize  sheer/friction injuries  10/23/2024 0804 by Juan Pablo Keith RN  Outcome: Progressing  Flowsheets (Taken 10/23/2024 0804)  Prevent/minimize sheer/friction injuries: Increase activity/out of bed for meals  10/23/2024 0803 by Juan Pablo Keith RN  Outcome: Progressing  Goal: Promote/optimize nutrition  10/23/2024 0804 by Juan Pablo Keith RN  Outcome: Progressing  Flowsheets (Taken 10/23/2024 0804)  Promote/optimize nutrition: Monitor/record intake including meals  10/23/2024 0803 by Juan Pablo Keith RN  Outcome: Progressing  Goal: Promote skin healing  10/23/2024 0804 by Juan Pablo Keith RN  Outcome: Progressing  Flowsheets (Taken 10/23/2024 0804)  Promote skin healing: Protective dressings over bony prominences  10/23/2024 0803 by Juan Pablo Keith RN  Outcome: Progressing     Problem: Deep Vein Thrombosis  Goal: I will remain free from complications of deep vein thrombosis and maintain current level of mobility  10/23/2024 0804 by Juan Pablo Keith RN  Outcome: Progressing  10/23/2024 0803 by Juan Pablo Keith RN  Outcome: Progressing     Problem: Pain  Goal: Takes deep breaths with improved pain control throughout the shift  10/23/2024 0804 by Juan Pablo Keith RN  Outcome: Progressing  10/23/2024 0803 by Juan Pablo Keith RN  Outcome: Progressing  Goal: Turns in bed with improved pain control throughout the shift  10/23/2024 0804 by Juan Pablo Keith RN  Outcome: Progressing  10/23/2024 0803 by Juan Pablo Keith RN  Outcome: Progressing  Goal: Walks with improved pain control throughout the shift  10/23/2024 0804 by Juan Pablo Keith RN  Outcome: Progressing  10/23/2024 0803 by Juan Pablo Keith RN  Outcome: Progressing  Goal: Performs ADL's with improved pain control throughout shift  10/23/2024 0804 by Juan Pablo Keith RN  Outcome: Progressing  10/23/2024 0803 by Juan Pablo Keith RN  Outcome: Progressing  Goal: Participates in PT with improved pain control throughout the shift  10/23/2024 0804 by Juan Pablo Keith RN  Outcome: Progressing  10/23/2024 0803 by Juan Pablo Keith RN  Outcome: Progressing  Goal: Free  from opioid side effects throughout the shift  10/23/2024 0804 by Juan Pablo Keith RN  Outcome: Progressing  10/23/2024 0803 by Juan Pablo Keith RN  Outcome: Progressing  Goal: Free from acute confusion related to pain meds throughout the shift  10/23/2024 0804 by Juan Pablo Keith RN  Outcome: Progressing  10/23/2024 0803 by Juan Pablo Keith RN  Outcome: Progressing

## 2024-10-23 NOTE — PROGRESS NOTES
"Angela Montelongo is a 74 y.o. female on day 5 of admission presenting with Acute on chronic diastolic congestive heart failure.    Subjective   Patient upright in recliner.  She is alert and oriented x 3.  She tells me her pain in her back is better today.  States her breathing better.  Still complains of lower extremity edema especially in the left leg.       Objective     Physical Exam  Constitutional:       Appearance: Normal appearance. She is obese.   Cardiovascular:      Rate and Rhythm: Regular rhythm. Tachycardia present.      Pulses: Normal pulses.      Heart sounds: Normal heart sounds.   Pulmonary:      Effort: Pulmonary effort is normal.      Breath sounds: Normal breath sounds.   Abdominal:      Palpations: Abdomen is soft.   Musculoskeletal:      Right lower leg: Edema present.      Left lower leg: Edema present.   Skin:     General: Skin is warm and dry.   Neurological:      Mental Status: She is alert and oriented to person, place, and time.         Last Recorded Vitals  Blood pressure 123/58, pulse 89, temperature 36.4 °C (97.5 °F), temperature source Temporal, resp. rate 18, height 1.6 m (5' 3\"), weight 111 kg (245 lb), SpO2 96%.  Intake/Output last 3 Shifts:  I/O last 3 completed shifts:  In: 900 (8.1 mL/kg) [P.O.:750; IV Piggyback:150]  Out: 2200 (19.8 mL/kg) [Urine:2200 (0.5 mL/kg/hr)]  Weight: 111.1 kg     Relevant Results    No results found for this or any previous visit (from the past 24 hours).                 Assessment/Plan   Assessment & Plan  Acute on chronic diastolic congestive heart failure    Hyperlipidemia    Essential hypertension    Chronic low back pain    Shortness of breath    Hypomagnesemia    Acute hypoxic respiratory failure (Multi)    #1 acute systolic heart failure ejection fraction of 35%.  Patient had just had an echocardiogram yesterday showing ejection fraction of 35% likely secondary in part to left bundle.  Patient before on hydrochlorothiazide which was stopped for " hyponatremia.  Currently has severe hypokalemia as well as hypomagnesemia.  Will optimize her treatment for heart failure systolic dysfunction.  Patient may need lumbar puncture as a workup for gram-negative bacteremia in the blood.  She will need ischemic workup which could be deferred as an outpatient.  She would like to hold off cardiac catheterization for now.  Will optimize her treatment for heart failure systolic dysfunction follow-up as an outpatient in my office in few weeks.      2.  Hypertension.  Will stop amlodipine and started on losartan 25 mg oral daily.  Patient has history of angioedema with has moderate LV*dysfunction she will benefit of ARB's.  Will start her also on Farxiga for heart failure systolic dysfunction.  Will reassess renal function and electrolytes in AM.     3.  Obesity hypoventilation.     4.  Hypokalemia replaced with 40 mg of potassium today.     5.  Hypomagnesemia we will replace with 2 more grams of potassium IV.     10/21: As above.  Patient to have paraspinal abscess drainage and potentially thoracentesis tomorrow at [facility].  For now we will continue with IV diuretic at Lasix 40 mg twice daily.  She tells me she is diuresing well although documented input and output do not demonstrate this.  Suspect that these readings are incorrect.  She remains in normal sinus rhythm without significant ectopy on telemetry.  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  Will continue to optimize her from a heart failure standpoint with diuresis, before undergoing ischemic workup.  She was started on losartan yesterday.  Pressures are stable most recent reading 135/75.  She remains on 2 L of O2 with saturations at 100%.  Lab work this morning reveals a sodium of 133, potassium of 3.3, creatinine 0.59, hemoglobin 10.0 hematocrit 32.5.  Potassium supplementation has been ordered.  She remains on IV antibiotics for discitis.  Will continue to diurese and follow with you.      10/22: As Above. Patient just returned from [facility] for spinal abscess biopsy. She has no complaints. She was transitioned to oral diuretic yesterday, reports good diuretic effect. I&Os not yet recorded today. She was transitioned to Coreg 3.125 mg yesterday as well. Tolerating this well. Continue Losartan 50 mg. Patient has been weaned off O2, saturations currently 95%, continue to monitor. Lab work reveals sodium 134, potassium 3.4, creatinine 0.69, hemoglobin 9.9 and hematocrit 32.3. She remains in normal sinus rhythm on telemetry. Blood pressures are stable most recent reading 109/74. Will continue to follow with you.     10/23: As above.  Patient had spinal abscess biopsied yesterday.  She reports her back pain is better.  Evidently patient became hypoxic overnight.  Supplemental O2 was placed again.  Suspect patient has a component of obstructive sleep apnea.  Patient is slightly tachycardic this morning.  Most likely secondary to her ongoing infection.  This will uptitrate her Coreg today to 6.25 mg.  Continue with losartan 50 mg for now.  Continue 40 mg twice daily Lasix.  Patient still complains of left lower extremity edema.  Discussed with nursing will wrap patient's legs for passive mobilization of fluid.  Continue Farxiga. Patient's blood pressures are stable most recent reading 123/58.  She remains in normal sinus rhythm on telemetry without significant ectopy.  Patient had a PICC line placed.  Infectious disease is awaiting final biopsy results before giving final antibiotic recommendations.       I spent 20 minutes in the professional and overall care of this patient.      Tana Lassiter PA-C

## 2024-10-23 NOTE — CARE PLAN
Problem: Safety - Adult  Goal: Free from fall injury  Outcome: Progressing     Problem: Discharge Planning  Goal: Discharge to home or other facility with appropriate resources  Outcome: Progressing     Problem: Chronic Conditions and Co-morbidities  Goal: Patient's chronic conditions and co-morbidity symptoms are monitored and maintained or improved  Outcome: Progressing     Problem: Fall/Injury  Goal: Not fall by end of shift  Outcome: Progressing  Goal: Be free from injury by end of the shift  Outcome: Progressing  Goal: Verbalize understanding of personal risk factors for fall in the hospital  Outcome: Progressing  Goal: Verbalize understanding of risk factor reduction measures to prevent injury from fall in the home  Outcome: Progressing  Goal: Use assistive devices by end of the shift  Outcome: Progressing  Goal: Pace activities to prevent fatigue by end of the shift  Outcome: Progressing     Problem: Heart Failure  Goal: Improved gas exchange this shift  Outcome: Progressing  Goal: Improved urinary output this shift  Outcome: Progressing  Goal: Reduction in peripheral edema within 24 hours  Outcome: Progressing  Goal: Report improvement of dyspnea/breathlessness this shift  Outcome: Progressing  Goal: Weight from fluid excess reduced over 2-3 days, then stabilize  Outcome: Progressing  Goal: Increase self care and/or family involvement in 24 hours  Outcome: Progressing     Problem: Nutrition  Goal: Oral intake greater 75%  Outcome: Progressing     Problem: Skin  Goal: Decreased wound size/increased tissue granulation at next dressing change  Outcome: Progressing  Goal: Participates in plan/prevention/treatment measures  Outcome: Progressing  Goal: Prevent/manage excess moisture  Outcome: Progressing  Goal: Prevent/minimize sheer/friction injuries  Outcome: Progressing  Goal: Promote/optimize nutrition  Outcome: Progressing  Goal: Promote skin healing  Outcome: Progressing     Problem: Deep Vein  Thrombosis  Goal: I will remain free from complications of deep vein thrombosis and maintain current level of mobility  Outcome: Progressing     Problem: Pain  Goal: Takes deep breaths with improved pain control throughout the shift  Outcome: Progressing  Goal: Turns in bed with improved pain control throughout the shift  Outcome: Progressing  Goal: Walks with improved pain control throughout the shift  Outcome: Progressing  Goal: Performs ADL's with improved pain control throughout shift  Outcome: Progressing  Goal: Participates in PT with improved pain control throughout the shift  Outcome: Progressing  Goal: Free from opioid side effects throughout the shift  Outcome: Progressing  Goal: Free from acute confusion related to pain meds throughout the shift  Outcome: Progressing

## 2024-10-23 NOTE — PROGRESS NOTES
Occupational Therapy    Occupational Therapy Treatment    Name: Anglea Montelongo  MRN: [id number]  Department: [address]  Room: [address] [place] [address]  Date: 10/23/24  Time Calculation  Start Time: 0828  Stop Time: 0902  Time Calculation (min): 34 min    Assessment:  OT Assessment: Continues to progress towards OT POC  Prognosis: Good  Barriers to Discharge: Other (Comment) (danyel santos for xfers)  Evaluation/Treatment Tolerance: Patient tolerated treatment well  Medical Staff Made Aware: Yes  End of Session Communication: Bedside nurse  End of Session Patient Position: Up in chair, Alarm on  Plan:  Treatment Interventions: ADL retraining, Functional transfer training, UE strengthening/ROM, Endurance training, Patient/family training, Equipment evaluation/education, Neuromuscular reeducation, Compensatory technique education  OT Frequency: 4 times per week  OT Discharge Recommendations: Moderate intensity level of continued care  Equipment Recommended upon Discharge: Lift  OT Recommended Transfer Status: Maximum assist, Assist of 2  OT - OK to Discharge: Yes    Subjective   Previous Visit Info:  OT Last Visit  OT Received On: 10/23/24  General:  General  Reason for Referral: Decreased ADLS; acute on chronic heart failure, T11-12/L1-2 discitis/OM, UTI  Referred By: Penelope Mcgee, APRN-CNP  Past Medical History Relevant to Rehab: HTN, CKD, low back pain, spinal stenosis, falls  Family/Caregiver Present: No  Co-Treatment: PT  Co-Treatment Reason: partial co treat with PTA for safe advancement of mobility  Prior to Session Communication: Bedside nurse  Patient Position Received: Bed, 3 rail up, Alarm on  Preferred Learning Style: visual, verbal  General Comment: Cleared by nsg, pt met in supine, agreeable to OT session    Precautions:  Hearing/Visual Limitations: vision and hearing WFL  Medical Precautions: Fall precautions, Oxygen therapy device and L/min, Spinal precautions  Post-Surgical Precautions: Spinal precautions  Precautions  Comment: spine precautions for pain mgmt.  on 2L O2 via NC    Pain Assessment:  Pain Assessment  Pain Assessment: 0-10  0-10 (Numeric) Pain Score: 7  Pain Type: Chronic pain  Pain Location: Back  Pain Interventions: Repositioned     Objective   Cognition:  Overall Cognitive Status: Within Functional Limits  Cognition Comments: emotional about current functional status and medical issues.  very grateful for xfer to recliner today, motivated to advance mobility    Activities of Daily Living:   Feeding  Feeding Level of Assistance: Setup  Feeding Where Assessed: Recliner  Feeding Comments: items placed within reach    LE Bathing  LE Bathing Level of Assistance: Dependent  LE Bathing Where Assessed: Bed level  LE Bathing Comments: for thorough posterior/richie area hygiene acts    Toileting  Toileting Level of Assistance: Dependent  Where Assessed: Bed level  Toileting Comments: purewick mgmt and bed level assist for hygiene acts in supine    Functional Standing Tolerance:  Functional Standing Tolerance  Time: 20s  Activity: static stand at EOB and at danyel stedy level  Functional Standing Tolerance Comments: fair- standing tolerance, limited d/t pain    Bed Mobility/Transfers:   Bed Mobility  Bed Mobility: Yes  Bed Mobility 1  Bed Mobility 1: Supine to sitting  Level of Assistance 1: Moderate assistance, +2  Bed Mobility Comments 1: for LLE advancement and trunk up  Bed Mobility 2  Bed Mobility  2: Rolling left, Rolling right  Level of Assistance 2: Maximum assistance  Bed Mobility Comments 2: lateral rolling in supine to faciliate hygiene acts    Transfers  Transfer: Yes  Transfer 1  Transfer From 1: Bed to  Transfer to 1: Stand, Sit  Technique 1: Sit to stand, Stand to sit  Transfer Device 1: Walker, Gait belt  Transfer Level of Assistance 1: Moderate assistance, +2  Trials/Comments 1: mod A x2 for STS from EOB with third person present to block b/l knees.  Mild Lt knee hyperextension. demos a very kyphotic posture  during static stand at fww level  Transfers 2  Transfer From 2: Bed to  Transfer to 2: Stand  Technique 2: Sit to stand  Transfer Device 2: Danyel Stedy  Transfer Level of Assistance 2: Moderate assistance, +2  Trials/Comments 2: second attempt at STS from EOB [facility] at [facility] level, mod A x2 with use of gait belt to enhance ease of transition.  is able to maintain static stand and partial sit with supervision at [facility] level with b/l knees blocked  Transfers 3  Transfer From 3: Bed to  Transfer to 3: Chair with arms  Transfer Device 3: Danyel Stedy  Transfer Level of Assistance 3: Dependent  Trials/Comments 3: dependent xfer to recliner chair today with use of danyel stedy.  stands from danyel stedy with min A x2 and is able to control descent into recliner with min-mod A x2    Functional Mobility:  Functional Mobility  Functional Mobility Performed: No    Sitting Balance:  Static Sitting Balance  Static Sitting-Balance Support: Feet supported, Bilateral upper extremity supported  Static Sitting-Level of Assistance: Close supervision  Static Sitting-Comment/Number of Minutes: EOB sitting    Outcome Measures:  [facility] Daily Activity  Putting on and taking off regular lower body clothing: Total  Bathing (including washing, rinsing, drying): A lot  Putting on and taking off regular upper body clothing: A lot  Toileting, which includes using toilet, bedpan or urinal: Total  Taking care of personal grooming such as brushing teeth: A lot  Eating Meals: A little  Daily Activity - Total Score: 11      Education Documentation  No documentation found.  Education Comments  No comments found.      Goals:  Encounter Problems       Encounter Problems (Active)       OT Goals       ADLS (Progressing)       Start:  10/21/24    Expected End:  11/04/24       Patient will complete ADL tasks, with moderate assist using AE need in order to increase patient's safety and independence with self-care tasks.           Functional Transfers  (Progressing)       Start:  10/21/24    Expected End:  11/04/24       Patient will complete functional transfers with moderate assist x2 using least restrictive device, in order to increase patient's safety and independence with daily tasks.           Activity Tolerance (Progressing)       Start:  10/21/24    Expected End:  11/04/24       Patient will demonstrate the ability to participate in functional activity at least >/= 15 minutes in order to increase patient's safety and independence with daily tasks.           Sitting Balance (Progressing)       Start:  10/21/24    Expected End:  11/04/24       Patient will complete EOB activity for >/= 15 minutes with fair+ sitting balance in preparation for ADLS.

## 2024-10-23 NOTE — PROGRESS NOTES
Angela Montelongo is a 74 y.o. female on day 5 of admission presenting with Acute on chronic diastolic congestive heart failure.      Subjective   No acute issues. Spinal culture pending.   Objective     Last Recorded Vitals  /60 (BP Location: Left arm, Patient Position: Sitting)   Pulse 96   Temp 36.4 °C (97.5 °F) (Temporal)   Resp 18   Wt 111 kg (245 lb)   SpO2 98%   Intake/Output last 3 Shifts:    Intake/Output Summary (Last 24 hours) at 10/23/2024 1322  Last data filed at 10/23/2024 0900  Gross per 24 hour   Intake 1200 ml   Output 1800 ml   Net -600 ml       Admission Weight  Weight: 118 kg (260 lb) (10/17/24 1757)    Daily Weight  10/18/24 : 111 kg (245 lb)    Image Results      Physical Exam  Constitutional:       Appearance: She is obese.   HENT:      Head: Normocephalic and atraumatic.      Nose: Nose normal.      Mouth/Throat:      Mouth: Mucous membranes are moist.      Pharynx: Oropharynx is clear.   Eyes:      Extraocular Movements: Extraocular movements intact.      Pupils: Pupils are equal, round, and reactive to light.   Cardiovascular:      Rate and Rhythm: Normal rate and regular rhythm.      Pulses: Normal pulses.      Comments: Trace edema bilaterally  Pulmonary:      Effort: Pulmonary effort is normal.      Breath sounds: Normal breath sounds.      Comments: Clear/diminished  Abdominal:      General: Abdomen is flat. Bowel sounds are normal.      Palpations: Abdomen is soft.   Musculoskeletal:         General: Normal range of motion.      Comments: Generalized weakness, non-ambulatory due to back pain   Skin:     General: Skin is warm and dry.      Capillary Refill: Capillary refill takes less than 2 seconds.   Neurological:      General: No focal deficit present.      Mental Status: She is oriented to person, place, and time.   Psychiatric:         Mood and Affect: Mood normal.       Relevant Results    No results found for this or any previous visit (from the past 24  hours).      Assessment/Plan      Acute Hypoxic Respiratory Failure / Bilateral Pleural Effusions  -Pulmonology follows.    -Cardiology follows.  -Likely secondary to pleural effusions.   -On 2L with high O2 sats, wean as able.   -Continue Lasix BID, switched to oral.  -Repeat CXR 10/21 showed improvement in bilateral effusions.       Acute Systolic CHF  -Cardiology follows. She has no known hx of CHF.   -ProBNP elevated 3,988 with pleural effusions on imaging.    -Echo shows decreased EF 30-35%.   -BID lasix, continue. Switched to oral.   -Med adjustments made by cardio.   -Monitor strict I&O and daily weights.      Back Pain / Concern for Osteomyelitis/Discitis  -CT lumbar and thoracic spine have classic findings of advanced discitis and osteomyelitis at T11-12 and L1-2.   -MRI lumbar and thoracic spine shows discitis/OM T11-T12   -Ortho spine follows. Reviewed MRI and no indication for surgery    -ID follows.   -IV abx per ID, currently on meropenem. Alexia patel'd  -S/p aspiration of spinal abscess peer IR. Culture pending.  -Blood cultures with EColi. Repeat blood cultures with NGTD.      Bacteremia  -ID on board.   -Blood cultures with e.coli. Second set with no growth. High suspicion this is from the urine.   -Continue broad spectrum IV abx.      UTI  -Urine culture with EColi.     -IV abx per ID.      Hypokalemia / Hypomagnesemia  -Resolved, monitor   -Continue K+ supplementation while being diuresed     HTN  -Continue current medications, adjustments made by cardio.   -BP stable, monitor.      HLD  -Continue statin.      DVT Risk  -Lovenox subcutaneous.      Dispo  To SNF on discharge when medically ready. Will likely need PICC and IV Abx on discharge.      Penelope Mcgee, ANGELITO-CNP

## 2024-10-24 ENCOUNTER — APPOINTMENT (OUTPATIENT)
Dept: PRIMARY CARE | Facility: CLINIC | Age: 74
End: 2024-10-24
Payer: MEDICARE

## 2024-10-24 VITALS
HEART RATE: 70 BPM | BODY MASS INDEX: 43.41 KG/M2 | SYSTOLIC BLOOD PRESSURE: 120 MMHG | WEIGHT: 245 LBS | HEIGHT: 63 IN | DIASTOLIC BLOOD PRESSURE: 61 MMHG | TEMPERATURE: 97.9 F | OXYGEN SATURATION: 92 % | RESPIRATION RATE: 20 BRPM

## 2024-10-24 LAB
ANION GAP SERPL CALCULATED.3IONS-SCNC: 9 MMOL/L (ref 10–20)
BUN SERPL-MCNC: 22 MG/DL (ref 6–23)
CALCIUM SERPL-MCNC: 8.8 MG/DL (ref 8.6–10.3)
CHLORIDE SERPL-SCNC: 88 MMOL/L (ref 98–107)
CO2 SERPL-SCNC: 41 MMOL/L (ref 21–32)
CREAT SERPL-MCNC: 0.76 MG/DL (ref 0.5–1.05)
EGFRCR SERPLBLD CKD-EPI 2021: 81 ML/MIN/1.73M*2
ERYTHROCYTE [DISTWIDTH] IN BLOOD BY AUTOMATED COUNT: 15.9 % (ref 11.5–14.5)
GLUCOSE SERPL-MCNC: 92 MG/DL (ref 74–99)
HCT VFR BLD AUTO: 30.5 % (ref 36–46)
HGB BLD-MCNC: 9.4 G/DL (ref 12–16)
MCH RBC QN AUTO: 28.3 PG (ref 26–34)
MCHC RBC AUTO-ENTMCNC: 30.8 G/DL (ref 32–36)
MCV RBC AUTO: 92 FL (ref 80–100)
NRBC BLD-RTO: 0 /100 WBCS (ref 0–0)
PLATELET # BLD AUTO: 301 X10*3/UL (ref 150–450)
POTASSIUM SERPL-SCNC: 3.2 MMOL/L (ref 3.5–5.3)
RBC # BLD AUTO: 3.32 X10*6/UL (ref 4–5.2)
SODIUM SERPL-SCNC: 135 MMOL/L (ref 136–145)
WBC # BLD AUTO: 8 X10*3/UL (ref 4.4–11.3)

## 2024-10-24 PROCEDURE — 97530 THERAPEUTIC ACTIVITIES: CPT | Mod: GP

## 2024-10-24 PROCEDURE — 99232 SBSQ HOSP IP/OBS MODERATE 35: CPT

## 2024-10-24 PROCEDURE — 2500000005 HC RX 250 GENERAL PHARMACY W/O HCPCS: Performed by: STUDENT IN AN ORGANIZED HEALTH CARE EDUCATION/TRAINING PROGRAM

## 2024-10-24 PROCEDURE — 2500000001 HC RX 250 WO HCPCS SELF ADMINISTERED DRUGS (ALT 637 FOR MEDICARE OP)

## 2024-10-24 PROCEDURE — 2500000002 HC RX 250 W HCPCS SELF ADMINISTERED DRUGS (ALT 637 FOR MEDICARE OP, ALT 636 FOR OP/ED): Performed by: STUDENT IN AN ORGANIZED HEALTH CARE EDUCATION/TRAINING PROGRAM

## 2024-10-24 PROCEDURE — 97535 SELF CARE MNGMENT TRAINING: CPT | Mod: GO

## 2024-10-24 PROCEDURE — 2500000004 HC RX 250 GENERAL PHARMACY W/ HCPCS (ALT 636 FOR OP/ED)

## 2024-10-24 PROCEDURE — 2500000004 HC RX 250 GENERAL PHARMACY W/ HCPCS (ALT 636 FOR OP/ED): Mod: JZ | Performed by: NURSE PRACTITIONER

## 2024-10-24 PROCEDURE — 99231 SBSQ HOSP IP/OBS SF/LOW 25: CPT | Performed by: NURSE PRACTITIONER

## 2024-10-24 PROCEDURE — 2500000001 HC RX 250 WO HCPCS SELF ADMINISTERED DRUGS (ALT 637 FOR MEDICARE OP): Performed by: NURSE PRACTITIONER

## 2024-10-24 PROCEDURE — 2500000004 HC RX 250 GENERAL PHARMACY W/ HCPCS (ALT 636 FOR OP/ED): Performed by: STUDENT IN AN ORGANIZED HEALTH CARE EDUCATION/TRAINING PROGRAM

## 2024-10-24 PROCEDURE — 2500000001 HC RX 250 WO HCPCS SELF ADMINISTERED DRUGS (ALT 637 FOR MEDICARE OP): Performed by: STUDENT IN AN ORGANIZED HEALTH CARE EDUCATION/TRAINING PROGRAM

## 2024-10-24 PROCEDURE — 97110 THERAPEUTIC EXERCISES: CPT | Mod: GP

## 2024-10-24 PROCEDURE — 97530 THERAPEUTIC ACTIVITIES: CPT | Mod: GO

## 2024-10-24 PROCEDURE — 2500000001 HC RX 250 WO HCPCS SELF ADMINISTERED DRUGS (ALT 637 FOR MEDICARE OP): Performed by: INTERNAL MEDICINE

## 2024-10-24 PROCEDURE — 80048 BASIC METABOLIC PNL TOTAL CA: CPT | Performed by: NURSE PRACTITIONER

## 2024-10-24 PROCEDURE — 85027 COMPLETE CBC AUTOMATED: CPT | Performed by: NURSE PRACTITIONER

## 2024-10-24 PROCEDURE — 99239 HOSP IP/OBS DSCHRG MGMT >30: CPT | Performed by: NURSE PRACTITIONER

## 2024-10-24 RX ORDER — LOSARTAN POTASSIUM 50 MG/1
50 TABLET ORAL DAILY
Start: 2024-10-25

## 2024-10-24 RX ORDER — CARVEDILOL 12.5 MG/1
12.5 TABLET ORAL 2 TIMES DAILY
Start: 2024-10-24

## 2024-10-24 RX ORDER — ACETAMINOPHEN 325 MG/1
650 TABLET ORAL EVERY 6 HOURS PRN
Qty: 30 TABLET | Refills: 0 | Status: SHIPPED | OUTPATIENT
Start: 2024-10-24

## 2024-10-24 RX ORDER — FUROSEMIDE 40 MG/1
40 TABLET ORAL DAILY
Start: 2024-10-25

## 2024-10-24 RX ORDER — POTASSIUM CHLORIDE 1.5 G/1.58G
40 POWDER, FOR SOLUTION ORAL
Start: 2024-10-24

## 2024-10-24 RX ORDER — DAPAGLIFLOZIN 10 MG/1
10 TABLET, FILM COATED ORAL DAILY
Start: 2024-10-25

## 2024-10-24 RX ORDER — ENOXAPARIN SODIUM 100 MG/ML
40 INJECTION SUBCUTANEOUS EVERY 12 HOURS SCHEDULED
Start: 2024-10-24

## 2024-10-24 RX ORDER — POLYETHYLENE GLYCOL 3350 17 G/17G
17 POWDER, FOR SOLUTION ORAL DAILY
Start: 2024-10-25

## 2024-10-24 RX ORDER — FAMOTIDINE 20 MG/1
20 TABLET, FILM COATED ORAL 2 TIMES DAILY
Start: 2024-10-24

## 2024-10-24 RX ORDER — FUROSEMIDE 40 MG/1
40 TABLET ORAL DAILY
Status: DISCONTINUED | OUTPATIENT
Start: 2024-10-24 | End: 2024-10-24 | Stop reason: HOSPADM

## 2024-10-24 RX ORDER — POTASSIUM CHLORIDE 1.5 G/1.58G
40 POWDER, FOR SOLUTION ORAL
Status: DISCONTINUED | OUTPATIENT
Start: 2024-10-24 | End: 2024-10-24 | Stop reason: HOSPADM

## 2024-10-24 RX ADMIN — CARVEDILOL 12.5 MG: 12.5 TABLET, FILM COATED ORAL at 09:03

## 2024-10-24 RX ADMIN — ALLOPURINOL 100 MG: 100 TABLET ORAL at 09:04

## 2024-10-24 RX ADMIN — POTASSIUM CHLORIDE 40 MEQ: 1.5 POWDER, FOR SOLUTION ORAL at 17:05

## 2024-10-24 RX ADMIN — GABAPENTIN 200 MG: 100 CAPSULE ORAL at 14:52

## 2024-10-24 RX ADMIN — Medication 28 PERCENT: at 08:24

## 2024-10-24 RX ADMIN — MEROPENEM AND SODIUM CHLORIDE 1 G: 1 INJECTION, SOLUTION INTRAVENOUS at 11:44

## 2024-10-24 RX ADMIN — FAMOTIDINE 20 MG: 20 TABLET ORAL at 09:03

## 2024-10-24 RX ADMIN — ERTAPENEM SODIUM 1 G: 1 INJECTION, POWDER, LYOPHILIZED, FOR SOLUTION INTRAMUSCULAR; INTRAVENOUS at 16:23

## 2024-10-24 RX ADMIN — LOSARTAN POTASSIUM 50 MG: 50 TABLET, FILM COATED ORAL at 09:03

## 2024-10-24 RX ADMIN — FUROSEMIDE 40 MG: 40 TABLET ORAL at 09:03

## 2024-10-24 RX ADMIN — GABAPENTIN 200 MG: 100 CAPSULE ORAL at 09:04

## 2024-10-24 RX ADMIN — OXYBUTYNIN CHLORIDE 5 MG: 5 TABLET ORAL at 09:01

## 2024-10-24 RX ADMIN — ACETAMINOPHEN 650 MG: 325 TABLET ORAL at 11:44

## 2024-10-24 RX ADMIN — ENOXAPARIN SODIUM 40 MG: 40 INJECTION SUBCUTANEOUS at 09:01

## 2024-10-24 RX ADMIN — Medication 2 L/MIN: at 15:48

## 2024-10-24 RX ADMIN — POLYETHYLENE GLYCOL 3350 17 G: 17 POWDER, FOR SOLUTION ORAL at 09:01

## 2024-10-24 RX ADMIN — POTASSIUM CHLORIDE 40 MEQ: 1.5 POWDER, FOR SOLUTION ORAL at 09:14

## 2024-10-24 RX ADMIN — MEROPENEM AND SODIUM CHLORIDE 1 G: 1 INJECTION, SOLUTION INTRAVENOUS at 04:34

## 2024-10-24 ASSESSMENT — COGNITIVE AND FUNCTIONAL STATUS - GENERAL
HELP NEEDED FOR BATHING: A LOT
MOVING FROM LYING ON BACK TO SITTING ON SIDE OF FLAT BED WITH BEDRAILS: TOTAL
DRESSING REGULAR LOWER BODY CLOTHING: A LOT
DRESSING REGULAR UPPER BODY CLOTHING: A LOT
TOILETING: TOTAL
CLIMB 3 TO 5 STEPS WITH RAILING: TOTAL
WALKING IN HOSPITAL ROOM: A LOT
DAILY ACTIVITIY SCORE: 12
DRESSING REGULAR UPPER BODY CLOTHING: A LOT
MOVING TO AND FROM BED TO CHAIR: TOTAL
DAILY ACTIVITIY SCORE: 13
EATING MEALS: A LITTLE
MOVING FROM LYING ON BACK TO SITTING ON SIDE OF FLAT BED WITH BEDRAILS: A LOT
TOILETING: A LOT
TURNING FROM BACK TO SIDE WHILE IN FLAT BAD: TOTAL
DRESSING REGULAR LOWER BODY CLOTHING: TOTAL
EATING MEALS: A LITTLE
WALKING IN HOSPITAL ROOM: TOTAL
TURNING FROM BACK TO SIDE WHILE IN FLAT BAD: A LOT
MOVING TO AND FROM BED TO CHAIR: A LOT
MOBILITY SCORE: 11
STANDING UP FROM CHAIR USING ARMS: A LOT
MOBILITY SCORE: 7
PERSONAL GROOMING: A LOT
HELP NEEDED FOR BATHING: A LOT
STANDING UP FROM CHAIR USING ARMS: A LOT
PERSONAL GROOMING: A LITTLE
CLIMB 3 TO 5 STEPS WITH RAILING: TOTAL

## 2024-10-24 ASSESSMENT — PAIN SCALES - GENERAL
PAINLEVEL_OUTOF10: 0 - NO PAIN
PAINLEVEL_OUTOF10: 6
PAINLEVEL_OUTOF10: 0 - NO PAIN
PAINLEVEL_OUTOF10: 7

## 2024-10-24 ASSESSMENT — PAIN - FUNCTIONAL ASSESSMENT
PAIN_FUNCTIONAL_ASSESSMENT: 0-10

## 2024-10-24 ASSESSMENT — ACTIVITIES OF DAILY LIVING (ADL): HOME_MANAGEMENT_TIME_ENTRY: 23

## 2024-10-24 NOTE — CARE PLAN
The patient's goals for the shift include improved breathing    The clinical goals for the shift include monitor labs; ATB

## 2024-10-24 NOTE — PROGRESS NOTES
10/24/24 1432   Discharge Planning   Expected Discharge Disposition SNF  (pending acceptance)   Does the patient need discharge transport arranged? Yes   RoundTrip coordination needed? Yes   Has discharge transport been arranged? No     Choices obtained this morning and submitted to [facility] for referral.  Patient wants a private room which isn't available at [place].  [facility] and [facility] are pending.  Questions all answered  Daughter states she has a long term plan if mom doesn't succeed at rehab this time.    Pending acceptance.   Patient has a discharge and is ready medically to go.

## 2024-10-24 NOTE — PROGRESS NOTES
"Angela Montelongo is a 74 y.o. female on day 6 of admission presenting with Acute on chronic diastolic congestive heart failure.    Subjective   Alert and oriented X3. No acute distress. Had some concerns about her CHF medications.        Objective     Physical Exam  Constitutional:       Appearance: She is obese.   Cardiovascular:      Rate and Rhythm: Normal rate and regular rhythm.      Pulses: Normal pulses.      Heart sounds: Normal heart sounds.   Pulmonary:      Effort: Pulmonary effort is normal.      Breath sounds: Decreased breath sounds present.   Musculoskeletal:      Right lower leg: Edema present.      Left lower leg: Edema present.      Comments: Trace BLE edema. Bilateral legs wrapped.    Skin:     General: Skin is warm and dry.   Neurological:      Mental Status: She is alert and oriented to person, place, and time.         Last Recorded Vitals  Blood pressure 102/63, pulse 96, temperature 36.5 °C (97.7 °F), temperature source Temporal, resp. rate 19, height 1.6 m (5' 3\"), weight 111 kg (245 lb), SpO2 98%.  Intake/Output last 3 Shifts:  I/O last 3 completed shifts:  In: 850 (7.6 mL/kg) [P.O.:600; IV Piggyback:250]  Out: 3650 (32.8 mL/kg) [Urine:3650 (0.9 mL/kg/hr)]  Weight: 111.1 kg     Relevant Results  Results for orders placed or performed during the hospital encounter of 10/17/24 (from the past 24 hours)   Basic metabolic panel   Result Value Ref Range    Glucose 92 74 - 99 mg/dL    Sodium 135 (L) 136 - 145 mmol/L    Potassium 3.2 (L) 3.5 - 5.3 mmol/L    Chloride 88 (L) 98 - 107 mmol/L    Bicarbonate 41 (HH) 21 - 32 mmol/L    Anion Gap 9 (L) 10 - 20 mmol/L    Urea Nitrogen 22 6 - 23 mg/dL    Creatinine 0.76 0.50 - 1.05 mg/dL    eGFR 81 >60 mL/min/1.73m*2    Calcium 8.8 8.6 - 10.3 mg/dL   CBC   Result Value Ref Range    WBC 8.0 4.4 - 11.3 x10*3/uL    nRBC 0.0 0.0 - 0.0 /100 WBCs    RBC 3.32 (L) 4.00 - 5.20 x10*6/uL    Hemoglobin 9.4 (L) 12.0 - 16.0 g/dL    Hematocrit 30.5 (L) 36.0 - 46.0 %    MCV 92 " 80 - 100 fL    MCH 28.3 26.0 - 34.0 pg    MCHC 30.8 (L) 32.0 - 36.0 g/dL    RDW 15.9 (H) 11.5 - 14.5 %    Platelets 301 150 - 450 x10*3/uL                    Assessment/Plan   Assessment & Plan  Acute on chronic diastolic congestive heart failure    Hyperlipidemia    Essential hypertension    Chronic low back pain    Shortness of breath    Hypomagnesemia    Acute hypoxic respiratory failure (Multi)    #1 acute systolic heart failure ejection fraction of 35%.  Patient had just had an echocardiogram yesterday showing ejection fraction of 35% likely secondary in part to left bundle.  Patient before on hydrochlorothiazide which was stopped for hyponatremia.  Currently has severe hypokalemia as well as hypomagnesemia.  Will optimize her treatment for heart failure systolic dysfunction.  Patient may need lumbar puncture as a workup for gram-negative bacteremia in the blood.  She will need ischemic workup which could be deferred as an outpatient.  She would like to hold off cardiac catheterization for now.  Will optimize her treatment for heart failure systolic dysfunction follow-up as an outpatient in my office in few weeks.      2.  Hypertension.  Will stop amlodipine and started on losartan 25 mg oral daily.  Patient has history of angioedema with has moderate LV*dysfunction she will benefit of ARB's.  Will start her also on Farxiga for heart failure systolic dysfunction.  Will reassess renal function and electrolytes in AM.     3.  Obesity hypoventilation.     4.  Hypokalemia replaced with 40 mg of potassium today.     5.  Hypomagnesemia we will replace with 2 more grams of potassium IV.     10/21: As above.  Patient to have paraspinal abscess drainage and potentially thoracentesis tomorrow at [facility].  For now we will continue with IV diuretic at Lasix 40 mg twice daily.  She tells me she is diuresing well although documented input and output do not demonstrate this.  Suspect that these readings are incorrect.  She  remains in normal sinus rhythm without significant ectopy on telemetry.  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  Will continue to optimize her from a heart failure standpoint with diuresis, before undergoing ischemic workup.  She was started on losartan yesterday.  Pressures are stable most recent reading 135/75.  She remains on 2 L of O2 with saturations at 100%.  Lab work this morning reveals a sodium of 133, potassium of 3.3, creatinine 0.59, hemoglobin 10.0 hematocrit 32.5.  Potassium supplementation has been ordered.  She remains on IV antibiotics for discitis.  Will continue to diurese and follow with you.     10/22: As Above. Patient just returned from [facility] for spinal abscess biopsy. She has no complaints. She was transitioned to oral diuretic yesterday, reports good diuretic effect. I&Os not yet recorded today. She was transitioned to Coreg 3.125 mg yesterday as well. Tolerating this well. Continue Losartan 50 mg. Patient has been weaned off O2, saturations currently 95%, continue to monitor. Lab work reveals sodium 134, potassium 3.4, creatinine 0.69, hemoglobin 9.9 and hematocrit 32.3. She remains in normal sinus rhythm on telemetry. Blood pressures are stable most recent reading 109/74. Will continue to follow with you.      10/23: As above.  Patient had spinal abscess biopsied yesterday.  She reports her back pain is better.  Evidently patient became hypoxic overnight.  Supplemental O2 was placed again.  Suspect patient has a component of obstructive sleep apnea.  Patient is slightly tachycardic this morning.  Most likely secondary to her ongoing infection.  This will uptitrate her Coreg today to 6.25 mg.  Continue with losartan 50 mg for now.  Continue 40 mg twice daily Lasix.  Patient still complains of left lower extremity edema.  Discussed with nursing will wrap patient's legs for passive mobilization of fluid.  Continue Farxiga. Patient's blood pressures are  stable most recent reading 123/58.  She remains in normal sinus rhythm on telemetry without significant ectopy.  Patient had a PICC line placed.  Infectious disease is awaiting final biopsy results before giving final antibiotic recommendations.    10/24: As above.  Patient has no complaints today.  She was apparently refusing her Farxiga as she stated she is not diabetic.  Her and I had a long discussion about the importance of this medication in the setting of chronic systolic heart failure.  Patient is agreeable to taking this medication.  She continues to do well on the increased dose of carvedilol.  Continue with 12.5 mg twice daily.  I have cut back on her diuretic today as sure her bicarb increased to 41.  She will continue on 40 mg p.o. Lasix daily as maintenance diuretic.  Continue losartan 50 mg daily.  Blood pressures are stable most recent reading 102/63.  She remains in normal sinus rhythm on telemetry.  She does continue on supplemental O2 at 2 L with saturations at 96%.  Lab work today reveals sodium of 135, potassium of 3.2 creatinine 0.76.  Infectious disease has given their recommendations she is to have IV or ertapenem 1 g daily for 6 weeks. Patient likely to go to acute rehab facility within the next 24-28 hours. Will continue to follow with you.           I spent 20 minutes in the professional and overall care of this patient.      Tana Lassiter PA-C

## 2024-10-24 NOTE — PROGRESS NOTES
10/24/24 1432   Discharge Planning   Expected Discharge Disposition SNF  (pending acceptance)   Does the patient need discharge transport arranged? Yes   RoundTrip coordination needed? Yes   Has discharge transport been arranged? No     Choices obtained this morning and submitted to [facility] for referral.  Patient wants a private room which isn't available at [place].  [facility] and [facility] are pending.  Questions all answered  Daughter states she has a long term plan if mom doesn't succeed at rehab this time.    Pending acceptance.   Patient has a discharge and is ready medically to go.    1610- NP, JAZMYN, wants patient to discharge tonight.  She had pharmacy retime Antibiotic.  Family and patient in room updated as well as .   Waiting on her to complete the discharge so I can get the paperwork done .

## 2024-10-24 NOTE — PROGRESS NOTES
Spiritual Care Visit    Clinical Encounter Type  Visited With: Patient  Routine Visit: Introduction    Congregation Encounters  Congregation Needs: Prayer     Annotation:  provided patient support while rounding the Unit.  introduced  services of    explained the role of the  in providing emotional and spiritual support for patient's and family while in admitted to the hospital. Patient stated that she had a fall, and has been having difficulty walking. Patient stated that her medical team are attempting address this issue which gives her courage and hope. Patient has family in the area and has good support. Patient requested prayer which was offered. No other spiritual or [employer] needs were expressed. Spiritual care will remain available for support as requested.                                    Taxonomy  Intended Effects: Establish rapport and connectedness, Build relationship of care and support

## 2024-10-24 NOTE — TELEPHONE ENCOUNTER
Bedside visit with patient and daughter at [facility]. Plan is for patient to discharge to SNF, facility to be determined. IV Ertapenem ordered x 6 weeks per ID. Patient uncertain of post SNF needs at this time, she is hopeful to regain function and discharge home, if not she may require LTC. House Calls to follow, patient and daughter in agreement.

## 2024-10-24 NOTE — PROGRESS NOTES
Angela Montelongo is a 74 y.o. female on day 6 of admission presenting with Acute on chronic diastolic congestive heart failure.    Subjective   Interval History:   Sitting up in chair  Afebrile, no chills  No chest pain or shortness of breath  No nausea vomiting or diarrhea    No new complaints     Objective   Range of Vitals (last 24 hours)  Heart Rate:  [77-99]   Temp:  [36.1 °C (97 °F)-36.4 °C (97.5 °F)]   Resp:  [15-18]   BP: (102-142)/(55-69)   SpO2:  [95 %-99 %]   Daily Weight  10/18/24 : 111 kg (245 lb)    Body mass index is 43.4 kg/m².    Physical Exam  Constitutional:       Appearance: Normal appearance. She is obese.   HENT:      Head: Normocephalic and atraumatic.      Nose: Nose normal.      Mouth/Throat:      Mouth: Mucous membranes are moist.      Pharynx: Oropharynx is clear.   Eyes:      General: No scleral icterus.  Cardiovascular:      Rate and Rhythm: Normal rate and regular rhythm.   Pulmonary:      Comments: Decreased bilateral breath sounds   Abdominal:      General: Bowel sounds are normal.      Palpations: Abdomen is soft.   Musculoskeletal:         General: Normal range of motion.      Cervical back: Normal range of motion and neck supple.   Skin:     General: Skin is warm and dry.   Neurological:      Mental Status: She is alert.      Comments: Awake, alert   Psychiatric:         Mood and Affect: Mood normal.         Behavior: Behavior normal.     Antibiotics  meropenem - 1 gram/50 mL    Relevant Results  Labs  Results from last 72 hours   Lab Units 10/24/24  0443 10/22/24  0421   WBC AUTO x10*3/uL 8.0 7.5   HEMOGLOBIN g/dL 9.4* 9.9*   HEMATOCRIT % 30.5* 32.3*   PLATELETS AUTO x10*3/uL 301 301     Results from last 72 hours   Lab Units 10/24/24  0443 10/22/24  0421   SODIUM mmol/L 135* 134*   POTASSIUM mmol/L 3.2* 3.4*   CHLORIDE mmol/L 88* 89*   CO2 mmol/L 41* 39*   BUN mg/dL 22 20   CREATININE mg/dL 0.76 0.69   GLUCOSE mg/dL 92 82   CALCIUM mg/dL 8.8 9.2   ANION GAP mmol/L 9* 9*   EGFR  Left message for pt to have INR checked    Kari Bergeron, PharmD     mL/min/1.73m*2 81 90           Estimated Creatinine Clearance: 75.4 mL/min (by C-G formula based on SCr of 0.76 mg/dL).  C-Reactive Protein   Date Value Ref Range Status   10/20/2024 7.80 (H) <1.00 mg/dL Final   10/18/2024 18.71 (H) <1.00 mg/dL Final     CRP   Date Value Ref Range Status   03/07/2018 4.2 (H) 0 - 2.0 MG/DL Final     Comment:     Performed at [address] [address]     Microbiology  Susceptibility data from last 14 days.  Collected Specimen Info Organism Ampicillin Cefazolin Cefazolin (uncomplicated UTIs only) Ciprofloxacin Gentamicin Levofloxacin Nitrofurantoin Piperacillin/Tazobactam Trimethoprim/Sulfamethoxazole   10/17/24 Urine from Clean Catch/Voided Escherichia coli  S  S  S  S  S   S  S  S   10/17/24 Blood culture from Peripheral Venipuncture Escherichia coli            10/17/24 Blood culture from Peripheral Venipuncture Escherichia coli  S  S   S  S  S   S  S     Imaging  MR lumbar spine wo IV contrast    Result Date: 10/20/2024  Interpreted By:  Les Dumont, STUDY: MR THORACIC SPINE WO IV CONTRAST; MR LUMBAR SPINE WO IV CONTRAST; 10/19/2024 4:24 pm   INDICATION: Signs/Symptoms:Question discitis/osteomyelitis on CT imaging.     COMPARISON: CT thoracic and lumbar spine dated 10/17/2024.   ACCESSION NUMBER(S): [id number]; [id number]   ORDERING CLINICIAN: JORGITO GRIFFITH   TECHNIQUE: Noncontrast multiplanar multisequence MR imaging of the thoracic and lumbar spine was performed. Sagittal T1, T2, STIR and axial T2 and T1 weighted MR images of the thoracic spine were obtained.   FINDINGS: The degree of motion artifact significantly degrades evaluation.   THORACIC SPINE:   Assessment of the upper thoracic spinal cord is limited by motion artifact. CSF flow related artifact also degrades evaluation on several sequences. Within these limitations, there is no definite cord signal abnormality or cord compression. There is concern for epidural abscess component within the  lower thoracic region with suggestion of circumferential T2 hyperintensity surrounding the thecal sac measuring up to 3 mm in maximum thickness along the ventral aspect at T12 (series 9, image 27). Potential epidural fluid/thickening may extend as far cranially as T7.   There is abnormal marrow signal centered about the T11-T12 endplates and intervertebral disc with STIR hyperintensity and T1 hypointensity and irregular appearance of the disc and endplates as previously described on CT examination, concerning for discitis/osteomyelitis. Signal abnormality also extends to involve the left-greater-than-right facet joints and there is extensive signal abnormality within the adjacent soft tissues including suspected paraspinal abscess components such as a 1.8 x 1.7 cm fluid collection along the rightward aspect of T11-T12 adjacent to the neural foraminal region (series 9, image 24). There is resultant mild height loss of the T11 and T12 vertebral bodies. Presumed epidural components resulting in moderate spinal canal stenosis at T11-T12. Inflammatory components are also evident involving the T11-T12 neural foraminal regions bilaterally.   Allowing for motion artifact there is no additional evidence of discitis/osteomyelitis within the remainder of the thoracic spine. The remaining thoracic vertebral bodies demonstrate grossly preserved heights. There is trace degenerative appearing anterolisthesis of T1 on T2 and T3 on T4. There is unchanged S-shaped scoliosis of the cervicothoracic spine with levo scoliosis of the upper cervicothoracic region and dextroscoliotic curvature of the thoracolumbar region. Multilevel type 2 Modic endplate signal change. There is moderate multilevel degenerative disc height loss throughout the thoracic regions. There is no additional significant spinal canal stenosis evident. Predominantly mild multilevel neural foraminal narrowing is suspected with evaluation limited by scoliosis and  aforementioned motion artifact.   There is extensive fatty atrophy of the posterior paraspinal musculature. Bilateral pleural effusions and dependent parenchymal airspace opacity are again noted.     LUMBAR SPINE:   Normal segmentation.   Similar to the T11-T12 level, there is extensive marrow edema and signal abnormality centered about the L1-L2 endplates and intervertebral disc concerning for discitis/osteomyelitis. There is surrounding soft tissue signal abnormality with concern for small abscess components along the rightward aspect at L1-L2 measuring up to 2.2 cm in diameter (series 9, image 20). No discrete epidural abscess component is seen at this level. There is mild destructive endplate height loss involving the L1 and L2 vertebrae.   The cauda equina are otherwise unremarkable. The conus appears to terminate at the T12-L1 level with CSF flow related artifact obscuring evaluation.   Similar levo scoliosis of the lumbar spine. The remaining lumbar vertebral body heights are otherwise maintained. There is no additional suspicious STIR hyperintensity/marrow edema within the remaining lumbar levels. There is moderate degenerative disc height loss within the remainder of the lumbar spine with likely degenerative appearing fluid within portions of the discs.   Degenerative change:   T12-L1: No spinal canal stenosis. Mild right-greater-than-left neural foraminal narrowing suggested.   L1-2: Mild facet arthropathy. Inflammatory components involving the right-greater-than-left paraspinal soft tissues with no definite spinal canal stenosis, suspected mild right lateral recess narrowing, and inflammatory components extending to the right-greater-than-left neural foraminal regions.   L2-3: Mild bilateral facet arthropathy. Mild disc bulge and endplate spurring, worse along the rightward aspect. No spinal canal stenosis with mild right lateral recess narrowing. Minimal left and mild-to-moderate right neural foraminal  narrowing.   L3-4: Moderate to severe bilateral facet arthropathy. Posterior disc osteophyte components. Moderate to severe spinal canal stenosis suggested. Moderate bilateral neural foraminal narrowing.   L4-5: Severe left and mild-to-moderate right facet arthropathy with ligamentum flavum hypertrophy. Disc bulge and hypertrophic endplate spurring. Severe spinal canal stenosis. Mild right and moderate to severe left neural foraminal narrowing.   L5-S1: Severe facet arthropathy. Mild disc bulge/uncovering. Mild left lateral recess narrowing. No additional spinal canal stenosis moderate bilateral neural foraminal narrowing.   Extensive fatty atrophy of the posterior paraspinal and iliopsoas musculature.       1. Motion degraded examination. 2. Findings to suggest discitis/osteomyelitis involving the T11-T12 and L1-L2 levels. There is mild height loss related to endplate destruction at these levels with surrounding inflammatory soft tissue change including right-sided small paraspinal abscess components suspected. Additionally, there is concern for potential epidural phlegmon versus abscess centered at T11-T12 with resultant moderate spinal canal stenosis at this level. Inflammatory components also involve the neural foraminal regions at both levels. 3. No additional evidence of significant spinal canal stenosis or cord compression within the thoracic regions when accounting for motion limitations. There is concern for moderate to severe spinal canal stenosis at L3-L4 and severe spinal canal stenosis at L4-L5 secondary to degenerative change. 4. Extensive scoliosis.   MACRO: None   Signed by: Les Dumont 10/20/2024 1:14 PM Dictation workstation:   [id number]    MR thoracic spine wo IV contrast    Result Date: 10/20/2024  Interpreted By:  Les Dumont, STUDY: MR THORACIC SPINE WO IV CONTRAST; MR LUMBAR SPINE WO IV CONTRAST; 10/19/2024 4:24 pm   INDICATION: Signs/Symptoms:Question discitis/osteomyelitis on CT  imaging.     COMPARISON: CT thoracic and lumbar spine dated 10/17/2024.   ACCESSION NUMBER(S): [id number]; [id number]   ORDERING CLINICIAN: JORGITO GRIFFITH   TECHNIQUE: Noncontrast multiplanar multisequence MR imaging of the thoracic and lumbar spine was performed. Sagittal T1, T2, STIR and axial T2 and T1 weighted MR images of the thoracic spine were obtained.   FINDINGS: The degree of motion artifact significantly degrades evaluation.   THORACIC SPINE:   Assessment of the upper thoracic spinal cord is limited by motion artifact. CSF flow related artifact also degrades evaluation on several sequences. Within these limitations, there is no definite cord signal abnormality or cord compression. There is concern for epidural abscess component within the lower thoracic region with suggestion of circumferential T2 hyperintensity surrounding the thecal sac measuring up to 3 mm in maximum thickness along the ventral aspect at T12 (series 9, image 27). Potential epidural fluid/thickening may extend as far cranially as T7.   There is abnormal marrow signal centered about the T11-T12 endplates and intervertebral disc with STIR hyperintensity and T1 hypointensity and irregular appearance of the disc and endplates as previously described on CT examination, concerning for discitis/osteomyelitis. Signal abnormality also extends to involve the left-greater-than-right facet joints and there is extensive signal abnormality within the adjacent soft tissues including suspected paraspinal abscess components such as a 1.8 x 1.7 cm fluid collection along the rightward aspect of T11-T12 adjacent to the neural foraminal region (series 9, image 24). There is resultant mild height loss of the T11 and T12 vertebral bodies. Presumed epidural components resulting in moderate spinal canal stenosis at T11-T12. Inflammatory components are also evident involving the T11-T12 neural foraminal regions bilaterally.   Allowing for motion artifact  there is no additional evidence of discitis/osteomyelitis within the remainder of the thoracic spine. The remaining thoracic vertebral bodies demonstrate grossly preserved heights. There is trace degenerative appearing anterolisthesis of T1 on T2 and T3 on T4. There is unchanged S-shaped scoliosis of the cervicothoracic spine with levo scoliosis of the upper cervicothoracic region and dextroscoliotic curvature of the thoracolumbar region. Multilevel type 2 Modic endplate signal change. There is moderate multilevel degenerative disc height loss throughout the thoracic regions. There is no additional significant spinal canal stenosis evident. Predominantly mild multilevel neural foraminal narrowing is suspected with evaluation limited by scoliosis and aforementioned motion artifact.   There is extensive fatty atrophy of the posterior paraspinal musculature. Bilateral pleural effusions and dependent parenchymal airspace opacity are again noted.     LUMBAR SPINE:   Normal segmentation.   Similar to the T11-T12 level, there is extensive marrow edema and signal abnormality centered about the L1-L2 endplates and intervertebral disc concerning for discitis/osteomyelitis. There is surrounding soft tissue signal abnormality with concern for small abscess components along the rightward aspect at L1-L2 measuring up to 2.2 cm in diameter (series 9, image 20). No discrete epidural abscess component is seen at this level. There is mild destructive endplate height loss involving the L1 and L2 vertebrae.   The cauda equina are otherwise unremarkable. The conus appears to terminate at the T12-L1 level with CSF flow related artifact obscuring evaluation.   Similar levo scoliosis of the lumbar spine. The remaining lumbar vertebral body heights are otherwise maintained. There is no additional suspicious STIR hyperintensity/marrow edema within the remaining lumbar levels. There is moderate degenerative disc height loss within the  remainder of the lumbar spine with likely degenerative appearing fluid within portions of the discs.   Degenerative change:   T12-L1: No spinal canal stenosis. Mild right-greater-than-left neural foraminal narrowing suggested.   L1-2: Mild facet arthropathy. Inflammatory components involving the right-greater-than-left paraspinal soft tissues with no definite spinal canal stenosis, suspected mild right lateral recess narrowing, and inflammatory components extending to the right-greater-than-left neural foraminal regions.   L2-3: Mild bilateral facet arthropathy. Mild disc bulge and endplate spurring, worse along the rightward aspect. No spinal canal stenosis with mild right lateral recess narrowing. Minimal left and mild-to-moderate right neural foraminal narrowing.   L3-4: Moderate to severe bilateral facet arthropathy. Posterior disc osteophyte components. Moderate to severe spinal canal stenosis suggested. Moderate bilateral neural foraminal narrowing.   L4-5: Severe left and mild-to-moderate right facet arthropathy with ligamentum flavum hypertrophy. Disc bulge and hypertrophic endplate spurring. Severe spinal canal stenosis. Mild right and moderate to severe left neural foraminal narrowing.   L5-S1: Severe facet arthropathy. Mild disc bulge/uncovering. Mild left lateral recess narrowing. No additional spinal canal stenosis moderate bilateral neural foraminal narrowing.   Extensive fatty atrophy of the posterior paraspinal and iliopsoas musculature.       1. Motion degraded examination. 2. Findings to suggest discitis/osteomyelitis involving the T11-T12 and L1-L2 levels. There is mild height loss related to endplate destruction at these levels with surrounding inflammatory soft tissue change including right-sided small paraspinal abscess components suspected. Additionally, there is concern for potential epidural phlegmon versus abscess centered at T11-T12 with resultant moderate spinal canal stenosis at this  level. Inflammatory components also involve the neural foraminal regions at both levels. 3. No additional evidence of significant spinal canal stenosis or cord compression within the thoracic regions when accounting for motion limitations. There is concern for moderate to severe spinal canal stenosis at L3-L4 and severe spinal canal stenosis at L4-L5 secondary to degenerative change. 4. Extensive scoliosis.   MACRO: None   Signed by: Les Dumont 10/20/2024 1:14 PM Dictation workstation:   [id number]    Transthoracic Echo (TTE) Complete    Result Date: 10/19/2024            [facility] [address] , [place] [id number]             Phone [phone] TRANSTHORACIC ECHOCARDIOGRAM REPORT Patient Name:      BRUNA HELTON        Reading Physician:    [id number] Cristina Wharton MD Study Date:        10/18/2024            Ordering Provider:    [id number] GARCIA MRN/PID:           [id number]              Fellow: Accession#:        [id number]          Nurse: Date of Birth/Age: 1950 / 74 years  Sonographer:          Audrey Barger RDCS Gender:            F                     Additional Staff:     Erlinda Merino RDCS Height:            160.02 cm             Admit Date: Weight:            117.94 kg             Admission Status:     Inpatient -                                                                Routine BSA / BMI:         2.16 m2 / 46.06 kg/m2 Department Location:  [facility] Blood Pressure: 131 /65 mmHg Study Type:    TRANSTHORACIC ECHO (TTE) COMPLETE Diagnosis/ICD: Acute on chronic diastolic (congestive) heart failure                (CHF)-I50.33 Indication:    CHF, SOB CPT Codes:     Echo Complete w Full [id number] Patient  History: Pertinent History: HLD, bradycardia, HTN, CHF. Study Detail: The following Echo studies were performed: 2D, M-Mode, Doppler and               color flow. Technically challenging study due to body habitus,               patient lying in supine position and prominent lung artifact.               Definity used as a contrast agent for endocardial border               definition. Total contrast used for this procedure was 4 mL via IV               push.  PHYSICIAN INTERPRETATION: Left Ventricle: The left ventricular systolic function is moderately decreased, with a visually estimated ejection fraction of 30-35%. There are no regional wall motion abnormalities. The left ventricular cavity size is normal. Abnormal (paradoxical) septal motion, consistent with an intraventricular conduction delay. Spectral Doppler shows a normal pattern of left ventricular diastolic filling. Left Atrium: The left atrium is normal in size. Right Ventricle: The right ventricle was not well visualized. There is reduced right ventricular systolic function. Right Atrium: The right atrium is normal in size. Aortic Valve: The aortic valve is trileaflet. The aortic valve dimensionless index is 0.78. There is no evidence of aortic valve regurgitation. The peak instantaneous gradient of the aortic valve is 7.2 mmHg. The mean gradient of the aortic valve is 4.0 mmHg. Mitral Valve: The mitral valve is normal in structure. There is trace mitral valve regurgitation. Tricuspid Valve: The tricuspid valve was not well visualized. There is trace tricuspid regurgitation. The right ventricular systolic pressure is unable to be estimated. Pulmonic Valve: The pulmonic valve is not well visualized. There is physiologic pulmonic valve regurgitation. Pericardium: No pericardial effusion noted. Aorta: The aortic root is normal. Systemic Veins: The inferior vena cava appears normal in size, with IVC inspiratory collapse less than 50%.  CONCLUSIONS:  1. Poorly  visualized anatomical structures due to suboptimal image quality.  2. The left ventricular systolic function is moderately decreased, with a visually estimated ejection fraction of 30-35%.  3. There is reduced right ventricular systolic function.  4. Trace mitral valve regurgitation.  5. Trace tricuspid regurgitation is visualized.  6. Limited images quality. Left ventricular ejection fraction appears reduced at 30 to 35% with interventricular conduction delay. QUANTITATIVE DATA SUMMARY:  LA VOLUME:                   Normal Ranges: LA Vol A4C:        69.7 ml   (22+/-6mL/m2) LA Vol Index A4C:  32.2ml/m2 LA Area A4C:       23.0 cm2 LA Major Axis A4C: 6.4 cm LA Vol A4C:        64.9 ml  M-MODE MEASUREMENTS:         Normal Ranges: Ao Root:             2.90 cm (2.0-3.7cm) LAs:                 4.00 cm (2.7-4.0cm)  AORTA MEASUREMENTS:         Normal Ranges: Ao Sinus, d:        2.92 cm (2.1-3.5cm) Ao STJ, d:          2.61 cm (1.7-3.4cm) Asc Ao, d:          3.40 cm (2.1-3.4cm)  LV SYSTOLIC FUNCTION BY 2D PLANIMETRY (MOD):                      Normal Ranges: EF-A4C View:    38 % (>=55%) EF-Visual:      33 % LV EF Reported: 33 %  LV DIASTOLIC FUNCTION:           Normal Ranges: MV Peak E:             0.73 m/s  (0.7-1.2 m/s) MV Peak A:             0.92 m/s  (0.42-0.7 m/s) E/A Ratio:             0.79      (1.0-2.2) MV e'                  0.066 m/s (>8.0) MV lateral e'          0.07 m/s MV medial e'           0.06 m/s E/e' Ratio:            11.06     (<8.0)  MITRAL VALVE:         Normal Ranges: MV DT:        94 msec (150-240msec)  AORTIC VALVE:                     Normal Ranges: AoV Vmax:                1.34 m/s (<=1.7m/s) AoV Peak P.2 mmHg (<20mmHg) AoV Mean P.0 mmHg (1.7-11.5mmHg) LVOT Max Alex:            1.07 m/s (<=1.1m/s) AoV VTI:                 25.00 cm (18-25cm) LVOT VTI:                19.50 cm LVOT Diameter:           2.40 cm  (1.8-2.4cm) AoV Area, VTI:           3.53 cm2 (2.5-5.5cm2) AoV  Area,Vmax:           3.61 cm2 (2.5-4.5cm2) AoV Dimensionless Index: 0.78  RIGHT VENTRICLE: TAPSE: 14.7 mm RV s'  0.12 m/s  TRICUSPID VALVE/RVSP:         Normal Ranges: IVC Diam:             1.59 cm  [id number] Cristina Wharton MD Electronically signed on 10/19/2024 at 4:28:53 PM  ** Final **     ECG 12 lead    Result Date: 10/18/2024  Sinus rhythm with Premature supraventricular complexes and with occasional and consecutive Premature ventricular complexes Right bundle branch block Left anterior fascicular block  Bifascicular block  Cannot rule out Anteroseptal infarct , age undetermined Abnormal ECG When compared with ECG of 30-AUG-2024 12:12, Premature supraventricular complexes are now Present Sinus rhythm is no longer with 2nd degree AV block (Mobitz II) Right bundle branch block has replaced Nonspecific intraventricular block Minimal criteria for Anteroseptal infarct are now Present    CT thoracic spine wo IV contrast    Result Date: 10/17/2024  Interpreted By:  Awais Ellis, STUDY: CT THORACIC SPINE WO IV CONTRAST; CT LUMBAR SPINE WO IV CONTRAST; 10/17/2024 10:15 pm   INDICATION: Signs/Symptoms:back pain, fall x 2 days ago.     COMPARISON: 08/31/2024   ACCESSION NUMBER(S): [id number]; [id number]   ORDERING CLINICIAN: KALYN RON   TECHNIQUE: Axial CT images of the thoracolumbar spine are obtained. Axial, coronal and sagittal reconstructions are submitted for review.   FINDINGS:     Alignment: There is mild scoliosis noted.   Vertebrae/Intervertebral Discs: The thoracolumbar vertebral body heights are intact. There is severe loss of intervertebral disc space. There are severe intervertebral destructive changes noted of the T11-T12 and L2-L3 levels. Findings appear somewhat worsened when compared to the prior study. Question underlying discitis/osteomyelitis. Recommend MRI for further evaluation. There is multilevel spinal canal stenosis noted. There is facet arthrosis noted.   Paraspinous Soft Tissues: Within  normal limits.         There are severe intervertebral destructive changes noted of the T11-T12 and L2-L3 levels. Findings appear somewhat worsened when compared to the prior study. Question underlying discitis/osteomyelitis. Recommend MRI for further evaluation. Recommend clinical correlation.   MACRO: None   Signed by: Awais Ellis 10/17/2024 10:32 PM Dictation workstation:   [id number]    CT lumbar spine wo IV contrast    Result Date: 10/17/2024  Interpreted By:  Awais Ellis, STUDY: CT THORACIC SPINE WO IV CONTRAST; CT LUMBAR SPINE WO IV CONTRAST; 10/17/2024 10:15 pm   INDICATION: Signs/Symptoms:back pain, fall x 2 days ago.     COMPARISON: 08/31/2024   ACCESSION NUMBER(S): [id number]; [id number]   ORDERING CLINICIAN: KALYN RON   TECHNIQUE: Axial CT images of the thoracolumbar spine are obtained. Axial, coronal and sagittal reconstructions are submitted for review.   FINDINGS:     Alignment: There is mild scoliosis noted.   Vertebrae/Intervertebral Discs: The thoracolumbar vertebral body heights are intact. There is severe loss of intervertebral disc space. There are severe intervertebral destructive changes noted of the T11-T12 and L2-L3 levels. Findings appear somewhat worsened when compared to the prior study. Question underlying discitis/osteomyelitis. Recommend MRI for further evaluation. There is multilevel spinal canal stenosis noted. There is facet arthrosis noted.   Paraspinous Soft Tissues: Within normal limits.         There are severe intervertebral destructive changes noted of the T11-T12 and L2-L3 levels. Findings appear somewhat worsened when compared to the prior study. Question underlying discitis/osteomyelitis. Recommend MRI for further evaluation. Recommend clinical correlation.   MACRO: None   Signed by: Awais Ellis 10/17/2024 10:32 PM Dictation workstation:   [id number]    CT angio chest for pulmonary embolism    Result Date: 10/17/2024  Interpreted By:  Awais Ellis,  STUDY: CT ANGIO CHEST FOR PULMONARY EMBOLISM;  10/17/2024 8:09 pm   INDICATION: Signs/Symptoms:shortness of breath and tachypnea.     COMPARISON: CT abdomen pelvis dated 08/31/2024   ACCESSION NUMBER(S): [id number]   ORDERING CLINICIAN: KALYN RON   TECHNIQUE: Helical data acquisition of the chest was obtained after intravenous administration of 75 ML Omnipaque 350, as per PE protocol. Images were reformatted in coronal and sagittal planes. Axial and coronal maximum intensity projection (MIP) images were created and reviewed.   FINDINGS: POTENTIAL LIMITATIONS OF THE STUDY: The assessment is limited by respiratory motion and suboptimal contrast opacification of pulmonary arteries.   HEART AND VESSELS: There is no main or lobar pulmonary embolus visualized. There is suboptimal opacification and respiratory motion noted limiting evaluation of the segmental and subsegmental branches. Main pulmonary artery and its branches are normal in caliber.   The thoracic aorta normal in course and caliber. Coronary artery calcifications are seen. Please note, the study is not optimized for evaluation of coronary arteries.   The cardiac chambers are not enlarged.   There is no pericardial effusion seen.   MEDIASTINUM AND SENG, LOWER NECK AND AXILLA: The visualized thyroid gland is within normal limits. No evidence of thoracic lymphadenopathy by CT criteria. Esophagus appears within normal limits as seen.   LUNGS AND AIRWAYS: There are large bilateral pleural effusions with overlying atelectasis versus consolidation. There are scattered ground-glass airspace opacities noted.     UPPER ABDOMEN: The visualized subdiaphragmatic structures demonstrate no remarkable findings.       CHEST WALL AND OSSEOUS STRUCTURES: Chest wall is within normal limits. There is multilevel loss of intervertebral disc space. The endplate osteophytic changes noted.There are severe degenerative changes noted of the bilateral shoulder joints. Associated  soft tissue lesions.There are destructive changes noted of the T11-T12 and L1-L2 disc spaces noted. Findings are unchanged from the prior study.       1. There is no main or lobar pulmonary embolus visualized. There is suboptimal opacification and respiratory motion noted limiting evaluation of the segmental and subsegmental branches. 2. There are large bilateral pleural effusions with overlying atelectasis versus consolidation. There are scattered ground-glass airspace opacities noted. 3. There are severe degenerative changes noted of the bilateral shoulder joints. Associated soft tissue lesions.   MACRO: None   Signed by: Awais Ellis 10/17/2024 8:28 PM Dictation workstation:   [id number]     Assessment/Plan   Discitis/osteomyelitis -seen on CT/OMB-N4-W2-paraspinal abscess-awaiting IR aspiration  Acute hypoxic respiratory failure, resolving, on 2 L  Bilateral pleural effusion   E. coli urinary tract infection  E. coli bacteremia  Allergy to cephalosporin -tolerating meropenem         IV meropenem-monitor for adverse reaction   IV ertapenem x 1 dose prior to discharge   Follow up fine needle aspiration  Supplemental oxygen, wean as tolerated  Monitor temperature and WBC  PICC line placed    Long term plan IV ertapenem 1 gram daily for total 6 weeks till 11/29/2024  Weekly CBC with diff, CMP  Follow up with Dr. Norwood  Follow up Intervertebral aspiration culture results     Total time spent caring for the patient today was 20 minutes. This includes time spent before the visit reviewing the chart, time spent during the visit, and time spent after the visit on documentation.     Lalitha Yancey, APRN-CNP

## 2024-10-24 NOTE — PROGRESS NOTES
Occupational Therapy    Occupational Therapy Treatment    Name: Angela Montelongo  MRN: [id number]  Department: [address]  Room: [address] [place] [address]  Date: 10/24/24  Time Calculation  Start Time: 1018  Stop Time: 1059  Time Calculation (min): 41 min    Assessment:  OT Assessment: Continues to progress towards OT POC. Motivated to advance OOB activity as able.  Prognosis: Good  Barriers to Discharge: Other (Comment) (danyel santos for xfers)  Evaluation/Treatment Tolerance: Patient tolerated treatment well  Medical Staff Made Aware: Yes  End of Session Communication: Bedside nurse  End of Session Patient Position: Up in chair, Alarm on  Plan:  Treatment Interventions: ADL retraining, Functional transfer training, UE strengthening/ROM, Endurance training, Patient/family training, Equipment evaluation/education, Neuromuscular reeducation, Compensatory technique education  OT Frequency: 4 times per week  OT Discharge Recommendations: Moderate intensity level of continued care  Equipment Recommended upon Discharge: Lift  OT Recommended Transfer Status: Assist of 2  OT - OK to Discharge: Yes    Subjective   Previous Visit Info:  OT Last Visit  OT Received On: 10/24/24  General:  General  Reason for Referral: Decreased ADLS  Referred By: ANGELITO Blevins-CNP  Past Medical History Relevant to Rehab: HTN, CKD, low back pain, spinal stenosis, falls  Family/Caregiver Present: No  Prior to Session Communication: Bedside nurse  Patient Position Received: Bed, 3 rail up, Alarm on  Preferred Learning Style: verbal, visual  General Comment: Cleared by nsg, pt met in supine, agreeable to OT session  Precautions:  Hearing/Visual Limitations: vision and hearing WFL  Medical Precautions: Fall precautions, Oxygen therapy device and L/min, Spinal precautions  Post-Surgical Precautions: Spinal precautions  Precautions Comment: spine precautions for pain mgmt.  on 2L O2 via NC    Pain Assessment:  Pain Assessment  Pain Assessment: 0-10  0-10 (Numeric)  Pain Score: 6  Pain Type: Chronic pain  Pain Location: Back     Objective   Cognition:  Overall Cognitive Status: Within Functional Limits  Cognition Comments: motivated and cooperative. slightly anxious at times    Activities of Daily Living:    Grooming  Grooming Level of Assistance: Minimum assistance  Grooming Where Assessed: Recliner  Grooming Comments: patient completes oral care, face washing, application of lotion, and hair combing while seated.  requires min A for thorough hair combing posteriorly d/t lack of sufficient shoulder flexion bilaterally    LE Dressing  LE Dressing: Yes  Adult Briefs Level of Assistance: Dependent  LE Dressing Where Assessed: Bed level  LE Dressing Comments: lateral rolling in supine completed to faciliate adult brief mgmt    Toileting  Toileting Level of Assistance: Dependent  Where Assessed: Bed level  Toileting Comments: patient met in supine on bedpan, requires dependent assist for bed pan removal and thorough posterior/richie area hygiene acts    Bed Mobility/Transfers:   Bed Mobility  Bed Mobility: Yes  Bed Mobility 1  Bed Mobility 1: Rolling left, Rolling right  Level of Assistance 1: Moderate assistance  Bed Mobility Comments 1: lateral rolling Lt>Rt x2 trials to complete hygiene acts and linen change  Bed Mobility 2  Bed Mobility  2: Supine to sitting  Level of Assistance 2: Moderate assistance, +2  Bed Mobility Comments 2: min A to advance LES to EOB, requires mod A x2 for trunk up with cues on sequencing, bed rail use, body mechanics.    Transfers  Transfer: Yes  Transfer 1  Transfer From 1: Bed to  Transfer to 1: Stand  Technique 1: Sit to stand  Transfer Device 1: Danyel Stedy  Transfer Level of Assistance 1: Moderate assistance, +2  Trials/Comments 1: STS from EOB to danyel stedy with cues on sequencing and body mechanics, demos good carryover of instruction from prior session. MOD A x2 to enhance ease of transition  Transfers 2  Transfer From 2: Bed to  Transfer to 2:  Chair with arms  Transfer Device 2: Danyel Stedy  Transfer Level of Assistance 2: Dependent  Trials/Comments 2: dep xfer from EOB to recliner at EOB with use of danyel stedy.  is able to maintain static sitting at danyel stedy at supervision level  Transfers 3  Transfer From 3: Stand to  Transfer to 3: Chair with arms  Technique 3: Stand to sit  Transfer Device 3: Danyel Stedy  Transfer Level of Assistance 3: Moderate assistance  Trials/Comments 3: patient demos ability to stand from danyel stedy with min-mod A x1, requires mod A to safely control descent into recliner chair    Functional Mobility:  Functional Mobility  Functional Mobility Performed: No    Sitting Balance:  Static Sitting Balance  Static Sitting-Balance Support: Feet supported, Bilateral upper extremity supported  Static Sitting-Level of Assistance: Close supervision  Static Sitting-Comment/Number of Minutes: EOB sitting    Outcome Measures:  [facility] Daily Activity  Putting on and taking off regular lower body clothing: Total  Bathing (including washing, rinsing, drying): A lot  Putting on and taking off regular upper body clothing: A lot  Toileting, which includes using toilet, bedpan or urinal: Total  Taking care of personal grooming such as brushing teeth: A little  Eating Meals: A little  Daily Activity - Total Score: 12      Education Documentation  No documentation found.  Education Comments  No comments found.      Goals:  Encounter Problems       Encounter Problems (Active)       OT Goals       ADLS (Progressing)       Start:  10/21/24    Expected End:  11/04/24       Patient will complete ADL tasks, with moderate assist using AE need in order to increase patient's safety and independence with self-care tasks.           Functional Transfers (Progressing)       Start:  10/21/24    Expected End:  11/04/24       Patient will complete functional transfers with moderate assist x2 using least restrictive device, in order to increase patient's safety and  independence with daily tasks.           Activity Tolerance (Progressing)       Start:  10/21/24    Expected End:  11/04/24       Patient will demonstrate the ability to participate in functional activity at least >/= 15 minutes in order to increase patient's safety and independence with daily tasks.           Sitting Balance (Progressing)       Start:  10/21/24    Expected End:  11/04/24       Patient will complete EOB activity for >/= 15 minutes with fair+ sitting balance in preparation for ADLS.

## 2024-10-24 NOTE — NURSING NOTE
On rounding, RUE single lumen PICC line dressing is current, dry and intact. Line flushes easily with brisk blood return. Curos cap applied.

## 2024-10-24 NOTE — PROGRESS NOTES
"Angela Montelongo is a 74 y.o. female on day 6 of admission follow-up for acute hypoxic respiratory failure, bilateral pleural effusions    Subjective   On 1 L nasal cannula oxygen; oxygen sats 96%. No respiratory complaints.  Denies pain.  Afebrile.       Objective     Physical Exam  Vitals and nursing note reviewed.   Constitutional:       Appearance: She is obese.   HENT:      Head: Normocephalic and atraumatic.      Nose: Nose normal.      Mouth/Throat:      Mouth: Mucous membranes are moist.   Eyes:      Extraocular Movements: Extraocular movements intact.      Conjunctiva/sclera: Conjunctivae normal.      Pupils: Pupils are equal, round, and reactive to light.   Cardiovascular:      Rate and Rhythm: Normal rate and regular rhythm.      Pulses: Normal pulses.      Heart sounds: Normal heart sounds.   Pulmonary:      Effort: Pulmonary effort is normal.      Breath sounds: Normal breath sounds.      Comments: Lungs diminished but clear.   Abdominal:      General: Bowel sounds are normal.      Palpations: Abdomen is soft.   Musculoskeletal:         General: Normal range of motion.      Right lower leg: Edema present.      Left lower leg: Edema present.   Skin:     General: Skin is warm and dry.      Capillary Refill: Capillary refill takes less than 2 seconds.   Neurological:      General: No focal deficit present.      Mental Status: She is alert and oriented to person, place, and time.   Psychiatric:         Mood and Affect: Mood normal.         Behavior: Behavior normal.       Last Recorded Vitals  Blood pressure 142/69, pulse 77, temperature 36.1 °C (97 °F), temperature source Temporal, resp. rate 16, height 1.6 m (5' 3\"), weight 111 kg (245 lb), SpO2 96%.  Intake/Output last 3 Shifts:  I/O last 3 completed shifts:  In: 850 (7.6 mL/kg) [P.O.:600; IV Piggyback:250]  Out: 3650 (32.8 mL/kg) [Urine:3650 (0.9 mL/kg/hr)]  Weight: 111.1 kg       Intake/Output Summary (Last 24 hours) at 10/24/2024 3413  Last data filed " at 10/24/2024 0916  Gross per 24 hour   Intake 700 ml   Output 2950 ml   Net -2250 ml      Scheduled medications:  allopurinol, 100 mg, oral, Daily  carvedilol, 12.5 mg, oral, BID  dapagliflozin propanediol, 10 mg, oral, Daily  enoxaparin, 40 mg, subcutaneous, q12h DYLAN  famotidine, 20 mg, oral, BID   Or  famotidine, 20 mg, intravenous, BID  furosemide, 40 mg, oral, Daily  gabapentin, 200 mg, oral, TID  lidocaine, 5 mL, infiltration, Once  losartan, 50 mg, oral, Daily  meropenem, 1 g, intravenous, q8h  oxybutynin, 5 mg, oral, BID  oxygen, , inhalation, q8h  oxygen, , inhalation, Nightly  perflutren protein A microsphere, 0.5 mL, intravenous, Once in imaging  polyethylene glycol, 17 g, oral, Daily  potassium chloride, 40 mEq, oral, BID  simvastatin, 40 mg, oral, Nightly  sulfur hexafluoride microsphr, 2 mL, intravenous, Once in imaging       PRN medications: acetaminophen **OR** acetaminophen **OR** acetaminophen, alteplase       Relevant Results  XR chest 2 views  Result Date: 10/21/2024  FINDINGS: The cardiac silhouette is normal in size for the AP technique. The aorta is ectatic and tortuous. There are improved bilateral pleural effusions and bibasilar infiltrates/atelectatic changes. No pneumothorax is seen. Extensive degenerative changes involve the shoulders. IMPRESSION: Improving bilateral effusions and bibasilar infiltrates/atelectatic changes.    MR thoracic spine wo IV contrast  Result Date: 10/20/2024  FINDINGS: The degree of motion artifact significantly degrades evaluation. THORACIC SPINE: Assessment of the upper thoracic spinal cord is limited by motion  artifact. CSF flow related artifact also degrades evaluation on several sequences. Within these limitations, there is no definite cord signal abnormality or cord compression. There is concern for epidural abscess component within the lower thoracic region with suggestion of circumferential T2 hyperintensity surrounding the thecal sac measuring up to 3 mm  in maximum thickness along the ventral aspect at T12 (series 9, image 27). Potential epidural fluid/thickening may extend as far cranially as T7. There is abnormal marrow signal centered about the T11-T12 endplates and intervertebral disc with STIR hyperintensity and T1 hypointensity and irregular appearance of the disc and endplates as previously described on CT examination, concerning for discitis/osteomyelitis.  Signal abnormality also extends to involve the left-greater-than-right facet joints and there is extensive signal abnormality within the adjacent soft tissues including suspected paraspinal abscess components such as a 1.8 x 1.7 cm fluid collection along the rightward aspect of T11-T12 adjacent to the neural foraminal region (series 9, image 24). There is resultant mild height loss of the T11 and T12 vertebral bodies. Presumed epidural Inflammatory components are also evident involving the T11-T12 neural foraminal regions bilaterally. Allowing for motion artifact there is no additional evidence of discitis/osteomyelitis within the remainder of the thoracic spine. The remaining thoracic vertebral bodies demonstrate grossly preserved heights. There is trace degenerative appearing anterolisthesis of T1 on T2 and T3 on T4. There is unchanged S-shaped scoliosis of the cervicothoracic spine with levo scoliosis of the upper cervicothoracic region and dextroscoliotic curvature of the thoracolumbar region. Multilevel type 2 Modic endplate signal change. There is moderate multilevel degenerative disc height loss throughout the thoracic regions. There is no additional significant spinal canal stenosis evident. Predominantly mild multilevel neural foraminal narrowing is suspected with evaluation limited by scoliosis and aforementioned motion artifact. There is extensive fatty atrophy of the posterior paraspinal musculature. Bilateral pleural effusions and dependent parenchymal airspace opacity are again noted. LUMBAR  SPINE: Normal segmentation. Similar to the T11-T12 level, there is extensive marrow edema and signal abnormality centered about the L1-L2 endplates and intervertebral disc concerning for discitis/osteomyelitis. There is  surrounding soft tissue signal abnormality with concern for small abscess components along the rightward aspect at L1-L2 measuring up to 2.2 cm in diameter (series 9, image 20). No discrete epidural abscess component is seen at this level. There is mild destructive endplate height loss involving the L1 and L2 vertebrae. The cauda equina are otherwise unremarkable. The conus appears to terminate at the T12-L1 level with CSF flow related artifact obscuring evaluation. Similar levo scoliosis of the lumbar spine. The remaining lumbar  vertebral body heights are otherwise maintained. There is no additional suspicious STIR hyperintensity/marrow edema within the remaining lumbar levels. There is moderate degenerative disc height loss within the remainder of the lumbar spine with likely degenerative appearing fluid within portions of the discs. Degenerative change: T12-L1: No spinal canal stenosis. Mild right-greater-than-left neural foraminal narrowing suggested. L1-2: Mild facet arthropathy. Inflammatory components involving the right-greater-than-left paraspinal soft tissues with no definite spinal canal stenosis, suspected mild right lateral recess narrowing and inflammatory components extending to the right-greater-than-left neural foraminal regions. L2-3: Mild bilateral facet arthropathy. Mild disc bulge and endplate spurring, worse along the rightward aspect. No spinal canal stenosis with mild right lateral recess narrowing. Minimal left and mild-to-moderate right neural foraminal narrowing. L3-4: Moderate to severe bilateral facet arthropathy. Posterior disc osteophyte components. Moderate to severe spinal canal stenosis suggested. Moderate bilateral neural foraminal narrowing. L4-5: Severe left and  mild-to-moderate right facet arthropathy with ligamentum flavum hypertrophy. Disc bulge and hypertrophic endplate spurring. Severe spinal canal stenosis. Mild right and moderate to severe left neural foraminal narrowing. L5-S1: Severe facet arthropathy. Mild disc bulge/uncovering. Mild left lateral recess narrowing. No additional spinal canal stenosis moderate bilateral neural foraminal narrowing. Extensive fatty atrophy of the posterior paraspinal and iliopsoas  musculature. IMPRESSION: 1. Motion degraded examination. 2. Findings to suggest discitis/osteomyelitis involving the T11-T12 and L1-L2 levels. There is mild height loss related to endplate destruction at these levels with surrounding inflammatory soft tissue change including right-sided small paraspinal abscess components suspected. Additionally, there is concern for potential epidural phlegmon versus abscess centered at T11-T12 with resultant moderate spinal canal stenosis at this level. Inflammatory components also  involve the neural foraminal regions at both levels. 3. No additional evidence of significant spinal canal stenosis or cord compression within the thoracic regions when accounting for motion limitations. There is concern for moderate to severe spinal canal stenosis at L3-L4 and severe spinal canal stenosis at L4-L5  secondary to degenerative change. 4. Extensive scoliosis.      MR lumbar spine wo IV contrast  Result Date: 10/20/2024  FINDINGS: The degree of motion artifact significantly degrades evaluation.   THORACIC SPINE:   Assessment of the upper thoracic spinal cord is limited by motion artifact. CSF flow related artifact also degrades evaluation on several sequences. Within these limitations, there is no definite cord signal abnormality or cord compression. There is concern for epidural abscess component within the lower thoracic region with suggestion of circumferential T2 hyperintensity surrounding the thecal sac measuring up to 3 mm  in maximum thickness along the ventral aspect at T12 (series 9, image 27). Potential epidural fluid/thickening may extend as far cranially as T7.   There is abnormal marrow signal centered about the T11-T12 endplates and intervertebral disc with STIR hyperintensity and T1 hypointensity and irregular appearance of the disc and endplates as previously described on CT examination, concerning for discitis/osteomyelitis. Signal abnormality also extends to involve the left-greater-than-right facet joints and there is extensive signal abnormality within the adjacent soft tissues including suspected paraspinal abscess components such as a 1.8 x 1.7 cm fluid collection along the rightward aspect of T11-T12 adjacent to the neural foraminal region (series 9, image 24). There is resultant mild height loss of the T11 and T12 vertebral bodies. Presumed epidural components resulting in moderate spinal canal stenosis at T11-T12. Inflammatory components are also evident involving the T11-T12 neural foraminal regions bilaterally.   Allowing for motion artifact there is no additional evidence of discitis/osteomyelitis within the remainder of the thoracic spine. The remaining thoracic vertebral bodies demonstrate grossly preserved heights. There is trace degenerative appearing anterolisthesis of T1 on T2 and T3 on T4. There is unchanged S-shaped scoliosis of the cervicothoracic spine with levo scoliosis of the upper cervicothoracic region and dextroscoliotic curvature of the thoracolumbar region. Multilevel type 2 Modic endplate signal change. There is moderate multilevel degenerative disc height loss throughout the thoracic regions. There is no additional significant spinal canal stenosis evident. Predominantly mild multilevel neural foraminal narrowing is suspected with evaluation limited by scoliosis and aforementioned motion artifact.   There is extensive fatty atrophy of the posterior paraspinal musculature. Bilateral pleural  effusions and dependent parenchymal airspace opacity are again noted.     LUMBAR SPINE:   Normal segmentation.   Similar to the T11-T12 level, there is extensive marrow edema and signal abnormality centered about the L1-L2 endplates and intervertebral disc concerning for discitis/osteomyelitis. There is surrounding soft tissue signal abnormality with concern for small abscess components along the rightward aspect at L1-L2 measuring up to 2.2 cm in diameter (series 9, image 20). No discrete epidural abscess component is seen at this level. There is mild destructive endplate height loss involving the L1 and L2 vertebrae.   The cauda equina are otherwise unremarkable. The conus appears to terminate at the T12-L1 level with CSF flow related artifact obscuring evaluation.   Similar levo scoliosis of the lumbar spine. The remaining lumbar vertebral body heights are otherwise maintained. There is no additional suspicious STIR hyperintensity/marrow edema within the remaining lumbar levels. There is moderate degenerative disc height loss within the remainder of the lumbar spine with likely degenerative appearing fluid within portions of the discs.   Degenerative change:   T12-L1: No spinal canal stenosis. Mild right-greater-than-left neural foraminal narrowing suggested.   L1-2: Mild facet arthropathy. Inflammatory components involving the right-greater-than-left paraspinal soft tissues with no definite spinal canal stenosis, suspected mild right lateral recess narrowing, and inflammatory components extending to the right-greater-than-left neural foraminal regions.   L2-3: Mild bilateral facet arthropathy. Mild disc bulge and endplate spurring, worse along the rightward aspect. No spinal canal stenosis with mild right lateral recess narrowing. Minimal left and mild-to-moderate right neural foraminal narrowing.   L3-4: Moderate to severe bilateral facet arthropathy. Posterior disc osteophyte components. Moderate to severe  spinal canal stenosis suggested. Moderate bilateral neural foraminal narrowing.   L4-5: Severe left and mild-to-moderate right facet arthropathy with ligamentum flavum hypertrophy. Disc bulge and hypertrophic endplate spurring. Severe spinal canal stenosis. Mild right and moderate to severe left neural foraminal narrowing.   L5-S1: Severe facet arthropathy. Mild disc bulge/uncovering. Mild left lateral recess narrowing. No additional spinal canal stenosis moderate bilateral neural foraminal narrowing.   Extensive fatty atrophy of the posterior paraspinal and iliopsoas musculature.     1. Motion degraded examination. 2. Findings to suggest discitis/osteomyelitis involving the T11-T12 and L1-L2 levels. There is mild height loss related to endplate destruction at these levels with surrounding inflammatory soft tissue change including right-sided small paraspinal abscess components suspected. Additionally, there is concern for potential epidural phlegmon versus abscess centered at T11-T12 with resultant moderate spinal canal stenosis at this level. Inflammatory components also involve the neural foraminal regions at both levels. 3. No additional evidence of significant spinal canal stenosis or cord compression within the thoracic regions when accounting for motion limitations. There is concern for moderate to severe spinal canal stenosis at L3-L4 and severe spinal canal stenosis at L4-L5 secondary to degenerative change. 4. Extensive scoliosis.      MR thoracic spine wo IV contrast  Result Date: 10/20/2024  FINDINGS: The degree of motion artifact significantly degrades evaluation.   THORACIC SPINE:   Assessment of the upper thoracic spinal cord is limited by motion artifact. CSF flow related artifact also degrades evaluation on several sequences. Within these limitations, there is no definite cord signal abnormality or cord compression. There is concern for epidural abscess component within the lower thoracic region  with suggestion of circumferential T2 hyperintensity surrounding the thecal sac measuring up to 3 mm in maximum thickness along the ventral aspect at T12 (series 9, image 27). Potential epidural fluid/thickening may extend as far cranially as T7.   There is abnormal marrow signal centered about the T11-T12 endplates and intervertebral disc with STIR hyperintensity and T1 hypointensity and irregular appearance of the disc and endplates as previously described on CT examination, concerning for discitis/osteomyelitis. Signal abnormality also extends to involve the left-greater-than-right facet joints and there is extensive signal abnormality within the adjacent soft tissues including suspected paraspinal abscess components such as a 1.8 x 1.7 cm fluid collection along the rightward aspect of T11-T12 adjacent to the neural foraminal region (series 9, image 24). There is resultant mild height loss of the T11 and T12 vertebral bodies. Presumed epidural components resulting in moderate spinal canal stenosis at T11-T12. Inflammatory components are also evident involving the T11-T12 neural foraminal regions bilaterally.   Allowing for motion artifact there is no additional evidence of discitis/osteomyelitis within the remainder of the thoracic spine. The remaining thoracic vertebral bodies demonstrate grossly preserved heights. There is trace degenerative appearing anterolisthesis of T1 on T2 and T3 on T4. There is unchanged S-shaped scoliosis of the cervicothoracic spine with levo scoliosis of the upper cervicothoracic region and dextroscoliotic curvature of the thoracolumbar region. Multilevel type 2 Modic endplate signal change. There is moderate multilevel degenerative disc height loss throughout the thoracic regions. There is no additional significant spinal canal stenosis evident. Predominantly mild multilevel neural foraminal narrowing is suspected with evaluation limited by scoliosis and aforementioned motion  artifact.   There is extensive fatty atrophy of the posterior paraspinal musculature. Bilateral pleural effusions and dependent parenchymal airspace opacity are again noted.     LUMBAR SPINE:   Normal segmentation.   Similar to the T11-T12 level, there is extensive marrow edema and signal abnormality centered about the L1-L2 endplates and intervertebral disc concerning for discitis/osteomyelitis. There is surrounding soft tissue signal abnormality with concern for small abscess components along the rightward aspect at L1-L2 measuring up to 2.2 cm in diameter (series 9, image 20). No discrete epidural abscess component is seen at this level. There is mild destructive endplate height loss involving the L1 and L2 vertebrae.   The cauda equina are otherwise unremarkable. The conus appears to terminate at the T12-L1 level with CSF flow related artifact obscuring evaluation.   Similar levo scoliosis of the lumbar spine. The remaining lumbar vertebral body heights are otherwise maintained. There is no additional suspicious STIR hyperintensity/marrow edema within the remaining lumbar levels. There is moderate degenerative disc height loss within the remainder of the lumbar spine with likely degenerative appearing fluid within portions of the discs.   Degenerative change:   T12-L1: No spinal canal stenosis. Mild right-greater-than-left neural foraminal narrowing suggested.   L1-2: Mild facet arthropathy. Inflammatory components involving the right-greater-than-left paraspinal soft tissues with no definite spinal canal stenosis, suspected mild right lateral recess narrowing, and inflammatory components extending to the right-greater-than-left neural foraminal regions.   L2-3: Mild bilateral facet arthropathy. Mild disc bulge and endplate spurring, worse along the rightward aspect. No spinal canal stenosis with mild right lateral recess narrowing. Minimal left and mild-to-moderate right neural foraminal narrowing.   L3-4:  Moderate to severe bilateral facet arthropathy. Posterior disc osteophyte components. Moderate to severe spinal canal stenosis suggested. Moderate bilateral neural foraminal narrowing.   L4-5: Severe left and mild-to-moderate right facet arthropathy with ligamentum flavum hypertrophy. Disc bulge and hypertrophic endplate spurring. Severe spinal canal stenosis. Mild right and moderate to severe left neural foraminal narrowing.   L5-S1: Severe facet arthropathy. Mild disc bulge/uncovering. Mild left lateral recess narrowing. No additional spinal canal stenosis moderate bilateral neural foraminal narrowing.   Extensive fatty atrophy of the posterior paraspinal and iliopsoas musculature.  1. Motion degraded examination. 2. Findings to suggest discitis/osteomyelitis involving the T11-T12 and L1-L2 levels. There is mild height loss related to endplate destruction at these levels with surrounding inflammatory soft tissue change including right-sided small paraspinal abscess components suspected. Additionally, there is concern for potential epidural phlegmon versus abscess centered at T11-T12 with resultant moderate spinal canal stenosis at this level. Inflammatory components also involve the neural foraminal regions at both levels. 3. No additional evidence of significant spinal canal stenosis or cord compression within the thoracic regions when accounting for motion limitations. There is concern for moderate to severe spinal canal stenosis at L3-L4 and severe spinal canal stenosis at L4-L5 secondary to degenerative change. 4. Extensive scoliosis.     Transthoracic Echo (TTE) Complete  Result Date: 10/19/2024  CONCLUSIONS:  1. Poorly visualized anatomical structures due to suboptimal image quality.  2. The left ventricular systolic function is moderately decreased, with a visually estimated ejection fraction of 30-35%.  3. There is reduced right ventricular systolic function.  4. Trace mitral valve regurgitation.  5. Trace  tricuspid regurgitation is visualized.  6. Limited images quality. Left ventricular ejection fraction appears reduced at 30 to 35% with interventricular conduction delay.     CT thoracic spine wo IV contrast  Result Date: 10/17/2024  FINDINGS:     Alignment: There is mild scoliosis noted.   Vertebrae/Intervertebral Discs: The thoracolumbar vertebral body heights are intact. There is severe loss of intervertebral disc space. There are severe intervertebral destructive changes noted of the T11-T12 and L2-L3 levels. Findings appear somewhat worsened when compared to the prior study. Question underlying discitis/osteomyelitis. Recommend MRI for further evaluation. There is multilevel spinal canal stenosis noted. There is facet arthrosis noted.   Paraspinous Soft Tissues: Within normal limits.  There are severe intervertebral destructive changes noted of the T11-T12 and L2-L3 levels. Findings appear somewhat worsened when compared to the prior study. Question underlying discitis/osteomyelitis. Recommend MRI for further evaluation. Recommend clinical correlation.       CT lumbar spine wo IV contrast  Result Date: 10/17/2024  FINDINGS:     Alignment: There is mild scoliosis noted.   Vertebrae/Intervertebral Discs: The thoracolumbar vertebral body heights are intact. There is severe loss of intervertebral disc space. There are severe intervertebral destructive changes noted of the T11-T12 and L2-L3 levels. Findings appear somewhat worsened when compared to the prior study. Question underlying discitis/osteomyelitis. Recommend MRI for further evaluation. There is multilevel spinal canal stenosis noted. There is facet arthrosis noted.   Paraspinous Soft Tissues: Within normal limits.       There are severe intervertebral destructive changes noted of the T11-T12 and L2-L3 levels. Findings appear somewhat worsened when compared to the prior study. Question underlying discitis/osteomyelitis. Recommend MRI for further evaluation.  Recommend clinical correlation.      CT angio chest for pulmonary embolism  Result Date: 10/17/2024  FINDINGS: POTENTIAL LIMITATIONS OF THE STUDY: The assessment is limited by respiratory motion and suboptimal contrast opacification of pulmonary arteries.   HEART AND VESSELS: There is no main or lobar pulmonary embolus visualized. There is suboptimal opacification and respiratory motion noted limiting evaluation of the segmental and subsegmental branches. Main pulmonary artery and its branches are normal in caliber.   The thoracic aorta normal in course and caliber. Coronary artery calcifications are seen. Please note, the study is not optimized for evaluation of coronary arteries.   The cardiac chambers are not enlarged.   There is no pericardial effusion seen.   MEDIASTINUM AND SENG, LOWER NECK AND AXILLA: The visualized thyroid gland is within normal limits. No evidence of thoracic lymphadenopathy by CT criteria. Esophagus appears within normal limits as seen.   LUNGS AND AIRWAYS: There are large bilateral pleural effusions with overlying atelectasis versus consolidation. There are scattered ground-glass airspace opacities noted.     UPPER ABDOMEN: The visualized subdiaphragmatic structures demonstrate no remarkable findings.       CHEST WALL AND OSSEOUS STRUCTURES: Chest wall is within normal limits. There is multilevel loss of intervertebral disc space. The endplate osteophytic changes noted.There are severe degenerative changes noted of the bilateral shoulder joints. Associated soft tissue lesions.There are destructive changes noted of the T11-T12 and L1-L2 disc spaces noted. Findings are unchanged from the prior study.  1. There is no main or lobar pulmonary embolus visualized. There is suboptimal opacification and respiratory motion noted limiting evaluation of the segmental and subsegmental branches. 2. There are large bilateral pleural effusions with overlying atelectasis versus consolidation. There are  scattered ground-glass airspace opacities noted. 3. There are severe degenerative changes noted of the bilateral shoulder joints. Associated soft tissue lesions.         Assessment/Plan   Acute hypoxic respiratory failure  Stable on room air  Patient qualified for 2 L nasal cannula oxygen nightly  Continue as needed nebulized bronchodilator  Continuous pulse oximetry  Incentive spirometry/pulmonary hygiene     Bilateral pleural effusions  PA lateral chest x-ray today shows improving bilateral effusions and bibasilar infiltrates/atelectatic changes  Monitor response to diuresis    Acute on chronic diastolic congestive heart failure  40 mg oral Lasix twice/daily  Strict I's and O's  Cardiology follow-up      Discitis/osteomyelitis  Follow-up cultures  Analgesia  Status post CT-guided aspiration via IR  PICC line placed  Orthopedic Surgery follow-up  Infectious Disease follow-up     Urinary tract infection/Bacteremia  Urine culture + E. coli  IV meropenem  Follow-up cultures  Infectious disease follow-up     Morbid obesity  Polysomnogram as outpatient    Discharge planning-SNF recommended, facilities in review  Nakia Maxwell, APRN-CNP

## 2024-10-24 NOTE — PROGRESS NOTES
Physical Therapy    Physical Therapy Treatment    Patient Name: Angela Montelongo  MRN: [id number]  Department: [address]  Room: [address] [place], [place] [address]  Today's Date: 10/24/2024  Time Calculation  Start Time: 1117  Stop Time: 1200  Time Calculation (min): 43 min         Assessment/Plan   PT Assessment  PT Assessment Results: Decreased strength, Decreased endurance, Impaired balance, Decreased mobility, Decreased safety awareness, Obesity, Impaired sensation, Pain, Orthopedic restrictions  Rehab Prognosis: Fair  Barriers to Discharge: assist x 2  Evaluation/Treatment Tolerance: Patient limited by fatigue, Patient limited by pain  Medical Staff Made Aware: Yes  Strengths: Support of Caregivers  Barriers to Participation: Comorbidities  End of Session Communication: Bedside nurse  Assessment Comment: Pt is motivated to try, dealing w/ moderate back pain, notable generalized  weakness but especially LLE w/ instability.  End of Session Patient Position: Bed, 4 rail up, Alarm on  PT Plan  Inpatient/Swing Bed or Outpatient: Inpatient  PT Plan  Treatment/Interventions: Bed mobility, Transfer training, Gait training, Balance training, Strengthening, Endurance training, Range of motion, Therapeutic exercise, Therapeutic activity  PT Plan: Ongoing PT  PT Frequency: 3 times per week  PT Discharge Recommendations: Moderate intensity level of continued care  Equipment Recommended upon Discharge: Lift  PT Recommended Transfer Status: Assist x2, Assistive device  PT - OK to Discharge: Yes      General Visit Information:   PT  Visit  PT Received On: 10/24/24  General  Reason for Referral: impaired mobility  Referred By: ANGELITO Blevins-CNP  Past Medical History Relevant to Rehab: HTN, CKD, low back pain, spinal stenosis, falls  Family/Caregiver Present: No  Prior to Session Communication: Bedside nurse  Patient Position Received: Up in chair, Alarm on (in recliner)  Preferred Learning Style: verbal, visual  General Comment: Pt agreeable to PT  session, wanting to try to take steps.    Subjective   Precautions:  Precautions  Medical Precautions: Fall precautions, Oxygen therapy device and L/min, Spinal precautions (2L O2)    Objective   Pain:  Pain Assessment  Pain Assessment: 0-10  0-10 (Numeric) Pain Score: 7  Pain Type: Chronic pain, Acute pain  Pain Location: Back  Pain Interventions:  (nurse in to give pain med during session)  Cognition:  Cognition  Overall Cognitive Status: Within Functional Limits  Cognition Comments: pleasant, cooperative, motivated to try  Coordination:  Coordination Comment: notable weakness  Postural Control:  Postural Control  Posture Comment: morbidly obese, heavy forward flexion in standing  Static Standing Balance  Static Standing-Balance Support: Bilateral upper extremity supported (2WW, Tessa Stedy)  Static Standing-Level of Assistance: Maximum assistance (+2)  Static Standing-Comment/Number of Minutes: tolerated ~ 15 seconds x 3, very forward flexed, little UE support on walker to help erect posture, a little more erect w/ Tessa Stedy  Activity Tolerance:  Activity Tolerance  Endurance: Decreased tolerance for upright activites  Activity Tolerance Comments: easily fatigued, increased back pain. Pt was OON from OT to PT session for ~ 1.5 hours.  Treatments:  Therapeutic Exercise  Therapeutic Exercise Activity 1: Pt performed seated bilat ankle pumps, LAQ w/ assist Lt, knee extension slides w/ guidance Lt, manually resisted knee flexion Lt, hip flexion, mirna hip add and abduction x 15 reps, occasional rest break during sets on Lt. Pt w/ notable weakness and decreased coordination LLE    Therapeutic Activity  Therapeutic Activity Performed: Yes  Therapeutic Activity 1: Pt able to roll L & R w/ assist and use of rail for hygiene after BM in brief; donning of new brief.    Bed Mobility 1  Bed Mobility 1: Rolling right, Rolling left  Level of Assistance 1: Moderate assistance, Maximum assistance  Bed Mobility Comments 1:  posterior hygiene and donning of new brief due to bowel incontinence.  Bed Mobility 2  Bed Mobility  2: Sitting to supine, Rolling right, Log roll  Level of Assistance 2: Maximum assistance, +2  Bed Mobility Comments 2: assist for trunk down and LEs into bed, assist to roll back to supine    Ambulation/Gait Training  Ambulation/Gait Training Performed: No (Tessa Stedy used for transfer chair to bed)  Transfer 1  Technique 1: Sit to stand, Stand to sit  Transfer Device 1: Walker, Gait belt  Transfer Level of Assistance 1: Maximum assistance, +2, Moderate verbal cues  Trials/Comments 1: First attempt w/ HDRW, pt w/ heavy forward flexed posture, little use of UE support to assist w/ erect posture.  Transfers 2  Technique 2: Sit to stand, Stand to sit  Transfer Device 2: Tessa Stedy, Gait belt  Transfer Level of Assistance 2: Maximum assistance, +2  Trials/Comments 2: Effort to get hands to bar, cues for erect posture w/ slightly improved performance. Performed x 2:  from chair and again to sit EOB.    Outcome Measures:  [facility] Basic Mobility  Turning from your back to your side while in a flat bed without using bedrails: Total  Moving from lying on your back to sitting on the side of a flat bed without using bedrails: Total  Moving to and from bed to chair (including a wheelchair): Total  Standing up from a chair using your arms (e.g. wheelchair or bedside chair): A lot  To walk in hospital room: Total  Climbing 3-5 steps with railing: Total  Basic Mobility - Total Score: 7    Education Documentation  Body Mechanics, taught by Angela Dodson PT at 10/24/2024  2:17 PM.  Learner: Patient  Readiness: Eager  Method: Explanation, Demonstration  Response: Verbalizes Understanding, Needs Reinforcement    Home Exercise Program, taught by Angela Dodson, PT at 10/24/2024  2:17 PM.  Learner: Patient  Readiness: Eager  Method: Explanation, Demonstration  Response: Verbalizes Understanding, Needs Reinforcement    Mobility Training,  taught by Angela Dodson, PT at 10/24/2024  2:17 PM.  Learner: Patient  Readiness: Eager  Method: Explanation, Demonstration  Response: Verbalizes Understanding, Needs Reinforcement    Education Comments  No comments found.        OP EDUCATION:       Encounter Problems       Encounter Problems (Active)       Balance       sitting (Progressing)       Start:  10/21/24    Expected End:  11/04/24       Pt will sit on EOB with supervision and perform dynamic tasks without LOB         standing (Progressing)       Start:  10/21/24    Expected End:  11/04/24       Pt will stand with UE support of walker and min assist, no LOB            Mobility       bed mobility (Progressing)       Start:  10/21/24    Expected End:  11/04/24       Pt will perform sup to/from sit transfer with min assist            PT Transfers       sit to stand (Progressing)       Start:  10/21/24    Expected End:  11/04/24       Pt will perform sit to stand transfer with walker and min assist         bed to chair (Progressing)       Start:  10/21/24    Expected End:  11/04/24       Pt will perform bed to chair transfer with walker and min assist            Safety       LTG - Patient will utilize safety techniques (Progressing)       Start:  10/21/24    Expected End:  11/04/24

## 2024-10-24 NOTE — NURSING NOTE
Agree with previous assessment. Patient lying in bed comfortably watching TV. Family  at bedside. Call light and possessions within reach. No needs or concerns voiced at this time per patient. Bed alarm on. Will continue plan of care.

## 2024-10-24 NOTE — PROGRESS NOTES
Music Therapy Note    Angela Montelongo     Therapy Session  Referral Type: Pain rounds  Visit Type: New visit  Session Start Time: 1602  Conflict of Service: Working with other staff               Treatment/Interventions            Narrative  Follow-up: MT will follow-up if Pt remains admitted.    Education Documentation  No documentation found.

## 2024-10-24 NOTE — DISCHARGE SUMMARY
Discharge Diagnosis  Acute on chronic diastolic congestive heart failure    Issues Requiring Follow-Up  OM of spine    Discharge Meds     Medication List      START taking these medications     acetaminophen 325 mg tablet; Commonly known as: Tylenol; Take 2 tablets   (650 mg) by mouth every 6 hours if needed for mild pain (1 - 3).   carvedilol 12.5 mg tablet; Commonly known as: Coreg; Take 1 tablet (12.5   mg) by mouth 2 times a day.   dapagliflozin propanediol 10 mg; Commonly known as: Farxiga; Take 1   tablet (10 mg) by mouth once daily.; Start taking on: October 25, 2024   enoxaparin 40 mg/0.4 mL syringe; Commonly known as: Lovenox; Inject 0.4   mL (40 mg) under the skin every 12 hours.   ertapenem 1,000 mg in sodium chloride 0.9% 50 mL IV; Infuse 1,000 mg at   100 mL/hr over 30 minutes into a venous catheter once every 24 hours. X 6   weeks   furosemide 40 mg tablet; Commonly known as: Lasix; Take 1 tablet (40 mg)   by mouth once daily.; Start taking on: October 25, 2024   losartan 50 mg tablet; Commonly known as: Cozaar; Take 1 tablet (50 mg)   by mouth once daily.; Start taking on: October 25, 2024   oxygen gas therapy; Commonly known as: O2; Inhale 1 each every 8 hours.   polyethylene glycol 17 gram packet; Commonly known as: Glycolax,   Miralax; Take 17 g by mouth once daily.; Start taking on: October 25, 2024   potassium chloride 20 mEq packet; Commonly known as: Klor-Con; Take 40   mEq by mouth 2 times daily (morning and late afternoon).     CHANGE how you take these medications     famotidine 20 mg tablet; Commonly known as: Pepcid; Take 1 tablet (20   mg) by mouth 2 times a day.; What changed: when to take this, reasons to   take this   gabapentin 100 mg capsule; Commonly known as: Neurontin; What changed:   Another medication with the same name was removed. Continue taking this   medication, and follow the directions you see here.     CONTINUE taking these medications     oxybutynin 5 mg tablet;  Commonly known as: Ditropan   simvastatin 40 mg tablet; Commonly known as: Zocor; TAKE 1 TABLET (40   MG) BY MOUTH ONCE DAILY AT BEDTIME.     STOP taking these medications     allopurinol 100 mg tablet; Commonly known as: Zyloprim   amLODIPine 10 mg tablet; Commonly known as: Norvasc   amLODIPine 5 mg tablet; Commonly known as: Norvasc   atenolol 25 mg tablet; Commonly known as: Tenormin   omeprazole 20 mg DR capsule; Commonly known as: PriLOSEC   ondansetron 4 mg tablet; Commonly known as: Zofran   tiZANidine 4 mg tablet; Commonly known as: Zanaflex   traMADol 50 mg tablet; Commonly known as: Ultram       Test Results Pending At Discharge  Pending Labs       Order Current Status    Extra Urine Gray Tube Collected (10/17/24 4269)    Urinalysis with Reflex Culture and Microscopic In process            Hospital Course  Acute Hypoxic Respiratory Failure / Bilateral Pleural Effusions  -Pulmonology followed.    -Cardiology followed. To follow-up outpatient   -Likely secondary to pleural effusions.   -On 2L with high O2 sats, wean as able.   -Continue oral Lasix.  -Repeat CXR 10/21 showed improvement in bilateral effusions.       Acute Systolic CHF  -Cardiology followed. She has no known hx of CHF.   -ProBNP elevated 3,988 with pleural effusions on imaging.    -Echo shows decreased EF 30-35%.   -BID lasix, continue. Switched to oral.   -Med adjustments made by cardio.   -Monitor strict I&O and daily weights.      Back Pain / Concern for Osteomyelitis/Discitis  -CT lumbar and thoracic spine have classic findings of advanced discitis and osteomyelitis at T11-12 and L1-2.   -MRI lumbar and thoracic spine shows discitis/OM T11-T12   -Ortho spine follows. Reviewed MRI and no indication for surgery    -ID follows.   -IV Ertapenem x 6 weeks per ID  -S/p aspiration of spinal abscess peer IR. Culture pending. To follow-up outpatient with ID.  -Blood cultures with EColi. Repeat blood cultures with NGTD.      Bacteremia  -ID on  board.   -Blood cultures with e.coli. Second set with no growth. High suspicion this is from the urine.   -Continue broad spectrum IV abx x 6 weeks.      UTI  -Urine culture with EColi.     -IV abx per ID.      Hypokalemia / Hypomagnesemia  -Resolved, monitor   -Continue K+ supplementation while being diuresed     HTN  -Continue current medications, adjustments made by cardio.   -BP stable, monitor.      HLD  -Continue statin.      DVT Risk  -Lovenox subcutaneous.      Dispo    Pertinent Physical Exam At Time of Discharge  Physical Exam  Constitutional:       Appearance: She is obese.   HENT:      Head: Normocephalic and atraumatic.      Nose: Nose normal.      Mouth/Throat:      Mouth: Mucous membranes are moist.      Pharynx: Oropharynx is clear.   Eyes:      Extraocular Movements: Extraocular movements intact.      Pupils: Pupils are equal, round, and reactive to light.   Cardiovascular:      Rate and Rhythm: Normal rate and regular rhythm.      Pulses: Normal pulses.      Comments: Trace edema bilaterally  Pulmonary:      Effort: Pulmonary effort is normal.      Breath sounds: Normal breath sounds.   Abdominal:      General: Abdomen is flat. Bowel sounds are normal.      Palpations: Abdomen is soft.   Musculoskeletal:         General: Normal range of motion.   Skin:     General: Skin is warm and dry.      Capillary Refill: Capillary refill takes less than 2 seconds.   Neurological:      General: No focal deficit present.      Mental Status: She is oriented to person, place, and time.   Psychiatric:         Mood and Affect: Mood normal.         Outpatient Follow-Up  Future Appointments   Date Time Provider Department Center   7/30/2025  1:15 PM Millie Hernandez MD [id number]         ANGELITO Blevins-CNP

## 2024-10-24 NOTE — CARE PLAN
The patient's goals for the shift include improved breathing.    The clinical goals for the shift include comfort, pain management, Q2H turn and reposition while in bed, increase in mobility tolerance, maintain safety, work with PT/OT, and OOB to chair for meals, and monitor labs/VS.    Problem: Safety - Adult  Goal: Free from fall injury  Outcome: Progressing     Problem: Chronic Conditions and Co-morbidities  Goal: Patient's chronic conditions and co-morbidity symptoms are monitored and maintained or improved  Outcome: Progressing     Problem: Fall/Injury  Goal: Not fall by end of shift  Outcome: Progressing  Goal: Be free from injury by end of the shift  Outcome: Progressing  Goal: Verbalize understanding of personal risk factors for fall in the hospital  Outcome: Progressing  Goal: Verbalize understanding of risk factor reduction measures to prevent injury from fall in the home  Outcome: Progressing  Goal: Use assistive devices by end of the shift  Outcome: Progressing  Goal: Pace activities to prevent fatigue by end of the shift  Outcome: Progressing     Problem: Heart Failure  Goal: Improved gas exchange this shift  Outcome: Progressing  Goal: Improved urinary output this shift  Outcome: Progressing  Goal: Reduction in peripheral edema within 24 hours  Outcome: Progressing  Goal: Report improvement of dyspnea/breathlessness this shift  Outcome: Progressing  Goal: Weight from fluid excess reduced over 2-3 days, then stabilize  Outcome: Progressing  Goal: Increase self care and/or family involvement in 24 hours  Outcome: Progressing     Problem: Nutrition  Goal: Oral intake greater 75%  Outcome: Progressing     Problem: Skin  Goal: Decreased wound size/increased tissue granulation at next dressing change  Outcome: Progressing  Goal: Participates in plan/prevention/treatment measures  Outcome: Progressing  Goal: Prevent/manage excess moisture  Outcome: Progressing  Goal: Prevent/minimize sheer/friction  injuries  Outcome: Progressing  Goal: Promote/optimize nutrition  Outcome: Progressing  Goal: Promote skin healing  Outcome: Progressing     Problem: Deep Vein Thrombosis  Goal: I will remain free from complications of deep vein thrombosis and maintain current level of mobility  Outcome: Progressing     Problem: Pain  Goal: Takes deep breaths with improved pain control throughout the shift  Outcome: Progressing  Goal: Turns in bed with improved pain control throughout the shift  Outcome: Progressing  Goal: Walks with improved pain control throughout the shift  Outcome: Progressing  Goal: Performs ADL's with improved pain control throughout shift  Outcome: Progressing  Goal: Participates in PT with improved pain control throughout the shift  Outcome: Progressing  Goal: Free from opioid side effects throughout the shift  Outcome: Progressing  Goal: Free from acute confusion related to pain meds throughout the shift  Outcome: Progressing     Problem: Discharge Planning  Goal: Discharge to home or other facility with appropriate resources  Outcome: Progressing

## 2024-10-25 LAB
BACTERIA SPEC CULT: ABNORMAL
GRAM STN SPEC: ABNORMAL
GRAM STN SPEC: ABNORMAL

## 2024-10-29 ENCOUNTER — PATIENT OUTREACH (OUTPATIENT)
Dept: CASE MANAGEMENT | Facility: HOSPITAL | Age: 74
End: 2024-10-29
Payer: MEDICARE

## 2024-11-04 ENCOUNTER — PATIENT OUTREACH (OUTPATIENT)
Dept: CASE MANAGEMENT | Facility: HOSPITAL | Age: 74
End: 2024-11-04
Payer: MEDICARE

## 2024-11-04 NOTE — PROGRESS NOTES
Follow up phone call made by CHF Clinical Nurse Navigator. Angela remains @ [facility]. Spoke with her nurse, reports that Angela is doing well. Currently daily weights are not being performed & not on low Na diet. Nurse will inform MD. I will continue to follow.

## 2024-11-19 ENCOUNTER — PATIENT OUTREACH (OUTPATIENT)
Dept: CASE MANAGEMENT | Facility: HOSPITAL | Age: 74
End: 2024-11-19
Payer: MEDICARE

## 2024-11-19 NOTE — PROGRESS NOTES
Follow up phone call made by CHF Clinical Nurse Navigator. Angela remains @ [facility]. Spoke with her nurse, reports that Angela is doing well. They perform weekly weights (current weight 231.6 lbs).  Per nurse the facility does not place patients on low Na diets. No discharge plans as of today. I will continue to follow.

## 2024-12-03 ENCOUNTER — PATIENT OUTREACH (OUTPATIENT)
Dept: CASE MANAGEMENT | Facility: HOSPITAL | Age: 74
End: 2024-12-03
Payer: MEDICARE

## 2024-12-03 NOTE — PROGRESS NOTES
Follow up phone call made by CHF Clinical Nurse Navigator. Angela remains @ [facility]. Nurse was not available to take my call. HF Navigator has been active with Angela since 10/21/24. No readmissions for CHF in past 45 days. Closing HF case today.

## 2024-12-09 ENCOUNTER — HOSPITAL ENCOUNTER (OUTPATIENT)
Dept: RADIOLOGY | Facility: HOSPITAL | Age: 74
Discharge: HOME | End: 2024-12-09
Payer: MEDICARE

## 2024-12-09 DIAGNOSIS — M46.20 OSTEOMYELITIS OF VERTEBRA, SITE UNSPECIFIED (MULTI): ICD-10-CM

## 2024-12-09 DIAGNOSIS — M46.45 DISCITIS OF THORACOLUMBAR REGION: ICD-10-CM

## 2024-12-09 DIAGNOSIS — M46.45 DISCITIS OF THORACOLUMBAR REGION: Primary | ICD-10-CM

## 2024-12-09 PROCEDURE — 72157 MRI CHEST SPINE W/O & W/DYE: CPT

## 2024-12-09 PROCEDURE — 2550000001 HC RX 255 CONTRASTS: Performed by: INTERNAL MEDICINE

## 2024-12-09 PROCEDURE — 72157 MRI CHEST SPINE W/O & W/DYE: CPT | Performed by: RADIOLOGY

## 2024-12-09 PROCEDURE — 72158 MRI LUMBAR SPINE W/O & W/DYE: CPT

## 2024-12-09 PROCEDURE — 72158 MRI LUMBAR SPINE W/O & W/DYE: CPT | Performed by: RADIOLOGY

## 2024-12-09 PROCEDURE — A9575 INJ GADOTERATE MEGLUMI 0.1ML: HCPCS | Performed by: INTERNAL MEDICINE

## 2024-12-09 RX ORDER — GADOTERATE MEGLUMINE 376.9 MG/ML
20 INJECTION INTRAVENOUS
Status: COMPLETED | OUTPATIENT
Start: 2024-12-09 | End: 2024-12-09

## 2024-12-13 ENCOUNTER — TELEPHONE (OUTPATIENT)
Dept: PRIMARY CARE | Facility: CLINIC | Age: 74
End: 2024-12-13
Payer: MEDICARE

## 2024-12-13 NOTE — TELEPHONE ENCOUNTER
Received message from Denise ROSARIO at [facility] that patient will be discharging home 12/20/24 with [facility]. House Calls visit scheduled with Torie Iraheta NP 12/27/24 at 1:30 pm.

## 2024-12-18 ENCOUNTER — APPOINTMENT (OUTPATIENT)
Dept: CARDIOLOGY | Facility: CLINIC | Age: 74
End: 2024-12-18
Payer: MEDICARE

## 2024-12-20 ENCOUNTER — OFFICE VISIT (OUTPATIENT)
Dept: ORTHOPEDIC SURGERY | Facility: CLINIC | Age: 74
End: 2024-12-20
Payer: MEDICARE

## 2024-12-20 DIAGNOSIS — M48.061 DEGENERATIVE LUMBAR SPINAL STENOSIS: ICD-10-CM

## 2024-12-20 DIAGNOSIS — M21.372 LEFT FOOT DROP: ICD-10-CM

## 2024-12-20 DIAGNOSIS — M43.10 DEGENERATIVE SPONDYLOLISTHESIS: ICD-10-CM

## 2024-12-20 DIAGNOSIS — M46.25: Primary | ICD-10-CM

## 2024-12-20 PROCEDURE — 1160F RVW MEDS BY RX/DR IN RCRD: CPT | Performed by: ORTHOPAEDIC SURGERY

## 2024-12-20 PROCEDURE — 1157F ADVNC CARE PLAN IN RCRD: CPT | Performed by: ORTHOPAEDIC SURGERY

## 2024-12-20 PROCEDURE — 1159F MED LIST DOCD IN RCRD: CPT | Performed by: ORTHOPAEDIC SURGERY

## 2024-12-20 PROCEDURE — 99214 OFFICE O/P EST MOD 30 MIN: CPT | Performed by: ORTHOPAEDIC SURGERY

## 2024-12-20 PROCEDURE — 99204 OFFICE O/P NEW MOD 45 MIN: CPT | Performed by: ORTHOPAEDIC SURGERY

## 2024-12-20 PROCEDURE — 1036F TOBACCO NON-USER: CPT | Performed by: ORTHOPAEDIC SURGERY

## 2024-12-20 NOTE — PROGRESS NOTES
The patient was hospitalized in October with acute respiratory insufficiency from congestive heart failure.  She was identified to have osteomyelitis in the thoracolumbar spine at that time with considerably erosion of the vertebrae at T11-12 and at L1 to.  Biopsy documented E. coli in the spine.  She also had E. coli bacteremia and UTI.  She had been on IV antibiotics under the direction of Dr. Norwood, which were discontinued at the end of November.  She still has her PICC line.  She has been in a skilled nursing until today and is being discharged to her home in a wheelchair.  She has no extreme pain.  She has pre-existing backaches, left leg weakness from a known lumbar spinal stenosis and spondylolisthesis.  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    Currently the patient is in a wheelchair but is able to transfer.  She can stand for 7 to 10 minutes using her right leg predominantly and she can walk 25 to 50 feet with a walker.    Prior to hospitalization she had incontinence issues and is unable to feel when she needs to go to the bathroom.    She is examined in her wheelchair.  She is morbidly obese she moves and controls her trunk normally and she has no palpable discomfort on her back no gibbus no ulceration no swelling.  She has good strength of each muscle group in her right leg including her foot extensors.  Her left knee is unstable, has 15 degrees of recurvatum, and medial lateral instability.  She cannot flex her hip against gravity on the left and she has grade 3 strength of the foot extensors.  Her quad is fair and her plantar flexors are fair.    I reviewed her original October MRI scans and CT scans and compared them to the current MRI from December 9.  She has had improvement in the discitis/osteo at T11-12 and L1-2.  She has moderate stenosis at T11-12 and severe stenosis at L3-4 with moderate stenosis at L4-5.  There is  considerable improvement in the signal changes in the vertebral bodies and there is no phlegmon in the spinal canal.    We had a delmis discussion with her and her daughter about the spinal stenosis issues.  Patient has no interest in any surgical procedures on her spine.  She has had multiple infections and recognizes that a much greater risk that surgery entails.  She is satisfied with the degree of limited mobility and the use of a wheelchair.    I explained that the resolution of osteomyelitis of the spine under ideal circumstances may require 1 to 1-1/2 years.    The further planning for antibiotic treatment is under the care of Dr. Norwood.  There is no further need for care here

## 2024-12-24 ENCOUNTER — LAB REQUISITION (OUTPATIENT)
Dept: LAB | Facility: LAB | Age: 74
End: 2024-12-24
Payer: MEDICARE

## 2024-12-24 DIAGNOSIS — N39.0 URINARY TRACT INFECTION, SITE NOT SPECIFIED: ICD-10-CM

## 2024-12-24 LAB
APPEARANCE UR: ABNORMAL
BACTERIA #/AREA URNS AUTO: ABNORMAL /HPF
BILIRUB UR STRIP.AUTO-MCNC: NEGATIVE MG/DL
COLOR UR: YELLOW
GLUCOSE UR STRIP.AUTO-MCNC: ABNORMAL MG/DL
KETONES UR STRIP.AUTO-MCNC: NEGATIVE MG/DL
LEUKOCYTE ESTERASE UR QL STRIP.AUTO: ABNORMAL
MUCOUS THREADS #/AREA URNS AUTO: ABNORMAL /LPF
NITRITE UR QL STRIP.AUTO: ABNORMAL
PH UR STRIP.AUTO: 8.5 [PH]
PROT UR STRIP.AUTO-MCNC: ABNORMAL MG/DL
RBC # UR STRIP.AUTO: NEGATIVE /UL
RBC #/AREA URNS AUTO: >20 /HPF
SP GR UR STRIP.AUTO: 1.02
TRI-PHOS CRY #/AREA UR COMP ASSIST: ABNORMAL /HPF
UROBILINOGEN UR STRIP.AUTO-MCNC: ABNORMAL MG/DL
WBC #/AREA URNS AUTO: ABNORMAL /HPF

## 2024-12-24 PROCEDURE — 87086 URINE CULTURE/COLONY COUNT: CPT

## 2024-12-24 PROCEDURE — 81001 URINALYSIS AUTO W/SCOPE: CPT

## 2024-12-26 ENCOUNTER — TELEPHONE (OUTPATIENT)
Dept: PRIMARY CARE | Facility: CLINIC | Age: 74
End: 2024-12-26
Payer: MEDICARE

## 2024-12-26 LAB — BACTERIA UR CULT: NORMAL

## 2024-12-26 NOTE — TELEPHONE ENCOUNTER
12/27/24 House Calls visit with Torie Iraheta NP confirmed via phone with patient.    altered level of consciousness

## 2024-12-27 ENCOUNTER — LAB (OUTPATIENT)
Dept: LAB | Facility: LAB | Age: 74
End: 2024-12-27
Payer: MEDICARE

## 2024-12-27 ENCOUNTER — OFFICE VISIT (OUTPATIENT)
Dept: PRIMARY CARE | Facility: CLINIC | Age: 74
End: 2024-12-27
Payer: MEDICARE

## 2024-12-27 VITALS
HEART RATE: 62 BPM | OXYGEN SATURATION: 97 % | TEMPERATURE: 96.8 F | DIASTOLIC BLOOD PRESSURE: 68 MMHG | RESPIRATION RATE: 16 BRPM | BODY MASS INDEX: 41.98 KG/M2 | WEIGHT: 237 LBS | SYSTOLIC BLOOD PRESSURE: 128 MMHG

## 2024-12-27 DIAGNOSIS — R39.9 UTI SYMPTOMS: ICD-10-CM

## 2024-12-27 DIAGNOSIS — I50.33 ACUTE ON CHRONIC DIASTOLIC CONGESTIVE HEART FAILURE: ICD-10-CM

## 2024-12-27 DIAGNOSIS — E87.6 HYPOKALEMIA: ICD-10-CM

## 2024-12-27 DIAGNOSIS — K21.9 GASTROESOPHAGEAL REFLUX DISEASE WITHOUT ESOPHAGITIS: ICD-10-CM

## 2024-12-27 DIAGNOSIS — I10 ESSENTIAL HYPERTENSION: ICD-10-CM

## 2024-12-27 DIAGNOSIS — E78.5 HYPERLIPIDEMIA, UNSPECIFIED HYPERLIPIDEMIA TYPE: ICD-10-CM

## 2024-12-27 DIAGNOSIS — I10 ESSENTIAL HYPERTENSION: Primary | ICD-10-CM

## 2024-12-27 DIAGNOSIS — M46.25: ICD-10-CM

## 2024-12-27 DIAGNOSIS — N32.81 OVERACTIVE BLADDER: ICD-10-CM

## 2024-12-27 LAB
ANION GAP SERPL CALCULATED.3IONS-SCNC: 11 MMOL/L (ref 10–20)
APPEARANCE UR: ABNORMAL
BACTERIA #/AREA URNS AUTO: ABNORMAL /HPF
BILIRUB UR STRIP.AUTO-MCNC: NEGATIVE MG/DL
BUN SERPL-MCNC: 41 MG/DL (ref 6–23)
CALCIUM SERPL-MCNC: 10.3 MG/DL (ref 8.6–10.3)
CHLORIDE SERPL-SCNC: 102 MMOL/L (ref 98–107)
CO2 SERPL-SCNC: 31 MMOL/L (ref 21–32)
COLOR UR: YELLOW
CREAT SERPL-MCNC: 1.2 MG/DL (ref 0.5–1.05)
EGFRCR SERPLBLD CKD-EPI 2021: 47 ML/MIN/1.73M*2
ERYTHROCYTE [DISTWIDTH] IN BLOOD BY AUTOMATED COUNT: 15.1 % (ref 11.5–14.5)
GLUCOSE SERPL-MCNC: 82 MG/DL (ref 74–99)
GLUCOSE UR STRIP.AUTO-MCNC: ABNORMAL MG/DL
HCT VFR BLD AUTO: 39.9 % (ref 36–46)
HGB BLD-MCNC: 12.2 G/DL (ref 12–16)
KETONES UR STRIP.AUTO-MCNC: NEGATIVE MG/DL
LEUKOCYTE ESTERASE UR QL STRIP.AUTO: ABNORMAL
MCH RBC QN AUTO: 30.7 PG (ref 26–34)
MCHC RBC AUTO-ENTMCNC: 30.6 G/DL (ref 32–36)
MCV RBC AUTO: 100 FL (ref 80–100)
MUCOUS THREADS #/AREA URNS AUTO: ABNORMAL /LPF
NITRITE UR QL STRIP.AUTO: ABNORMAL
NRBC BLD-RTO: 0 /100 WBCS (ref 0–0)
PH UR STRIP.AUTO: 8.5 [PH]
PLATELET # BLD AUTO: 239 X10*3/UL (ref 150–450)
POTASSIUM SERPL-SCNC: 4.4 MMOL/L (ref 3.5–5.3)
PROT UR STRIP.AUTO-MCNC: ABNORMAL MG/DL
RBC # BLD AUTO: 3.98 X10*6/UL (ref 4–5.2)
RBC # UR STRIP.AUTO: NEGATIVE /UL
RBC #/AREA URNS AUTO: >20 /HPF
SODIUM SERPL-SCNC: 140 MMOL/L (ref 136–145)
SP GR UR STRIP.AUTO: 1.02
UROBILINOGEN UR STRIP.AUTO-MCNC: NORMAL MG/DL
WBC # BLD AUTO: 8.2 X10*3/UL (ref 4.4–11.3)
WBC #/AREA URNS AUTO: ABNORMAL /HPF

## 2024-12-27 PROCEDURE — 87086 URINE CULTURE/COLONY COUNT: CPT

## 2024-12-27 PROCEDURE — 85027 COMPLETE CBC AUTOMATED: CPT

## 2024-12-27 PROCEDURE — 87186 SC STD MICRODIL/AGAR DIL: CPT

## 2024-12-27 PROCEDURE — 81001 URINALYSIS AUTO W/SCOPE: CPT

## 2024-12-27 PROCEDURE — 80048 BASIC METABOLIC PNL TOTAL CA: CPT

## 2024-12-27 RX ORDER — NITROFURANTOIN 25; 75 MG/1; MG/1
100 CAPSULE ORAL 2 TIMES DAILY
Qty: 10 CAPSULE | Refills: 0 | Status: SHIPPED | OUTPATIENT
Start: 2024-12-27 | End: 2025-01-01

## 2024-12-27 ASSESSMENT — ENCOUNTER SYMPTOMS
ARTHRALGIAS: 1
HEADACHES: 1
BACK PAIN: 1
ACTIVITY CHANGE: 1
WEAKNESS: 1
DYSURIA: 1
EYES NEGATIVE: 1
NUMBNESS: 1
NECK STIFFNESS: 1
HEMATOLOGIC/LYMPHATIC NEGATIVE: 1
SLEEP DISTURBANCE: 1
SHORTNESS OF BREATH: 1
DIARRHEA: 1
MYALGIAS: 1
FREQUENCY: 1
COUGH: 1
FATIGUE: 1

## 2024-12-27 ASSESSMENT — PAIN SCALES - GENERAL: PAINLEVEL_OUTOF10: 0-NO PAIN

## 2024-12-27 NOTE — PROGRESS NOTES
Subjective   Patient ID: Angela Montelongo is a 74 y.o. female who is being seen to establish care with the house calls program.     HPI Pt seen in single family home accompanied by daughter Delicia. PMHx: Osteomyelitis of spine, HTN, HLD, Hypokalemia, GERD, Constipation, gout, OA, CELINA, SOB, DJD, Left foot drop, hyponatremia. Pt seen sitting in lift chair with legs dependent. Pt alert and oriented times three and cooperative with examination.   Pt active with [facility] twice weekly. Pt will be active with nursing, OT and PT. Pt spent last 3 months in rehab being treated for osteomyelitis in spine. Pt follows with Dr. Forte for Infectious Disease. Pt currently off IV antibiotics but still has Picc line which she is waiting for okay to remove from ID. Pt lives with daughter, son in law and granddaughter in 2 story house. Pts daughter does all the cooking, house keeping and pts medications. Pt receives assistance from family and home health aide for ADL's. Pt would like to receive meals on wheels and would like a referral to [facility]  on [facility] for this.  Pt ambulating very short distances with walker with someone behind her with wheelchair. Pt ambulated to bathroom and back to chair. Pt dependent on others for ADL's, meals, and medications. Pt has medications set up in pill boxes and is able to take on her own.  Pt has has been sleeping in lift chair  since she has been home. Pt has home health aide twice weekly. Pt is going to go back to sleeping in her bed tonight as she believes the recliner gave her a bedsore.  Pt on farxiga but for heart protection not has diabetes.  Pt reports energy is coming back, wasn't all that energetic before. Daughter thinks pt has more energy then even before hospitalization. Pt appetite reported as pretty good, eats 3 meals a day and snacks throughout the day. Sleep at night reported as poor but blames that on pain. Pt denies napping during the day. Pt has  periods of bowel and bladder incontinence with out being able to tell when she is going but states it is starting to come back. Pt denies fever, chills, night sweats or dizziness. Pt with c/o of congestion and sinus pressure along with sinus headaches. Pt denies trouble chewing or swallowing. Pt admits to RIGGS and moist productive cough. Pt with lower extremity swelling left greater than right. Pt denies CP or palpitations. Denies N/V or constipation with occasional diarrhea and reports a bowel movement daily. Admits to dysuria which has been going on for over a week, pt with frequency and urgency and cloudy urine. Pt had UA checked 12/24 which was contaminated. Pt assisted with collecting a clean catch urine and specimen taken to the lab. Pt believes she has a UTI. Pt with ramp on front of house to make getting in and out of the home easier but pt states she still has much difficulty getting out of the house to see specialists. Pt does have transport wheelchair and has bedroom on first floor. Pt with no other concerns of complaints at present.   Initial encounter for House Calls NP visit. Program explained and contact information provided, all questions answered with apparent satisfaction.  More than 50% of time spent in face to face discussion a total of 60 minutes with this patient on counseling, coordination, of care, collaboration with staff and family, and review of medical records and  diagnostics  Home Visit medically necessary due to: pt has chronic condition that makes access to a traditional office visit very difficult, illness or condition that results in activity limitation or restriction that impacts the ability to leave home such as; unsteady gait/ poor condition.    Review of Systems   Constitutional:  Positive for activity change and fatigue.   HENT:  Positive for congestion and postnasal drip.    Eyes: Negative.    Respiratory:  Positive for cough (moist non productive) and shortness of breath  (RIGGS).    Cardiovascular:  Positive for leg swelling (left greater than right).   Gastrointestinal:  Positive for diarrhea.   Endocrine: Positive for cold intolerance.   Genitourinary:  Positive for dysuria, frequency and urgency.   Musculoskeletal:  Positive for arthralgias (knees, hip, shoulders.), back pain (lower), gait problem, myalgias (shoulders) and neck stiffness.   Neurological:  Positive for weakness, numbness (left leg) and headaches (sinus).   Hematological: Negative.    Psychiatric/Behavioral:  Positive for sleep disturbance.         Objective   /68 (BP Location: Left arm, Patient Position: Sitting, BP Cuff Size: Adult)   Pulse 62   Temp 36 °C (96.8 °F) (Temporal)   Resp 16   Wt 108 kg (237 lb)   SpO2 97%   BMI 41.98 kg/m²         Current Outpatient Medications:     acetaminophen (Tylenol) 325 mg tablet, Take 2 tablets (650 mg) by mouth every 6 hours if needed for mild pain (1 - 3)., Disp: 30 tablet, Rfl: 0    carvedilol (Coreg) 12.5 mg tablet, Take 1 tablet (12.5 mg) by mouth 2 times a day., Disp: , Rfl:     dapagliflozin propanediol (Farxiga) 10 mg, Take 1 tablet (10 mg) by mouth once daily., Disp: , Rfl:     famotidine (Pepcid) 20 mg tablet, Take 1 tablet (20 mg) by mouth 2 times a day., Disp: , Rfl:     furosemide (Lasix) 40 mg tablet, Take 1 tablet (40 mg) by mouth once daily., Disp: , Rfl:     gabapentin (Neurontin) 100 mg capsule, Take 2 capsules (200 mg) by mouth 3 times a day., Disp: , Rfl:     losartan (Cozaar) 50 mg tablet, Take 1 tablet (50 mg) by mouth once daily., Disp: , Rfl:     oxybutynin (Ditropan) 5 mg tablet, Take 1 tablet (5 mg) by mouth 2 times a day., Disp: , Rfl:     polyethylene glycol (Glycolax, Miralax) 17 gram packet, Take 17 g by mouth once daily., Disp: , Rfl:     potassium chloride (Klor-Con) 20 mEq packet, Take 40 mEq by mouth 2 times daily (morning and late afternoon)., Disp: , Rfl:     simvastatin (Zocor) 40 mg tablet, TAKE 1 TABLET (40 MG) BY MOUTH ONCE  DAILY AT BEDTIME., Disp: 90 tablet, Rfl: 1    ertapenem 1,000 mg in sodium chloride 0.9% 50 mL IV, Infuse 1,000 mg at 100 mL/hr over 30 minutes into a venous catheter once every 24 hours. X 6 weeks (Patient not taking: Reported on 12/27/2024), Disp: , Rfl:     nitrofurantoin, macrocrystal-monohydrate, (Macrobid) 100 mg capsule, Take 1 capsule (100 mg) by mouth 2 times a day for 5 days., Disp: 10 capsule, Rfl: 0      Physical Exam  Constitutional:       Appearance: Normal appearance. She is obese.   HENT:      Head: Normocephalic and atraumatic.      Nose: Nose normal.      Mouth/Throat:      Mouth: Mucous membranes are moist.      Pharynx: Oropharynx is clear.   Eyes:      Conjunctiva/sclera: Conjunctivae normal.      Pupils: Pupils are equal, round, and reactive to light.   Cardiovascular:      Rate and Rhythm: Normal rate and regular rhythm.      Pulses: Normal pulses.      Heart sounds: Normal heart sounds.   Pulmonary:      Effort: Pulmonary effort is normal.      Breath sounds: Examination of the right-lower field reveals decreased breath sounds. Decreased breath sounds present.   Abdominal:      General: Bowel sounds are normal.      Palpations: Abdomen is soft.   Musculoskeletal:         General: Swelling (legs left greater than right) present.      Cervical back: Normal range of motion and neck supple.   Skin:     General: Skin is warm and dry.      Capillary Refill: Capillary refill takes less than 2 seconds.      Comments: Picc line with dressing dry and intact to right upper arm.   Neurological:      Mental Status: She is alert and oriented to person, place, and time.      Motor: Weakness present.      Gait: Gait abnormal.   Psychiatric:         Mood and Affect: Mood normal.         Behavior: Behavior normal.         Thought Content: Thought content normal.         Judgment: Judgment normal.       Patient Active Problem List   Diagnosis    Hyperlipidemia    Shoulder arthritis    Essential hypertension     Nausea    Constipation    Claw toe    Gout    Chronic gout without tophus    Gastroesophageal reflux disease    Esophageal reflux    Endometrial hyperplasia    Hyponatremia    CELINA (acute kidney injury) (CMS-HCC)    Acute cystitis without hematuria    Weakness    Bradycardia    Chronic low back pain    Shortness of breath    Acute on chronic diastolic congestive heart failure    Hypomagnesemia    Acute hypoxic respiratory failure (Multi)    Osteomyelitis of vertebra, thoracolumbar region (Multi)    Degenerative lumbar spinal stenosis    Degenerative spondylolisthesis    Left foot drop       Assessment/Plan   Diagnoses and all orders for this visit:  Essential hypertension  Comments:  Continue Losartan 50 mg po daily.  BMP CBC  Orders:  -     Basic metabolic panel; Future  -     CBC; Future  UTI symptoms  Comments:  Begin Macrobid 100 mg po twice daily for 5 days.   UA/UC collected and transported to lab.  Orders:  -     nitrofurantoin, macrocrystal-monohydrate, (Macrobid) 100 mg capsule; Take 1 capsule (100 mg) by mouth 2 times a day for 5 days.  -     Urinalysis with Reflex Culture and Microscopic; Future  Acute on chronic diastolic congestive heart failure  Comments:  Continue Coreg 12.5 mg po twice a day.  Continue Farxiga 10 mg po daily.  Gastroesophageal reflux disease without esophagitis  Comments:  Continue Pepcid 20 mg po twice a day  Hypokalemia  Comments:  Continue KCL 40 meq po twice daily.   Check BMP  Osteomyelitis of vertebra, thoracolumbar region (Multi)  Comments:  Follow with Infectious Disease  Overactive bladder  Comments:  Continue Oxybutynin 5 mg po twice daily.  Hyperlipidemia, unspecified hyperlipidemia type  Comments:  Continue Zocor 40 mg po daily.    Labs drawn without complication.   Follow up in 1 month or prn.  Torie Iraheta, APRN-CNP

## 2024-12-28 PROBLEM — E87.6 HYPOKALEMIA: Status: ACTIVE | Noted: 2024-12-28

## 2024-12-28 PROBLEM — N32.81 OVERACTIVE BLADDER: Status: ACTIVE | Noted: 2024-12-28

## 2024-12-28 LAB — HOLD SPECIMEN: NORMAL

## 2024-12-29 LAB — BACTERIA UR CULT: ABNORMAL

## 2024-12-30 ENCOUNTER — TELEPHONE (OUTPATIENT)
Dept: PRIMARY CARE | Facility: CLINIC | Age: 74
End: 2024-12-30
Payer: MEDICARE

## 2024-12-30 DIAGNOSIS — N30.00 ACUTE CYSTITIS WITHOUT HEMATURIA: Primary | ICD-10-CM

## 2024-12-30 LAB — BACTERIA UR CULT: ABNORMAL

## 2024-12-30 RX ORDER — SULFAMETHOXAZOLE AND TRIMETHOPRIM 800; 160 MG/1; MG/1
1 TABLET ORAL 2 TIMES DAILY
Qty: 10 TABLET | Refills: 0 | Status: SHIPPED | OUTPATIENT
Start: 2024-12-30 | End: 2025-01-04

## 2024-12-30 NOTE — TELEPHONE ENCOUNTER
Can you please put in a referral to [facility] for meals on wheels for pt. Thanks!

## 2024-12-30 NOTE — TELEPHONE ENCOUNTER
Result Communication    Resulted Orders   Basic metabolic panel   Result Value Ref Range    Glucose 82 74 - 99 mg/dL    Sodium 140 136 - 145 mmol/L    Potassium 4.4 3.5 - 5.3 mmol/L    Chloride 102 98 - 107 mmol/L    Bicarbonate 31 21 - 32 mmol/L    Anion Gap 11 10 - 20 mmol/L    Urea Nitrogen 41 (H) 6 - 23 mg/dL    Creatinine 1.20 (H) 0.50 - 1.05 mg/dL    eGFR 47 (L) >60 mL/min/1.73m*2      Comment:      Calculations of estimated GFR are performed using the 2021 CKD-EPI Study Refit equation without the race variable for the IDMS-Traceable creatinine methods.  https://jasn.asnjournals.org/content/early/2021/09/22/ASN.6629791637    Calcium 10.3 8.6 - 10.3 mg/dL   CBC   Result Value Ref Range    WBC 8.2 4.4 - 11.3 x10*3/uL    nRBC 0.0 0.0 - 0.0 /100 WBCs    RBC 3.98 (L) 4.00 - 5.20 x10*6/uL    Hemoglobin 12.2 12.0 - 16.0 g/dL    Hematocrit 39.9 36.0 - 46.0 %     80 - 100 fL    MCH 30.7 26.0 - 34.0 pg    MCHC 30.6 (L) 32.0 - 36.0 g/dL    RDW 15.1 (H) 11.5 - 14.5 %    Platelets 239 150 - 450 x10*3/uL   Urinalysis with Reflex Culture and Microscopic   Result Value Ref Range    Color, Urine Yellow Light-Yellow, Yellow, Dark-Yellow    Appearance, Urine Turbid (N) Clear    Specific Gravity, Urine 1.017 1.005 - 1.035    pH, Urine 8.5 (N) 5.0, 5.5, 6.0, 6.5, 7.0, 7.5, 8.0    Protein, Urine 50 (1+) (A) NEGATIVE, 10 (TRACE), 20 (TRACE) mg/dL    Glucose, Urine 500 (3+) (A) Normal mg/dL    Blood, Urine NEGATIVE NEGATIVE    Ketones, Urine NEGATIVE NEGATIVE mg/dL    Bilirubin, Urine NEGATIVE NEGATIVE    Urobilinogen, Urine Normal Normal mg/dL    Nitrite, Urine 2+ (A) NEGATIVE    Leukocyte Esterase, Urine 500 Chanel/µL (A) NEGATIVE   Extra Urine Gray Tube   Result Value Ref Range    Extra Tube Hold for add-ons.       Comment:      Auto resulted.   Microscopic Only, Urine   Result Value Ref Range    WBC, Urine 6-10 (A) 1-5, NONE /HPF    RBC, Urine >20 (A) NONE, 1-2, 3-5 /HPF    Bacteria, Urine 1+ (A) NONE SEEN /HPF    Mucus,  Urine FEW Reference range not established. /LPF   Urine Culture   Result Value Ref Range    Urine Culture >=100,000 CFU/mL Proteus mirabilis (A)        2:31 PM      Results were successfully communicated with the patient and they acknowledged their understanding.  Pt informed that we need to change antibiotic because the UTI is resistant to Macrobid which pt started Saturday, will replace with Bactrim. Pt verbalizes understanding. Pt informed to drink more water due to increased BUN/Cr. Will repeat next month.   Torie Iraheta, APRN-CNP

## 2025-01-20 ENCOUNTER — APPOINTMENT (OUTPATIENT)
Dept: CARDIOLOGY | Facility: CLINIC | Age: 75
End: 2025-01-20
Payer: MEDICARE

## 2025-01-20 VITALS
HEART RATE: 43 BPM | DIASTOLIC BLOOD PRESSURE: 70 MMHG | RESPIRATION RATE: 18 BRPM | WEIGHT: 240 LBS | OXYGEN SATURATION: 96 % | HEIGHT: 63 IN | TEMPERATURE: 98.6 F | SYSTOLIC BLOOD PRESSURE: 126 MMHG | BODY MASS INDEX: 42.52 KG/M2

## 2025-01-20 DIAGNOSIS — I50.33 ACUTE ON CHRONIC DIASTOLIC CONGESTIVE HEART FAILURE: ICD-10-CM

## 2025-01-20 DIAGNOSIS — I50.41 ACUTE COMBINED SYSTOLIC AND DIASTOLIC HEART FAILURE: Primary | ICD-10-CM

## 2025-01-20 DIAGNOSIS — E78.2 MIXED HYPERLIPIDEMIA: ICD-10-CM

## 2025-01-20 DIAGNOSIS — I21.4 ACUTE NON-ST SEGMENT ELEVATION MYOCARDIAL INFARCTION (MULTI): ICD-10-CM

## 2025-01-20 DIAGNOSIS — R00.1 BRADYCARDIA: ICD-10-CM

## 2025-01-20 DIAGNOSIS — Z86.79 HISTORY OF HYPERTENSION: ICD-10-CM

## 2025-01-20 DIAGNOSIS — I10 ESSENTIAL HYPERTENSION: ICD-10-CM

## 2025-01-20 PROBLEM — R20.0 HAND NUMBNESS: Status: ACTIVE | Noted: 2025-01-20

## 2025-01-20 PROBLEM — M25.512 CHRONIC PAIN OF BOTH SHOULDERS: Status: ACTIVE | Noted: 2025-01-20

## 2025-01-20 PROBLEM — N39.0 URINARY TRACT INFECTION: Status: ACTIVE | Noted: 2025-01-20

## 2025-01-20 PROBLEM — I82.409 ACUTE EMBOLISM AND THROMBOSIS OF UNSPECIFIED DEEP VEINS OF UNSPECIFIED LOWER EXTREMITY (MULTI): Status: ACTIVE | Noted: 2024-08-13

## 2025-01-20 PROBLEM — G56.00 CARPAL TUNNEL SYNDROME: Status: ACTIVE | Noted: 2025-01-20

## 2025-01-20 PROBLEM — T78.3XXA ANGIOEDEMA: Status: ACTIVE | Noted: 2025-01-20

## 2025-01-20 PROBLEM — R55 SYNCOPE AND COLLAPSE: Status: ACTIVE | Noted: 2025-01-20

## 2025-01-20 PROBLEM — R20.9 SKIN SENSATION DISTURBANCE: Status: ACTIVE | Noted: 2025-01-20

## 2025-01-20 PROBLEM — M25.511 CHRONIC PAIN OF BOTH SHOULDERS: Status: ACTIVE | Noted: 2025-01-20

## 2025-01-20 PROBLEM — N18.9 CHRONIC KIDNEY DISEASE, UNSPECIFIED: Status: ACTIVE | Noted: 2024-09-04

## 2025-01-20 PROBLEM — E55.9 VITAMIN D DEFICIENCY: Status: ACTIVE | Noted: 2025-01-20

## 2025-01-20 PROBLEM — C44.92 SQUAMOUS CELL CARCINOMA OF SKIN, UNSPECIFIED: Status: ACTIVE | Noted: 2025-01-20

## 2025-01-20 PROBLEM — M70.61 TROCHANTERIC BURSITIS OF RIGHT HIP: Status: ACTIVE | Noted: 2025-01-20

## 2025-01-20 PROBLEM — D50.0 ANEMIA DUE TO BLOOD LOSS: Status: ACTIVE | Noted: 2025-01-20

## 2025-01-20 PROBLEM — N17.9 ACUTE KIDNEY FAILURE, UNSPECIFIED (CMS-HCC): Status: ACTIVE | Noted: 2024-09-02

## 2025-01-20 PROBLEM — M54.30 SCIATICA: Status: ACTIVE | Noted: 2025-01-20

## 2025-01-20 PROBLEM — G89.29 CHRONIC PAIN OF BOTH SHOULDERS: Status: ACTIVE | Noted: 2025-01-20

## 2025-01-20 PROBLEM — R19.7 DIARRHEA OF PRESUMED INFECTIOUS ORIGIN: Status: ACTIVE | Noted: 2025-01-20

## 2025-01-20 PROBLEM — M75.102 ROTATOR CUFF SYNDROME OF LEFT SHOULDER: Status: ACTIVE | Noted: 2025-01-20

## 2025-01-20 PROBLEM — E03.8 SUBCLINICAL HYPOTHYROIDISM: Status: ACTIVE | Noted: 2025-01-20

## 2025-01-20 PROCEDURE — 1159F MED LIST DOCD IN RCRD: CPT | Performed by: INTERNAL MEDICINE

## 2025-01-20 PROCEDURE — 1126F AMNT PAIN NOTED NONE PRSNT: CPT | Performed by: INTERNAL MEDICINE

## 2025-01-20 PROCEDURE — 1036F TOBACCO NON-USER: CPT | Performed by: INTERNAL MEDICINE

## 2025-01-20 PROCEDURE — 3074F SYST BP LT 130 MM HG: CPT | Performed by: INTERNAL MEDICINE

## 2025-01-20 PROCEDURE — 1157F ADVNC CARE PLAN IN RCRD: CPT | Performed by: INTERNAL MEDICINE

## 2025-01-20 PROCEDURE — 3078F DIAST BP <80 MM HG: CPT | Performed by: INTERNAL MEDICINE

## 2025-01-20 PROCEDURE — 1160F RVW MEDS BY RX/DR IN RCRD: CPT | Performed by: INTERNAL MEDICINE

## 2025-01-20 PROCEDURE — 99215 OFFICE O/P EST HI 40 MIN: CPT | Performed by: INTERNAL MEDICINE

## 2025-01-20 RX ORDER — OXYBUTYNIN CHLORIDE 10 MG/1
10 TABLET, EXTENDED RELEASE ORAL DAILY
COMMUNITY

## 2025-01-20 RX ORDER — ATORVASTATIN CALCIUM 20 MG/1
20 TABLET, FILM COATED ORAL DAILY
Qty: 30 TABLET | Refills: 11 | Status: SHIPPED | OUTPATIENT
Start: 2025-01-20 | End: 2026-01-20

## 2025-01-20 RX ORDER — ATORVASTATIN CALCIUM 20 MG/1
TABLET, FILM COATED ORAL
COMMUNITY
Start: 2024-10-13 | End: 2025-01-20 | Stop reason: WASHOUT

## 2025-01-20 RX ORDER — DAPAGLIFLOZIN 10 MG/1
10 TABLET, FILM COATED ORAL DAILY
Qty: 30 TABLET | Refills: 11 | Status: SHIPPED | OUTPATIENT
Start: 2025-01-20 | End: 2026-01-20

## 2025-01-20 RX ORDER — PROPRANOLOL HYDROCHLORIDE 10 MG/1
10 TABLET ORAL DAILY
COMMUNITY
Start: 2024-12-20 | End: 2025-01-20 | Stop reason: WASHOUT

## 2025-01-20 ASSESSMENT — ENCOUNTER SYMPTOMS
DEPRESSION: 0
OCCASIONAL FEELINGS OF UNSTEADINESS: 1
LOSS OF SENSATION IN FEET: 0

## 2025-01-20 ASSESSMENT — LIFESTYLE VARIABLES
HAVE YOU OR SOMEONE ELSE BEEN INJURED AS A RESULT OF YOUR DRINKING: NO
HAS A RELATIVE, FRIEND, DOCTOR, OR ANOTHER HEALTH PROFESSIONAL EXPRESSED CONCERN ABOUT YOUR DRINKING OR SUGGESTED YOU CUT DOWN: NO
SKIP TO QUESTIONS 9-10: 1
HOW MANY STANDARD DRINKS CONTAINING ALCOHOL DO YOU HAVE ON A TYPICAL DAY: 1 OR 2
HOW OFTEN DO YOU HAVE SIX OR MORE DRINKS ON ONE OCCASION: NEVER
HOW OFTEN DO YOU HAVE A DRINK CONTAINING ALCOHOL: MONTHLY OR LESS
AUDIT TOTAL SCORE: 1
AUDIT-C TOTAL SCORE: 1

## 2025-01-20 ASSESSMENT — PATIENT HEALTH QUESTIONNAIRE - PHQ9
SUM OF ALL RESPONSES TO PHQ9 QUESTIONS 1 AND 2: 2
1. LITTLE INTEREST OR PLEASURE IN DOING THINGS: SEVERAL DAYS
2. FEELING DOWN, DEPRESSED OR HOPELESS: SEVERAL DAYS
10. IF YOU CHECKED OFF ANY PROBLEMS, HOW DIFFICULT HAVE THESE PROBLEMS MADE IT FOR YOU TO DO YOUR WORK, TAKE CARE OF THINGS AT HOME, OR GET ALONG WITH OTHER PEOPLE: SOMEWHAT DIFFICULT

## 2025-01-20 ASSESSMENT — PAIN SCALES - GENERAL: PAINLEVEL_OUTOF10: 0-NO PAIN

## 2025-01-20 NOTE — PROGRESS NOTES
history of present illness:  This is a very pleasant 74-year-old female following up in my office after hospitalization in October 2020 for for weakness and bacteremia.  Patient was found to have E. coli osteomyelitis in the setting of urinary tract infection.  Patient at that time echocardiogram showed new onset heart failure severe LV systolic dysfunction ejection fraction of 30 to 35%.  Patient was managed medically in terms of her new onset heart failure due to multiple comorbidities and bacteremia in addition of osteomyelitis.  Has been feeling better.  She is currently off IV antibiotics.  Complaining of shortness of breath.  Has chronic lower extremity edema.  Denies having chest pain palpitations dizziness lightheadedness or syncope.    Past Medical History:   Diagnosis Date    Acute embolism and thrombosis of unspecified deep veins of unspecified lower extremity (Multi) 08/13/2024    Acute non-ST segment elevation myocardial infarction (Multi) 01/20/2025    Acute on chronic diastolic congestive heart failure 10/18/2024    Angioedema 01/20/2025    Bradycardia 09/02/2024    Deep venous thrombosis (Multi) 08/13/2024    Elevation of levels of liver transaminase levels     ALT (SGPT) level raised    Essential hypertension 04/22/2024    History of hypertension 01/20/2025    Hyperlipidemia 10/19/2023    Other conditions influencing health status     Osteoarthritis    Personal history of other complications of pregnancy, childbirth and the puerperium     History of ectopic pregnancy    Personal history of other diseases of the circulatory system     History of hypertension    Personal history of other diseases of the musculoskeletal system and connective tissue     History of bursitis    Personal history of other diseases of the musculoskeletal system and connective tissue     Personal history of gout    Personal history of other diseases of the nervous system and sense organs     History of migraine headaches        Past Surgical History:   Procedure Laterality Date    CARPAL TUNNEL RELEASE  04/09/2013    Neuroplasty Median Nerve At Carpal Tunnel    CHOLECYSTECTOMY  04/09/2013    Cholecystectomy    KNEE ARTHROPLASTY  04/09/2013    Knee Arthroplasty       Allergies   Allergen Reactions    Ciprofloxacin Anaphylaxis, Unknown and Swelling    Lisinopril Anaphylaxis, Angioedema, Swelling, Unknown and Other    Cefazolin Angioedema and Swelling    Cephalexin Unknown    Tetanus Toxoid, Adsorbed Unknown        reports that she has never smoked. She has never used smokeless tobacco. She reports that she does not currently use alcohol. She reports that she does not use drugs.    No family history on file.    Patient's Medications   New Prescriptions    No medications on file   Previous Medications    ACETAMINOPHEN (TYLENOL) 325 MG TABLET    Take 2 tablets (650 mg) by mouth every 6 hours if needed for mild pain (1 - 3).    ATORVASTATIN (LIPITOR) 20 MG TABLET    GIVE 20 MG BY MOUTH AT BEDTIME FOR HYPERLIPIDEMIA    CARVEDILOL (COREG) 12.5 MG TABLET    Take 1 tablet (12.5 mg) by mouth 2 times a day.    DAPAGLIFLOZIN PROPANEDIOL (FARXIGA) 10 MG    Take 1 tablet (10 mg) by mouth once daily.    ERTAPENEM 1,000 MG IN SODIUM CHLORIDE 0.9% 50 ML IV    Infuse 1,000 mg at 100 mL/hr over 30 minutes into a venous catheter once every 24 hours. X 6 weeks    FAMOTIDINE (PEPCID) 20 MG TABLET    Take 1 tablet (20 mg) by mouth 2 times a day.    FUROSEMIDE (LASIX) 40 MG TABLET    Take 1 tablet (40 mg) by mouth once daily.    GABAPENTIN (NEURONTIN) 100 MG CAPSULE    Take 2 capsules (200 mg) by mouth 3 times a day.    LOSARTAN (COZAAR) 50 MG TABLET    Take 1 tablet (50 mg) by mouth once daily.    OXYBUTYNIN (DITROPAN) 5 MG TABLET    Take 1 tablet (5 mg) by mouth 2 times a day.    OXYBUTYNIN XL (DITROPAN-XL) 10 MG 24 HR TABLET    Take 1 tablet (10 mg) by mouth once daily. Do not crush, chew, or split.    POLYETHYLENE GLYCOL (GLYCOLAX, MIRALAX) 17 GRAM  PACKET    Take 17 g by mouth once daily.    POTASSIUM CHLORIDE (KLOR-CON) 20 MEQ PACKET    Take 40 mEq by mouth 2 times daily (morning and late afternoon).    PROPRANOLOL (INDERAL) 10 MG TABLET    Take 1 tablet (10 mg) by mouth once daily.    SIMVASTATIN (ZOCOR) 40 MG TABLET    TAKE 1 TABLET (40 MG) BY MOUTH ONCE DAILY AT BEDTIME.   Modified Medications    No medications on file   Discontinued Medications    No medications on file       Objective   Physical Exam  General: Patient in no acute distress   HEENT: Atraumatic normocephalic.  Neck: Supple, jugular venous pressure within normal limit.  No bruits  Lungs: Clear to auscultation bilaterally  Cardiovascular: Regular rate and rhythm, normal heart sounds, no murmurs rubs or gallops  Abdomen: Soft nontender nondistended.  Normal bowel sounds.  Extremities: Warm to touch, no edema.      Lab Review   Lab on 12/27/2024   Component Date Value    Glucose 12/27/2024 82     Sodium 12/27/2024 140     Potassium 12/27/2024 4.4     Chloride 12/27/2024 102     Bicarbonate 12/27/2024 31     Anion Gap 12/27/2024 11     Urea Nitrogen 12/27/2024 41 (H)     Creatinine 12/27/2024 1.20 (H)     eGFR 12/27/2024 47 (L)     Calcium 12/27/2024 10.3     WBC 12/27/2024 8.2     nRBC 12/27/2024 0.0     RBC 12/27/2024 3.98 (L)     Hemoglobin 12/27/2024 12.2     Hematocrit 12/27/2024 39.9     MCV 12/27/2024 100     MCH 12/27/2024 30.7     MCHC 12/27/2024 30.6 (L)     RDW 12/27/2024 15.1 (H)     Platelets 12/27/2024 239     Color, Urine 12/27/2024 Yellow     Appearance, Urine 12/27/2024 Turbid (N)     Specific Gravity, Urine 12/27/2024 1.017     pH, Urine 12/27/2024 8.5 (N)     Protein, Urine 12/27/2024 50 (1+) (A)     Glucose, Urine 12/27/2024 500 (3+) (A)     Blood, Urine 12/27/2024 NEGATIVE     Ketones, Urine 12/27/2024 NEGATIVE     Bilirubin, Urine 12/27/2024 NEGATIVE     Urobilinogen, Urine 12/27/2024 Normal     Nitrite, Urine 12/27/2024 2+ (A)     Leukocyte Esterase, Urine 12/27/2024 500  Chanel/µL (A)     Extra Tube 12/27/2024 Hold for add-ons.     WBC, Urine 12/27/2024 6-10 (A)     RBC, Urine 12/27/2024 >20 (A)     Bacteria, Urine 12/27/2024 1+ (A)     Mucus, Urine 12/27/2024 FEW     Urine Culture 12/27/2024 >=100,000 CFU/mL Proteus mirabilis (A)    Lab Requisition on 12/24/2024   Component Date Value    Urine Culture 12/24/2024 Growth indicates contamination with mixed bacterial homero. Repeat culture if clinically indicated.     Color, Urine 12/24/2024 Yellow     Appearance, Urine 12/24/2024 Turbid (N)     Specific Gravity, Urine 12/24/2024 1.020     pH, Urine 12/24/2024 8.5 (N)     Protein, Urine 12/24/2024 30 (1+) (A)     Glucose, Urine 12/24/2024 70 (1+) (A)     Blood, Urine 12/24/2024 NEGATIVE     Ketones, Urine 12/24/2024 NEGATIVE     Bilirubin, Urine 12/24/2024 NEGATIVE     Urobilinogen, Urine 12/24/2024 NORM     Nitrite, Urine 12/24/2024 2+ (A)     Leukocyte Esterase, Urine 12/24/2024 500 Chanel/µL (A)     WBC, Urine 12/24/2024 11-20 (A)     RBC, Urine 12/24/2024 >20 (A)     Bacteria, Urine 12/24/2024 1+ (A)     Mucus, Urine 12/24/2024 FEW     Triple Phosphate Crystal* 12/24/2024 1+         Assessment/Plan   Patient Active Problem List   Diagnosis    Hyperlipidemia    Shoulder arthritis    Essential hypertension    Nausea    Constipation    Claw toe    Gout    Chronic gout without tophus    Gastroesophageal reflux disease    Esophageal reflux    Endometrial hyperplasia    Hyponatremia    CELINA (acute kidney injury) (CMS-HCC)    Acute cystitis without hematuria    Weakness    Bradycardia    Chronic low back pain    Shortness of breath    Acute on chronic diastolic congestive heart failure    Hypomagnesemia    Acute hypoxic respiratory failure (Multi)    Osteomyelitis of vertebra, thoracolumbar region (Multi)    Degenerative lumbar spinal stenosis    Degenerative spondylolisthesis    Left foot drop    Hypokalemia    Overactive bladder    Acute embolism and thrombosis of unspecified deep veins of  unspecified lower extremity (Multi)    Acute non-ST segment elevation myocardial infarction (Multi)    Acute kidney failure, unspecified (CMS-HCC)    Anemia due to blood loss    Angioedema    Carpal tunnel syndrome    Chronic kidney disease, unspecified    Chronic pain of both shoulders    Diarrhea of presumed infectious origin    Hand numbness    Vitamin D deficiency    Urinary tract infection    Trochanteric bursitis of right hip    Syncope and collapse    Subclinical hypothyroidism    Squamous cell carcinoma of skin, unspecified    Skin sensation disturbance    Sciatica    Rotator cuff syndrome of left shoulder    History of hypertension      This is a very pleasant 74-year-old female following up in my office after hospitalization in October 2020 for for weakness and bacteremia.  Patient was found to have E. coli osteomyelitis in the setting of urinary tract infection.  Patient at that time echocardiogram showed new onset heart failure severe LV systolic dysfunction ejection fraction of 30 to 35%.  Patient was managed medically in terms of her new onset heart failure due to multiple comorbidities and bacteremia in addition of osteomyelitis.  Has been feeling better.  She is currently off IV antibiotics.  Complaining of shortness of breath.  Has chronic lower extremity edema.  Denies having chest pain palpitations dizziness lightheadedness or syncope.    At this point we will decrease carvedilol to 6.25 mg oral twice daily due to bradycardia.  Continue losartan Farxiga and furosemide.  Reviewed last labs her baseline creatinine is slightly worse than before likely secondary to Farxiga and losartan.  We will arrange for cardiac catheterization as a workup for new onset heart failure severe LV systolic dysfunction and episode of elevated troponin in the past.  We will follow-up in 3 months after cardiac catheterization.  Will repeat another echo to assess ejection fraction.  Patient otherwise on optimal medical  therapy for heart failure systolic dysfunction.  Discussed with her lifestyle modification.    Cristina Wharton MD

## 2025-01-20 NOTE — PATIENT INSTRUCTIONS
Decrease carvedilol to 6.25 mg oral twice daily.    Start atorvastatin 20 mg oral daily.    Corinne will call you to tell you about the best timing for cardiac catheterization.    Call centralized scheduling to schedule for echocardiogram.

## 2025-01-22 ENCOUNTER — TELEPHONE (OUTPATIENT)
Dept: CARDIOLOGY | Facility: CLINIC | Age: 75
End: 2025-01-22
Payer: MEDICARE

## 2025-01-22 PROBLEM — I50.41 ACUTE COMBINED SYSTOLIC AND DIASTOLIC HEART FAILURE: Status: ACTIVE | Noted: 2025-01-20

## 2025-01-28 ENCOUNTER — TELEPHONE (OUTPATIENT)
Dept: PRIMARY CARE | Facility: CLINIC | Age: 75
End: 2025-01-28
Payer: MEDICARE

## 2025-01-28 DIAGNOSIS — R39.9 UTI SYMPTOMS: Primary | ICD-10-CM

## 2025-01-28 RX ORDER — NITROFURANTOIN 25; 75 MG/1; MG/1
100 CAPSULE ORAL 2 TIMES DAILY
Qty: 10 CAPSULE | Refills: 0 | Status: SHIPPED | OUTPATIENT
Start: 2025-01-28 | End: 2025-02-02

## 2025-01-28 NOTE — TELEPHONE ENCOUNTER
Macrobid sent to local pharmacy 100 mg po twice daily for 5 days if you could let pt know, thank you.

## 2025-01-29 ENCOUNTER — APPOINTMENT (OUTPATIENT)
Dept: PRIMARY CARE | Facility: CLINIC | Age: 75
End: 2025-01-29
Payer: MEDICARE

## 2025-01-29 ENCOUNTER — TELEPHONE (OUTPATIENT)
Dept: PRIMARY CARE | Facility: CLINIC | Age: 75
End: 2025-01-29
Payer: MEDICARE

## 2025-01-29 NOTE — TELEPHONE ENCOUNTER
Patient returning call to office.  Insurance and address confirmed.  COVID Screening negative, verbal consent obtained.  Appointment confirmed.

## 2025-01-30 ENCOUNTER — APPOINTMENT (OUTPATIENT)
Dept: PRIMARY CARE | Facility: CLINIC | Age: 75
End: 2025-01-30
Payer: MEDICARE

## 2025-01-31 ENCOUNTER — OFFICE VISIT (OUTPATIENT)
Dept: PRIMARY CARE | Facility: CLINIC | Age: 75
End: 2025-01-31
Payer: MEDICARE

## 2025-01-31 VITALS
SYSTOLIC BLOOD PRESSURE: 142 MMHG | TEMPERATURE: 97.6 F | RESPIRATION RATE: 16 BRPM | DIASTOLIC BLOOD PRESSURE: 70 MMHG | HEART RATE: 69 BPM | OXYGEN SATURATION: 93 %

## 2025-01-31 DIAGNOSIS — I10 ESSENTIAL HYPERTENSION: ICD-10-CM

## 2025-01-31 DIAGNOSIS — I50.41 ACUTE COMBINED SYSTOLIC AND DIASTOLIC HEART FAILURE: ICD-10-CM

## 2025-01-31 DIAGNOSIS — R39.9 UTI SYMPTOMS: Primary | ICD-10-CM

## 2025-01-31 DIAGNOSIS — N32.81 OVERACTIVE BLADDER: ICD-10-CM

## 2025-01-31 DIAGNOSIS — E78.5 HYPERLIPIDEMIA, UNSPECIFIED HYPERLIPIDEMIA TYPE: ICD-10-CM

## 2025-01-31 PROCEDURE — 1159F MED LIST DOCD IN RCRD: CPT | Performed by: NURSE PRACTITIONER

## 2025-01-31 PROCEDURE — 99349 HOME/RES VST EST MOD MDM 40: CPT | Performed by: NURSE PRACTITIONER

## 2025-01-31 PROCEDURE — 3078F DIAST BP <80 MM HG: CPT | Performed by: NURSE PRACTITIONER

## 2025-01-31 PROCEDURE — 1125F AMNT PAIN NOTED PAIN PRSNT: CPT | Performed by: NURSE PRACTITIONER

## 2025-01-31 PROCEDURE — 1157F ADVNC CARE PLAN IN RCRD: CPT | Performed by: NURSE PRACTITIONER

## 2025-01-31 PROCEDURE — 3077F SYST BP >= 140 MM HG: CPT | Performed by: NURSE PRACTITIONER

## 2025-01-31 PROCEDURE — 1160F RVW MEDS BY RX/DR IN RCRD: CPT | Performed by: NURSE PRACTITIONER

## 2025-01-31 ASSESSMENT — ENCOUNTER SYMPTOMS
NECK PAIN: 1
HEMATOLOGIC/LYMPHATIC NEGATIVE: 1
ARTHRALGIAS: 1
HEADACHES: 1
COUGH: 1
LIGHT-HEADEDNESS: 1
WEAKNESS: 1
ACTIVITY CHANGE: 1
DIZZINESS: 1
FATIGUE: 1
GASTROINTESTINAL NEGATIVE: 1
PSYCHIATRIC NEGATIVE: 1
FREQUENCY: 1
DYSURIA: 1
BACK PAIN: 1
EYES NEGATIVE: 1
NECK STIFFNESS: 1
SINUS PRESSURE: 1

## 2025-01-31 ASSESSMENT — PAIN SCALES - GENERAL: PAINLEVEL_OUTOF10: 3

## 2025-01-31 NOTE — PROGRESS NOTES
Subjective   Patient ID: Angela Montelongo is a 74 y.o. female who is being seen for routine 1 month house calls follow up.    HPI Pt seen in single family home unaccompanied. PMHx: Osteomyelitis of spine, HTN, HLD, Hypokalemia, GERD, Constipation, gout, OA, CELINA, SOB, DJD, Left foot drop, hyponatremia. Pt seen sitting in lift chair with legs dependent. Pt alert and oriented times three and cooperative with examination. Pt discharged from home health care nursing and is still active with PT and OT. Pt coming twice weekly and OT coming once weekly. Pt does daily exercises on days they do not come. Pt currently believes she has a UTI and started Macrobid yesterday. Pt continues with dysuria, cloudy foul smelling urine. Pt states she feels physical therapy is going slowly but feels she had more to come back from. Pt relies on family for meals and assist with ADL's, does own medications. Pt saw Dr. Wharton on 1/20/25 and was scheduled for heart cath scheduled for March 13 and an echo scheduled for February 11th. Pt sleeping in regular bed in her bedroom. Pt reports energy as low can only do little amounts before needing to rest. Going to start using Rolator so she has a built in seat, was suggested to use Rolator by PT. Appetite reported as good and eating 2 meals a day. Pt started meals on wheels this week. Pt sleep reported as off and on will wake up to urinate or due to pain. Pt denies fever, chills, night sweats. Admits to headaches and occasional lightheadedness. Denies trouble chewing or swallowing. Denies SOB admits to RIGGS that resolves with rest. Pt denies CP or palpitations. Pt admits to leg swelling left greater than right. Denies N/V/D or constipation with a bowel movement daily. Pt admits to urianary frequency and urgency. Pt with c/o pain in shoulders, hips and knee along with back pain. Pt with no other concerns or complaints at present.   Home Visit medically necessary due to: pt has chronic condition that  makes access to a traditional office visit very difficult, illness or condition that results in activity limitation or restriction that impacts the ability to leave home such as; unsteady gait/ poor condition.      Review of Systems   Constitutional:  Positive for activity change and fatigue.   HENT:  Positive for congestion and sinus pressure.    Eyes: Negative.    Respiratory:  Positive for cough (Moist non productive).    Cardiovascular:  Positive for leg swelling.   Gastrointestinal: Negative.    Genitourinary:  Positive for dysuria, frequency and urgency.   Musculoskeletal:  Positive for arthralgias (knees, hip, shoulders.), back pain, gait problem, neck pain and neck stiffness.   Skin: Negative.    Allergic/Immunologic: Positive for environmental allergies.   Neurological:  Positive for dizziness, weakness, light-headedness and headaches.   Hematological: Negative.    Psychiatric/Behavioral: Negative.         Objective   /70 (BP Location: Right arm, Patient Position: Sitting, BP Cuff Size: Adult)   Pulse 69   Temp 36.4 °C (97.6 °F) (Temporal)   Resp 16   SpO2 93%       Current Outpatient Medications:     acetaminophen (Tylenol) 325 mg tablet, Take 2 tablets (650 mg) by mouth every 6 hours if needed for mild pain (1 - 3)., Disp: 30 tablet, Rfl: 0    atorvastatin (Lipitor) 20 mg tablet, Take 1 tablet (20 mg) by mouth once daily., Disp: 30 tablet, Rfl: 11    carvedilol (Coreg) 12.5 mg tablet, Take 1 tablet (12.5 mg) by mouth 2 times a day., Disp: , Rfl:     dapagliflozin propanediol (Farxiga) 10 mg, Take 1 tablet (10 mg) by mouth once daily., Disp: , Rfl:     famotidine (Pepcid) 20 mg tablet, Take 1 tablet (20 mg) by mouth 2 times a day., Disp: , Rfl:     furosemide (Lasix) 40 mg tablet, Take 1 tablet (40 mg) by mouth once daily., Disp: , Rfl:     gabapentin (Neurontin) 100 mg capsule, Take 2 capsules (200 mg) by mouth 3 times a day., Disp: , Rfl:     losartan (Cozaar) 50 mg tablet, Take 1 tablet (50  mg) by mouth once daily., Disp: , Rfl:     nitrofurantoin, macrocrystal-monohydrate, (Macrobid) 100 mg capsule, Take 1 capsule (100 mg) by mouth 2 times a day for 5 days., Disp: 10 capsule, Rfl: 0    oxybutynin XL (Ditropan-XL) 10 mg 24 hr tablet, Take 1 tablet (10 mg) by mouth once daily. Do not crush, chew, or split., Disp: , Rfl:     polyethylene glycol (Glycolax, Miralax) 17 gram packet, Take 17 g by mouth once daily., Disp: , Rfl:     potassium chloride (Klor-Con) 20 mEq packet, Take 40 mEq by mouth 2 times daily (morning and late afternoon)., Disp: , Rfl:     dapagliflozin propanediol (Farxiga) 10 mg, Take 1 tablet (10 mg) by mouth once daily., Disp: 30 tablet, Rfl: 11      Physical Exam  Constitutional:       Appearance: Normal appearance. She is obese.   HENT:      Head: Normocephalic and atraumatic.      Nose: Nose normal.      Mouth/Throat:      Mouth: Mucous membranes are moist.      Pharynx: Oropharynx is clear.   Eyes:      Conjunctiva/sclera: Conjunctivae normal.      Pupils: Pupils are equal, round, and reactive to light.   Cardiovascular:      Rate and Rhythm: Normal rate and regular rhythm.      Pulses: Normal pulses.      Heart sounds: Normal heart sounds.   Pulmonary:      Effort: Pulmonary effort is normal.      Breath sounds: Normal breath sounds.   Abdominal:      General: Bowel sounds are normal.      Palpations: Abdomen is soft.   Musculoskeletal:         General: Tenderness (lower extremities) present.      Left lower leg: Edema present.   Skin:     General: Skin is warm and dry.      Capillary Refill: Capillary refill takes 2 to 3 seconds.      Comments: Lower legs with purplish vascular changes to skin.    Neurological:      Mental Status: She is alert and oriented to person, place, and time.      Gait: Gait abnormal.   Psychiatric:         Mood and Affect: Mood normal.         Behavior: Behavior normal.         Thought Content: Thought content normal.         Judgment: Judgment normal.      Patient Active Problem List   Diagnosis    Hyperlipidemia    Shoulder arthritis    Essential hypertension    Nausea    Constipation    Claw toe    Gout    Chronic gout without tophus    Gastroesophageal reflux disease    Esophageal reflux    Endometrial hyperplasia    Hyponatremia    CELINA (acute kidney injury) (CMS-[facility])    Acute cystitis without hematuria    Weakness    Bradycardia    Chronic low back pain    Shortness of breath    Acute on chronic diastolic congestive heart failure    Hypomagnesemia    Acute hypoxic respiratory failure (Multi)    Osteomyelitis of vertebra, thoracolumbar region (Multi)    Degenerative lumbar spinal stenosis    Degenerative spondylolisthesis    Left foot drop    Hypokalemia    Overactive bladder    Acute embolism and thrombosis of unspecified deep veins of unspecified lower extremity (Multi)    Acute non-ST segment elevation myocardial infarction (Multi)    Acute kidney failure, unspecified (CMS-[facility])    Anemia due to blood loss    Angioedema    Carpal tunnel syndrome    Chronic kidney disease, unspecified    Chronic pain of both shoulders    Diarrhea of presumed infectious origin    Hand numbness    Vitamin D deficiency    Urinary tract infection    Trochanteric bursitis of right hip    Syncope and collapse    Subclinical hypothyroidism    Squamous cell carcinoma of skin, unspecified    Skin sensation disturbance    Sciatica    Rotator cuff syndrome of left shoulder    History of hypertension    Acute combined systolic and diastolic heart failure     Lab Results   Component Value Date    WBC 8.2 12/27/2024    HGB 12.2 12/27/2024    HCT 39.9 12/27/2024     12/27/2024     12/27/2024     Lab Results   Component Value Date    GLUCOSE 82 12/27/2024    CALCIUM 10.3 12/27/2024     12/27/2024    K 4.4 12/27/2024    CO2 31 12/27/2024     12/27/2024    BUN 41 (H) 12/27/2024    CREATININE 1.20 (H) 12/27/2024       Assessment/Plan   Diagnoses and all orders for this  visit:  UTI symptoms  Comments:  Urine sampe obtained and taken to lab  Begin Macrobid 100 mg po twice daily.  Orders:  -     Urinalysis with Reflex Culture and Microscopic; Future  Acute combined systolic and diastolic heart failure  Comments:  Continue Farxiga 10 mg po daily.  Continue Lasix 40 mg po daily.   Elevate legs when sitting.  Low sodium diet.  Hyperlipidemia, unspecified hyperlipidemia type  Comments:  Continue Lipitor 20 mg po daily.  Essential hypertension  Comments:  Stable  Continue Cozaar 50 mg po daily.  Continue Coreg 12.5 mg po twice daily.  Overactive bladder  Comments:  Continue Ditropan XL 10 mg po daily.  Other orders  -     Urine Culture    Follow up in 2 weeks with labs or prn.   Torie Iraheta, APRN-CNP

## 2025-02-02 LAB — BACTERIA UR CULT: NORMAL

## 2025-02-11 ENCOUNTER — APPOINTMENT (OUTPATIENT)
Dept: CARDIOLOGY | Facility: HOSPITAL | Age: 75
End: 2025-02-11
Payer: MEDICARE

## 2025-02-17 NOTE — PROGRESS NOTES
Pharmacist Clinic: Cardiology Management    Angela Montelongo is a 74 y.o. female was referred to Clinical Pharmacy Team for heart failure/cardiomyopathy management.     Referring Provider: Cristina Wharton MD    THIS IS A NEW PATIENT APPOINTMENT. PATIENT WILL BE ESTABLISHING CARE WITH CLINICAL PHARMACY.    Appointment was completed by the patient who was reached at .    REVIEW OF PAST APPNT (IF APPLICABLE):   N/A    Allergies Reviewed? Yes; patient is unsure of some allergies (cefazolin, cephalexin, tetanus)     Allergies   Allergen Reactions    Ciprofloxacin Anaphylaxis, Unknown and Swelling    Lisinopril Anaphylaxis, Angioedema, Swelling, Unknown and Other    Cefazolin Angioedema and Swelling    Cephalexin Unknown    Tetanus Toxoid, Adsorbed Unknown       Past Medical History:   Diagnosis Date    Acute embolism and thrombosis of unspecified deep veins of unspecified lower extremity (Multi) 08/13/2024    Acute non-ST segment elevation myocardial infarction (Multi) 01/20/2025    Acute on chronic diastolic congestive heart failure 10/18/2024    Angioedema 01/20/2025    Bradycardia 09/02/2024    Deep venous thrombosis (Multi) 08/13/2024    Elevation of levels of liver transaminase levels     ALT (SGPT) level raised    Essential hypertension 04/22/2024    History of hypertension 01/20/2025    Hyperlipidemia 10/19/2023    Other conditions influencing health status     Osteoarthritis    Personal history of other complications of pregnancy, childbirth and the puerperium     History of ectopic pregnancy    Personal history of other diseases of the circulatory system     History of hypertension    Personal history of other diseases of the musculoskeletal system and connective tissue     History of bursitis    Personal history of other diseases of the musculoskeletal system and connective tissue     Personal history of gout    Personal history of other diseases of the nervous system and sense organs     History  of migraine headaches       Current Outpatient Medications on File Prior to Visit   Medication Sig Dispense Refill    acetaminophen (Tylenol) 325 mg tablet Take 2 tablets (650 mg) by mouth every 6 hours if needed for mild pain (1 - 3). 30 tablet 0    aspirin 81 mg EC tablet Take 1 tablet (81 mg) by mouth once daily.      atorvastatin (Lipitor) 20 mg tablet Take 1 tablet (20 mg) by mouth once daily. 30 tablet 11    calcium carb/vit D3/minerals (CALCIUM-VITAMIN D ORAL) Take 1 tablet by mouth once daily.      carvedilol (Coreg) 12.5 mg tablet Take 1 tablet (12.5 mg) by mouth 2 times a day.      dapagliflozin propanediol (Farxiga) 10 mg Take 1 tablet (10 mg) by mouth once daily.      dapagliflozin propanediol (Farxiga) 10 mg Take 1 tablet (10 mg) by mouth once daily. 30 tablet 11    famotidine (Pepcid) 20 mg tablet Take 1 tablet (20 mg) by mouth 2 times a day. (Patient taking differently: Take 1 tablet (20 mg) by mouth once daily.)      furosemide (Lasix) 40 mg tablet Take 1 tablet (40 mg) by mouth once daily.      gabapentin (Neurontin) 100 mg capsule Take 2 capsules (200 mg) by mouth 3 times a day.      losartan (Cozaar) 50 mg tablet Take 1 tablet (50 mg) by mouth once daily.      multivitamin tablet Take 1 tablet by mouth once daily.      oxybutynin XL (Ditropan-XL) 10 mg 24 hr tablet Take 1 tablet (10 mg) by mouth once daily. Do not crush, chew, or split.      potassium chloride CR 20 mEq ER tablet Take 1 tablet (20 mEq) by mouth 2 times a day. Do not crush or chew.      polyethylene glycol (Glycolax, Miralax) 17 gram packet Take 17 g by mouth once daily. (Patient not taking: Reported on 2/18/2025)      potassium chloride (Klor-Con) 20 mEq packet Take 40 mEq by mouth 2 times daily (morning and late afternoon). (Patient not taking: Reported on 2/18/2025)       No current facility-administered medications on file prior to visit.         RELEVANT LAB RESULTS:  Lab Results   Component Value Date    BILITOT 0.5  "10/19/2024    CALCIUM 10.3 12/27/2024    CO2 31 12/27/2024     12/27/2024    CREATININE 1.20 (H) 12/27/2024    GLUCOSE 82 12/27/2024    ALKPHOS 90 10/19/2024    K 4.4 12/27/2024    PROT 5.1 (L) 10/19/2024     12/27/2024    AST 38 10/19/2024    ALT 21 10/19/2024    BUN 41 (H) 12/27/2024    ANIONGAP 11 12/27/2024    MG 1.64 10/19/2024    PHOS 2.2 (L) 08/31/2024    ALBUMIN 2.5 (L) 10/19/2024    GFRF 80 07/24/2023     Lab Results   Component Value Date    TRIG 91 06/07/2022    CHOL 164 06/07/2022    LDLCALC 81 06/07/2022    HDL 65 06/07/2022     No results found for: \"BMCBC\", \"CBCDIF\"     PHARMACEUTICAL ASSESSMENT:    MEDICATION RECONCILIATION    Home Pharmacy Reviewed? Yes, describe: [facility], [facility]    Added:  - Calcium with Vitamin D: 1 tablet daily (unknown strength per patient)  - Multivitamin: 1 tablet daily   - Aspirin 81 mg daily     Changed:  - Famotidine 20 mg: patient taking once daily  - Potassium chloride 20 mEq: patient taking 1 tablet twice daily (tablets, not packets as listed)  - Miralax 17 gram: patient not taking    Removed:  - None    Drug Interactions? Yes, describe: Oxybutynin may enhance the ulcerogenic effect of potassium. Recommended to avoid combination. Monitor for signs/symptoms of ulcer.  Patient denies symptoms at this time.      Medication Documentation Review Audit       Reviewed by HALEY Figueredo (Nurse Practitioner) on 01/31/25 at 1259      Medication Order Taking? Sig Documenting Provider Last Dose Status   acetaminophen (Tylenol) 325 mg tablet [id number] Yes Take 2 tablets (650 mg) by mouth every 6 hours if needed for mild pain (1 - 3). HALEY Blevins  Active   atorvastatin (Lipitor) 20 mg tablet [id number] Yes Take 1 tablet (20 mg) by mouth once daily. Cristina Wharton MD  Active   carvedilol (Coreg) 12.5 mg tablet [id number] Yes Take 1 tablet (12.5 mg) by mouth 2 times a day. Penelope Mcgee, APRN-CNP  Active   dapagliflozin propanediol " (Farxiga) 10 mg [id number] Yes Take 1 tablet (10 mg) by mouth once daily. HALEY Blevins  Active   dapagliflozin propanediol (Farxiga) 10 mg [id number]  Take 1 tablet (10 mg) by mouth once daily. Cristina Wharton MD  Active   famotidine (Pepcid) 20 mg tablet [id number] Yes Take 1 tablet (20 mg) by mouth 2 times a day. HALEY Blevins  Active   furosemide (Lasix) 40 mg tablet [id number] Yes Take 1 tablet (40 mg) by mouth once daily. HALEY Blevins  Active   gabapentin (Neurontin) 100 mg capsule [id number] Yes Take 2 capsules (200 mg) by mouth 3 times a day. Historical Provider, MD  Active   losartan (Cozaar) 50 mg tablet [id number] Yes Take 1 tablet (50 mg) by mouth once daily. HALEY Blevins  Active   nitrofurantoin, macrocrystal-monohydrate, (Macrobid) 100 mg capsule [id number] Yes Take 1 capsule (100 mg) by mouth 2 times a day for 5 days. HALEY Figueredo  Active   oxybutynin XL (Ditropan-XL) 10 mg 24 hr tablet [id number] Yes Take 1 tablet (10 mg) by mouth once daily. Do not crush, chew, or split. Historical Provider, MD  Active   polyethylene glycol (Glycolax, Miralax) 17 gram packet [id number] Yes Take 17 g by mouth once daily. HALEY Blevins  Active   potassium chloride (Klor-Con) 20 mEq packet [id number] Yes Take 40 mEq by mouth 2 times daily (morning and late afternoon). HALEY Blevins  Active                    DISEASE MANAGEMENT ASSESSMENT:     CHF ASSESSMENT     Symptom/Staging:  -Most recent ejection fraction: 30-35%  -NYHA Stage: Unknown    Results for orders placed during the hospital encounter of 10/17/24    Transthoracic Echo (TTE) Complete    [facility]  [address], [place] [id number]  Phone [phone]    TRANSTHORACIC ECHOCARDIOGRAM REPORT    Patient Name:      BRUNA HELTON        Reading Physician:    [id number] Cristina Wharton MD  Study Date:        10/18/2024            Ordering Provider:    [id number]  CHETAN FUNEZ [facility]  MRN/PID:           [id number]              Fellow:  Accession#:        [id number]          Nurse:  Date of Birth/Age: 1950 / 74 years  Sonographer:          Audrey Barger RDCS  Gender:            F                     Additional Staff:     Erlinda Merino RDCS  Height:            160.02 cm             Admit Date:  Weight:            117.94 kg             Admission Status:     Inpatient -  Routine  BSA / BMI:         2.16 m2 / 46.06 kg/m2 Department Location:  [facility]  Blood Pressure: 131 /65 mmHg    Study Type:    TRANSTHORACIC ECHO (TTE) COMPLETE  Diagnosis/ICD: Acute on chronic diastolic (congestive) heart failure  (CHF)-I50.33  Indication:    CHF, SOB  CPT Codes:     Echo Complete w Full Doppler-[id number]    Patient History:  Pertinent History: HLD, bradycardia, HTN, CHF.    Study Detail: The following Echo studies were performed: 2D, M-Mode, Doppler and  color flow. Technically challenging study due to body habitus,  patient lying in supine position and prominent lung artifact.  Definity used as a contrast agent for endocardial border  definition. Total contrast used for this procedure was 4 mL via IV  push.      PHYSICIAN INTERPRETATION:  Left Ventricle: The left ventricular systolic function is moderately decreased, with a visually estimated ejection fraction of 30-35%. There are no regional wall motion abnormalities. The left ventricular cavity size is normal. Abnormal (paradoxical) septal motion, consistent with an intraventricular conduction delay. Spectral Doppler shows a normal pattern of left ventricular diastolic filling.  Left Atrium: The left atrium is normal in size.  Right Ventricle: The right ventricle was not well visualized. There is reduced right ventricular systolic function.  Right Atrium: The right atrium is normal in size.  Aortic Valve: The aortic valve is trileaflet. The aortic valve dimensionless index is 0.78. There is no evidence of aortic valve  regurgitation. The peak instantaneous gradient of the aortic valve is 7.2 mmHg. The mean gradient of the aortic valve is 4.0 mmHg.  Mitral Valve: The mitral valve is normal in structure. There is trace mitral valve regurgitation.  Tricuspid Valve: The tricuspid valve was not well visualized. There is trace tricuspid regurgitation. The right ventricular systolic pressure is unable to be estimated.  Pulmonic Valve: The pulmonic valve is not well visualized. There is physiologic pulmonic valve regurgitation.  Pericardium: No pericardial effusion noted.  Aorta: The aortic root is normal.  Systemic Veins: The inferior vena cava appears normal in size, with IVC inspiratory collapse less than 50%.      CONCLUSIONS:  1. Poorly visualized anatomical structures due to suboptimal image quality.  2. The left ventricular systolic function is moderately decreased, with a visually estimated ejection fraction of 30-35%.  3. There is reduced right ventricular systolic function.  4. Trace mitral valve regurgitation.  5. Trace tricuspid regurgitation is visualized.  6. Limited images quality. Left ventricular ejection fraction appears reduced at 30 to 35% with interventricular conduction delay.    QUANTITATIVE DATA SUMMARY:    LA VOLUME:                   Normal Ranges:  LA Vol A4C:        69.7 ml   (22+/-6mL/m2)  LA Vol Index A4C:  32.2ml/m2  LA Area A4C:       23.0 cm2  LA Major Axis A4C: 6.4 cm  LA Vol A4C:        64.9 ml      M-MODE MEASUREMENTS:         Normal Ranges:  Ao Root:             2.90 cm (2.0-3.7cm)  LAs:                 4.00 cm (2.7-4.0cm)      AORTA MEASUREMENTS:         Normal Ranges:  Ao Sinus, d:        2.92 cm (2.1-3.5cm)  Ao STJ, d:          2.61 cm (1.7-3.4cm)  Asc Ao, d:          3.40 cm (2.1-3.4cm)      LV SYSTOLIC FUNCTION BY 2D PLANIMETRY (MOD):  Normal Ranges:  EF-A4C View:    38 % (>=55%)  EF-Visual:      33 %  LV EF Reported: 33 %      LV DIASTOLIC FUNCTION:           Normal Ranges:  MV Peak E:              0.73 m/s  (0.7-1.2 m/s)  MV Peak A:             0.92 m/s  (0.42-0.7 m/s)  E/A Ratio:             0.79      (1.0-2.2)  MV e'                  0.066 m/s (>8.0)  MV lateral e'          0.07 m/s  MV medial e'           0.06 m/s  E/e' Ratio:            11.06     (<8.0)      MITRAL VALVE:         Normal Ranges:  MV DT:        94 msec (150-240msec)      AORTIC VALVE:                     Normal Ranges:  AoV Vmax:                1.34 m/s (<=1.7m/s)  AoV Peak P.2 mmHg (<20mmHg)  AoV Mean P.0 mmHg (1.7-11.5mmHg)  LVOT Max Alex:            1.07 m/s (<=1.1m/s)  AoV VTI:                 25.00 cm (18-25cm)  LVOT VTI:                19.50 cm  LVOT Diameter:           2.40 cm  (1.8-2.4cm)  AoV Area, VTI:           3.53 cm2 (2.5-5.5cm2)  AoV Area,Vmax:           3.61 cm2 (2.5-4.5cm2)  AoV Dimensionless Index: 0.78      RIGHT VENTRICLE:  TAPSE: 14.7 mm  RV s'  0.12 m/s      TRICUSPID VALVE/RVSP:         Normal Ranges:  IVC Diam:             1.59 cm      [id number] Cristina Wharton MD  Electronically signed on 10/19/2024 at 4:28:53 PM        ** Final **      Guideline-Directed Medical Therapy:  -ARNI: No   -If no, then ACEi/ARB?: Yes, describe: Losartan 50 mg every day   (listed history of angioedema/anaphylaxis with lisinopril)  -Beta Blocker: Yes, describe: Carvedilol 12.5 mg BID  -MRA: No  -SGLT2i: Yes, describe: Farxiga 10 mg every day     Secondary Prevention:  -The ASCVD Risk score (Liseth RANDALL, et al., 2019) failed to calculate for the following reasons:    Risk score cannot be calculated because patient has a medical history suggesting prior/existing ASCVD   -Aspirin 81mg? no  -Statin?: Yes, describe: Atorvastatin 20 mg every day   -HTN?: Yes, describe: 142/70 as of 2025    CURRENT PHARMACOTHERAPY:   Carvedilol 12.5 mg BID- notation to decrease to 6.25 mg BID at  cardiology visit, patient reports taking 12.5 mg twice daily  Farxiga 10 mg every day   Furosemide 40 mg every day   Losartan 50 mg every  day   Potassium chloride 20 mEq 1 tablet BID (patient reported - alternative prescription states 40 mEq BID)    Affordability: Farxiga creates cost burden for patient  Adherence/Compliance: see above  Adverse Effects: Reports she has had about 3 UTIs in the past year, also unsure if she had potential yeast infections has been increasing washing after going to the bathroom which has helped decreased symptoms    Monitoring Weights at Home: No  Home Weight Recordings: None    Past In Office Weight Readings:   Wt Readings from Last 6 Encounters:   01/20/25 109 kg (240 lb)   12/27/24 108 kg (237 lb)   12/09/24 109 kg (240 lb)   10/18/24 111 kg (245 lb)   10/11/24 114 kg (252 lb 3.2 oz)   10/07/24 120 kg (264 lb 8 oz)       Monitoring Blood Pressure at Home: No  Home BP Recordings: None    Past In Office BP Readings:   BP Readings from Last 6 Encounters:   01/31/25 142/70   01/20/25 126/70   12/27/24 128/68   10/24/24 120/61   10/22/24 109/74   10/11/24 145/81       HEALTH MANAGEMENT    Maintaining fluid restriction (<2 L/day): N/A  Edema/swelling: Yes, some left leg swelling which has been chronic for patient after knee replacement. Patient reports no acute change at today's appointment.  Shortness of breath: No, only with exertion. None with typical daily activities.  Trouble sleeping/lying down: No, notes she does sleep with a slight incline on her bed  Dry/hacking cough: No, some cough in the morning due to sinuses but this typically resolves quickly.   Recent Hospitalizations: No    EDUCATION/COUNSELING:   - Counseled patient on MOA, expectations, duration of therapy, contraindications, administration, and monitoring parameters  - Counseled patient on lifestyle modifications that can decrease your risk of having complications (smoking cessation, losing weight, daily weights, vaccines)  - Counseled patient on fluid intake and weight management. Recommended to not consume more than 2 liters of fliuids per day. If they  have gained more than 2-3 pounds within a 24 hours period (or 5 pounds in a week), contact their cardiologist  - Answered all patient questions and concerns      Patient Assistance Program (PAP)    Application for program to be submitted for the following medications: [facility] Permanent Address: [facility]   Prescription Insurance:   Yes   Members of Household: 1   Files Taxes: Yes       Patient will be faxing financial information to pharmacist directly at .    Patient verbally reports monthly or yearly income which is less than 400% federal poverty level    Patient aware this process may take up to 6 weeks.     If approved medication must be filled through [facility] and MEDICATION WILL BE MAILED TO PATIENT.       DISCUSSION/NOTES:   Today's appointment was initial visit to establish care with Clinical Pharmacy. Angela reports some left leg swelling which has been chronic for patient after knee replacement. Patient reports no acute change at today's appointment. Also reports shortness of breath on exertion, none with typical daily activities.   She reports she has still been taking carvedilol 12.5 mg twice daily, notation per 1/20/2025 cardiology visit states to decrease to 6.25 mg BID, however updated prescription was not sent. Medication list states 12.5 mg BID. Will have patient clarify with cardiologist, as she is not currently monitoring HR/BP at home.  She has also noted taking potassium chloride 20 mEq twice daily, rather than 40 mEq twice daily as listed, and is unsure if this prescription accurate as she was recently in a SNF. Recommended discussion with her PCP regarding this.  We also discussed patient's recent history of UTIs, as she reports 3 occurences in the past year. States she has been cautious to ensure she is practicing good hygiene, and has been washing herself after she uses the bathroom, which has been helpful. Discussed that Farxiga may increase the risk  of UTIs. Will continue to monitor occurrences, may consider discontinuation if patient experiences recurrent UTIs.    ASSESSMENT:    Assessment/Plan   Problem List Items Addressed This Visit       Acute combined systolic and diastolic heart failure     Angela is prescribed 4/4 GDMT. Will defer adjustment due to lack of BP/HR readings. Patient to clarify dosage of carvedilol with cardiologist. Will follow up in 1 month.         Relevant Orders    Referral to Clinical Pharmacy         RECOMMENDATIONS/PLAN:    Continue:   Carvedilol  Patient to clarify dosage with provider, reports taking 12.5 mg BID  Potassium chloride 20 mEQ  Patient to clarify dosage with provider   Farxiga 10 mg every day   Furosemide 40 mg every day   Losartan 50 mg every day     Last Appnt with Referring Provider: 1/20/2025  Next Appnt with Referring Provider: To be scheduled (procedure scheduled 3/13/2025)  Clinical Pharmacist follow up: 3/18/2025  VAF/Application Expiration: Yes    Date: Pending  Type of Encounter: Lilibeth Dangelo PharmD    Verbal consent to manage patient's drug therapy was obtained from the patient . They were informed they may decline to participate or withdraw from participation in pharmacy services at any time.    Continue all meds under the continuation of care with the referring provider and clinical pharmacy team.

## 2025-02-18 ENCOUNTER — APPOINTMENT (OUTPATIENT)
Dept: PHARMACY | Facility: HOSPITAL | Age: 75
End: 2025-02-18
Payer: MEDICARE

## 2025-02-18 DIAGNOSIS — I50.41 ACUTE COMBINED SYSTOLIC AND DIASTOLIC HEART FAILURE: ICD-10-CM

## 2025-02-18 DIAGNOSIS — M46.46 DISCITIS OF LUMBAR REGION: ICD-10-CM

## 2025-02-18 PROCEDURE — RXMED WILLOW AMBULATORY MEDICATION CHARGE

## 2025-02-18 RX ORDER — ASPIRIN 81 MG/1
81 TABLET ORAL DAILY
COMMUNITY

## 2025-02-18 RX ORDER — POTASSIUM CHLORIDE 20 MEQ/1
20 TABLET, EXTENDED RELEASE ORAL 2 TIMES DAILY
COMMUNITY

## 2025-02-18 RX ORDER — DAPAGLIFLOZIN 10 MG/1
10 TABLET, FILM COATED ORAL DAILY
Qty: 90 TABLET | Refills: 3 | Status: SHIPPED | OUTPATIENT
Start: 2025-02-18

## 2025-02-18 RX ORDER — BISMUTH SUBSALICYLATE 262 MG
1 TABLET,CHEWABLE ORAL DAILY
COMMUNITY

## 2025-02-18 NOTE — ASSESSMENT & PLAN NOTE
Angela is prescribed 4/4 GDMT. Will defer adjustment due to lack of BP/HR readings. Patient to clarify dosage of carvedilol with cardiologist. Will follow up in 1 month.

## 2025-02-18 NOTE — Clinical Note
Colton Wharton, Today's appointment was initial visit to establish care with Clinical Pharmacy. Angela reports some left leg swelling which has been chronic for patient after knee replacement. Patient reports no acute change at today's appointment. Also reports shortness of breath on exertion, none with typical daily activities.  She reports she has still been taking carvedilol 12.5 mg twice daily. I see there is notation from her most recent visit with you to decrease to 6.25 mg BID, however her medication list still states 12.5 mg. I advised her to call the office to clarify this dosage.  We also discussed patient's history of UTIs, she reports 3 occurences in the past year. States she has been cautious to ensure she is practicing good hygiene, and has been washing herself after she uses the bathroom. Discussed that Farxiga may increase the risk of UTIs. Will continue to monitor, may consider discontinuation if patient experiences recurrent UTIs. Will apply for  PAP and follow up in 1 month.

## 2025-02-19 PROCEDURE — G0179 MD RECERTIFICATION HHA PT: HCPCS | Performed by: FAMILY MEDICINE

## 2025-02-20 ENCOUNTER — PHARMACY VISIT (OUTPATIENT)
Dept: PHARMACY | Facility: CLINIC | Age: 75
End: 2025-02-20
Payer: COMMERCIAL

## 2025-02-25 ENCOUNTER — HOSPITAL ENCOUNTER (OUTPATIENT)
Dept: CARDIOLOGY | Facility: HOSPITAL | Age: 75
Discharge: HOME | End: 2025-02-25
Payer: MEDICARE

## 2025-02-25 DIAGNOSIS — I50.41 ACUTE COMBINED SYSTOLIC AND DIASTOLIC HEART FAILURE: ICD-10-CM

## 2025-02-25 PROCEDURE — 93306 TTE W/DOPPLER COMPLETE: CPT | Performed by: INTERNAL MEDICINE

## 2025-02-25 PROCEDURE — 93306 TTE W/DOPPLER COMPLETE: CPT

## 2025-02-26 LAB
AORTIC VALVE MEAN GRADIENT: 3 MMHG
AORTIC VALVE PEAK VELOCITY: 1.43 M/S
AV PEAK GRADIENT: 8 MMHG
AVA (PEAK VEL): 1.95 CM2
AVA (VTI): 2.4 CM2
EJECTION FRACTION APICAL 4 CHAMBER: 33.6
EJECTION FRACTION: 33 %
LEFT VENTRICLE INTERNAL DIMENSION DIASTOLE: 3.87 CM (ref 3.5–6)
LEFT VENTRICULAR OUTFLOW TRACT DIAMETER: 2 CM
MITRAL VALVE E/A RATIO: 0.43
RIGHT VENTRICLE FREE WALL PEAK S': 7.94 CM/S
RIGHT VENTRICLE PEAK SYSTOLIC PRESSURE: 30.5 MMHG
TRICUSPID ANNULAR PLANE SYSTOLIC EXCURSION: 2.3 CM

## 2025-03-04 ENCOUNTER — TELEPHONE (OUTPATIENT)
Dept: PHARMACY | Facility: HOSPITAL | Age: 75
End: 2025-03-04
Payer: MEDICARE

## 2025-03-04 NOTE — TELEPHONE ENCOUNTER
Patient Assistance Program Approval:     We are pleased to inform you that your application for assistance has been approved.     This approval is valid through  2/18/2026  as long as the following criteria continue to be satisfied:     Your medication (Farxiga) remains covered under your current insurance plan.   Your prescriber does not discontinue therapy.   You do not seek reimbursement from any other private or government-funded programs for the  medication.    Under this program, the pharmacy will first bill your insurance plan for your indemnified specified medication. The [employer] Fund will then offset your copay balance, so that your out-of pocket expense for your specialty medication will be $0.00.    Left voicemail for patient with the above information.    Brook Dangelo, KaitlynD

## 2025-03-05 ENCOUNTER — TELEPHONE (OUTPATIENT)
Dept: PRIMARY CARE | Facility: CLINIC | Age: 75
End: 2025-03-05
Payer: MEDICARE

## 2025-03-05 NOTE — H&P
History Of Present Illness  Angela Montelongo is a 74 y.o. female presenting with combined systolic and diastolic heart failure, Reports symptoms of RIGGS, fatigue, and intermittent dizziness. Pt had been admitted to hospital 10/18/2024 with osteomyelitis of spine in setting of E-coli UTI, at the time echo showed newly diagnosed heart failure, severe LV systolic dysfunction ejection fraction of 30 to 35%.  Heart failure managed medically due to bacteremia/osteomyelitis. Echo repeat 2/2025 identified moderate decreased LV systolic function, EF 30-35%. Global hypokinesis, grad 1 impaired LV diastolic filling. Prior echo 2/2019 showed normal LV systolic function, EF 55-59%.  PMH includes systolic/diastolic heart failure, bacteremia, osteomyelitis, HTN, HLD, CELINA          Past Medical History:  Past Medical History:   Diagnosis Date    Acute embolism and thrombosis of unspecified deep veins of unspecified lower extremity (Multi) 08/13/2024    Acute non-ST segment elevation myocardial infarction (Multi) 01/20/2025    Acute on chronic diastolic congestive heart failure 10/18/2024    Angioedema 01/20/2025    Bradycardia 09/02/2024    Deep venous thrombosis (Multi) 08/13/2024    Elevation of levels of liver transaminase levels     ALT (SGPT) level raised    Essential hypertension 04/22/2024    History of hypertension 01/20/2025    Hyperlipidemia 10/19/2023    Other conditions influencing health status     Osteoarthritis    Personal history of other complications of pregnancy, childbirth and the puerperium     History of ectopic pregnancy    Personal history of other diseases of the circulatory system     History of hypertension    Personal history of other diseases of the musculoskeletal system and connective tissue     History of bursitis    Personal history of other diseases of the musculoskeletal system and connective tissue     Personal history of gout    Personal history of other diseases of the nervous system and sense  organs     History of migraine headaches        Past Surgical History:  Past Surgical History:   Procedure Laterality Date    CARPAL TUNNEL RELEASE  04/09/2013    Neuroplasty Median Nerve At Carpal Tunnel    CHOLECYSTECTOMY  04/09/2013    Cholecystectomy    KNEE ARTHROPLASTY  04/09/2013    Knee Arthroplasty          Social History:  Social History     Tobacco Use    Smoking status: Never    Smokeless tobacco: Never   Vaping Use    Vaping status: Never Used   Substance Use Topics    Alcohol use: Not Currently    Drug use: Never       Family History:  No family history on file.     Allergies:  Allergies   Allergen Reactions    Ciprofloxacin Anaphylaxis, Unknown and Swelling    Lisinopril Anaphylaxis, Angioedema, Swelling, Unknown and Other    Cefazolin Angioedema and Swelling    Cephalexin Unknown    Tetanus Toxoid, Adsorbed Unknown        Home Medications:  Current Outpatient Medications   Medication Instructions    acetaminophen (TYLENOL) 650 mg, oral, Every 6 hours PRN    aspirin 81 mg, Daily    atorvastatin (LIPITOR) 20 mg, oral, Daily    calcium carb/vit D3/minerals (CALCIUM-VITAMIN D ORAL) 1 tablet, Daily    carvedilol (COREG) 12.5 mg, oral, 2 times daily    famotidine (PEPCID) 20 mg, oral, 2 times daily    Farxiga 10 mg, oral, Daily    furosemide (LASIX) 40 mg, oral, Daily    gabapentin (NEURONTIN) 200 mg, 3 times daily    losartan (COZAAR) 50 mg, oral, Daily    multivitamin tablet 1 tablet, Daily    multivitamin with minerals iron-free (Centrum Silver) 1 tablet, Daily    oxybutynin XL (DITROPAN-XL) 10 mg, Daily    polyethylene glycol (GLYCOLAX, MIRALAX) 17 g, oral, Daily    potassium chloride CR 20 mEq ER tablet 20 mEq, 2 times daily       Inpatient Medications:            Review of Systems   Constitutional:  Positive for fatigue.   HENT: Negative.     Eyes: Negative.    Respiratory:  Positive for shortness of breath.    Cardiovascular:  Positive for palpitations and leg swelling.   Gastrointestinal: Negative.     Endocrine: Negative.    Genitourinary: Negative.    Musculoskeletal:  Positive for arthralgias.   Skin: Negative.    Allergic/Immunologic: Negative.    Neurological:  Positive for dizziness.   Hematological: Negative.    Psychiatric/Behavioral: Negative.            Physical Exam  Constitutional:       General: She is awake. She is not in acute distress.     Appearance: Normal appearance. She is obese. She is not ill-appearing.   HENT:      Head: Normocephalic and atraumatic.      Mouth/Throat:      Mouth: Mucous membranes are moist.      Pharynx: Oropharynx is clear.   Cardiovascular:      Rate and Rhythm: Regular rhythm. Bradycardia present.      Pulses:           Radial pulses are 2+ on the right side and 2+ on the left side.        Dorsalis pedis pulses are 2+ on the right side and 2+ on the left side.      Heart sounds: Murmur heard.   Pulmonary:      Effort: Pulmonary effort is normal.      Breath sounds: Normal breath sounds.   Abdominal:      General: Bowel sounds are normal.      Palpations: Abdomen is soft.   Musculoskeletal:      Cervical back: Normal range of motion and neck supple.      Right lower leg: Edema present.      Left lower leg: Edema present.   Skin:     General: Skin is warm and dry.      Capillary Refill: Capillary refill takes less than 2 seconds.   Neurological:      General: No focal deficit present.      Mental Status: She is alert and oriented to person, place, and time. Mental status is at baseline.      Cranial Nerves: Cranial nerves 2-12 are intact.   Psychiatric:         Mood and Affect: Mood and affect normal.         Behavior: Behavior normal.        Sedation Plan    ASA 3     Mallampati class: III.           NPO since 2100 on 3/12/2025    Last Recorded Vitals  Blood pressure 162/80, pulse 52, temperature 36.3 °C (97.4 °F), temperature source Tympanic, resp. rate 16, SpO2 98%.         Vitals from the Past 24 Hours  Heart Rate:  [52]   Temp:  [36.3 °C (97.4 °F)]   Resp:  [16]    BP: (162)/(80)   SpO2:  [98 %]          Relevant Results    Labs    POCT Glucose:      POCT Urine Pregnancy:       CBC:   Recent Labs     03/13/25  0735 12/27/24  1452 10/24/24  0443 10/22/24  0421 10/21/24  0532 10/20/24  0531   WBC 7.1 8.2 8.0 7.5 6.4 6.7   HGB 14.9 12.2 9.4* 9.9* 10.0* 9.4*   HCT 45.1 39.9 30.5* 32.3* 32.5* 31.0*    239 301 301 292 298   MCV 95 100 92 91 91 91     BMP/CMP:   Recent Labs     12/27/24  1452 10/24/24  0443 10/22/24  0421 10/21/24  0532 10/20/24  0531 10/19/24  1435 10/19/24  0416 10/18/24  0702 10/17/24  1818 09/16/24  0504 09/12/24  0636 08/29/24  2042 08/29/24  1631 07/29/24  1402 07/29/24  1402 07/24/23  1354    135* 134* 133* 136  --  137   < > 134*   < > 135   < > 118*   < > 128* 128*   K 4.4 3.2* 3.4* 3.3* 3.5 3.2* 2.9*   < > 3.3*   < > 3.7   < > 3.9   < > 4.2 3.8    88* 89* 91* 95*  --  97*   < > 94*   < > 91*   < > 82*   < > 89* 87*   BUN 41* 22 20 17 16  --  18   < > 16   < > 18   < > 46*   < > 26* 15   CREATININE 1.20* 0.76 0.69 0.59 0.71  --  0.75   < > 0.65   < > 0.70   < > 1.70*   < > 1.16* 0.77   CO2 31 41* 39* 34* 33*  --  32   < > 29   < > 33*   < > 26   < > 27 30   CALCIUM 10.3 8.8 9.2 8.9 8.7  --  8.6   < > 8.8   < > 9.0   < > 9.1   < > 10.0 10.1   PROT  --   --   --   --   --   --  5.1*  --  6.5  --  5.2*  --  6.2  --  6.7 6.7   BILITOT  --   --   --   --   --   --  0.5  --  1.2  --  0.8  --  0.8  --  0.6 0.7   ALKPHOS  --   --   --   --   --   --  90  --  107  --  99  --  126*  --  116 123   ALT  --   --   --   --   --   --  21  --  17  --  12  --  20  --  15 17   AST  --   --   --   --   --   --  38  --  32  --  23  --  41*  --  25 30   GLUCOSE 82 92 82 81 94  --  86   < > 132*   < > 76   < > 80   < > 78 83    < > = values in this interval not displayed.      Magnesium:   Recent Labs     10/19/24  1435 10/19/24  0416 10/18/24  0702 10/17/24  1818 08/30/24  1235 08/29/24  1631   MG 1.64 1.36* 1.56* 0.91* 1.80 1.40*     Lipid Panel:   Recent  "Labs     06/07/22  1501 09/22/20  1500 02/02/19  0520   CHOL 164 180 113*   HDL 65 62 49*   CHHDL 2.5 2.9 2.3   TRIG 91 152* 99     Cardiac       No lab exists for component: \"CK\", \"CKMBP\"   Hemoglobin A1C:   Recent Labs     02/01/19  1903   HGBA1C 5.3     TSH/ Free T4:   Recent Labs     08/29/24  1631 06/07/22  1501 09/22/20  1500 09/06/18  0950   TSH 2.31 2.58 3.32 3.78     Iron:   Recent Labs     10/17/24  1818 02/19/19  0539 12/17/18  0927   FERRITIN  --  62  --    TIBC  --  284 325   IRONSAT  --  32.4 21.5   BNP 3,988*  --   --      Coag:     ABO: No results found for: \"ABO\"    Past Cardiology Tests (Last 3 Years):    EKG:  Recent Labs     10/17/24  1903 08/29/24  1455   ATRRATE 88 69   VENTRATE 88 69   PRINT 176 192   QRSDUR 148 156   QTCFRED 470 434   QTCCALCB 500 443       Echo:  Echocardiogram:   Transthoracic Echo (TTE) Complete 02/25/2025    [place] [address]  Phone [phone]    TRANSTHORACIC ECHOCARDIOGRAM REPORT    Patient Name:       BRUNA TERESSA Burns Physician:    [id number] Wharton MD  Study Date:         2/25/2025           Ordering Provider:    [id number] TRUDI WHARTON  MRN/PID:            [id number]            Fellow:  Accession#:         [id number]        Nurse:  Date of Birth/Age:  1950 / 74      Sonographer:          Erlinda mcqueen RDCS  Gender Assigned at  F                   Additional Staff:  Birth:  Height:             160.02 cm           Admit Date:  Weight:             108.86 kg           Admission Status:     Outpatient  BSA / BMI:          2.09 m2 / 42.51     Department Location:  [facility]  kg/m2  Blood Pressure: 142 /70 mmHg    Study Type:    TRANSTHORACIC ECHO (TTE) COMPLETE  Diagnosis/ICD: Acute combined systolic (congestive) and diastolic (congestive)  heart failure (CHF)-I50.41  Indication:    Congestive Heart Failure  CPT Codes:     Echo Complete w Full " [id number]    Patient History:  Pertinent History: HTN, Hyperlipidemia and CHF. Bradycardia, SOB, Syncope.    Study Detail: The following Echo studies were performed: 2D, M-Mode, Doppler and  color flow.      PHYSICIAN INTERPRETATION:  Left Ventricle: The left ventricular systolic function is moderately decreased, with a visually estimated ejection fraction of 30-35%. There is global hypokinesis of the left ventricle with minor regional variations. The left ventricular cavity size is normal. There is normal septal and normal posterior left ventricular wall thickness. There is left ventricular concentric remodeling. Spectral Doppler shows a Grade I (impaired relaxation pattern) of left ventricular diastolic filling with normal left atrial filling pressure.  Left Atrium: The left atrium is upper limits of normal in size.  Right Ventricle: The right ventricle is normal in size. There is normal right ventricular global systolic function.  Right Atrium: The right atrial size is normal.  Aortic Valve: The aortic valve is trileaflet. The aortic valve dimensionless index is 0.76. There is trace aortic valve regurgitation. The peak instantaneous gradient of the aortic valve is 8 mmHg. The mean gradient of the aortic valve is 3 mmHg.  Mitral Valve: The mitral valve is normal in structure. There is trace mitral valve regurgitation.  Tricuspid Valve: The tricuspid valve is structurally normal. There is trace tricuspid regurgitation. The Doppler estimated RVSP is within normal limits at 30.5 mmHg.  Pulmonic Valve: The pulmonic valve is structurally normal. There is physiologic pulmonic valve regurgitation.  Pericardium: No pericardial effusion noted.  Aorta: The aortic root is normal.  Systemic Veins: The inferior vena cava appears normal in size, with IVC inspiratory collapse greater than 50%.      CONCLUSIONS:  1. The left ventricular systolic function is moderately decreased, with a visually estimated ejection fraction of  30-35%.  2. There is global hypokinesis of the left ventricle with minor regional variations.  3. Spectral Doppler shows a Grade I (impaired relaxation pattern) of left ventricular diastolic filling with normal left atrial filling pressure.  4. Trace mitral valve regurgitation.  5. Right ventricular systolic pressure is within normal limits.  6. Trace tricuspid regurgitation is visualized.    QUANTITATIVE DATA SUMMARY:    2D MEASUREMENTS:           Normal Ranges:  IVSd:            0.83 cm   (0.6-1.1cm)  LVPWd:           0.82 cm   (0.6-1.1cm)  LVIDd:           3.87 cm   (3.9-5.9cm)  LVIDs:           3.30 cm  LV Mass Index:   44.3 g/m2  LV % FS          14.7 %      LEFT ATRIUM:                 Normal Ranges:  LA Vol A4C:        53.3 ml   (22+/-6mL/m2)  LA Vol Index A4C:  25.5ml/m2  LA Area A4C:       19.6 cm2  LA Major Axis A4C: 6.1 cm  LA Vol A4C:        47.7 ml      RIGHT ATRIUM:                 Normal Ranges:  RA Vol A4C:        30.7 ml    (8.3-19.5ml)  RA Vol Index A4C:  14.7 ml/m2  RA Area A4C:       12.7 cm2  RA Major Axis A4C: 4.5 cm      M-MODE MEASUREMENTS:         Normal Ranges:  Ao Root:             3.00 cm (2.0-3.7cm)      LV SYSTOLIC FUNCTION:  Normal Ranges:  EF-A4C View:    34 % (>=55%)  EF-Visual:      33 %  LV EF Reported: 33 %      LV DIASTOLIC FUNCTION:           Normal Ranges:  MV Peak E:             0.46 m/s  (0.7-1.2 m/s)  MV Peak A:             1.08 m/s  (0.42-0.7 m/s)  E/A Ratio:             0.43      (1.0-2.2)  MV e'                  0.033 m/s (>8.0)  MV lateral e'          0.04 m/s  MV medial e'           0.03 m/s  E/e' Ratio:            14.01     (<8.0)      MITRAL VALVE:          Normal Ranges:  MV DT:        296 msec (150-240msec)      AORTIC VALVE:                     Normal Ranges:  AoV Vmax:                1.43 m/s (<=1.7m/s)  AoV Peak P.2 mmHg (<20mmHg)  AoV Mean PG:             3.0 mmHg (1.7-11.5mmHg)  LVOT Max Alex:            0.89 m/s (<=1.1m/s)  AoV VTI:                  28.90 cm (18-25cm)  LVOT VTI:                22.10 cm  LVOT Diameter:           2.00 cm  (1.8-2.4cm)  AoV Area, VTI:           2.40 cm2 (2.5-5.5cm2)  AoV Area,Vmax:           1.95 cm2 (2.5-4.5cm2)  AoV Dimensionless Index: 0.76      RIGHT VENTRICLE:  TAPSE: 23.3 mm  RV s'  0.08 m/s      TRICUSPID VALVE/RVSP:          Normal Ranges:  Peak TR Velocity:     2.62 m/s  RV Syst Pressure:     30 mmHg  (< 30mmHg)  IVC Diam:             1.36 cm      PULMONIC VALVE:          Normal Ranges:  PV Accel Time:  127 msec (>120ms)  PV Max Alex:     1.0 m/s  (0.6-0.9m/s)  PV Max PG:      3.8 mmHg      [id number] Cristina Wharton MD  Electronically signed on 2025 at 8:28:46 AM        ** Final **          PROCEDURE:         ECHOCARDIOGRAM 2-D M-MODE - IEC  0010  REASON FOR EXAM: chf/syncope     RESULT:                         [facility]                          Echocardiography Report     Pat.Name:  BRUNA HELTON         Pat.ID:    [id number]  .Date:   2019                Refer.MD:  BYRON ALEXANDRA  Exam Time: 9:38:00 AM              Study Type:Echocardiography  Height:    160.02cm                Weight:    131.0054kg  BSA:       2.26 m2                   Age:  1950,68Y  Sex:       FEMALE                  BP:        136/67  Sonogrphr: Loulou Steinberg RDCS   Pat. Stat.:Inpatient  Room:      403  Reason for Study:Congestive heart failure, Syncope  Procedures:2D, M-mode, Doppler, Color Flow     MEASUREMENTS:                                     MMODE  Aorta   Ao Rt            2.9 cm   (2-3.7)                                    2D  Left Ventricle   LVIDd           4.71 cm   (3.6-5.2) LVESV           51.9 cc   LVIDs           3.53 cm   (2.3-3.9) LV%fs           25.1 %    (25-46)   LVEDV            103 cc            LV SV           51.1 cc   LV EF (Teich)   49.6 %    (55-75)  Left Atrium   LA a-p           2.6 cm   (2.8-3.4)  Ventricular Septum   IVSd           0.541 cm  LVPW   LVPWd          0.765 cm  LVOT   LVOT                2 cm            LVOTArea        3.14 cm2  Ratios   IVS/LVPW       0.707  Structure Sinus of Valsalva   Diameter         2.7 cm                                  DOPPLER  MV Forward Flow   MV DeTm          187 ms            MV pkE          66.8 cm/s ()   MV P1/2t          55 ms   (30-60)  MV AP1/2t          4 cm2  (4-6)   MV pkA          94.6 cm/s          MV E/A           0.7  Left Ventricle   LaLat Em        10.2 cm/s          LaLat E/Em       6.5  Right Ventricle   Right Ventricle  11.5 cm/s        FINDINGS:     Procedural Information: Exam quality: technically difficult  LV:       The left ventricular chamber size is normal. Global left            ventricular  wall motion and contractility are within normal            limits.  There is normal left ventricular systolic function.            Estimated  ejection fraction is 55-59%. There is normal            ventricular  diastolic filling observed.  RV:       The right ventricular cavity size is normal. The right            ventricular  global systolic function is normal.  LA:       The left atrial size is normal.  RA:       Right atrial cavity size is normal.  RENÉ:     Pericardium is normal.  AO:       Aortic root is normal in size.  SVn:      Inferior vena cava is normal.  AV:       Aortic valve is structurally normal. No evidence of            regurgitation.  MV:       Mitral valve is structurally normal. No evidence of            regurgitation.  PV:       Pulmonic valve is structurally normal.  TV:       Tricuspid valve is structurally normal. Trivial tricuspid            regurgitation.  Unable to estimate the right ventricular            systolic  pressure.           Exam quality: technically difficult   The left ventricular chamber size is normal.   There is normal left ventricular systolic function.   Estimated ejection fraction is 55-59%.   The right ventricular global systolic function is normal.   Aortic valve is structurally normal. No  evidence of regurgitation.   Mitral valve is structurally normal. No evidence of regurgitation.   Trivial tricuspid regurgitation.   Unable to estimate the right ventricular systolic pressure.  Signed 02/04/2019 12:18 PM  Cristina Wharton MD     Original Interpreting Physician:   CRISTINA WHARTON MD  Original Transcribed by/Date: TUAN   Feb 4 2019 12:18P  Original Electronically Signed by/Date: CRISTINA WHARTON MD Feb 4 2019  9:38A     Addendum Interpreting Physician:     Addendum Transcribed by/Date: NO ADDENDUM  Addendum Electronically Signed by/Date:          Ejection Fractions:  LV EF   Date/Time Value Ref Range Status   02/25/2025 02:05 PM 33 %    10/18/2024 02:33 PM 33 %      Cath:  Coronary Angiography: No results found for this or any previous visit from the past 1800 days.    Right Heart Cath: No results found for this or any previous visit from the past 1800 days.    Stress Test:  Nuclear:No results found for this or any previous visit from the past 1800 days.    Metabolic Stress: No results found for this or any previous visit from the past 1800 days.    Cardiac Imaging:  Cardiac Scoring: No results found for this or any previous visit from the past 1800 days.    Cardiac MRI: No results found for this or any previous visit from the past 1800 days.         Assessment/Plan  Assessment/Plan   Assessment & Plan  Acute combined systolic and diastolic heart failure      Systolic/diastolic heart failure  HTN  HLD    Here for left heart cath with Dr. Wharton on 3/13/2025.         NP discussed with Dr. Wharton regarding plan of care/ discharge plan      I spent 35 minutes in the professional and overall care of this patient.      Colette Mcginnis, APRN-CNP

## 2025-03-06 ENCOUNTER — OFFICE VISIT (OUTPATIENT)
Dept: PRIMARY CARE | Facility: CLINIC | Age: 75
End: 2025-03-06
Payer: MEDICARE

## 2025-03-06 VITALS
HEART RATE: 83 BPM | WEIGHT: 230 LBS | DIASTOLIC BLOOD PRESSURE: 58 MMHG | SYSTOLIC BLOOD PRESSURE: 110 MMHG | TEMPERATURE: 98.3 F | RESPIRATION RATE: 18 BRPM | BODY MASS INDEX: 40.74 KG/M2 | OXYGEN SATURATION: 98 %

## 2025-03-06 DIAGNOSIS — E78.5 HYPERLIPIDEMIA, UNSPECIFIED HYPERLIPIDEMIA TYPE: ICD-10-CM

## 2025-03-06 DIAGNOSIS — R39.9 UTI SYMPTOMS: Primary | ICD-10-CM

## 2025-03-06 DIAGNOSIS — K21.9 GASTROESOPHAGEAL REFLUX DISEASE WITHOUT ESOPHAGITIS: ICD-10-CM

## 2025-03-06 DIAGNOSIS — N32.81 OVERACTIVE BLADDER: ICD-10-CM

## 2025-03-06 DIAGNOSIS — I50.33 ACUTE ON CHRONIC DIASTOLIC CONGESTIVE HEART FAILURE: ICD-10-CM

## 2025-03-06 DIAGNOSIS — I10 ESSENTIAL HYPERTENSION: ICD-10-CM

## 2025-03-06 PROCEDURE — 3078F DIAST BP <80 MM HG: CPT | Performed by: NURSE PRACTITIONER

## 2025-03-06 PROCEDURE — 99349 HOME/RES VST EST MOD MDM 40: CPT | Performed by: NURSE PRACTITIONER

## 2025-03-06 PROCEDURE — 1160F RVW MEDS BY RX/DR IN RCRD: CPT | Performed by: NURSE PRACTITIONER

## 2025-03-06 PROCEDURE — 3074F SYST BP LT 130 MM HG: CPT | Performed by: NURSE PRACTITIONER

## 2025-03-06 PROCEDURE — 1125F AMNT PAIN NOTED PAIN PRSNT: CPT | Performed by: NURSE PRACTITIONER

## 2025-03-06 PROCEDURE — 1159F MED LIST DOCD IN RCRD: CPT | Performed by: NURSE PRACTITIONER

## 2025-03-06 PROCEDURE — 1157F ADVNC CARE PLAN IN RCRD: CPT | Performed by: NURSE PRACTITIONER

## 2025-03-06 ASSESSMENT — ENCOUNTER SYMPTOMS
APPETITE CHANGE: 1
RHINORRHEA: 1
NECK STIFFNESS: 1
DIARRHEA: 1
NECK PAIN: 1
PSYCHIATRIC NEGATIVE: 1
COUGH: 1
DIZZINESS: 1
EYES NEGATIVE: 1
ARTHRALGIAS: 1
BACK PAIN: 1
ABDOMINAL PAIN: 1
HEMATOLOGIC/LYMPHATIC NEGATIVE: 1
FREQUENCY: 1
LIGHT-HEADEDNESS: 1
SHORTNESS OF BREATH: 1
HEADACHES: 1
ACTIVITY CHANGE: 1
WEAKNESS: 1
NUMBNESS: 1
CONSTIPATION: 1
FATIGUE: 1

## 2025-03-06 ASSESSMENT — PAIN SCALES - GENERAL: PAINLEVEL_OUTOF10: 3

## 2025-03-06 NOTE — PROGRESS NOTES
Subjective   Patient ID: Angela Montelongo is a 74 y.o. female who is being seen for [facility] 1 month follow up.    HPI Pt seen in single family home accompanied. PMHx: Osteomyelitis of spine, HTN, HLD, Hypokalemia, GERD, Constipation, gout, OA, CELINA, SOB, DJD, Left foot drop, hyponatremia. Pt seen sitting in lift chair with legs dependent. Pt alert and oriented times three and cooperative with examination. Pt still active with PT/OT. Pt scheduled for heart cath 3/13/25 with Dr. Wharton. Pt reports feeling better overall, doing better with mobility. Pt reports energy comes and goes, better when sleeps well. Ambulates with rolator and denies recent falls. Pt has not been able to step in to shower yet and still doing sponge baths. Pt reports appetite as picking up and is still active with meals on wheels. Pt reports sleep has been on and off, has nights where she isn't sleeping well. Pt denies fever, chills, or night sweats. Pt admits to daily sinus headaches and dizziness and lightheadedness upon standing. Pt denies trouble chewing or swallowing. Pt admits to RIGGS and moist non productive cough. Pt denies CP or palpitations. Pt with upper abdominal pain and with consipation alternating diarrhea with a bowel movement daily to every other day. Pt admits to urinary freqeuncy and occasional dysurai. Pt collected urine sample as she is concerned she may have a UTI. States urine has a foul odor. Pts urine is pale and clear on inspection. Pt with c/o back pain and uses heating pad to area. Pt aslo with c/o knee pain. PT got pt out of the house to practice walking on her ramp. Pt with no other concerns or complaints.   Home Visit medically necessary due to: pt has chronic condition that makes access to a traditional office visit very difficult, illness or condition that results in activity limitation or restriction that impacts the ability to leave home such as; unsteady gait/ poor condition.      Review of Systems    Constitutional:  Positive for activity change, appetite change and fatigue.   HENT:  Positive for congestion and rhinorrhea.    Eyes: Negative.    Respiratory:  Positive for cough (moist non productive) and shortness of breath (RIGGS).    Cardiovascular:  Positive for leg swelling.   Gastrointestinal:  Positive for abdominal pain (upper), constipation and diarrhea.   Endocrine: Positive for cold intolerance.   Genitourinary:  Positive for frequency and urgency.   Musculoskeletal:  Positive for arthralgias (knees, wrists,), back pain, neck pain and neck stiffness.   Skin: Negative.    Neurological:  Positive for dizziness, weakness, light-headedness, numbness (left foot) and headaches.   Hematological: Negative.    Psychiatric/Behavioral: Negative.         Objective   /58 (BP Location: Right arm, Patient Position: Sitting, BP Cuff Size: Adult)   Pulse 83   Temp 36.8 °C (98.3 °F) (Temporal)   Resp 18   Wt 104 kg (230 lb)   SpO2 98%   BMI 40.74 kg/m²       Current Outpatient Medications:     acetaminophen (Tylenol) 325 mg tablet, Take 2 tablets (650 mg) by mouth every 6 hours if needed for mild pain (1 - 3)., Disp: 30 tablet, Rfl: 0    aspirin 81 mg EC tablet, Take 1 tablet (81 mg) by mouth once daily., Disp: , Rfl:     atorvastatin (Lipitor) 20 mg tablet, Take 1 tablet (20 mg) by mouth once daily., Disp: 30 tablet, Rfl: 11    calcium carb/vit D3/minerals (CALCIUM-VITAMIN D ORAL), Take 1 tablet by mouth once daily., Disp: , Rfl:     carvedilol (Coreg) 12.5 mg tablet, Take 1 tablet (12.5 mg) by mouth 2 times a day. (Patient taking differently: Take 0.5 tablets (6.25 mg) by mouth 2 times a day.), Disp: , Rfl:     dapagliflozin propanediol (Farxiga) 10 mg, Take 1 tablet (10 mg) by mouth once daily., Disp: 90 tablet, Rfl: 3    famotidine (Pepcid) 20 mg tablet, Take 1 tablet (20 mg) by mouth 2 times a day. (Patient taking differently: Take 1 tablet (20 mg) by mouth once daily.), Disp: , Rfl:     furosemide  (Lasix) 40 mg tablet, Take 1 tablet (40 mg) by mouth once daily., Disp: , Rfl:     gabapentin (Neurontin) 100 mg capsule, Take 2 capsules (200 mg) by mouth 3 times a day., Disp: , Rfl:     losartan (Cozaar) 50 mg tablet, Take 1 tablet (50 mg) by mouth once daily., Disp: , Rfl:     multivitamin tablet, Take 1 tablet by mouth once daily., Disp: , Rfl:     multivitamin with minerals iron-free (Centrum Silver), Take 1 tablet by mouth once daily., Disp: , Rfl:     oxybutynin XL (Ditropan-XL) 10 mg 24 hr tablet, Take 1 tablet (10 mg) by mouth once daily. Do not crush, chew, or split., Disp: , Rfl:     potassium chloride CR 20 mEq ER tablet, Take 1 tablet (20 mEq) by mouth 2 times a day. Do not crush or chew., Disp: , Rfl:     polyethylene glycol (Glycolax, Miralax) 17 gram packet, Take 17 g by mouth once daily. (Patient not taking: Reported on 3/6/2025), Disp: , Rfl:       Physical Exam  Constitutional:       Appearance: Normal appearance. She is obese.   HENT:      Head: Normocephalic and atraumatic.      Nose: Nose normal.      Mouth/Throat:      Mouth: Mucous membranes are moist.      Pharynx: Oropharynx is clear.   Eyes:      Conjunctiva/sclera: Conjunctivae normal.      Pupils: Pupils are equal, round, and reactive to light.   Cardiovascular:      Rate and Rhythm: Normal rate and regular rhythm.      Pulses: Normal pulses.      Heart sounds: Normal heart sounds.   Pulmonary:      Effort: Pulmonary effort is normal.      Breath sounds: Normal breath sounds.   Abdominal:      General: Bowel sounds are normal.      Palpations: Abdomen is soft.   Musculoskeletal:         General: Tenderness (lower extremities) present.      Cervical back: Normal range of motion and neck supple.      Right lower le+ Edema present.      Left lower le+ Edema present.   Skin:     General: Skin is warm and dry.      Capillary Refill: Capillary refill takes 2 to 3 seconds.      Comments: Purplish vascular changes to to bilat lower legs     Neurological:      Mental Status: She is alert and oriented to person, place, and time.      Motor: Weakness present.      Gait: Gait abnormal.   Psychiatric:         Mood and Affect: Mood normal.         Behavior: Behavior normal.         Thought Content: Thought content normal.         Judgment: Judgment normal.     Patient Active Problem List   Diagnosis    Hyperlipidemia    Shoulder arthritis    Essential hypertension    Nausea    Constipation    Claw toe    Gout    Chronic gout without tophus    Gastroesophageal reflux disease    Esophageal reflux    Endometrial hyperplasia    Hyponatremia    CELINA (acute kidney injury) (CMS-HCC)    Acute cystitis without hematuria    Weakness    Bradycardia    Chronic low back pain    Shortness of breath    Acute on chronic diastolic congestive heart failure    Hypomagnesemia    Acute hypoxic respiratory failure (Multi)    Osteomyelitis of vertebra, thoracolumbar region (Multi)    Degenerative lumbar spinal stenosis    Degenerative spondylolisthesis    Left foot drop    Hypokalemia    Overactive bladder    Acute embolism and thrombosis of unspecified deep veins of unspecified lower extremity (Multi)    Acute non-ST segment elevation myocardial infarction (Multi)    Acute kidney failure, unspecified (CMS-[facility])    Anemia due to blood loss    Angioedema    Carpal tunnel syndrome    Chronic kidney disease, unspecified    Chronic pain of both shoulders    Diarrhea of presumed infectious origin    Hand numbness    Vitamin D deficiency    Urinary tract infection    Trochanteric bursitis of right hip    Syncope and collapse    Subclinical hypothyroidism    Squamous cell carcinoma of skin, unspecified    Skin sensation disturbance    Sciatica    Rotator cuff syndrome of left shoulder    History of hypertension    Acute combined systolic and diastolic heart failure         Assessment/Plan   Diagnoses and all orders for this visit:  UTI symptoms  Comments:  Check UA with reflex to  culture  Orders:  -     Urinalysis with Reflex Culture and Microscopic; Future  Hyperlipidemia, unspecified hyperlipidemia type  Comments:  Continue Lipitor 20 mg po daily.  Essential hypertension  Comments:  Continue Cozaar 50 mg po daily  Continue Coreg 6.25 mg po daily.  Acute on chronic diastolic congestive heart failure  Comments:  Continue Farxiga 10 mg po daily.   Continue Lasix 40 mg po daily.  Gastroesophageal reflux disease without esophagitis  Comments:  Continue Pepcid 20 mg po daily.  Overactive bladder  Comments:  Continue Oxybutynin 10 mg po daily.    Follow up in 2 months or prn  Torie Iraheta, APRN-CNP

## 2025-03-07 LAB
APPEARANCE UR: CLEAR
BACTERIA #/AREA URNS HPF: ABNORMAL /HPF
BACTERIA UR CULT: ABNORMAL
BILIRUB UR QL STRIP: NEGATIVE
COLOR UR: YELLOW
GLUCOSE UR QL STRIP: ABNORMAL
HGB UR QL STRIP: NEGATIVE
HYALINE CASTS #/AREA URNS LPF: ABNORMAL /LPF
KETONES UR QL STRIP: NEGATIVE
LEUKOCYTE ESTERASE UR QL STRIP: NEGATIVE
NITRITE UR QL STRIP: NEGATIVE
PH UR STRIP: 6.5 [PH] (ref 5–8)
PROT UR QL STRIP: NEGATIVE
RBC #/AREA URNS HPF: ABNORMAL /HPF
SERVICE CMNT-IMP: ABNORMAL
SP GR UR STRIP: 1.01 (ref 1–1.03)
SQUAMOUS #/AREA URNS HPF: ABNORMAL /HPF
WBC #/AREA URNS HPF: ABNORMAL /HPF
YEAST #/AREA URNS HPF: ABNORMAL /HPF

## 2025-03-12 ENCOUNTER — TELEPHONE (OUTPATIENT)
Facility: CLINIC | Age: 75
End: 2025-03-12
Payer: MEDICARE

## 2025-03-12 ENCOUNTER — TELEPHONE (OUTPATIENT)
Dept: CARDIOLOGY | Facility: CLINIC | Age: 75
End: 2025-03-12
Payer: MEDICARE

## 2025-03-12 NOTE — TELEPHONE ENCOUNTER
Patient called asking if there are any medications she needs to stop prior to her cath tomorrow morning.

## 2025-03-13 ENCOUNTER — HOSPITAL ENCOUNTER (OUTPATIENT)
Facility: HOSPITAL | Age: 75
Setting detail: OUTPATIENT SURGERY
Discharge: HOME | End: 2025-03-13
Attending: INTERNAL MEDICINE | Admitting: INTERNAL MEDICINE
Payer: MEDICARE

## 2025-03-13 ENCOUNTER — APPOINTMENT (OUTPATIENT)
Dept: CARDIOLOGY | Facility: HOSPITAL | Age: 75
End: 2025-03-13
Payer: MEDICARE

## 2025-03-13 VITALS
RESPIRATION RATE: 16 BRPM | TEMPERATURE: 97.4 F | HEART RATE: 63 BPM | DIASTOLIC BLOOD PRESSURE: 55 MMHG | SYSTOLIC BLOOD PRESSURE: 105 MMHG | OXYGEN SATURATION: 98 %

## 2025-03-13 DIAGNOSIS — I50.41 ACUTE COMBINED SYSTOLIC AND DIASTOLIC HEART FAILURE: Primary | ICD-10-CM

## 2025-03-13 DIAGNOSIS — I42.6 ALCOHOLIC CARDIOMYOPATHY (MULTI): ICD-10-CM

## 2025-03-13 DIAGNOSIS — I42.0 DILATED CARDIOMYOPATHY (MULTI): ICD-10-CM

## 2025-03-13 DIAGNOSIS — I10 ESSENTIAL HYPERTENSION: ICD-10-CM

## 2025-03-13 DIAGNOSIS — M46.46 DISCITIS OF LUMBAR REGION: ICD-10-CM

## 2025-03-13 LAB
ANION GAP SERPL CALCULATED.3IONS-SCNC: 12 MMOL/L (ref 10–20)
ATRIAL RATE: 64 BPM
BUN SERPL-MCNC: 33 MG/DL (ref 6–23)
CALCIUM SERPL-MCNC: 10.5 MG/DL (ref 8.6–10.3)
CHLORIDE SERPL-SCNC: 101 MMOL/L (ref 98–107)
CO2 SERPL-SCNC: 29 MMOL/L (ref 21–32)
CREAT SERPL-MCNC: 1 MG/DL (ref 0.5–1.05)
EGFRCR SERPLBLD CKD-EPI 2021: 59 ML/MIN/1.73M*2
ERYTHROCYTE [DISTWIDTH] IN BLOOD BY AUTOMATED COUNT: 11.9 % (ref 11.5–14.5)
GLUCOSE SERPL-MCNC: 96 MG/DL (ref 74–99)
HCT VFR BLD AUTO: 45.1 % (ref 36–46)
HGB BLD-MCNC: 14.9 G/DL (ref 12–16)
MCH RBC QN AUTO: 31.4 PG (ref 26–34)
MCHC RBC AUTO-ENTMCNC: 33 G/DL (ref 32–36)
MCV RBC AUTO: 95 FL (ref 80–100)
NRBC BLD-RTO: 0 /100 WBCS (ref 0–0)
P AXIS: 44 DEGREES
P OFFSET: 160 MS
P ONSET: 97 MS
PLATELET # BLD AUTO: 221 X10*3/UL (ref 150–450)
POTASSIUM SERPL-SCNC: 4 MMOL/L (ref 3.5–5.3)
PR INTERVAL: 204 MS
Q ONSET: 199 MS
QRS COUNT: 10 BEATS
QRS DURATION: 156 MS
QT INTERVAL: 430 MS
QTC CALCULATION(BAZETT): 443 MS
QTC FREDERICIA: 439 MS
R AXIS: -56 DEGREES
RBC # BLD AUTO: 4.74 X10*6/UL (ref 4–5.2)
SODIUM SERPL-SCNC: 138 MMOL/L (ref 136–145)
T AXIS: -6 DEGREES
T OFFSET: 414 MS
VENTRICULAR RATE: 64 BPM
WBC # BLD AUTO: 7.1 X10*3/UL (ref 4.4–11.3)

## 2025-03-13 PROCEDURE — C1887 CATHETER, GUIDING: HCPCS | Performed by: INTERNAL MEDICINE

## 2025-03-13 PROCEDURE — 85027 COMPLETE CBC AUTOMATED: CPT | Performed by: NURSE PRACTITIONER

## 2025-03-13 PROCEDURE — 93010 ELECTROCARDIOGRAM REPORT: CPT | Performed by: INTERNAL MEDICINE

## 2025-03-13 PROCEDURE — 99152 MOD SED SAME PHYS/QHP 5/>YRS: CPT | Performed by: INTERNAL MEDICINE

## 2025-03-13 PROCEDURE — C1769 GUIDE WIRE: HCPCS | Performed by: INTERNAL MEDICINE

## 2025-03-13 PROCEDURE — 93005 ELECTROCARDIOGRAM TRACING: CPT

## 2025-03-13 PROCEDURE — 99153 MOD SED SAME PHYS/QHP EA: CPT | Performed by: INTERNAL MEDICINE

## 2025-03-13 PROCEDURE — 2550000001 HC RX 255 CONTRASTS: Performed by: INTERNAL MEDICINE

## 2025-03-13 PROCEDURE — RXMED WILLOW AMBULATORY MEDICATION CHARGE

## 2025-03-13 PROCEDURE — 2500000004 HC RX 250 GENERAL PHARMACY W/ HCPCS (ALT 636 FOR OP/ED): Performed by: NURSE PRACTITIONER

## 2025-03-13 PROCEDURE — 2700000047 HC OR 270 NO HCPCS: Performed by: INTERNAL MEDICINE

## 2025-03-13 PROCEDURE — 2500000004 HC RX 250 GENERAL PHARMACY W/ HCPCS (ALT 636 FOR OP/ED): Performed by: INTERNAL MEDICINE

## 2025-03-13 PROCEDURE — 80048 BASIC METABOLIC PNL TOTAL CA: CPT | Performed by: NURSE PRACTITIONER

## 2025-03-13 PROCEDURE — 2720000007 HC OR 272 NO HCPCS: Performed by: INTERNAL MEDICINE

## 2025-03-13 PROCEDURE — 93458 L HRT ARTERY/VENTRICLE ANGIO: CPT | Performed by: INTERNAL MEDICINE

## 2025-03-13 PROCEDURE — 7100000009 HC PHASE TWO TIME - INITIAL BASE CHARGE: Performed by: INTERNAL MEDICINE

## 2025-03-13 PROCEDURE — C1894 INTRO/SHEATH, NON-LASER: HCPCS | Performed by: INTERNAL MEDICINE

## 2025-03-13 PROCEDURE — 7100000010 HC PHASE TWO TIME - EACH INCREMENTAL 1 MINUTE: Performed by: INTERNAL MEDICINE

## 2025-03-13 RX ORDER — CARVEDILOL 6.25 MG/1
6.25 TABLET ORAL 2 TIMES DAILY
Qty: 180 TABLET | Refills: 3 | Status: SHIPPED | OUTPATIENT
Start: 2025-03-13 | End: 2026-03-13

## 2025-03-13 RX ORDER — MIDAZOLAM HYDROCHLORIDE 1 MG/ML
INJECTION, SOLUTION INTRAMUSCULAR; INTRAVENOUS AS NEEDED
Status: DISCONTINUED | OUTPATIENT
Start: 2025-03-13 | End: 2025-03-13 | Stop reason: HOSPADM

## 2025-03-13 RX ORDER — ACETAMINOPHEN 325 MG/1
650 TABLET ORAL EVERY 6 HOURS PRN
Status: DISCONTINUED | OUTPATIENT
Start: 2025-03-13 | End: 2025-03-13 | Stop reason: HOSPADM

## 2025-03-13 RX ORDER — LIDOCAINE HYDROCHLORIDE 20 MG/ML
INJECTION, SOLUTION INFILTRATION; PERINEURAL AS NEEDED
Status: DISCONTINUED | OUTPATIENT
Start: 2025-03-13 | End: 2025-03-13 | Stop reason: HOSPADM

## 2025-03-13 RX ORDER — HEPARIN SODIUM 1000 [USP'U]/ML
INJECTION, SOLUTION INTRAVENOUS; SUBCUTANEOUS AS NEEDED
Status: DISCONTINUED | OUTPATIENT
Start: 2025-03-13 | End: 2025-03-13 | Stop reason: HOSPADM

## 2025-03-13 RX ORDER — CARVEDILOL 6.25 MG/1
6.25 TABLET ORAL 2 TIMES DAILY
Qty: 180 TABLET | Refills: 3 | Status: SHIPPED | OUTPATIENT
Start: 2025-03-13 | End: 2025-03-13

## 2025-03-13 RX ORDER — FENTANYL CITRATE 50 UG/ML
INJECTION, SOLUTION INTRAMUSCULAR; INTRAVENOUS AS NEEDED
Status: DISCONTINUED | OUTPATIENT
Start: 2025-03-13 | End: 2025-03-13 | Stop reason: HOSPADM

## 2025-03-13 RX ADMIN — SODIUM CHLORIDE 500 ML: 900 INJECTION, SOLUTION INTRAVENOUS at 10:03

## 2025-03-13 ASSESSMENT — ENCOUNTER SYMPTOMS
ARTHRALGIAS: 1
EYES NEGATIVE: 1
FATIGUE: 1
PSYCHIATRIC NEGATIVE: 1
PALPITATIONS: 1
HEMATOLOGIC/LYMPHATIC NEGATIVE: 1
SHORTNESS OF BREATH: 1
ALLERGIC/IMMUNOLOGIC NEGATIVE: 1
ENDOCRINE NEGATIVE: 1
GASTROINTESTINAL NEGATIVE: 1
DIZZINESS: 1

## 2025-03-13 ASSESSMENT — COLUMBIA-SUICIDE SEVERITY RATING SCALE - C-SSRS
2. HAVE YOU ACTUALLY HAD ANY THOUGHTS OF KILLING YOURSELF?: NO
1. IN THE PAST MONTH, HAVE YOU WISHED YOU WERE DEAD OR WISHED YOU COULD GO TO SLEEP AND NOT WAKE UP?: NO
6. HAVE YOU EVER DONE ANYTHING, STARTED TO DO ANYTHING, OR PREPARED TO DO ANYTHING TO END YOUR LIFE?: NO

## 2025-03-13 ASSESSMENT — PAIN - FUNCTIONAL ASSESSMENT: PAIN_FUNCTIONAL_ASSESSMENT: 0-10

## 2025-03-13 NOTE — Clinical Note
Closure device placed in the right radial artery. Site closed by radial compression system. 13cc of air in Band

## 2025-03-13 NOTE — NURSING NOTE
END OF PROCEDURE MONITORING CRITERIA    02. Oxygen sat is at 92% or above on RA, or existing order for O2 treatment, or at pre-sedation levels, otherwise new O2 order needed.  03. Unless the patient has a pre-procedure history of diminished level of consciousness, s/he is easily arousable and when aroused is able to responds appropriately for his/her age.  04. Significant complications related to the specific procedure are absent, have been controlled, or have been evaluated, including:      A. Pain.      B. Wound drainage.      C. All drains and tubes are patent.      D. Nausea and Vomiting. Vomiting is not persisten and has not occurred within 15 minutes prior to discharge.      E. Bladder distention. Voided bladder and/or no symptoms or urinary retention (e.g., bladder distention, frequent voiding in small amounts).      F. Neurovascular status.      G. Level of Consciousness consistent with pre procedural status.        daughter(s)  affirmed that they were driving the patient home.

## 2025-03-13 NOTE — Clinical Note
Called patient in regards to an appointment. No answer , left voicemail to return call.    Diagnostic wire inserted. Assist with cath placement

## 2025-03-14 ENCOUNTER — TELEPHONE (OUTPATIENT)
Dept: CARDIOLOGY | Facility: CLINIC | Age: 75
End: 2025-03-14
Payer: MEDICARE

## 2025-03-14 ENCOUNTER — PHARMACY VISIT (OUTPATIENT)
Dept: PHARMACY | Facility: CLINIC | Age: 75
End: 2025-03-14
Payer: MEDICARE

## 2025-03-14 ASSESSMENT — PAIN SCALES - GENERAL: PAINLEVEL_OUTOF10: 0 - NO PAIN

## 2025-03-14 NOTE — TELEPHONE ENCOUNTER
Patient called stating she had a cath yesterday and by last night she had come down with a bad head cold. She would like to know what she can take that is safe since she just had the cath done.

## 2025-03-14 NOTE — TELEPHONE ENCOUNTER
Called patient, advised per Dr. Wharton, ok to take extra strength tylenol every 8hrs. Patient said ok, already taking that for arthritis

## 2025-03-15 PROCEDURE — RXMED WILLOW AMBULATORY MEDICATION CHARGE

## 2025-03-17 NOTE — PROGRESS NOTES
Pharmacist Clinic: Cardiology Management    Angela Montelongo is a 74 y.o. female was referred to Clinical Pharmacy Team for heart failure/cardiomyopathy management.     Referring Provider: Cristina Wharton MD    THIS IS A FOLLOW UP PATIENT APPOINTMENT. AT LAST VISIT ON 3/5/2025 WITH PHARMACIST (Brook Dangelo).    Appointment was completed by the patient who was reached at .    REVIEW OF PAST APPNT (IF APPLICABLE):   Today's appointment was initial visit to establish care with Clinical Pharmacy. Angela reports some left leg swelling which has been chronic for patient after knee replacement. Patient reports no acute change at today's appointment. Also reports shortness of breath on exertion, none with typical daily activities.   She reports she has still been taking carvedilol 12.5 mg twice daily, notation per 1/20/2025 cardiology visit states to decrease to 6.25 mg BID, however updated prescription was not sent. Medication list states 12.5 mg BID. Will have patient clarify with cardiologist, as she is not currently monitoring HR/BP at home.  She has also noted taking potassium chloride 20 mEq twice daily, rather than 40 mEq twice daily as listed, and is unsure if this prescription accurate as she was recently in a SNF. Recommended discussion with her PCP regarding this.  We also discussed patient's recent history of UTIs, as she reports 3 occurences in the past year. States she has been cautious to ensure she is practicing good hygiene, and has been washing herself after she uses the bathroom, which has been helpful. Discussed that Farxiga may increase the risk of UTIs. Will continue to monitor occurrences, may consider discontinuation if patient experiences recurrent UTIs.    Allergies   Allergen Reactions    Ciprofloxacin Anaphylaxis, Unknown and Swelling    Lisinopril Anaphylaxis, Angioedema, Swelling, Unknown and Other    Cefazolin Angioedema and Swelling    Cephalexin Unknown    Tetanus Toxoid, Adsorbed  Unknown       Past Medical History:   Diagnosis Date    Acute embolism and thrombosis of unspecified deep veins of unspecified lower extremity (Multi) 08/13/2024    Acute non-ST segment elevation myocardial infarction (Multi) 01/20/2025    Acute on chronic diastolic congestive heart failure 10/18/2024    Angioedema 01/20/2025    Bradycardia 09/02/2024    Deep venous thrombosis (Multi) 08/13/2024    Elevation of levels of liver transaminase levels     ALT (SGPT) level raised    Essential hypertension 04/22/2024    History of hypertension 01/20/2025    Hyperlipidemia 10/19/2023    Other conditions influencing health status     Osteoarthritis    Personal history of other complications of pregnancy, childbirth and the puerperium     History of ectopic pregnancy    Personal history of other diseases of the circulatory system     History of hypertension    Personal history of other diseases of the musculoskeletal system and connective tissue     History of bursitis    Personal history of other diseases of the musculoskeletal system and connective tissue     Personal history of gout    Personal history of other diseases of the nervous system and sense organs     History of migraine headaches       Current Outpatient Medications on File Prior to Visit   Medication Sig Dispense Refill    acetaminophen (Tylenol) 325 mg tablet Take 2 tablets (650 mg) by mouth every 6 hours if needed for mild pain (1 - 3). 30 tablet 0    aspirin 81 mg EC tablet Take 1 tablet (81 mg) by mouth once daily.      atorvastatin (Lipitor) 20 mg tablet Take 1 tablet (20 mg) by mouth once daily. 30 tablet 11    calcium carb/vit D3/minerals (CALCIUM-VITAMIN D ORAL) Take 1 tablet by mouth once daily.      carvedilol (Coreg) 6.25 mg tablet Take 1 tablet (6.25 mg) by mouth 2 times a day. 180 tablet 3    dapagliflozin propanediol (Farxiga) 10 mg Take 1 tablet (10 mg) by mouth once daily. 90 tablet 3    famotidine (Pepcid) 20 mg tablet Take 1 tablet (20 mg) by  "mouth 2 times a day. (Patient taking differently: Take 1 tablet (20 mg) by mouth once daily.)      furosemide (Lasix) 40 mg tablet Take 1 tablet (40 mg) by mouth once daily.      gabapentin (Neurontin) 100 mg capsule Take 2 capsules (200 mg) by mouth 3 times a day.      losartan (Cozaar) 50 mg tablet Take 1 tablet (50 mg) by mouth once daily.      multivitamin tablet Take 1 tablet by mouth once daily.      multivitamin with minerals iron-free (Centrum Silver) Take 1 tablet by mouth once daily.      oxybutynin XL (Ditropan-XL) 10 mg 24 hr tablet Take 1 tablet (10 mg) by mouth once daily. Do not crush, chew, or split.      polyethylene glycol (Glycolax, Miralax) 17 gram packet Take 17 g by mouth once daily. (Patient not taking: Reported on 3/6/2025)      potassium chloride CR 20 mEq ER tablet Take 1 tablet (20 mEq) by mouth 2 times a day. Do not crush or chew.      [DISCONTINUED] carvedilol (Coreg) 12.5 mg tablet Take 1 tablet (12.5 mg) by mouth 2 times a day. (Patient taking differently: Take 0.5 tablets (6.25 mg) by mouth 2 times a day.)      [DISCONTINUED] carvedilol (Coreg) 6.25 mg tablet Take 1 tablet (6.25 mg) by mouth 2 times a day. 180 tablet 3     No current facility-administered medications on file prior to visit.         RELEVANT LAB RESULTS:  Lab Results   Component Value Date    BILITOT 0.5 10/19/2024    CALCIUM 10.5 (H) 03/13/2025    CO2 29 03/13/2025     03/13/2025    CREATININE 1.00 03/13/2025    GLUCOSE 96 03/13/2025    ALKPHOS 90 10/19/2024    K 4.0 03/13/2025    PROT 5.1 (L) 10/19/2024     03/13/2025    AST 38 10/19/2024    ALT 21 10/19/2024    BUN 33 (H) 03/13/2025    ANIONGAP 12 03/13/2025    MG 1.64 10/19/2024    PHOS 2.2 (L) 08/31/2024    ALBUMIN 2.5 (L) 10/19/2024    GFRF 80 07/24/2023     Lab Results   Component Value Date    TRIG 91 06/07/2022    CHOL 164 06/07/2022    LDLCALC 81 06/07/2022    HDL 65 06/07/2022     No results found for: \"BMCBC\", \"CBCDIF\"     PHARMACEUTICAL " ASSESSMENT:    MEDICATION RECONCILIATION    Was a medication reconciliation completed at today's appointment? No  Home Pharmacy Reviewed? Yes, describe: [facility], [facility]    Added:  - None    Changed:  - None    Removed:  - None    Drug Interactions? Yes, describe: Oxybutynin may enhance the ulcerogenic effect of potassium. Recommended to avoid combination. Monitor for signs/symptoms of ulcer.  Patient previously denied symptoms at last visit.     Medication Documentation Review Audit       Reviewed by Amari Andre RN (Registered Nurse) on 03/13/25 at 0738      Medication Order Taking? Sig Documenting Provider Last Dose Status   acetaminophen (Tylenol) 325 mg tablet [id number]  Take 2 tablets (650 mg) by mouth every 6 hours if needed for mild pain (1 - 3). HALEY Blevins  Active   aspirin 81 mg EC tablet [id number] Yes Take 1 tablet (81 mg) by mouth once daily. Historical Provider, MD 3/13/2025 Morning Active   atorvastatin (Lipitor) 20 mg tablet [id number] Yes Take 1 tablet (20 mg) by mouth once daily. Cristina Wharton MD 3/12/2025 Active   calcium carb/vit D3/minerals (CALCIUM-VITAMIN D ORAL) [id number]  Take 1 tablet by mouth once daily. Historical Provider, MD  Active   carvedilol (Coreg) 12.5 mg tablet [id number] Yes Take 1 tablet (12.5 mg) by mouth 2 times a day.   Patient taking differently: Take 0.5 tablets (6.25 mg) by mouth 2 times a day.    HALEY Blevins 3/12/2025 Active   dapagliflozin propanediol (Farxiga) 10 mg [id number] Yes Take 1 tablet (10 mg) by mouth once daily. Cristina Wharton MD 3/13/2025 Morning Active   famotidine (Pepcid) 20 mg tablet [id number] Yes Take 1 tablet (20 mg) by mouth 2 times a day.   Patient taking differently: Take 1 tablet (20 mg) by mouth once daily.    HALEY Blevins 3/13/2025 Morning Active   furosemide (Lasix) 40 mg tablet [id number] Yes Take 1 tablet (40 mg) by mouth once daily. HALEY Blevins 3/12/2025 Active    gabapentin (Neurontin) 100 mg capsule [id number] Yes Take 2 capsules (200 mg) by mouth 3 times a day. Historical Provider, MD 3/13/2025 Morning Active   losartan (Cozaar) 50 mg tablet [id number] Yes Take 1 tablet (50 mg) by mouth once daily. HALEY Blevins 3/13/2025 Morning Active   multivitamin tablet [id number]  Take 1 tablet by mouth once daily. Historical Provider, MD  Active   multivitamin with minerals iron-free (Centrum Silver) [id number]  Take 1 tablet by mouth once daily. Historical Provider, MD  Active   oxybutynin XL (Ditropan-XL) 10 mg 24 hr tablet [id number] Yes Take 1 tablet (10 mg) by mouth once daily. Do not crush, chew, or split. Rolf Provider, MD 3/13/2025 Morning Active   polyethylene glycol (Glycolax, Miralax) 17 gram packet [id number]  Take 17 g by mouth once daily.   Patient not taking: Reported on 3/6/2025    HALEY Blevins  Active   potassium chloride CR 20 mEq ER tablet [id number]  Take 1 tablet (20 mEq) by mouth 2 times a day. Do not crush or chew. Historical Provider, MD  Active                    DISEASE MANAGEMENT ASSESSMENT:     CHF ASSESSMENT     Symptom/Staging:  -Most recent ejection fraction: 30-35%  -NYHA Stage: Unknown    Results for orders placed during the hospital encounter of 10/17/24    Transthoracic Echo (TTE) Complete    [place] [id number]  Phone [phone]    TRANSTHORACIC ECHOCARDIOGRAM REPORT    Patient Name:      BRUNA Burns Physician:    [id number] Cristina Wharton MD  Study Date:        10/18/2024            Ordering Provider:    [id number] CHETAN GARCIA  MRN/PID:           [id number]              Fellow:  Accession#:        [id number]          Nurse:  Date of Birth/Age: 1950 / 74 years  Sonographer:          Audrey Barger RDCS  Gender:            F                     Additional Staff:     Erlinda Merino RDCS  Height:            160.02 cm             Admit  Date:  Weight:            117.94 kg             Admission Status:     Inpatient -  Routine  BSA / BMI:         2.16 m2 / 46.06 kg/m2 Department Location:  [facility]  Blood Pressure: 131 /65 mmHg    Study Type:    TRANSTHORACIC ECHO (TTE) COMPLETE  Diagnosis/ICD: Acute on chronic diastolic (congestive) heart failure  (CHF)-I50.33  Indication:    CHF, SOB  CPT Codes:     Echo Complete w Full Doppler-71722    Patient History:  Pertinent History: HLD, bradycardia, HTN, CHF.    Study Detail: The following Echo studies were performed: 2D, M-Mode, Doppler and  color flow. Technically challenging study due to body habitus,  patient lying in supine position and prominent lung artifact.  Definity used as a contrast agent for endocardial border  definition. Total contrast used for this procedure was 4 mL via IV  push.      PHYSICIAN INTERPRETATION:  Left Ventricle: The left ventricular systolic function is moderately decreased, with a visually estimated ejection fraction of 30-35%. There are no regional wall motion abnormalities. The left ventricular cavity size is normal. Abnormal (paradoxical) septal motion, consistent with an intraventricular conduction delay. Spectral Doppler shows a normal pattern of left ventricular diastolic filling.  Left Atrium: The left atrium is normal in size.  Right Ventricle: The right ventricle was not well visualized. There is reduced right ventricular systolic function.  Right Atrium: The right atrium is normal in size.  Aortic Valve: The aortic valve is trileaflet. The aortic valve dimensionless index is 0.78. There is no evidence of aortic valve regurgitation. The peak instantaneous gradient of the aortic valve is 7.2 mmHg. The mean gradient of the aortic valve is 4.0 mmHg.  Mitral Valve: The mitral valve is normal in structure. There is trace mitral valve regurgitation.  Tricuspid Valve: The tricuspid valve was not well visualized. There is trace tricuspid regurgitation. The right  ventricular systolic pressure is unable to be estimated.  Pulmonic Valve: The pulmonic valve is not well visualized. There is physiologic pulmonic valve regurgitation.  Pericardium: No pericardial effusion noted.  Aorta: The aortic root is normal.  Systemic Veins: The inferior vena cava appears normal in size, with IVC inspiratory collapse less than 50%.      CONCLUSIONS:  1. Poorly visualized anatomical structures due to suboptimal image quality.  2. The left ventricular systolic function is moderately decreased, with a visually estimated ejection fraction of 30-35%.  3. There is reduced right ventricular systolic function.  4. Trace mitral valve regurgitation.  5. Trace tricuspid regurgitation is visualized.  6. Limited images quality. Left ventricular ejection fraction appears reduced at 30 to 35% with interventricular conduction delay.    QUANTITATIVE DATA SUMMARY:    LA VOLUME:                   Normal Ranges:  LA Vol A4C:        69.7 ml   (22+/-6mL/m2)  LA Vol Index A4C:  32.2ml/m2  LA Area A4C:       23.0 cm2  LA Major Axis A4C: 6.4 cm  LA Vol A4C:        64.9 ml      M-MODE MEASUREMENTS:         Normal Ranges:  Ao Root:             2.90 cm (2.0-3.7cm)  LAs:                 4.00 cm (2.7-4.0cm)      AORTA MEASUREMENTS:         Normal Ranges:  Ao Sinus, d:        2.92 cm (2.1-3.5cm)  Ao STJ, d:          2.61 cm (1.7-3.4cm)  Asc Ao, d:          3.40 cm (2.1-3.4cm)      LV SYSTOLIC FUNCTION BY 2D PLANIMETRY (MOD):  Normal Ranges:  EF-A4C View:    38 % (>=55%)  EF-Visual:      33 %  LV EF Reported: 33 %      LV DIASTOLIC FUNCTION:           Normal Ranges:  MV Peak E:             0.73 m/s  (0.7-1.2 m/s)  MV Peak A:             0.92 m/s  (0.42-0.7 m/s)  E/A Ratio:             0.79      (1.0-2.2)  MV e'                  0.066 m/s (>8.0)  MV lateral e'          0.07 m/s  MV medial e'           0.06 m/s  E/e' Ratio:            11.06     (<8.0)      MITRAL VALVE:         Normal Ranges:  MV DT:        94 msec  (150-240msec)      AORTIC VALVE:                     Normal Ranges:  AoV Vmax:                1.34 m/s (<=1.7m/s)  AoV Peak P.2 mmHg (<20mmHg)  AoV Mean P.0 mmHg (1.7-11.5mmHg)  LVOT Max Alex:            1.07 m/s (<=1.1m/s)  AoV VTI:                 25.00 cm (18-25cm)  LVOT VTI:                19.50 cm  LVOT Diameter:           2.40 cm  (1.8-2.4cm)  AoV Area, VTI:           3.53 cm2 (2.5-5.5cm2)  AoV Area,Vmax:           3.61 cm2 (2.5-4.5cm2)  AoV Dimensionless Index: 0.78      RIGHT VENTRICLE:  TAPSE: 14.7 mm  RV s'  0.12 m/s      TRICUSPID VALVE/RVSP:         Normal Ranges:  IVC Diam:             1.59 cm      [id number] Cristina Wharton MD  Electronically signed on 10/19/2024 at 4:28:53 PM        ** Final **      Guideline-Directed Medical Therapy:  -ARNI: No   -If no, then ACEi/ARB?: Yes, describe: Losartan 50 mg every day   (listed history of angioedema/anaphylaxis with lisinopril)  -Beta Blocker: Yes, describe: Carvedilol 12.5 mg BID  -MRA: No  -SGLT2i: Yes, describe: Farxiga 10 mg every day     Secondary Prevention:  -The ASCVD Risk score (Liseth RANDALL, et al., 2019) failed to calculate for the following reasons:    Risk score cannot be calculated because patient has a medical history suggesting prior/existing ASCVD   -Aspirin 81mg? no  -Statin?: Yes, describe: Atorvastatin 20 mg every day   -HTN?: Yes, describe: 105/55 as of 3/13/2025    CURRENT PHARMACOTHERAPY:   Carvedilol 6.25 mg BID - confirms taking decreased dosage  Farxiga 10 mg every day   Furosemide 40 mg every day   Losartan 50 mg every day   Potassium chloride 20 mEq 1 tablet BID     Affordability: [facility] PAP for Farxiga active through 2026  Adherence/Compliance: see above  Adverse Effects: Previous dizziness has improved since decreasing to carvedilol 6.25 mg BID. Still occurs with position changes but is improved. Reports no current pain with urination but did discuss with PCP 3/6/2025 due to previous UTI. Notes increased  urination with Farxiga/furosemide. Reports previously having Botox injections due to urinary incontinence but is overdue.     Monitoring Weights at Home: Yes, however not daily  Home Weight Recordings: ~ 230 lbs (denies acute fluctuations)     Past In Office Weight Readings:   Wt Readings from Last 6 Encounters:   03/06/25 104 kg (230 lb)   01/20/25 109 kg (240 lb)   12/27/24 108 kg (237 lb)   12/09/24 109 kg (240 lb)   10/18/24 111 kg (245 lb)   10/11/24 114 kg (252 lb 3.2 oz)       Monitoring Blood Pressure at Home: No  Home BP Recordings: None    Past In Office BP Readings:   BP Readings from Last 6 Encounters:   03/13/25 105/55   03/06/25 110/58   01/31/25 142/70   01/20/25 126/70   12/27/24 128/68   10/24/24 120/61       HEALTH MANAGEMENT    Maintaining fluid restriction (<2 L/day): N/A  Edema/swelling: Yes, some left leg swelling which has been chronic for patient after knee replacement. Patient reports no acute change at today's appointment.  Shortness of breath: Yes, only with exertion which varies day to day, sometimes occurs with cooking/cleaning and must sit to take breaks during this.  Trouble sleeping/lying down: No, notes she does sleep with a slight incline on her bed  Dry/hacking cough: No, had some congestion/sinus symptoms after cardiac cath on 3/13, reports she has been feeling improved as of today.  Recent Hospitalizations: No    EDUCATION/COUNSELING:   - Counseled patient on MOA, expectations, duration of therapy, contraindications, administration, and monitoring parameters  - Counseled patient on lifestyle modifications that can decrease your risk of having complications (smoking cessation, losing weight, daily weights, vaccines)  - Counseled patient on fluid intake and weight management. Recommended to not consume more than 2 liters of fliuids per day. If they have gained more than 2-3 pounds within a 24 hours period (or 5 pounds in a week), contact their cardiologist  - Answered all patient  "questions and concerns     Prescription was written for \"Blood Pressure Monitor\"   Prescription was faxed to [employer] [facility] @ [phone]  Prescription has patient's date of birth AND contact information   Picture of patient's insurance information was included  Prescription has diagnosis of HTN documented   Prescription has which size cuff the patient would need   Standard: 14 in  Large: 16 in  XL: 21 in     These are digital, batter operated blood pressure cuffs. Once filled, they prefer for patients to go to their local [employer] [facility] to  - but they are able to ship if the patient does not have transportation.     Contact Information:   [employer]   Phone: [phone]   Fax: [phone]   Toll Free: [phone]      DISCUSSION/NOTES:   Today's appointment was 3 month follow up regarding heart failure pharmacotherapy.  Angela reports dyspnea on exertion that varies day to day, often has to take breaks throughout activities. No increased SOB with lying flat or dry/hacking cough. Reports persistent leg swelling after knee replacement, no acute change at today's appointment.   Previous dizziness has improved with decreased dosage of carvedilol, patient endorses some dizziness with position changes but this resolves quickly. Discussed home BP monitoring, which patient is agreeable to. Will send prescription for BP cuff for patient to monitor.  Is still experiencing increased urination, denies current dysuria and recently had urinalysis completed after PCP appointment which did not indicate acute infection. She previously received Botox injections due to urinary incontinence but is overdue, recommended discussion with her provider regarding this.   She inquired about taking biotin for recently noted hair loss, no drug interactions found with current medications. Did advise discussion with her providers prior to taking this as biotin may interfere with some laboratory " tests.  Angela also endorses a fall last night after losing her balance while preparing food. Fell on her backside and endorses slightly bumping her head. No loss of consciousness. She did call the fire department to help her up. Endorses a slight headache and aches in her back/neck this morning, but often awakes with a headache. Took Tylenol and endorses resolution of symptoms. No changes in vision or dizziness.  Highly recommended patient call her PCP regarding fall yesterday due to head strike and subsequent headache, to determine if further evaluation is needed. Patient confirms understanding.   No changes today, will follow up in 3 months.    ASSESSMENT:    Assessment/Plan   Problem List Items Addressed This Visit       Acute combined systolic and diastolic heart failure     Angela is prescribed 3/4 GDMT. Will defer addition of MRA or titration due to previous dizziness and lack of home BP readings.          Relevant Orders    Referral to Clinical Pharmacy       RECOMMENDATIONS/PLAN:    Continue:  Carvedilol 6.25 mg BID   Farxiga 10 mg every day   Furosemide 40 mg every day   Losartan 50 mg every day   Potassium chloride 20 mEq 1 tablet BID   Follow up: 3 months    Last Appnt with Referring Provider: 1/20/2025  Next Appnt with Referring Provider: 4/10/2025  Clinical Pharmacist follow up: 6/18/2025  VAF/Application Expiration: Yes    Date: 2/18/2026  Type of Encounter: Lilibeth Dangelo PharmD    Verbal consent to manage patient's drug therapy was obtained from the patient . They were informed they may decline to participate or withdraw from participation in pharmacy services at any time.    Continue all meds under the continuation of care with the referring provider and clinical pharmacy team.

## 2025-03-18 ENCOUNTER — APPOINTMENT (OUTPATIENT)
Dept: PHARMACY | Facility: HOSPITAL | Age: 75
End: 2025-03-18
Payer: MEDICARE

## 2025-03-18 DIAGNOSIS — I50.41 ACUTE COMBINED SYSTOLIC AND DIASTOLIC HEART FAILURE: ICD-10-CM

## 2025-03-18 RX ORDER — ACETAMINOPHEN 500 MG
TABLET ORAL
Qty: 1 KIT | Refills: 0 | Status: SHIPPED | OUTPATIENT
Start: 2025-03-18

## 2025-03-18 NOTE — ASSESSMENT & PLAN NOTE
Angela is prescribed 3/4 GDMT. Will defer addition of MRA or titration due to previous dizziness and lack of home BP readings.

## 2025-03-18 NOTE — Clinical Note
Colton Wharton, Angela reports dyspnea on exertion that varies day to day, often has to take breaks throughout activities. No increased SOB with lying flat or dry/hacking cough. Reports persistent leg swelling after knee replacement, no acute change at today's appointment. Previous dizziness has improved, will send BP cuff for patient to begin home monitoring. Of note, Angela reported a fall last night in which she fell  on her backside and endorses slightly bumping her head. No loss of consciousness. She did call the fire department to help her up. Endorses a slight headache and aches in her back/neck this morning, but often awakes with a headache. Took Tylenol and endorses resolution of symptoms. No changes in vision or dizziness. I highly recommended she call her PCP regarding this to determine if further evaluation is needed. Patient confirms understanding. No changes today, will follow up in 3 months.

## 2025-03-20 ENCOUNTER — PHARMACY VISIT (OUTPATIENT)
Dept: PHARMACY | Facility: CLINIC | Age: 75
End: 2025-03-20
Payer: MEDICARE

## 2025-03-25 DIAGNOSIS — I10 ESSENTIAL HYPERTENSION: ICD-10-CM

## 2025-03-25 RX ORDER — LOSARTAN POTASSIUM 50 MG/1
50 TABLET ORAL DAILY
Qty: 90 TABLET | Refills: 0 | Status: SHIPPED | OUTPATIENT
Start: 2025-03-25 | End: 2025-06-23

## 2025-03-25 RX ORDER — POTASSIUM CHLORIDE 20 MEQ/1
20 TABLET, EXTENDED RELEASE ORAL 2 TIMES DAILY
Qty: 180 TABLET | Refills: 0 | Status: SHIPPED | OUTPATIENT
Start: 2025-03-25 | End: 2025-06-23

## 2025-03-25 NOTE — TELEPHONE ENCOUNTER
Please send the attached prescription to the patient's pharmacy as soon as possible. Thank you!    Requested Prescriptions     Pending Prescriptions Disp Refills    losartan (Cozaar) 50 mg tablet 90 tablet 0     Sig: Take 1 tablet (50 mg) by mouth once daily.    potassium chloride CR 20 mEq ER tablet 180 tablet 0     Sig: Take 1 tablet (20 mEq) by mouth 2 times a day. Do not crush or chew.

## 2025-03-27 DIAGNOSIS — M46.46 DISCITIS OF LUMBAR REGION: ICD-10-CM

## 2025-03-27 NOTE — TELEPHONE ENCOUNTER
Please send the attached prescription to the patient's pharmacy as soon as possible. Thank you!    Requested Prescriptions     Pending Prescriptions Disp Refills    furosemide (Lasix) 40 mg tablet 90 tablet 3     Sig: Take 1 tablet (40 mg) by mouth once daily.

## 2025-03-28 RX ORDER — FUROSEMIDE 40 MG/1
40 TABLET ORAL DAILY
Qty: 90 TABLET | Refills: 3 | Status: SHIPPED | OUTPATIENT
Start: 2025-03-28 | End: 2026-03-28

## 2025-04-10 ENCOUNTER — APPOINTMENT (OUTPATIENT)
Dept: CARDIOLOGY | Facility: CLINIC | Age: 75
End: 2025-04-10
Payer: MEDICARE

## 2025-04-10 VITALS
HEIGHT: 63 IN | OXYGEN SATURATION: 98 % | WEIGHT: 229 LBS | SYSTOLIC BLOOD PRESSURE: 130 MMHG | RESPIRATION RATE: 18 BRPM | BODY MASS INDEX: 40.57 KG/M2 | DIASTOLIC BLOOD PRESSURE: 59 MMHG | HEART RATE: 55 BPM | TEMPERATURE: 98.6 F

## 2025-04-10 DIAGNOSIS — I50.33 ACUTE ON CHRONIC DIASTOLIC CONGESTIVE HEART FAILURE: ICD-10-CM

## 2025-04-10 DIAGNOSIS — Z86.79 HISTORY OF HYPERTENSION: ICD-10-CM

## 2025-04-10 DIAGNOSIS — I50.21 ACUTE SYSTOLIC HEART FAILURE: Primary | ICD-10-CM

## 2025-04-10 DIAGNOSIS — I21.4 ACUTE NON-ST SEGMENT ELEVATION MYOCARDIAL INFARCTION (MULTI): ICD-10-CM

## 2025-04-10 DIAGNOSIS — I50.41 ACUTE COMBINED SYSTOLIC AND DIASTOLIC HEART FAILURE: ICD-10-CM

## 2025-04-10 DIAGNOSIS — R00.1 BRADYCARDIA: ICD-10-CM

## 2025-04-10 DIAGNOSIS — I10 ESSENTIAL HYPERTENSION: ICD-10-CM

## 2025-04-10 PROCEDURE — 1157F ADVNC CARE PLAN IN RCRD: CPT | Performed by: INTERNAL MEDICINE

## 2025-04-10 PROCEDURE — 1160F RVW MEDS BY RX/DR IN RCRD: CPT | Performed by: INTERNAL MEDICINE

## 2025-04-10 PROCEDURE — 99215 OFFICE O/P EST HI 40 MIN: CPT | Performed by: INTERNAL MEDICINE

## 2025-04-10 PROCEDURE — 3075F SYST BP GE 130 - 139MM HG: CPT | Performed by: INTERNAL MEDICINE

## 2025-04-10 PROCEDURE — 1159F MED LIST DOCD IN RCRD: CPT | Performed by: INTERNAL MEDICINE

## 2025-04-10 PROCEDURE — 3078F DIAST BP <80 MM HG: CPT | Performed by: INTERNAL MEDICINE

## 2025-04-10 PROCEDURE — 1126F AMNT PAIN NOTED NONE PRSNT: CPT | Performed by: INTERNAL MEDICINE

## 2025-04-10 RX ORDER — SPIRONOLACTONE 25 MG/1
25 TABLET ORAL DAILY
Qty: 90 TABLET | Refills: 3 | Status: SHIPPED | OUTPATIENT
Start: 2025-04-10 | End: 2026-04-10

## 2025-04-10 RX ORDER — POTASSIUM CHLORIDE 20 MEQ/1
20 TABLET, EXTENDED RELEASE ORAL DAILY
Start: 2025-04-10 | End: 2025-07-09

## 2025-04-10 ASSESSMENT — ENCOUNTER SYMPTOMS
OCCASIONAL FEELINGS OF UNSTEADINESS: 1
LOSS OF SENSATION IN FEET: 0
DEPRESSION: 0

## 2025-04-10 ASSESSMENT — PATIENT HEALTH QUESTIONNAIRE - PHQ9
1. LITTLE INTEREST OR PLEASURE IN DOING THINGS: SEVERAL DAYS
2. FEELING DOWN, DEPRESSED OR HOPELESS: SEVERAL DAYS
SUM OF ALL RESPONSES TO PHQ9 QUESTIONS 1 AND 2: 2
10. IF YOU CHECKED OFF ANY PROBLEMS, HOW DIFFICULT HAVE THESE PROBLEMS MADE IT FOR YOU TO DO YOUR WORK, TAKE CARE OF THINGS AT HOME, OR GET ALONG WITH OTHER PEOPLE: SOMEWHAT DIFFICULT

## 2025-04-10 ASSESSMENT — LIFESTYLE VARIABLES
HOW MANY STANDARD DRINKS CONTAINING ALCOHOL DO YOU HAVE ON A TYPICAL DAY: PATIENT DOES NOT DRINK
AUDIT-C TOTAL SCORE: 0
SKIP TO QUESTIONS 9-10: 1
HOW OFTEN DO YOU HAVE SIX OR MORE DRINKS ON ONE OCCASION: NEVER
HOW OFTEN DO YOU HAVE A DRINK CONTAINING ALCOHOL: NEVER

## 2025-04-10 ASSESSMENT — PAIN SCALES - GENERAL: PAINLEVEL_OUTOF10: 0-NO PAIN

## 2025-04-10 NOTE — PROGRESS NOTES
History of present illness:  This is a very pleasant 74-year-old female with history of heart failure severe LV systolic dysfunction ejection fraction of 30 to 35%, obesity, hypertension.  Patient underwent cardiac catheterization by myself in March 13, 2025 that showed no significant coronary artery disease.  Echocardiogram February 25, 2025 showed ejection fraction of 30 to 35%.  Patient returns to my office for follow-up.  Still complain of lower extremity edema and no chest pain shortness of breath orthopnea or PND.    Past Medical History:   Diagnosis Date    Acute embolism and thrombosis of unspecified deep veins of unspecified lower extremity 08/13/2024    Acute non-ST segment elevation myocardial infarction (Multi) 01/20/2025    Acute on chronic diastolic congestive heart failure 10/18/2024    Angioedema 01/20/2025    Bradycardia 09/02/2024    Deep venous thrombosis (Multi) 08/13/2024    Elevation of levels of liver transaminase levels     ALT (SGPT) level raised    Essential hypertension 04/22/2024    History of hypertension 01/20/2025    Hyperlipidemia 10/19/2023    Other conditions influencing health status     Osteoarthritis    Personal history of other complications of pregnancy, childbirth and the puerperium     History of ectopic pregnancy    Personal history of other diseases of the circulatory system     History of hypertension    Personal history of other diseases of the musculoskeletal system and connective tissue     History of bursitis    Personal history of other diseases of the musculoskeletal system and connective tissue     Personal history of gout    Personal history of other diseases of the nervous system and sense organs     History of migraine headaches       Past Surgical History:   Procedure Laterality Date    CARDIAC CATHETERIZATION Left 3/13/2025    Procedure: Left Heart Cath;  Surgeon: Cristina Wharton MD;  Location: [facility];  Service: Cardiovascular;  Laterality: Left;   no pre auth required    CARPAL TUNNEL RELEASE  04/09/2013    Neuroplasty Median Nerve At Carpal Tunnel    CHOLECYSTECTOMY  04/09/2013    Cholecystectomy    KNEE ARTHROPLASTY  04/09/2013    Knee Arthroplasty       Allergies   Allergen Reactions    Ciprofloxacin Anaphylaxis, Unknown and Swelling    Lisinopril Anaphylaxis, Angioedema, Swelling, Unknown and Other    Cefazolin Angioedema and Swelling    Cephalexin Unknown    Tetanus Toxoid, Adsorbed Unknown        reports that she has never smoked. She has never been exposed to tobacco smoke. She has never used smokeless tobacco. She reports that she does not currently use alcohol. She reports that she does not use drugs.    Family History   Family history unknown: Yes       Patient's Medications   New Prescriptions    No medications on file   Previous Medications    ACETAMINOPHEN (TYLENOL) 325 MG TABLET    Take 2 tablets (650 mg) by mouth every 6 hours if needed for mild pain (1 - 3).    ASPIRIN 81 MG EC TABLET    Take 1 tablet (81 mg) by mouth once daily.    ATORVASTATIN (LIPITOR) 20 MG TABLET    Take 1 tablet (20 mg) by mouth once daily.    BLOOD PRESSURE MONITOR KIT    Use as directed to monitor blood pressure once daily    CALCIUM CARB/VIT D3/MINERALS (CALCIUM-VITAMIN D ORAL)    Take 1 tablet by mouth once daily.    CARVEDILOL (COREG) 6.25 MG TABLET    Take 1 tablet (6.25 mg) by mouth 2 times a day.    DAPAGLIFLOZIN PROPANEDIOL (FARXIGA) 10 MG    Take 1 tablet (10 mg) by mouth once daily.    FAMOTIDINE (PEPCID) 20 MG TABLET    Take 1 tablet (20 mg) by mouth 2 times a day.    FUROSEMIDE (LASIX) 40 MG TABLET    Take 1 tablet (40 mg) by mouth once daily.    GABAPENTIN (NEURONTIN) 100 MG CAPSULE    Take 2 capsules (200 mg) by mouth 3 times a day.    LOSARTAN (COZAAR) 50 MG TABLET    Take 1 tablet (50 mg) by mouth once daily.    MULTIVITAMIN WITH MINERALS IRON-FREE (CENTRUM SILVER)    Take 1 tablet by mouth once daily.    OXYBUTYNIN XL (DITROPAN-XL) 10 MG 24 HR TABLET     Take 1 tablet (10 mg) by mouth once daily. Do not crush, chew, or split.    POLYETHYLENE GLYCOL (GLYCOLAX, MIRALAX) 17 GRAM PACKET    Take 17 g by mouth once daily.    POTASSIUM CHLORIDE CR 20 MEQ ER TABLET    Take 1 tablet (20 mEq) by mouth 2 times a day. Do not crush or chew.   Modified Medications    No medications on file   Discontinued Medications    MULTIVITAMIN TABLET    Take 1 tablet by mouth once daily.       Objective   Physical Exam  General: Patient in no acute distress   HEENT: Atraumatic normocephalic.  Neck: Supple, jugular venous pressure within normal limit.  No bruits  Lungs: Clear to auscultation bilaterally  Cardiovascular: Regular rate and rhythm, normal heart sounds, no murmurs rubs or gallops  Abdomen: Soft nontender nondistended.  Normal bowel sounds.  Extremities: Warm to touch, no edema.    Lab Review   Admission on 03/13/2025, Discharged on 03/13/2025   Component Date Value    WBC 03/13/2025 7.1     nRBC 03/13/2025 0.0     RBC 03/13/2025 4.74     Hemoglobin 03/13/2025 14.9     Hematocrit 03/13/2025 45.1     MCV 03/13/2025 95     MCH 03/13/2025 31.4     MCHC 03/13/2025 33.0     RDW 03/13/2025 11.9     Platelets 03/13/2025 221     Glucose 03/13/2025 96     Sodium 03/13/2025 138     Potassium 03/13/2025 4.0     Chloride 03/13/2025 101     Bicarbonate 03/13/2025 29     Anion Gap 03/13/2025 12     Urea Nitrogen 03/13/2025 33 (H)     Creatinine 03/13/2025 1.00     eGFR 03/13/2025 59 (L)     Calcium 03/13/2025 10.5 (H)     Ventricular Rate 03/13/2025 64     Atrial Rate 03/13/2025 64     FL Interval 03/13/2025 204     QRS Duration 03/13/2025 156     QT Interval 03/13/2025 430     QTC Calculation(Bazett) 03/13/2025 443     P Axis 03/13/2025 44     R Axis 03/13/2025 -56     T Axis 03/13/2025 -6     QRS Count 03/13/2025 10     Q Onset 03/13/2025 199     P Onset 03/13/2025 97     P Offset 03/13/2025 160     T Offset 03/13/2025 414     QTC Fredericia 03/13/2025 439    Office Visit on 03/06/2025    Component Date Value    COLOR 03/06/2025 YELLOW     APPEARANCE 03/06/2025 CLEAR     SPECIFIC GRAVITY 03/06/2025 1.007     PH 03/06/2025 6.5     GLUCOSE 03/06/2025 1+ (A)     BILIRUBIN 03/06/2025 NEGATIVE     KETONES 03/06/2025 NEGATIVE     OCCULT BLOOD 03/06/2025 NEGATIVE     PROTEIN 03/06/2025 NEGATIVE     NITRITE 03/06/2025 NEGATIVE     LEUKOCYTE ESTERASE 03/06/2025 NEGATIVE     WBC 03/06/2025 0-5     RBC 03/06/2025 NONE SEEN     SQUAMOUS EPITHELIAL CELLS 03/06/2025 NONE SEEN     BACTERIA 03/06/2025 NONE SEEN     HYALINE CAST 03/06/2025 0-5 (A)     YEAST 03/06/2025 FEW (A)     NOTE 03/06/2025      REFLEXIVE URINE CULTURE 03/06/2025     Hospital Outpatient Visit on 02/25/2025   Component Date Value    AV pk jeffery 02/25/2025 1.43     LVOT diam 02/25/2025 2.00     AV mn grad 02/25/2025 3     MV E/A ratio 02/25/2025 0.43     Tricuspid annular plane * 02/25/2025 2.3     LV EF 02/25/2025 33     RV free wall pk S' 02/25/2025 7.94     RVSP 02/25/2025 30.5     LVIDd 02/25/2025 3.87     AV pk grad 02/25/2025 8     Aortic Valve Area by Con* 02/25/2025 2.40     Aortic Valve Area by Con* 02/25/2025 1.95     LV A4C EF 02/25/2025 33.6         Assessment/Plan   Patient Active Problem List   Diagnosis    Hyperlipidemia    Shoulder arthritis    Essential hypertension    Nausea    Constipation    Claw toe    Gout    Chronic gout without tophus    Gastroesophageal reflux disease    Esophageal reflux    Endometrial hyperplasia    Hyponatremia    CELINA (acute kidney injury)    Acute cystitis without hematuria    Weakness    Bradycardia    Chronic low back pain    Shortness of breath    Acute on chronic diastolic congestive heart failure    Hypomagnesemia    Acute hypoxic respiratory failure    Osteomyelitis of vertebra, thoracolumbar region (Multi)    Degenerative lumbar spinal stenosis    Degenerative spondylolisthesis    Left foot drop    Hypokalemia    Overactive bladder    Acute embolism and thrombosis of unspecified deep veins of  unspecified lower extremity    Acute non-ST segment elevation myocardial infarction (Multi)    Acute kidney failure, unspecified    Anemia due to blood loss    Angioedema    Carpal tunnel syndrome    Chronic kidney disease, unspecified    Chronic pain of both shoulders    Diarrhea of presumed infectious origin    Hand numbness    Vitamin D deficiency    Urinary tract infection    Trochanteric bursitis of right hip    Syncope and collapse    Subclinical hypothyroidism    Squamous cell carcinoma of skin, unspecified    Skin sensation disturbance    Sciatica    Rotator cuff syndrome of left shoulder    History of hypertension    Acute combined systolic and diastolic heart failure      This is a very pleasant 74-year-old female with history of heart failure severe LV systolic dysfunction ejection fraction of 30 to 35%, obesity, hypertension.  Patient underwent cardiac catheterization by myself in March 13, 2025 that showed no significant coronary artery disease.  Echocardiogram February 25, 2025 showed ejection fraction of 30 to 35%.  Patient returns to my office for follow-up.  Still complain of lower extremity edema and no chest pain shortness of breath orthopnea or PND.    Patient with severe LV systolic dysfunction ejection fraction of 30 to 35%.  Has been using high doses potassium supplements.  I will stop one of the potassium supplements will put her on spironolactone 25 mg oral daily.  Continue carvedilol and losartan.  Continue Jardiance.  Patient last creatinine was stable.  She is currently on optimal medical therapy.  Will wait 3 months to repeat another echo if her ejection fraction remains low then we will arrange for ICD.  Discussed with her at length lifestyle modification weight loss.  Will follow-up in 3 months.          Cristina Wharton MD

## 2025-04-10 NOTE — PATIENT INSTRUCTIONS
Decrease your potassium to 20 mg 1 tablet daily.    Start spironolactone 25 mg oral 1 tablet daily.    Have a blood work done in 2 to 3 weeks.    I will see you in 3 months    Have an echocardiogram done 1 week before you come and see me next visit.

## 2025-04-22 DIAGNOSIS — M54.30 SCIATICA, UNSPECIFIED LATERALITY: ICD-10-CM

## 2025-04-22 DIAGNOSIS — M25.511 CHRONIC PAIN OF BOTH SHOULDERS: ICD-10-CM

## 2025-04-22 DIAGNOSIS — M54.50 CHRONIC LOW BACK PAIN, UNSPECIFIED BACK PAIN LATERALITY, UNSPECIFIED WHETHER SCIATICA PRESENT: ICD-10-CM

## 2025-04-22 DIAGNOSIS — M70.61 TROCHANTERIC BURSITIS OF RIGHT HIP: ICD-10-CM

## 2025-04-22 DIAGNOSIS — G89.29 CHRONIC PAIN OF BOTH SHOULDERS: ICD-10-CM

## 2025-04-22 DIAGNOSIS — M25.512 CHRONIC PAIN OF BOTH SHOULDERS: ICD-10-CM

## 2025-04-22 DIAGNOSIS — G89.29 CHRONIC LOW BACK PAIN, UNSPECIFIED BACK PAIN LATERALITY, UNSPECIFIED WHETHER SCIATICA PRESENT: ICD-10-CM

## 2025-04-22 RX ORDER — GABAPENTIN 100 MG/1
CAPSULE ORAL
Qty: 540 CAPSULE | Refills: 0 | Status: SHIPPED | OUTPATIENT
Start: 2025-04-22 | End: 2025-04-23 | Stop reason: SDUPTHER

## 2025-04-23 DIAGNOSIS — M70.61 TROCHANTERIC BURSITIS OF RIGHT HIP: ICD-10-CM

## 2025-04-23 DIAGNOSIS — G89.29 CHRONIC PAIN OF BOTH SHOULDERS: ICD-10-CM

## 2025-04-23 DIAGNOSIS — M25.512 CHRONIC PAIN OF BOTH SHOULDERS: ICD-10-CM

## 2025-04-23 DIAGNOSIS — M54.50 CHRONIC LOW BACK PAIN, UNSPECIFIED BACK PAIN LATERALITY, UNSPECIFIED WHETHER SCIATICA PRESENT: ICD-10-CM

## 2025-04-23 DIAGNOSIS — G89.29 CHRONIC LOW BACK PAIN, UNSPECIFIED BACK PAIN LATERALITY, UNSPECIFIED WHETHER SCIATICA PRESENT: ICD-10-CM

## 2025-04-23 DIAGNOSIS — M54.30 SCIATICA, UNSPECIFIED LATERALITY: ICD-10-CM

## 2025-04-23 DIAGNOSIS — M25.511 CHRONIC PAIN OF BOTH SHOULDERS: ICD-10-CM

## 2025-04-24 RX ORDER — GABAPENTIN 100 MG/1
CAPSULE ORAL
Qty: 540 CAPSULE | Refills: 0 | Status: SHIPPED | OUTPATIENT
Start: 2025-04-24

## 2025-04-25 ENCOUNTER — TELEPHONE (OUTPATIENT)
Dept: PRIMARY CARE | Facility: CLINIC | Age: 75
End: 2025-04-25
Payer: MEDICARE

## 2025-04-25 ENCOUNTER — TELEPHONE (OUTPATIENT)
Dept: CARDIOLOGY | Facility: CLINIC | Age: 75
End: 2025-04-25

## 2025-04-25 NOTE — TELEPHONE ENCOUNTER
Since starting spironolactone, patient is experiencing very low blood pressure readings of 80's/50's and hr in 70's but she is very dizzy. She would like to know what she should do. I advised patient that if her blood pressure got any lower, or if her dizziness worsens to go to the ER.

## 2025-04-25 NOTE — TELEPHONE ENCOUNTER
Pt states her BP has been running low and she has been lightheaded. She states her cardiologist started her on a new medication and she has not felt right since starting. Pt states BP is 88/58. I advised her to reach out to her cardiologist and call office back if she is not able to speak to anyone.

## 2025-05-05 ENCOUNTER — CLINICAL SUPPORT (OUTPATIENT)
Dept: CARDIAC REHAB | Facility: CLINIC | Age: 75
End: 2025-05-05
Payer: MEDICARE

## 2025-05-05 VITALS — HEIGHT: 63 IN | BODY MASS INDEX: 40.57 KG/M2 | WEIGHT: 229 LBS

## 2025-05-05 DIAGNOSIS — I50.21 ACUTE SYSTOLIC HEART FAILURE: ICD-10-CM

## 2025-05-05 DIAGNOSIS — I50.21 ACUTE SYSTOLIC HEART FAILURE: Primary | ICD-10-CM

## 2025-05-05 ASSESSMENT — PATIENT HEALTH QUESTIONNAIRE - PHQ9
SUM OF ALL RESPONSES TO PHQ QUESTIONS 1-9: 8
5. POOR APPETITE OR OVEREATING: MORE THAN HALF THE DAYS
SUM OF ALL RESPONSES TO PHQ QUESTIONS 1-9: 8
4. FEELING TIRED OR HAVING LITTLE ENERGY: SEVERAL DAYS
3. TROUBLE FALLING OR STAYING ASLEEP OR SLEEPING TOO MUCH: SEVERAL DAYS
SUM OF ALL RESPONSES TO PHQ9 QUESTIONS 1 & 2: 1
8. MOVING OR SPEAKING SO SLOWLY THAT OTHER PEOPLE COULD HAVE NOTICED. OR THE OPPOSITE, BEING SO FIGETY OR RESTLESS THAT YOU HAVE BEEN MOVING AROUND A LOT MORE THAN USUAL: NOT AT ALL
9. THOUGHTS THAT YOU WOULD BE BETTER OFF DEAD, OR OF HURTING YOURSELF: NOT AT ALL
2. FEELING DOWN, DEPRESSED OR HOPELESS: SEVERAL DAYS
1. LITTLE INTEREST OR PLEASURE IN DOING THINGS: NOT AT ALL
6. FEELING BAD ABOUT YOURSELF - OR THAT YOU ARE A FAILURE OR HAVE LET YOURSELF OR YOUR FAMILY DOWN: SEVERAL DAYS
7. TROUBLE CONCENTRATING ON THINGS, SUCH AS READING THE NEWSPAPER OR WATCHING TELEVISION: MORE THAN HALF THE DAYS

## 2025-05-05 ASSESSMENT — DUKE ACTIVITY SCORE INDEX (DASI)
CAN YOU PARTICIPATE IN STRENOUS SPORTS LIKE SWIMMING, SINGLES TENNIS, FOOTBALL, BASKETBALL, OR SKIING: NO
CAN YOU DO MODERATE WORK AROUND THE HOUSE LIKE VACUUMING, SWEEPING FLOORS OR CARRYING GROCERIES: YES
CAN YOU WALK A BLOCK OR TWO ON LEVEL GROUND: NO
CAN YOU DO LIGHT WORK AROUND THE HOUSE LIKE DUSTING OR WASHING DISHES: YES
CAN YOU CLIMB A FLIGHT OF STAIRS OR WALK UP A HILL: YES
CAN YOU HAVE SEXUAL RELATIONS: NO
DASI METS SCORE: 4.7
CAN YOU RUN A SHORT DISTANCE: NO
CAN YOU DO HEAVY WORK AROUND THE HOUSE LIKE SCRUBBING FLOORS OR LIFTING AND MOVING HEAVY FURNITURE: NO
CAN YOU DO YARD WORK LIKE RAKING LEAVES, WEEDING OR PUSHING A MOWER: NO
CAN YOU TAKE CARE OF YOURSELF (EAT, DRESS, BATHE, OR USE TOILET): YES
TOTAL_SCORE: 16.2
CAN YOU WALK INDOORS, SUCH AS AROUND YOUR HOUSE: YES
CAN YOU PARTICIPATE IN MODERATE RECREATIONAL ACTIVITIES LIKE GOLF, BOWLING, DANCING, DOUBLES TENNIS OR THROWING A BASEBALL OR FOOTBALL: NO

## 2025-05-05 NOTE — PROGRESS NOTES
Cardiac Rehabilitation Initial Treatment Plan    Name: Angela Montelongo  Medical Record Number: [id number]  YOB: 1950  Age: 74 y.o.    Today’s Date: 5/5/2025  Primary Care Physician: Danielito Roach DO  Referring Physician: Cristina Wharton MD  Program Location: [address]  Primary Diagnosis:   1. Acute systolic heart failure  Referral to Cardiac Rehab         Onset/Date of Diagnosis: 9/2024  Initial Assessment, not yet started program.  AACVPR Risk Stratification: High  Falls Risk: High  Psychosocial Assessment   Pre PHQ-9: 8  Sent PH-Q 9 to MD if score > 20: No; score < 20  Pt reported/currently experiencing stress: Yes; Stress; Severity: moderate  Patient uses stress management skills: No   History of: no history of anxiety or depression  Currently seeing a mental health provider: No  Social Support: Yes, Whom:Daughter and son in law  Quality of Life Survey: KCCQ (see Heart Failure Management below)  Learning Assessment:  Learning assessment/barriers: Physical and Transportation  Preferred learning method: Reading handout and Writing handout  Barriers: Physical limitations  Comments:  Stages of Change:Preparation    Psychosocial Plan  Goal Status: Initial Assessment; goals not yet started  Psychosocial Goals: Maintain or lower PH-Q 9 score by discharge and Identify strategies for managing depression  Psychosocial Interventions/Education: To be done in Cardiac Rehab.        Nutrition Assessment:    Hyperlipidemia: Yes     Lipids:   Lab Results   Component Value Date    CHOL 164 06/07/2022    HDL 65 06/07/2022    TRIG 91 06/07/2022       Current Dietary Guidelines: Low sodium  Barriers to dietary change: no    Diet Habit Survey: Picture Your Plate  Pre: Initial survey given. Pending completion and return from patient.  Post: To be done at discharge.    Diabetes Assessment    Lab Results   Component Value Date    HGBA1C 5.3 02/01/2019       History of Diabetes: No    Weight  "Management    Height: 160 cm (5' 3\")  Weight: 104 kg (229 lb)  BMI (Calculated): 40.58  No data recorded    Nutrition Plan  Goal Status: Initial Assessment; goals not yet started  Nutrition Goals: Improve Diet Habit Survey score by 5-10 points by discharge, Adapt a low-sodium, DASH diet prior to discharge, Adapt a Mediterranean focused diet prior to discharge, and Learn how to read and interpret nutrition labels prior to discharge  Nutrition Interventions/Education:   To be done in Cardiac Rehab.        Exercise Assessment    Home exercise:No  Mode: NA  Frequency: NA  Duration: NA    Exercise Prescription     Exercise Prescription based on: Duke Activity Status Index (DASI)    DASI Score: 16.2   MET Score: 4.7   Frequency:  3 days/week   Mode: NuStep and Recumbent Cycle   Duration: 21 total aerobic minutes   Intensity: RPE 12-14  Target HR:  To be calculated after 6 attended sessions.  MET Level: 2  Patient wears supplemental O2: No     Modality Workload METs Duration (minutes)   1 Pre-Exercise   4:00   2 NuStep Load 2 @ 32 lugo  2 7 :00   3 Recumbent Bike Load 1 @ 45 rpm  1.3 7 :00   4 Schwinn Airdyne 0.4 Load  1.5 7 :00          6 Post-Exercise   4:00     Resistance Training: No   Home Exercise Prescription given: To be given prior to discharge from program.    Exercise Plan  Goal Status: Initial Assessment; goals not yet started  Exercise Goals: Increase exercise MET level by 5-10% each week, Increase total exercise duration to 30-45 minutes, and Establish a home exercise program before discharge  Exercise Interventions/Education:   To be done in Cardiac Rehab.        Other Core Components/Risk Factor Assessment:    Medication adherence  Current Medications:   Medication Documentation Review Audit       Reviewed by Cristina Wharton MD (Physician) on 04/14/25 at 0831      Medication Order Taking? Sig Documenting Provider Last Dose Status   acetaminophen (Tylenol) 325 mg tablet [id number] Yes Take 2 tablets (650 mg) " by mouth every 6 hours if needed for mild pain (1 - 3). HALEY Blevins  Active   aspirin 81 mg EC tablet [id number] Yes Take 1 tablet (81 mg) by mouth once daily. Historical Provider, MD  Active   atorvastatin (Lipitor) 20 mg tablet [id number] Yes Take 1 tablet (20 mg) by mouth once daily. Cristina Wharton MD  Active   blood pressure monitor kit [id number] Yes Use as directed to monitor blood pressure once daily Cristina Wharton MD  Active   calcium carb/vit D3/minerals (CALCIUM-VITAMIN D ORAL) [id number] Yes Take 1 tablet by mouth once daily. Historical Provider, MD  Active   carvedilol (Coreg) 6.25 mg tablet [id number] Yes Take 1 tablet (6.25 mg) by mouth 2 times a day. HALEY Ferrara  Active   dapagliflozin propanediol (Farxiga) 10 mg [id number] Yes Take 1 tablet (10 mg) by mouth once daily. Cristina Wharton MD  Active   famotidine (Pepcid) 20 mg tablet [id number] Yes Take 1 tablet (20 mg) by mouth 2 times a day.   Patient taking differently: Take 1 tablet (20 mg) by mouth once daily.    HALEY Blevins  Active   furosemide (Lasix) 40 mg tablet [id number] Yes Take 1 tablet (40 mg) by mouth once daily. Cristina Wharton MD  Active   gabapentin (Neurontin) 100 mg capsule [id number] Yes Take 2 capsules (200 mg) by mouth 3 times a day. Historical Provider, MD  Active   losartan (Cozaar) 50 mg tablet [id number] Yes Take 1 tablet (50 mg) by mouth once daily. Cristina Wharton MD  Active    Discontinued 04/10/25 1528   multivitamin with minerals iron-free (Centrum Silver) [id number] Yes Take 1 tablet by mouth once daily. Rolf Provider, MD  Active   oxybutynin XL (Ditropan-XL) 10 mg 24 hr tablet [id number] Yes Take 1 tablet (10 mg) by mouth once daily. Do not crush, chew, or split. Rolf Provider, MD  Active   polyethylene glycol (Glycolax, Miralax) 17 gram packet [id number]  Take 17 g by mouth once daily.   Patient not taking: Reported on 2/18/2025    Penelope Mcgee APRN-CNP  Active   potassium  chloride CR 20 mEq ER tablet [id number]  Take 1 tablet (20 mEq) by mouth once daily. Do not crush or chew. Cristina Wharton MD  Active   spironolactone (Aldactone) 25 mg tablet [id number]  Take 1 tablet (25 mg) by mouth once daily. Cristina Wharton MD  Active                                 Medication compliance: Yes   Uses pill box/organizer: Yes    Carries medication list: No     Blood Pressure Management  History of Hypertension: Yes   Medication Changes: Yes   Resting BP:       Heart Failure Management  Hx of Heart Failure: Yes;   Type (selection): HFrEF  Most recent EF: 35    Onset of heart failure diagnosis: 9/2024  Last heart failure hospitalization: 10/2024  Number of HF admissions per year: 1  Symptoms: Fatigue at rest, Fatigue with exertion, Shortness of breath, and LE edema  Is there a family Hx of HF: Yes   Does patient obtain daily weight No     KCCQ survey: Pre:   Post:     Heart Failure Reassessment: Initial Treatment Plan. Will reassess in 30 days.  Heart Failure Goals: Able to verbalize signs and symptoms of fluid retention and when to contact MD, Adhere to proper fluid restrictions, Adapt a low sodium diet and verbalize guidelines, and Obtain daily weight and verbalize proper method of obtaining weights  Smoking/Tobacco Assessment  Tobacco Use History[1]    Other Core Component Plan  Goal Status: Initial Assessment; goals not yet started  Other Core Component Goals: Medication compliance and Achieve resting BP of < 130/80 by discharge  Other Core Component Interventions/Education:           Individual Patient Goals:    Shower without assistance by discharge  Be able to get out of house and be more independent    Goal Status: Initial Assessment; goals not yet started    Staff Comments:      Rehab Staff Signature: Denise Lawrence RN             [1]   Social History  Tobacco Use   Smoking Status Never    Passive exposure: Never   Smokeless Tobacco Never

## 2025-05-06 ENCOUNTER — TELEPHONE (OUTPATIENT)
Dept: PRIMARY CARE | Facility: CLINIC | Age: 75
End: 2025-05-06
Payer: MEDICARE

## 2025-05-06 DIAGNOSIS — M46.46 DISCITIS OF LUMBAR REGION: ICD-10-CM

## 2025-05-06 DIAGNOSIS — I50.41 ACUTE COMBINED SYSTOLIC AND DIASTOLIC HEART FAILURE: ICD-10-CM

## 2025-05-06 RX ORDER — ATORVASTATIN CALCIUM 20 MG/1
20 TABLET, FILM COATED ORAL DAILY
Qty: 30 TABLET | Refills: 11 | Status: SHIPPED | OUTPATIENT
Start: 2025-05-06 | End: 2026-05-06

## 2025-05-06 RX ORDER — FAMOTIDINE 20 MG/1
20 TABLET, FILM COATED ORAL 2 TIMES DAILY
Qty: 180 TABLET | Refills: 3 | Status: SHIPPED | OUTPATIENT
Start: 2025-05-06

## 2025-05-07 ENCOUNTER — OFFICE VISIT (OUTPATIENT)
Dept: PRIMARY CARE | Facility: CLINIC | Age: 75
End: 2025-05-07
Payer: MEDICARE

## 2025-05-07 VITALS
BODY MASS INDEX: 40.74 KG/M2 | RESPIRATION RATE: 18 BRPM | OXYGEN SATURATION: 98 % | HEART RATE: 68 BPM | SYSTOLIC BLOOD PRESSURE: 100 MMHG | WEIGHT: 230 LBS | DIASTOLIC BLOOD PRESSURE: 50 MMHG | TEMPERATURE: 98.2 F

## 2025-05-07 DIAGNOSIS — I50.41 ACUTE COMBINED SYSTOLIC AND DIASTOLIC HEART FAILURE: ICD-10-CM

## 2025-05-07 DIAGNOSIS — I10 ESSENTIAL HYPERTENSION: Primary | ICD-10-CM

## 2025-05-07 DIAGNOSIS — E87.6 HYPOKALEMIA: ICD-10-CM

## 2025-05-07 DIAGNOSIS — E78.5 HYPERLIPIDEMIA, UNSPECIFIED HYPERLIPIDEMIA TYPE: ICD-10-CM

## 2025-05-07 DIAGNOSIS — K21.9 GASTROESOPHAGEAL REFLUX DISEASE WITHOUT ESOPHAGITIS: ICD-10-CM

## 2025-05-07 PROCEDURE — 3074F SYST BP LT 130 MM HG: CPT | Performed by: NURSE PRACTITIONER

## 2025-05-07 PROCEDURE — 1160F RVW MEDS BY RX/DR IN RCRD: CPT | Performed by: NURSE PRACTITIONER

## 2025-05-07 PROCEDURE — 1125F AMNT PAIN NOTED PAIN PRSNT: CPT | Performed by: NURSE PRACTITIONER

## 2025-05-07 PROCEDURE — 3078F DIAST BP <80 MM HG: CPT | Performed by: NURSE PRACTITIONER

## 2025-05-07 PROCEDURE — 99349 HOME/RES VST EST MOD MDM 40: CPT | Performed by: NURSE PRACTITIONER

## 2025-05-07 PROCEDURE — 1159F MED LIST DOCD IN RCRD: CPT | Performed by: NURSE PRACTITIONER

## 2025-05-07 ASSESSMENT — PAIN SCALES - GENERAL: PAINLEVEL_OUTOF10: 3

## 2025-05-07 ASSESSMENT — ENCOUNTER SYMPTOMS
RHINORRHEA: 1
ENDOCRINE NEGATIVE: 1
ABDOMINAL PAIN: 1
LIGHT-HEADEDNESS: 1
SHORTNESS OF BREATH: 1
EYES NEGATIVE: 1
COUGH: 1
WEAKNESS: 1
HEADACHES: 1
FREQUENCY: 1
DIARRHEA: 1
ACTIVITY CHANGE: 1
FATIGUE: 1

## 2025-05-07 NOTE — PROGRESS NOTES
Subjective   Patient ID: Angela Montelongo is a 74 y.o. female who is being seen for 6 week routine house calls follow up.    HPI Pt seen in single family home unaccompanied. PMHx: Osteomyelitis of spine, HTN, HLD, Hypokalemia, GERD, Constipation, gout, OA, CELINA, SOB, DJD, Left foot drop, hyponatremia. Pt seen sitting in lift chair with legs dependent. Pt alert and oriented times three and cooperative with examination. Pt had heart cath 3/13/25 that pt states showed no blockages but weakness of left side of heart and will be beginning cardiac rehab on Monday of next week. Pt still active with meals on wheels. Pt reports feeling well overall but gets frustrated that she can not do as much as she once could. Pt using wheeled walker or Rolator for ambulation and denies recent falls. Pt reports energy has been lower than she would like and is hoping cardiac rehab will increase her stamina. Pt appetite reported as okay, craving things she shouldn't have but doesn't eat them. Believes weight has been staying stable. Pt does not have a scale. Pt sleep schedule off as pt has difficulty staying asleep at night and gets about 5 to 6 hours of sleep and dozes in the afternoon. Pt denies fever, chills, or night sweats. Pt admits to headaches she believes are allergy related. Pt admits to lightheadedness when standing too fast. Pt denies trouble chewing or swallowing. Pt admits to RIGGS that resolves with rest and dry non productive cough. Pt denies CP or palpitations. Pt denies N/V/D or constipation. Pt not taking Miralax at present but states going to resume. Pt admits to urinary frequency and urgency. Pt with c/o left knee pain and neck pain. Pt with no other concerns or complaints  Home Visit medically necessary due to: pt has chronic condition that makes access to a traditional office visit very difficult, illness or condition that results in activity limitation or restriction that impacts the ability to leave home such as;  unsteady gait/ poor condition.      Review of Systems   Constitutional:  Positive for activity change and fatigue.   HENT:  Positive for congestion and rhinorrhea.    Eyes: Negative.    Respiratory:  Positive for cough (dry non productive) and shortness of breath (RIGGS).    Cardiovascular:  Positive for leg swelling.   Gastrointestinal:  Positive for abdominal pain and diarrhea.   Endocrine: Negative.    Genitourinary:  Positive for frequency and urgency.   Musculoskeletal:  Positive for gait problem.   Allergic/Immunologic: Positive for environmental allergies.   Neurological:  Positive for weakness, light-headedness and headaches.       Objective   /50 (BP Location: Right arm, Patient Position: Sitting, BP Cuff Size: Adult)   Pulse 68   Temp 36.8 °C (98.2 °F) (Temporal)   Resp 18   Wt 104 kg (230 lb)   SpO2 98%   BMI 40.74 kg/m²     Current Medications[1]      Physical Exam  Constitutional:       Appearance: Normal appearance. She is obese.   HENT:      Head: Normocephalic and atraumatic.      Nose: Nose normal.      Mouth/Throat:      Mouth: Mucous membranes are moist.      Pharynx: Oropharynx is clear.   Eyes:      Extraocular Movements: Extraocular movements intact.      Conjunctiva/sclera: Conjunctivae normal.      Pupils: Pupils are equal, round, and reactive to light.   Cardiovascular:      Rate and Rhythm: Normal rate and regular rhythm.      Pulses: Normal pulses.      Heart sounds: Normal heart sounds.   Pulmonary:      Effort: Pulmonary effort is normal.      Breath sounds: Examination of the left-lower field reveals decreased breath sounds. Decreased breath sounds present.   Abdominal:      General: Bowel sounds are normal.      Palpations: Abdomen is soft.   Musculoskeletal:         General: Swelling (left knee) and tenderness (lower extremities) present.      Cervical back: Normal range of motion and neck supple. Tenderness present.      Right lower leg: Edema present.      Left lower leg:  Edema present.   Skin:     General: Skin is warm and dry.      Capillary Refill: Capillary refill takes 2 to 3 seconds.   Neurological:      Mental Status: She is alert and oriented to person, place, and time.      Motor: Weakness present.      Gait: Gait abnormal.   Psychiatric:         Mood and Affect: Mood normal.         Behavior: Behavior normal.         Thought Content: Thought content normal.         Judgment: Judgment normal.     Problem List[2]      Assessment/Plan   Diagnoses and all orders for this visit:  Essential hypertension  Comments:  Stable  Continue Losartan 50 mg po daily.   Continue Coreg 6.25 mg po daily.  Hyperlipidemia, unspecified hyperlipidemia type  Comments:  Continue Lipitor 20 mg po daily.  Acute combined systolic and diastolic heart failure  Comments:  Stable  Continue Farxiga 10 mg po daily.   Continue Lasix 40 mg po daily.  Gastroesophageal reflux disease without esophagitis  Comments:  Continue Pepcid 20 mg po twice a day  Hypokalemia  Comments:  Continue KCL 20 meq po daily.    Follow up in 2 months or prn  Torie Iraheta APRN-CNP          [1]   Current Outpatient Medications:     blood pressure monitor kit, Use as directed to monitor blood pressure once daily, Disp: 1 kit, Rfl: 0    calcium carb/vit D3/minerals (CALCIUM-VITAMIN D ORAL), Take 1 tablet by mouth once daily., Disp: , Rfl:     dapagliflozin propanediol (Farxiga) 10 mg, Take 1 tablet (10 mg) by mouth once daily., Disp: 90 tablet, Rfl: 3    gabapentin (Neurontin) 100 mg capsule, TAKE 2 CAPSULES BY MOUTH 3 TIMES A DAY, Disp: 540 capsule, Rfl: 0    multivitamin with minerals iron-free (Centrum Silver), Take 1 tablet by mouth once daily., Disp: , Rfl:     acetaminophen (Tylenol) 325 mg tablet, Take 2 tablets (650 mg) by mouth every 6 hours if needed for mild pain (1 - 3)., Disp: 30 tablet, Rfl: 0    aspirin 81 mg EC tablet, Take 1 tablet (81 mg) by mouth once daily., Disp: , Rfl:     atorvastatin (Lipitor) 20 mg tablet,  Take 1 tablet (20 mg) by mouth once daily., Disp: 30 tablet, Rfl: 11    carvedilol (Coreg) 6.25 mg tablet, Take 1 tablet (6.25 mg) by mouth 2 times a day., Disp: 180 tablet, Rfl: 3    famotidine (Pepcid) 20 mg tablet, Take 1 tablet (20 mg) by mouth 2 times a day., Disp: 180 tablet, Rfl: 3    furosemide (Lasix) 40 mg tablet, Take 1 tablet (40 mg) by mouth once daily., Disp: 90 tablet, Rfl: 3    losartan (Cozaar) 50 mg tablet, Take 1 tablet (50 mg) by mouth once daily., Disp: 90 tablet, Rfl: 0    oxybutynin XL (Ditropan-XL) 10 mg 24 hr tablet, Take 1 tablet (10 mg) by mouth once daily. Do not crush, chew, or split. (Patient taking differently: Take 5 mg by mouth 3 times a day. Do not crush, chew, or split.), Disp: , Rfl:     polyethylene glycol (Glycolax, Miralax) 17 gram packet, Take 17 g by mouth once daily. (Patient not taking: Reported on 5/7/2025), Disp: , Rfl:     potassium chloride CR 20 mEq ER tablet, Take 1 tablet (20 mEq) by mouth once daily. Do not crush or chew., Disp: , Rfl:   [2]   Patient Active Problem List  Diagnosis    Hyperlipidemia    Shoulder arthritis    Essential hypertension    Nausea    Constipation    Claw toe    Gout    Chronic gout without tophus    Gastroesophageal reflux disease    Esophageal reflux    Endometrial hyperplasia    Hyponatremia    CELINA (acute kidney injury)    Acute cystitis without hematuria    Weakness    Bradycardia    Chronic low back pain    Shortness of breath    Acute on chronic diastolic congestive heart failure    Hypomagnesemia    Acute hypoxic respiratory failure    Osteomyelitis of vertebra, thoracolumbar region (Multi)    Degenerative lumbar spinal stenosis    Degenerative spondylolisthesis    Left foot drop    Hypokalemia    Overactive bladder    Acute embolism and thrombosis of unspecified deep veins of unspecified lower extremity    Acute non-ST segment elevation myocardial infarction (Multi)    Acute kidney failure, unspecified    Anemia due to blood loss     Angioedema    Carpal tunnel syndrome    Chronic kidney disease, unspecified    Chronic pain of both shoulders    Diarrhea of presumed infectious origin    Hand numbness    Vitamin D deficiency    Urinary tract infection    Trochanteric bursitis of right hip    Syncope and collapse    Subclinical hypothyroidism    Squamous cell carcinoma of skin, unspecified    Skin sensation disturbance    Sciatica    Rotator cuff syndrome of left shoulder    History of hypertension    Acute combined systolic and diastolic heart failure

## 2025-05-12 ENCOUNTER — CLINICAL SUPPORT (OUTPATIENT)
Dept: CARDIAC REHAB | Facility: CLINIC | Age: 75
End: 2025-05-12
Payer: MEDICARE

## 2025-05-12 VITALS — SYSTOLIC BLOOD PRESSURE: 94 MMHG | DIASTOLIC BLOOD PRESSURE: 60 MMHG | OXYGEN SATURATION: 97 %

## 2025-05-12 DIAGNOSIS — I50.21 ACUTE SYSTOLIC HEART FAILURE: ICD-10-CM

## 2025-05-12 PROCEDURE — 93798 PHYS/QHP OP CAR RHAB W/ECG: CPT | Performed by: INTERNAL MEDICINE

## 2025-05-13 NOTE — TELEPHONE ENCOUNTER
Patient states she has stopped the spironolactone but continues to have lower blood pressure. She only feels dizzy when she reaches down to the floor and comes back up.     112/51, 105/55, 130/59,  94/60    Please advise if you want to make any additional med changes.

## 2025-05-16 ENCOUNTER — CLINICAL SUPPORT (OUTPATIENT)
Dept: CARDIAC REHAB | Facility: CLINIC | Age: 75
End: 2025-05-16
Payer: MEDICARE

## 2025-05-16 DIAGNOSIS — I50.21 ACUTE SYSTOLIC HEART FAILURE: ICD-10-CM

## 2025-05-16 PROCEDURE — 93798 PHYS/QHP OP CAR RHAB W/ECG: CPT | Performed by: INTERNAL MEDICINE

## 2025-05-16 NOTE — PROGRESS NOTES
Cardiac Rehab Education  Angela TERESSA Ryleyhaileymiryam   [phone]    5/16/2025  Education on cholesterol was done during today's session. Discussed with patient the different types of cholesterol, risk factors and how to make lifestyle modifications to improve levels. Handout and patient's recent lab results were given for home review.                Signature Denise Lawrence RN

## 2025-05-19 ENCOUNTER — CLINICAL SUPPORT (OUTPATIENT)
Dept: CARDIAC REHAB | Facility: CLINIC | Age: 75
End: 2025-05-19
Payer: MEDICARE

## 2025-05-19 DIAGNOSIS — I50.21 ACUTE SYSTOLIC HEART FAILURE: ICD-10-CM

## 2025-05-19 PROCEDURE — 93798 PHYS/QHP OP CAR RHAB W/ECG: CPT | Performed by: INTERNAL MEDICINE

## 2025-05-23 ENCOUNTER — CLINICAL SUPPORT (OUTPATIENT)
Dept: CARDIAC REHAB | Facility: CLINIC | Age: 75
End: 2025-05-23
Payer: MEDICARE

## 2025-05-23 DIAGNOSIS — I50.21 ACUTE SYSTOLIC HEART FAILURE: ICD-10-CM

## 2025-05-23 PROCEDURE — 93798 PHYS/QHP OP CAR RHAB W/ECG: CPT | Performed by: INTERNAL MEDICINE

## 2025-05-27 ENCOUNTER — APPOINTMENT (OUTPATIENT)
Dept: CARDIOLOGY | Facility: HOSPITAL | Age: 75
End: 2025-05-27
Payer: MEDICARE

## 2025-05-27 ENCOUNTER — APPOINTMENT (OUTPATIENT)
Dept: RADIOLOGY | Facility: HOSPITAL | Age: 75
End: 2025-05-27
Payer: MEDICARE

## 2025-05-27 ENCOUNTER — DOCUMENTATION (OUTPATIENT)
Dept: CARDIAC REHAB | Facility: CLINIC | Age: 75
End: 2025-05-27
Payer: MEDICARE

## 2025-05-27 ENCOUNTER — CLINICAL SUPPORT (OUTPATIENT)
Dept: CARDIAC REHAB | Facility: CLINIC | Age: 75
End: 2025-05-27
Payer: MEDICARE

## 2025-05-27 ENCOUNTER — HOSPITAL ENCOUNTER (INPATIENT)
Facility: HOSPITAL | Age: 75
LOS: 1 days | Discharge: HOME | End: 2025-05-28
Attending: STUDENT IN AN ORGANIZED HEALTH CARE EDUCATION/TRAINING PROGRAM | Admitting: INTERNAL MEDICINE
Payer: MEDICARE

## 2025-05-27 DIAGNOSIS — N32.81 OVERACTIVE BLADDER: ICD-10-CM

## 2025-05-27 DIAGNOSIS — I50.41 ACUTE COMBINED SYSTOLIC AND DIASTOLIC HEART FAILURE: ICD-10-CM

## 2025-05-27 DIAGNOSIS — I50.21 ACUTE SYSTOLIC HEART FAILURE: ICD-10-CM

## 2025-05-27 DIAGNOSIS — R55 SYNCOPE, CARDIOGENIC: Primary | ICD-10-CM

## 2025-05-27 LAB
ALBUMIN SERPL BCP-MCNC: 3.9 G/DL (ref 3.4–5)
ALP SERPL-CCNC: 100 U/L (ref 33–136)
ALT SERPL W P-5'-P-CCNC: 15 U/L (ref 7–45)
ANION GAP SERPL CALCULATED.3IONS-SCNC: 13 MMOL/L (ref 10–20)
APPEARANCE UR: CLEAR
AST SERPL W P-5'-P-CCNC: 26 U/L (ref 9–39)
ATRIAL RATE: 63 BPM
BASOPHILS # BLD AUTO: 0.02 X10*3/UL (ref 0–0.1)
BASOPHILS NFR BLD AUTO: 0.3 %
BILIRUB SERPL-MCNC: 0.7 MG/DL (ref 0–1.2)
BILIRUB UR STRIP.AUTO-MCNC: NEGATIVE MG/DL
BNP SERPL-MCNC: 110 PG/ML (ref 0–99)
BUN SERPL-MCNC: 42 MG/DL (ref 6–23)
CALCIUM SERPL-MCNC: 9.6 MG/DL (ref 8.6–10.3)
CARDIAC TROPONIN I PNL SERPL HS: 12 NG/L (ref 0–13)
CARDIAC TROPONIN I PNL SERPL HS: 18 NG/L (ref 0–13)
CHLORIDE SERPL-SCNC: 104 MMOL/L (ref 98–107)
CO2 SERPL-SCNC: 26 MMOL/L (ref 21–32)
COLOR UR: COLORLESS
CREAT SERPL-MCNC: 1.48 MG/DL (ref 0.5–1.05)
EGFRCR SERPLBLD CKD-EPI 2021: 37 ML/MIN/1.73M*2
EOSINOPHIL # BLD AUTO: 0.23 X10*3/UL (ref 0–0.4)
EOSINOPHIL NFR BLD AUTO: 3.8 %
ERYTHROCYTE [DISTWIDTH] IN BLOOD BY AUTOMATED COUNT: 13 % (ref 11.5–14.5)
GLUCOSE SERPL-MCNC: 131 MG/DL (ref 74–99)
GLUCOSE UR STRIP.AUTO-MCNC: ABNORMAL MG/DL
HCT VFR BLD AUTO: 39.9 % (ref 36–46)
HGB BLD-MCNC: 13.4 G/DL (ref 12–16)
IMM GRANULOCYTES # BLD AUTO: 0.01 X10*3/UL (ref 0–0.5)
IMM GRANULOCYTES NFR BLD AUTO: 0.2 % (ref 0–0.9)
KETONES UR STRIP.AUTO-MCNC: NEGATIVE MG/DL
LEUKOCYTE ESTERASE UR QL STRIP.AUTO: NEGATIVE
LYMPHOCYTES # BLD AUTO: 1.11 X10*3/UL (ref 0.8–3)
LYMPHOCYTES NFR BLD AUTO: 18.4 %
MCH RBC QN AUTO: 31.3 PG (ref 26–34)
MCHC RBC AUTO-ENTMCNC: 33.6 G/DL (ref 32–36)
MCV RBC AUTO: 93 FL (ref 80–100)
MONOCYTES # BLD AUTO: 0.5 X10*3/UL (ref 0.05–0.8)
MONOCYTES NFR BLD AUTO: 8.3 %
NEUTROPHILS # BLD AUTO: 4.17 X10*3/UL (ref 1.6–5.5)
NEUTROPHILS NFR BLD AUTO: 69 %
NITRITE UR QL STRIP.AUTO: NEGATIVE
NRBC BLD-RTO: 0 /100 WBCS (ref 0–0)
P AXIS: 31 DEGREES
P OFFSET: 152 MS
P ONSET: 105 MS
PH UR STRIP.AUTO: 5.5 [PH]
PLATELET # BLD AUTO: 214 X10*3/UL (ref 150–450)
POTASSIUM SERPL-SCNC: 3.8 MMOL/L (ref 3.5–5.3)
PR INTERVAL: 192 MS
PROT SERPL-MCNC: 6.3 G/DL (ref 6.4–8.2)
PROT UR STRIP.AUTO-MCNC: NEGATIVE MG/DL
Q ONSET: 201 MS
QRS COUNT: 10 BEATS
QRS DURATION: 158 MS
QT INTERVAL: 462 MS
QTC CALCULATION(BAZETT): 472 MS
QTC FREDERICIA: 469 MS
R AXIS: -67 DEGREES
RBC # BLD AUTO: 4.28 X10*6/UL (ref 4–5.2)
RBC # UR STRIP.AUTO: NEGATIVE MG/DL
SODIUM SERPL-SCNC: 139 MMOL/L (ref 136–145)
SP GR UR STRIP.AUTO: 1.01
T AXIS: 14 DEGREES
T OFFSET: 432 MS
TSH SERPL-ACNC: 6.41 MIU/L (ref 0.44–3.98)
UROBILINOGEN UR STRIP.AUTO-MCNC: NORMAL MG/DL
VENTRICULAR RATE: 63 BPM
WBC # BLD AUTO: 6 X10*3/UL (ref 4.4–11.3)

## 2025-05-27 PROCEDURE — 83880 ASSAY OF NATRIURETIC PEPTIDE: CPT | Performed by: STUDENT IN AN ORGANIZED HEALTH CARE EDUCATION/TRAINING PROGRAM

## 2025-05-27 PROCEDURE — 93010 ELECTROCARDIOGRAM REPORT: CPT | Performed by: INTERNAL MEDICINE

## 2025-05-27 PROCEDURE — 93005 ELECTROCARDIOGRAM TRACING: CPT

## 2025-05-27 PROCEDURE — 84484 ASSAY OF TROPONIN QUANT: CPT | Performed by: STUDENT IN AN ORGANIZED HEALTH CARE EDUCATION/TRAINING PROGRAM

## 2025-05-27 PROCEDURE — 84443 ASSAY THYROID STIM HORMONE: CPT | Performed by: INTERNAL MEDICINE

## 2025-05-27 PROCEDURE — 2500000002 HC RX 250 W HCPCS SELF ADMINISTERED DRUGS (ALT 637 FOR MEDICARE OP, ALT 636 FOR OP/ED): Performed by: INTERNAL MEDICINE

## 2025-05-27 PROCEDURE — 36415 COLL VENOUS BLD VENIPUNCTURE: CPT | Performed by: STUDENT IN AN ORGANIZED HEALTH CARE EDUCATION/TRAINING PROGRAM

## 2025-05-27 PROCEDURE — 99223 1ST HOSP IP/OBS HIGH 75: CPT | Performed by: INTERNAL MEDICINE

## 2025-05-27 PROCEDURE — 71046 X-RAY EXAM CHEST 2 VIEWS: CPT | Performed by: RADIOLOGY

## 2025-05-27 PROCEDURE — 2500000001 HC RX 250 WO HCPCS SELF ADMINISTERED DRUGS (ALT 637 FOR MEDICARE OP): Performed by: INTERNAL MEDICINE

## 2025-05-27 PROCEDURE — 71046 X-RAY EXAM CHEST 2 VIEWS: CPT

## 2025-05-27 PROCEDURE — 85025 COMPLETE CBC W/AUTO DIFF WBC: CPT | Performed by: STUDENT IN AN ORGANIZED HEALTH CARE EDUCATION/TRAINING PROGRAM

## 2025-05-27 PROCEDURE — 2500000004 HC RX 250 GENERAL PHARMACY W/ HCPCS (ALT 636 FOR OP/ED): Mod: JZ | Performed by: INTERNAL MEDICINE

## 2025-05-27 PROCEDURE — 70450 CT HEAD/BRAIN W/O DYE: CPT

## 2025-05-27 PROCEDURE — 81003 URINALYSIS AUTO W/O SCOPE: CPT | Performed by: STUDENT IN AN ORGANIZED HEALTH CARE EDUCATION/TRAINING PROGRAM

## 2025-05-27 PROCEDURE — 80053 COMPREHEN METABOLIC PANEL: CPT | Performed by: STUDENT IN AN ORGANIZED HEALTH CARE EDUCATION/TRAINING PROGRAM

## 2025-05-27 PROCEDURE — 1200000002 HC GENERAL ROOM WITH TELEMETRY DAILY

## 2025-05-27 PROCEDURE — 99291 CRITICAL CARE FIRST HOUR: CPT | Mod: 25 | Performed by: STUDENT IN AN ORGANIZED HEALTH CARE EDUCATION/TRAINING PROGRAM

## 2025-05-27 PROCEDURE — 70450 CT HEAD/BRAIN W/O DYE: CPT | Performed by: RADIOLOGY

## 2025-05-27 RX ORDER — ASPIRIN 81 MG/1
81 TABLET ORAL DAILY
Status: DISCONTINUED | OUTPATIENT
Start: 2025-05-27 | End: 2025-05-28 | Stop reason: HOSPADM

## 2025-05-27 RX ORDER — ONDANSETRON HYDROCHLORIDE 2 MG/ML
4 INJECTION, SOLUTION INTRAVENOUS EVERY 8 HOURS PRN
Status: DISCONTINUED | OUTPATIENT
Start: 2025-05-27 | End: 2025-05-28 | Stop reason: HOSPADM

## 2025-05-27 RX ORDER — MULTIVIT-MIN/IRON FUM/FOLIC AC 7.5 MG-4
1 TABLET ORAL DAILY
Status: DISCONTINUED | OUTPATIENT
Start: 2025-05-28 | End: 2025-05-28 | Stop reason: HOSPADM

## 2025-05-27 RX ORDER — ACETAMINOPHEN 160 MG/5ML
650 SOLUTION ORAL EVERY 4 HOURS PRN
Status: DISCONTINUED | OUTPATIENT
Start: 2025-05-27 | End: 2025-05-28 | Stop reason: HOSPADM

## 2025-05-27 RX ORDER — ACETAMINOPHEN 325 MG/1
650 TABLET ORAL EVERY 4 HOURS PRN
Status: DISCONTINUED | OUTPATIENT
Start: 2025-05-27 | End: 2025-05-28 | Stop reason: HOSPADM

## 2025-05-27 RX ORDER — HEPARIN SODIUM 5000 [USP'U]/ML
5000 INJECTION, SOLUTION INTRAVENOUS; SUBCUTANEOUS EVERY 8 HOURS
Status: DISCONTINUED | OUTPATIENT
Start: 2025-05-27 | End: 2025-05-28 | Stop reason: HOSPADM

## 2025-05-27 RX ORDER — OXYBUTYNIN CHLORIDE 5 MG/1
10 TABLET ORAL NIGHTLY
Status: ON HOLD | COMMUNITY
End: 2025-05-28

## 2025-05-27 RX ORDER — OXYBUTYNIN CHLORIDE 5 MG/1
5 TABLET ORAL 3 TIMES DAILY
Status: DISCONTINUED | OUTPATIENT
Start: 2025-05-27 | End: 2025-05-28 | Stop reason: HOSPADM

## 2025-05-27 RX ORDER — FAMOTIDINE 20 MG/1
20 TABLET, FILM COATED ORAL DAILY
Status: DISCONTINUED | OUTPATIENT
Start: 2025-05-27 | End: 2025-05-28 | Stop reason: HOSPADM

## 2025-05-27 RX ORDER — POLYETHYLENE GLYCOL 3350 17 G/17G
17 POWDER, FOR SOLUTION ORAL DAILY
Status: DISCONTINUED | OUTPATIENT
Start: 2025-05-27 | End: 2025-05-28 | Stop reason: HOSPADM

## 2025-05-27 RX ORDER — ATORVASTATIN CALCIUM 20 MG/1
20 TABLET, FILM COATED ORAL DAILY
Status: DISCONTINUED | OUTPATIENT
Start: 2025-05-27 | End: 2025-05-28 | Stop reason: HOSPADM

## 2025-05-27 RX ORDER — GABAPENTIN 100 MG/1
200 CAPSULE ORAL EVERY 8 HOURS
Status: DISCONTINUED | OUTPATIENT
Start: 2025-05-27 | End: 2025-05-28 | Stop reason: HOSPADM

## 2025-05-27 RX ORDER — ACETAMINOPHEN 650 MG/1
650 SUPPOSITORY RECTAL EVERY 4 HOURS PRN
Status: DISCONTINUED | OUTPATIENT
Start: 2025-05-27 | End: 2025-05-28 | Stop reason: HOSPADM

## 2025-05-27 RX ORDER — FUROSEMIDE 40 MG/1
40 TABLET ORAL DAILY
Status: DISCONTINUED | OUTPATIENT
Start: 2025-05-27 | End: 2025-05-28 | Stop reason: HOSPADM

## 2025-05-27 RX ORDER — FAMOTIDINE 10 MG/ML
20 INJECTION, SOLUTION INTRAVENOUS DAILY
Status: DISCONTINUED | OUTPATIENT
Start: 2025-05-27 | End: 2025-05-28 | Stop reason: HOSPADM

## 2025-05-27 RX ORDER — CARVEDILOL 3.12 MG/1
3.12 TABLET ORAL 2 TIMES DAILY
Status: DISCONTINUED | OUTPATIENT
Start: 2025-05-27 | End: 2025-05-28 | Stop reason: HOSPADM

## 2025-05-27 RX ORDER — DAPAGLIFLOZIN 10 MG/1
10 TABLET, FILM COATED ORAL DAILY
Status: DISCONTINUED | OUTPATIENT
Start: 2025-05-27 | End: 2025-05-28 | Stop reason: HOSPADM

## 2025-05-27 RX ORDER — ONDANSETRON 4 MG/1
4 TABLET, FILM COATED ORAL EVERY 8 HOURS PRN
Status: DISCONTINUED | OUTPATIENT
Start: 2025-05-27 | End: 2025-05-28 | Stop reason: HOSPADM

## 2025-05-27 RX ORDER — TALC
3 POWDER (GRAM) TOPICAL NIGHTLY PRN
Status: DISCONTINUED | OUTPATIENT
Start: 2025-05-27 | End: 2025-05-28 | Stop reason: HOSPADM

## 2025-05-27 RX ADMIN — OXYBUTYNIN CHLORIDE 5 MG: 5 TABLET ORAL at 21:39

## 2025-05-27 RX ADMIN — GABAPENTIN 200 MG: 100 CAPSULE ORAL at 18:29

## 2025-05-27 RX ADMIN — CARVEDILOL 3.12 MG: 3.12 TABLET, FILM COATED ORAL at 21:39

## 2025-05-27 RX ADMIN — HEPARIN SODIUM 5000 UNITS: 5000 INJECTION INTRAVENOUS; SUBCUTANEOUS at 18:29

## 2025-05-27 SDOH — ECONOMIC STABILITY: FOOD INSECURITY: WITHIN THE PAST 12 MONTHS, YOU WORRIED THAT YOUR FOOD WOULD RUN OUT BEFORE YOU GOT THE MONEY TO BUY MORE.: NEVER TRUE

## 2025-05-27 SDOH — SOCIAL STABILITY: SOCIAL INSECURITY: DO YOU FEEL ANYONE HAS EXPLOITED OR TAKEN ADVANTAGE OF YOU FINANCIALLY OR OF YOUR PERSONAL PROPERTY?: NO

## 2025-05-27 SDOH — SOCIAL STABILITY: SOCIAL INSECURITY: WITHIN THE LAST YEAR, HAVE YOU BEEN AFRAID OF YOUR PARTNER OR EX-PARTNER?: NO

## 2025-05-27 SDOH — SOCIAL STABILITY: SOCIAL INSECURITY: ABUSE: ADULT

## 2025-05-27 SDOH — SOCIAL STABILITY: SOCIAL INSECURITY: DOES ANYONE TRY TO KEEP YOU FROM HAVING/CONTACTING OTHER FRIENDS OR DOING THINGS OUTSIDE YOUR HOME?: NO

## 2025-05-27 SDOH — ECONOMIC STABILITY: FOOD INSECURITY: WITHIN THE PAST 12 MONTHS, THE FOOD YOU BOUGHT JUST DIDN'T LAST AND YOU DIDN'T HAVE MONEY TO GET MORE.: NEVER TRUE

## 2025-05-27 SDOH — ECONOMIC STABILITY: INCOME INSECURITY: IN THE PAST 12 MONTHS HAS THE ELECTRIC, GAS, OIL, OR WATER COMPANY THREATENED TO SHUT OFF SERVICES IN YOUR HOME?: NO

## 2025-05-27 SDOH — SOCIAL STABILITY: SOCIAL INSECURITY: WITHIN THE LAST YEAR, HAVE YOU BEEN HUMILIATED OR EMOTIONALLY ABUSED IN OTHER WAYS BY YOUR PARTNER OR EX-PARTNER?: NO

## 2025-05-27 SDOH — SOCIAL STABILITY: SOCIAL INSECURITY: HAVE YOU HAD THOUGHTS OF HARMING ANYONE ELSE?: NO

## 2025-05-27 SDOH — SOCIAL STABILITY: SOCIAL INSECURITY: HAS ANYONE EVER THREATENED TO HURT YOUR FAMILY OR YOUR PETS?: NO

## 2025-05-27 SDOH — SOCIAL STABILITY: SOCIAL INSECURITY: DO YOU FEEL UNSAFE GOING BACK TO THE PLACE WHERE YOU ARE LIVING?: NO

## 2025-05-27 SDOH — SOCIAL STABILITY: SOCIAL INSECURITY: ARE THERE ANY APPARENT SIGNS OF INJURIES/BEHAVIORS THAT COULD BE RELATED TO ABUSE/NEGLECT?: NO

## 2025-05-27 SDOH — SOCIAL STABILITY: SOCIAL INSECURITY: ARE YOU OR HAVE YOU BEEN THREATENED OR ABUSED PHYSICALLY, EMOTIONALLY, OR SEXUALLY BY ANYONE?: NO

## 2025-05-27 SDOH — SOCIAL STABILITY: SOCIAL INSECURITY: HAVE YOU HAD ANY THOUGHTS OF HARMING ANYONE ELSE?: NO

## 2025-05-27 ASSESSMENT — COGNITIVE AND FUNCTIONAL STATUS - GENERAL
DRESSING REGULAR LOWER BODY CLOTHING: A LITTLE
STANDING UP FROM CHAIR USING ARMS: A LITTLE
WALKING IN HOSPITAL ROOM: A LITTLE
DRESSING REGULAR UPPER BODY CLOTHING: A LITTLE
MOBILITY SCORE: 18
PERSONAL GROOMING: A LITTLE
PATIENT BASELINE BEDBOUND: NO
TOILETING: A LITTLE
HELP NEEDED FOR BATHING: A LITTLE
CLIMB 3 TO 5 STEPS WITH RAILING: A LOT
STANDING UP FROM CHAIR USING ARMS: A LITTLE
CLIMB 3 TO 5 STEPS WITH RAILING: A LITTLE
MOVING TO AND FROM BED TO CHAIR: A LITTLE
WALKING IN HOSPITAL ROOM: A LITTLE
DAILY ACTIVITIY SCORE: 19
DRESSING REGULAR LOWER BODY CLOTHING: A LITTLE
DRESSING REGULAR UPPER BODY CLOTHING: A LITTLE
TURNING FROM BACK TO SIDE WHILE IN FLAT BAD: A LITTLE
TURNING FROM BACK TO SIDE WHILE IN FLAT BAD: A LITTLE
MOVING TO AND FROM BED TO CHAIR: A LITTLE
TOILETING: A LITTLE
HELP NEEDED FOR BATHING: A LITTLE
PERSONAL GROOMING: A LITTLE
MOBILITY SCORE: 18
MOVING FROM LYING ON BACK TO SITTING ON SIDE OF FLAT BED WITH BEDRAILS: A LITTLE
DAILY ACTIVITIY SCORE: 19

## 2025-05-27 ASSESSMENT — ACTIVITIES OF DAILY LIVING (ADL)
TOILETING: NEEDS ASSISTANCE
PATIENT'S MEMORY ADEQUATE TO SAFELY COMPLETE DAILY ACTIVITIES?: YES
LACK_OF_TRANSPORTATION: NO
ADEQUATE_TO_COMPLETE_ADL: YES
LACK_OF_TRANSPORTATION: NO
DRESSING YOURSELF: NEEDS ASSISTANCE
ASSISTIVE_DEVICE: WALKER
LACK_OF_TRANSPORTATION: NO
WALKS IN HOME: NEEDS ASSISTANCE
JUDGMENT_ADEQUATE_SAFELY_COMPLETE_DAILY_ACTIVITIES: YES
HEARING - LEFT EAR: FUNCTIONAL
BATHING: NEEDS ASSISTANCE
HEARING - RIGHT EAR: FUNCTIONAL
FEEDING YOURSELF: INDEPENDENT
GROOMING: INDEPENDENT

## 2025-05-27 ASSESSMENT — LIFESTYLE VARIABLES
HOW OFTEN DO YOU HAVE 6 OR MORE DRINKS ON ONE OCCASION: NEVER
AUDIT-C TOTAL SCORE: 0
HOW MANY STANDARD DRINKS CONTAINING ALCOHOL DO YOU HAVE ON A TYPICAL DAY: PATIENT DOES NOT DRINK
HOW OFTEN DO YOU HAVE A DRINK CONTAINING ALCOHOL: NEVER
AUDIT-C TOTAL SCORE: 0
SKIP TO QUESTIONS 9-10: 1

## 2025-05-27 ASSESSMENT — PATIENT HEALTH QUESTIONNAIRE - PHQ9
SUM OF ALL RESPONSES TO PHQ9 QUESTIONS 1 & 2: 0
1. LITTLE INTEREST OR PLEASURE IN DOING THINGS: NOT AT ALL
2. FEELING DOWN, DEPRESSED OR HOPELESS: NOT AT ALL

## 2025-05-27 ASSESSMENT — HEART SCORE: AGE: 65+

## 2025-05-27 ASSESSMENT — PAIN SCALES - GENERAL
PAINLEVEL_OUTOF10: 0 - NO PAIN
PAINLEVEL_OUTOF10: 0 - NO PAIN

## 2025-05-27 ASSESSMENT — PAIN - FUNCTIONAL ASSESSMENT: PAIN_FUNCTIONAL_ASSESSMENT: 0-10

## 2025-05-27 NOTE — CARE PLAN
Problem: Fall/Injury  Goal: Not fall by end of shift  Outcome: Progressing  Goal: Be free from injury by end of the shift  Outcome: Progressing  Goal: Verbalize understanding of personal risk factors for fall in the hospital  Outcome: Progressing  Goal: Verbalize understanding of risk factor reduction measures to prevent injury from fall in the home  Outcome: Progressing  Goal: Use assistive devices by end of the shift  Outcome: Progressing  Goal: Pace activities to prevent fatigue by end of the shift  Outcome: Progressing   The patient's goals for the shift include no syncope    The clinical goals for the shift include no falls

## 2025-05-27 NOTE — ED PROCEDURE NOTE
Procedure  Critical Care    Performed by: Laura Christiansen MD  Authorized by: Laura Christiansen MD    Critical care provider statement:     Critical care time (minutes):  60    Critical care was necessary to treat or prevent imminent or life-threatening deterioration of the following conditions: syncope.    Critical care was time spent personally by me on the following activities:  Blood draw for specimens, development of treatment plan with patient or surrogate, evaluation of patient's response to treatment, discussions with consultants, ordering and performing treatments and interventions, ordering and review of laboratory studies, ordering and review of radiographic studies, re-evaluation of patient's condition, obtaining history from patient or surrogate, review of old charts and pulse oximetry               Laura Christiansen MD  05/27/25 0828

## 2025-05-27 NOTE — PROGRESS NOTES
Angela Montelongo is a 74 y.o. female admitted for Syncope, cardiogenic. Pharmacy reviewed the patient's [id number] medications and allergies for accuracy.    Medications ADDED:  BIOTIN ORAL  lubricating eye drops ophthalmic solution  Medications CHANGED:  oxyBUTYnin (Ditropan) 5 mg tablet  potassium chloride CR 20 mEq ER tablet  Medications REMOVED:   polyethylene glycol (Glycolax, Miralax) 17 gram packet     The list below reflects the updated PTA list. Comments regarding how patient may be taking medications differently can be found in the Admit Orders Activity  Prior to Admission Medications   Prescriptions Last Dose Informant   BIOTIN ORAL 5/26/2025   Noon Self   Sig: Take 1 tablet by mouth once daily.   Take for hair/skin/nails   acetaminophen (Tylenol) 325 mg tablet PRN Self   Sig: Take 2 tablets (650 mg) by mouth every 6 hours if   needed for mild pain (1 - 3).   aspirin 81 mg EC tablet 5/27/2025 Morning Self   Sig: Take 1 tablet (81 mg) by mouth once daily.   atorvastatin (Lipitor) 20 mg tablet 5/27/2025 Morning Self   Sig: Take 1 tablet (20 mg) by mouth once daily.      calcium carb/vit D3/minerals (CALCIUM-VITAMIN D ORAL) 5/26/2025   Noon Self   Sig: Take 1 tablet by mouth once daily.   carvedilol (Coreg) 6.25 mg tablet 5/27/2025 Morning Self   Sig: Take 1 tablet (6.25 mg) by mouth 2 times a day.   dapagliflozin propanediol (Farxiga) 10 mg 5/27/2025 Morning Self   Sig: Take 1 tablet (10 mg) by mouth once daily.   famotidine (Pepcid) 20 mg tablet 5/27/2025 Morning Self   Sig: Take 1 tablet (20 mg) by mouth 2 times a day.   furosemide (Lasix) 40 mg tablet 5/27/2025 Morning Self   Sig: Take 1 tablet (40 mg) by mouth once daily.   gabapentin (Neurontin) 100 mg capsule 5/27/2025 Morning Self   Sig: TAKE 2 CAPSULES BY MOUTH 3 TIMES A DAY   losartan (Cozaar) 50 mg tablet 5/27/2025 Morning Self   Sig: Take 1 tablet (50 mg) by mouth once daily.   lubricating eye drops ophthalmic solution PRN Self   Sig:  Administer 1 drop into both eyes if needed for dry eyes.   multivitamin with minerals iron-free (Centrum Silver) 5/27/2025 Morning Self   Sig: Take 1 tablet by mouth once daily.   oxyBUTYnin (Ditropan) 5 mg tablet 5/26/2025 Bedtime Self   Sig: Take 2 tablets (10 mg) by mouth once daily at bedtime.      potassium chloride CR 20 mEq ER tablet 5/27/2025 Morning Self   Sig: Take 1 tablet (20 mEq) by mouth 2 times a day.   Do not crush or chew.      Facility-Administered Medications: None        The list below reflects the updated allergy list. Please review each documented allergy for additional clarification and justification.  Allergies  Reviewed by Jose Francisco Montiel on 5/27/2025        Severity Reactions Comments    Ciprofloxacin High Anaphylaxis, Unknown, Swelling     Lisinopril High Anaphylaxis, Angioedema, Swelling, Unknown, Other     Cefazolin Not Specified Angioedema, Swelling     Cephalexin Not Specified Unknown     Tetanus Toxoid, Adsorbed Not Specified Unknown             Pharmacy has been updated to [facility].    Sources used to complete the med history include:   Patient interview with medication list and daughter at bedside, good historian, dispense report, care everywhere, chart review history    Below are additional concerns with the patient's PTA list.  None    Jose Francisco Montiel, [facility]  Please reach out via [employer] Secure Chat for questions

## 2025-05-27 NOTE — ED PROVIDER NOTES
Emergency Department Provider Note       History of Present Illness     History provided by: Patient and EMS   Limitations to History: None  External Records Reviewed with Brief Summary:   EMR reviewed    HPI:  Angela Montelongo is a 74 y.o. female  patient with past medical history of diastolic heart failure and systolic heart failure,   Hypertension with a EF of 30 to 35%  sent to the ER due to near syncope.  patient reports that she was in cardiac rehab and suddenly feel weak, dizzy, and with associated nausea and almost passing out.  Did not actually pass out.    Symptoms resolved. Denies any difficulty breathing, chest pain, abdominal pain, recent travel, recent admissions, worsening lower extremity edema.  Patient is compliant with medications.        Physical Exam   Triage vitals:  T 36.1 °C (97 °F)  HR 58  /55  RR 16  O2 96 % None (Room air)    Physical Exam  Vitals and nursing note reviewed.   Constitutional:       Appearance: Normal appearance.   HENT:      Head: Normocephalic and atraumatic.      Nose: Nose normal.      Mouth/Throat:      Mouth: Mucous membranes are moist.   Cardiovascular:      Rate and Rhythm: Normal rate and regular rhythm.      Heart sounds: Normal heart sounds.   Pulmonary:      Effort: Pulmonary effort is normal.      Breath sounds: Normal breath sounds.   Abdominal:      General: Abdomen is flat.      Tenderness: There is no abdominal tenderness.   Musculoskeletal:         General: Normal range of motion.      Cervical back: Normal range of motion and neck supple.      Right lower leg: Edema present.      Left lower leg: Edema present.      Comments:   Nonpitting bilateral edema   Skin:     General: Skin is warm.   Neurological:      General: No focal deficit present.      Mental Status: She is alert.   Psychiatric:         Mood and Affect: Mood normal.           Medical Decision Making & ED Course   Medical Decision Makin y.o. female atient with past medical history  of diastolic heart failure and systolic heart failure,   Hypertension with a EF of 30 to 35%  sent to the ER due to near syncope.    physical exam is unremarkable except with lower extremity edema.  EKG is not ischemic in nature,  send has prior. Patient is well-appearing, not in distress.  No difficulty breathing,  of PE.   No chest pain, no worsening lower extremity edema,  normal vitals, low suspicion of PE.  No chest pain, no ischemic EKG, low suspicion of ACS.   high risk near-syncope, will likely need observation.  Plan: Labs, chest x-ray, and obs for telemetry monitoring for 24 hours.        ----    [place]  syncope score is 3, medium risk.     Differential diagnoses considered include but are not limited to:   See above    Social Determinants of Health which Significantly Impact Care: Social Determinants of Health which Significantly Impact Care: None identified     EKG Independent Interpretation: see below    Independent Result Review and Interpretation: Relevant laboratory and radiographic results were reviewed and independently interpreted by myself.  As necessary, they are commented on in the ED Course.    Chronic conditions affecting the patient's care: As documented above in [facility]    The patient was discussed with the following consultants/services: admitting physician DR. Espinoza    Care Considerations: As documented above in [facility]    ED Course:  ED Course as of 05/27/25 1628 Tue May 27, 2025   1408   EKG interpreted by me:  sinus rhythm with PAC, left axis deviation, right bundle branch,  left anterior fascicular block, same as prior EKGs. [CC]   1556 Troponin I, High Sensitivity(!): 18 [CC]   1556 Troponin I, High Sensitivity: 12 [CC]   1556 CELINA, will not do IV fluids due to history of heart failure. [CC]      ED Course User Index  [CC] Laura Christiansen MD         Diagnoses as of 05/27/25 1628   Syncope, cardiogenic       Disposition   As a result of their workup, the patient will require  admission to the hospital.  The patient was informed of her diagnosis.  The patient was given the opportunity to ask questions and I answered them. The patient agreed to be admitted to the hospital.    Procedures   Procedures    Patient was seen independently    Laura Christiansen MD  Emergency Medicine                                                            Laura Christiansen MD  05/27/25 8946       Laura Christiansen MD  05/27/25 4545

## 2025-05-27 NOTE — H&P
History Of Present Illness  Angela Montelongo is a 74 y.o. female presenting with near syncope  Patient is a 74 years old female with past medical history of congestive heart failure with systolic dysfunction, EF 30 to 35%, hypertension, chronic kidney disease, hyperlipidemia and GERD.  Patient presented to [facility] complaining of near syncope.  Patient has HFrEF and goes to cardiac rehab.  Patient stated she was feeling dizzy when she was on the bus on her way to cardiac rehab.  Patient stated she thought she was going to pass out.  She denied to lose her consciousness.  She denied ever numbness or tingling upper or lower extremities.  Patient stated she has been compliant with medication and appointment.  She sees Dr. Wharton in regularly basis.  Patient denied to have chest pain or shortness of breath.  Patient goes to cardiac rehab on Monday and Friday and regularly basis.  Patient denied to have any fluttering in her chest.  Patient stated intermittently her heart rate gets low.  She is on beta-blocker.  Patient says stated she gained some weight but she is not able to quantify.  Patient denied paroxysmal nocturnal dyspnea or orthopnea.       Past Medical History  Medical History[1]    Surgical History  Surgical History[2]     Social History  She reports that she has never smoked. She has never been exposed to tobacco smoke. She has never used smokeless tobacco. She reports that she does not currently use alcohol. She reports that she does not use drugs.    Family History  Family History[3]     Allergies  Ciprofloxacin; Lisinopril; Cefazolin; Cephalexin; and Tetanus toxoid, adsorbed    Review of Systems  General: Negative for fever,  chills, positive for fatigue.    HEENT: Negative for headache, blurring of vision or double vision.    Cardiovascular: Negative for chest pain, palpitations or orthopnea.    Respiratory: Negative for cough, shortness of breath or wheezing.    Gastrointestinal: Negative for nausea,  "vomiting, hematemesis, abdominal pain or diarrhea.   Genitourinary: Negative for dysuria, hematuria, frequency or nocturia.   Musculoskeletal: Negative for joint pain, positive for swelling in both lower extremities   skin: Negative for rash, itching, or jaundice.   Hematologic: Negative for bleeding or bruising.   Neurologic: Negative for headache, loss of consciousness or seizures   Psychological: Negative for anxiety, hallucinations or depression.      Physical Exam   General: Morbidly obese, BMI 40.7, cooperating during physical exam.  HEENT: Pupils are equal and reactive to light and commendation , oral mucosa moist, no JVD .  Cardiovascular: PMI nondisplaced  Lungs: Clear to auscultation bilaterally, no wheezing, no crackles, no dullness to percussion.  Abdomen: No hepatosplenomegaly appreciated, soft , not tender, positive bowel sounds, positive bowel movement.  Neuro: Alert and oriented x3, strength in upper and lower extremities , sensation intact.  Psych: Patient had great insight was going on  Musculoskeletal: +2 pedal edema both lower extremities, deep edema of left lower extremity  Vascular: Pulses are intact in upper and lower extremities  Skin: No petechiae, ecchymosis or other stigmata for dermatology disease.   Last Recorded Vitals  Blood pressure 119/86, pulse 66, temperature 36.1 °C (97 °F), resp. rate 15, height 1.6 m (5' 3\"), weight 104 kg (230 lb), SpO2 97%.    Relevant Results    Results for orders placed or performed during the hospital encounter of 05/27/25 (from the past 24 hours)   CBC and Auto Differential   Result Value Ref Range    WBC 6.0 4.4 - 11.3 x10*3/uL    nRBC 0.0 0.0 - 0.0 /100 WBCs    RBC 4.28 4.00 - 5.20 x10*6/uL    Hemoglobin 13.4 12.0 - 16.0 g/dL    Hematocrit 39.9 36.0 - 46.0 %    MCV 93 80 - 100 fL    MCH 31.3 26.0 - 34.0 pg    MCHC 33.6 32.0 - 36.0 g/dL    RDW 13.0 11.5 - 14.5 %    Platelets 214 150 - 450 x10*3/uL    Neutrophils % 69.0 40.0 - 80.0 %    Immature " Granulocytes %, Automated 0.2 0.0 - 0.9 %    Lymphocytes % 18.4 13.0 - 44.0 %    Monocytes % 8.3 2.0 - 10.0 %    Eosinophils % 3.8 0.0 - 6.0 %    Basophils % 0.3 0.0 - 2.0 %    Neutrophils Absolute 4.17 1.60 - 5.50 x10*3/uL    Immature Granulocytes Absolute, Automated 0.01 0.00 - 0.50 x10*3/uL    Lymphocytes Absolute 1.11 0.80 - 3.00 x10*3/uL    Monocytes Absolute 0.50 0.05 - 0.80 x10*3/uL    Eosinophils Absolute 0.23 0.00 - 0.40 x10*3/uL    Basophils Absolute 0.02 0.00 - 0.10 x10*3/uL   Comprehensive metabolic panel   Result Value Ref Range    Glucose 131 (H) 74 - 99 mg/dL    Sodium 139 136 - 145 mmol/L    Potassium 3.8 3.5 - 5.3 mmol/L    Chloride 104 98 - 107 mmol/L    Bicarbonate 26 21 - 32 mmol/L    Anion Gap 13 10 - 20 mmol/L    Urea Nitrogen 42 (H) 6 - 23 mg/dL    Creatinine 1.48 (H) 0.50 - 1.05 mg/dL    eGFR 37 (L) >60 mL/min/1.73m*2    Calcium 9.6 8.6 - 10.3 mg/dL    Albumin 3.9 3.4 - 5.0 g/dL    Alkaline Phosphatase 100 33 - 136 U/L    Total Protein 6.3 (L) 6.4 - 8.2 g/dL    AST 26 9 - 39 U/L    Bilirubin, Total 0.7 0.0 - 1.2 mg/dL    ALT 15 7 - 45 U/L   B-Type Natriuretic Peptide   Result Value Ref Range     (H) 0 - 99 pg/mL   Troponin I, High Sensitivity, Initial   Result Value Ref Range    Troponin I, High Sensitivity 18 (H) 0 - 13 ng/L   Urinalysis with Reflex Culture and Microscopic   Result Value Ref Range    Color, Urine Colorless (N) Light-Yellow, Yellow, Dark-Yellow    Appearance, Urine Clear Clear    Specific Gravity, Urine 1.007 1.005 - 1.035    pH, Urine 5.5 5.0, 5.5, 6.0, 6.5, 7.0, 7.5, 8.0    Protein, Urine NEGATIVE NEGATIVE, 10 (TRACE), 20 (TRACE) mg/dL    Glucose, Urine 300 (3+) (A) Normal mg/dL    Blood, Urine NEGATIVE NEGATIVE mg/dL    Ketones, Urine NEGATIVE NEGATIVE mg/dL    Bilirubin, Urine NEGATIVE NEGATIVE mg/dL    Urobilinogen, Urine Normal Normal mg/dL    Nitrite, Urine NEGATIVE NEGATIVE    Leukocyte Esterase, Urine NEGATIVE NEGATIVE   ECG 12 lead   Result Value Ref Range     Ventricular Rate 63 BPM    Atrial Rate 63 BPM    WV Interval 192 ms    QRS Duration 158 ms    QT Interval 462 ms    QTC Calculation(Bazett) 472 ms    P Axis 31 degrees    R Axis -67 degrees    T Axis 14 degrees    QRS Count 10 beats    Q Onset 201 ms    P Onset 105 ms    P Offset 152 ms    T Offset 432 ms    QTC Fredericia 469 ms   Troponin, High Sensitivity, 1 Hour   Result Value Ref Range    Troponin I, High Sensitivity 12 0 - 13 ng/L      [unfilled]      Assessment/Plan       Near syncope  Most likely multifactorial.  Patient has history of heart failure with reduced EF  Check orthostatics  CT brain without contrast  From last 2D echo done this year and ejection fraction was 30 -35%   I do not see any reason to repeat 2D echo.  Consult cardiology    Chronic CHF with systolic dysfunction  Last 2D echo done in January 2025 patient had EF 30 to 35%.  Patient is on Coreg, losartan Farxigaand Lasix.  Continue with Coreg, I will decrease the dose of Coreg.  Hold losartan today.  Continue with Lasix.  Consult cardiology.  Patient see Dr. Wharton in regular basis  CHF navigator    Hypertension  Fairly controlled  Continue with medication.    Acute kidney disease.  There is slight elevation in creatinine.  Patient had normal creatinine in the last year.  Hold losartan today.  Check RFP in AM.    GERD  On famotidine    DVT prophylax    Morbid obesity  BMI 40.7  Advised again diet    Deconditioning  PT OT to see her  Fall precaution  Ambulate with assistance    I expect the patient to get discharged in 24 hours.    I spent 62 minutes in the professional and overall care of this patient.      Kieran Espinoza MD         [1]   Past Medical History:  Diagnosis Date    Acute embolism and thrombosis of unspecified deep veins of unspecified lower extremity 08/13/2024    Acute non-ST segment elevation myocardial infarction (Multi) 01/20/2025    Acute on chronic diastolic congestive heart failure 10/18/2024    Angioedema 01/20/2025     Bradycardia 09/02/2024    Deep venous thrombosis (Multi) 08/13/2024    Elevation of levels of liver transaminase levels     ALT (SGPT) level raised    Essential hypertension 04/22/2024    History of hypertension 01/20/2025    Hyperlipidemia 10/19/2023    Other conditions influencing health status     Osteoarthritis    Personal history of other complications of pregnancy, childbirth and the puerperium     History of ectopic pregnancy    Personal history of other diseases of the circulatory system     History of hypertension    Personal history of other diseases of the musculoskeletal system and connective tissue     History of bursitis    Personal history of other diseases of the musculoskeletal system and connective tissue     Personal history of gout    Personal history of other diseases of the nervous system and sense organs     History of migraine headaches   [2]   Past Surgical History:  Procedure Laterality Date    CARDIAC CATHETERIZATION Left 3/13/2025    Procedure: Left Heart Cath;  Surgeon: Cristina Wharton MD;  Location: [facility];  Service: Cardiovascular;  Laterality: Left;  no pre auth required    CARPAL TUNNEL RELEASE  04/09/2013    Neuroplasty Median Nerve At Carpal Tunnel    CHOLECYSTECTOMY  04/09/2013    Cholecystectomy    KNEE ARTHROPLASTY  04/09/2013    Knee Arthroplasty   [3]   Family History  Family history unknown: Yes

## 2025-05-27 NOTE — ED NOTES
Patient bib ems from cardiac rehab for dizziness and weakness.                 PMHX:  Medical History[1]     RX Allergies[2]      LABS:   Latest Reference Range & Units 05/27/25 14:00   GLUCOSE 74 - 99 mg/dL 131 (H)   SODIUM 136 - 145 mmol/L 139   POTASSIUM 3.5 - 5.3 mmol/L 3.8   CHLORIDE 98 - 107 mmol/L 104   Bicarbonate 21 - 32 mmol/L 26   Anion Gap 10 - 20 mmol/L 13   Blood Urea Nitrogen 6 - 23 mg/dL 42 (H)   Creatinine 0.50 - 1.05 mg/dL 1.48 (H)   EGFR >60 mL/min/1.73m*2 37 (L)   Calcium 8.6 - 10.3 mg/dL 9.6   Albumin 3.4 - 5.0 g/dL 3.9   Alkaline Phosphatase 33 - 136 U/L 100   ALT 7 - 45 U/L 15   AST 9 - 39 U/L 26   Bilirubin Total 0.0 - 1.2 mg/dL 0.7   Total Protein 6.4 - 8.2 g/dL 6.3 (L)   BNP 0 - 99 pg/mL 110 (H)   WBC 4.4 - 11.3 x10*3/uL 6.0   nRBC 0.0 - 0.0 /100 WBCs 0.0   RBC 4.00 - 5.20 x10*6/uL 4.28   HEMOGLOBIN 12.0 - 16.0 g/dL 13.4   HEMATOCRIT 36.0 - 46.0 % 39.9   MCV 80 - 100 fL 93   MCH 26.0 - 34.0 pg 31.3   MCHC 32.0 - 36.0 g/dL 33.6   RED CELL DISTRIBUTION WIDTH 11.5 - 14.5 % 13.0   Platelets 150 - 450 x10*3/uL 214   (H): Data is abnormally high  (L): Data is abnormally low      PLAN: Pending                   [1]   Past Medical History:  Diagnosis Date    Acute embolism and thrombosis of unspecified deep veins of unspecified lower extremity 08/13/2024    Acute non-ST segment elevation myocardial infarction (Multi) 01/20/2025    Acute on chronic diastolic congestive heart failure 10/18/2024    Angioedema 01/20/2025    Bradycardia 09/02/2024    Deep venous thrombosis (Multi) 08/13/2024    Elevation of levels of liver transaminase levels     ALT (SGPT) level raised    Essential hypertension 04/22/2024    History of hypertension 01/20/2025    Hyperlipidemia 10/19/2023    Other conditions influencing health status     Osteoarthritis    Personal history of other complications of pregnancy, childbirth and the puerperium     History of ectopic pregnancy    Personal history of other diseases of the  circulatory system     History of hypertension    Personal history of other diseases of the musculoskeletal system and connective tissue     History of bursitis    Personal history of other diseases of the musculoskeletal system and connective tissue     Personal history of gout    Personal history of other diseases of the nervous system and sense organs     History of migraine headaches   [2]   Allergies  Allergen Reactions    Ciprofloxacin Anaphylaxis, Unknown and Swelling    Lisinopril Anaphylaxis, Angioedema, Swelling, Unknown and Other    Cefazolin Angioedema and Swelling    Cephalexin Unknown    Tetanus Toxoid, Adsorbed Unknown        Selinsa MAYTE Magdaleno RN  05/27/25 0150

## 2025-05-27 NOTE — PROGRESS NOTES
Cardiac Rehabilitation 30 Day Progress Report    Name: Angela Montelongo  Medical Record Number: [id number]  YOB: 1950  Age: 74 y.o.    Today’s Date: 5/27/2025  Primary Care Physician: Danielito Roach DO  Referring Physician: Cristina Wharton MD  Program Location: [address]  Primary Diagnosis:   1. Acute systolic heart failure  Referral to Cardiac Rehab         Onset/Date of Diagnosis: 9/2024  Session #:  4  AACVPR Risk Stratification: High  Falls Risk: High  Psychosocial Assessment   Pre PHQ-9: 8  Sent PH-Q 9 to MD if score > 20: No; score < 20  Pt reported/currently experiencing stress: Yes; Stress; Severity: moderate  Patient uses stress management skills: No   History of: no history of anxiety or depression  Currently seeing a mental health provider: No  Social Support: Yes, Whom:Daughter and son in law  Quality of Life Survey: KCCQ (see Heart Failure Management below)  Learning Assessment:  Learning assessment/barriers: Physical and Transportation  Preferred learning method: Reading handout and Writing handout  Barriers: Physical limitations  Comments:  Stages of Change: action    Psychosocial Plan  Goal Status: In Progress  Psychosocial Goals: Maintain or lower PH-Q 9 score by discharge and Identify strategies for managing depression  Psychosocial Interventions/Education:   5/5/25 reviewed initial PHQ-9 score with pt at eval/      Nutrition Assessment:    Hyperlipidemia: Yes     Lipids:   Lab Results   Component Value Date    CHOL 164 06/07/2022    HDL 65 06/07/2022    TRIG 91 06/07/2022       Current Dietary Guidelines: Low sodium  Barriers to dietary change: no    Diet Habit Survey: Picture Your Plate  Pre: Initial survey given. Pending completion and return from patient.  Post: To be done at discharge.    Diabetes Assessment    Lab Results   Component Value Date    HGBA1C 5.3 02/01/2019       History of Diabetes: No    Weight Management    Height: 160 cm (5'  "3\")  Weight: 104 kg (229 lb)  BMI (Calculated): 40.58      Nutrition Plan  Goal Status: In progress  Nutrition Goals: Improve Diet Habit Survey score by 5-10 points by discharge, Adapt a low-sodium, DASH diet prior to discharge, Adapt a Mediterranean focused diet prior to discharge, and Learn how to read and interpret nutrition labels prior to discharge  Nutrition Interventions/Education:   5/16/2025  Education on cholesterol was done during today's session. Discussed with patient the different types of cholesterol, risk factors and how to make lifestyle modifications to improve levels. Handout and patient's recent lab results were given for home review. Signature Denise Lawrence RN    Exercise Assessment    Home exercise:No  Mode: NA  Frequency: NA  Duration: NA    Exercise Prescription     Exercise Prescription based on: Duke Activity Status Index (DASI)    DASI Score: 16.2   MET Score: 4.7   Frequency:  3 days/week   Mode: NuStep and Recumbent Cycle   Duration: 21 total aerobic minutes   Intensity: RPE 12-14  Target HR:  To be calculated after 6 attended sessions.  MET Level: 2  Patient wears supplemental O2: No     Modality Workload METs Duration (minutes)   1 Pre-Exercise   4:00   2 NuStep Load 1 @ 32 lugo  2 7 :00   3 Recumbent Bike Load 1 @ 45 rpm  1.3 7 :00   4 Schwinn Airdyne 0.4 Load  1.5 7 :00          6 Post-Exercise   4:00     Resistance Training: No   Home Exercise Prescription given: To be given prior to discharge from program.    Exercise Plan  Goal Status: In Progress  Exercise Goals: Increase exercise MET level by 5-10% each week, Increase total exercise duration to 30-45 minutes, and Establish a home exercise program before discharge  Exercise Interventions/Education:       Other Core Components/Risk Factor Assessment:    Medication adherence  Current Medications:   Medication Documentation Review Audit       Reviewed by HALEY Figueredo (Nurse Practitioner) on 05/07/25 at 1107  "     Medication Order Taking? Sig Documenting Provider Last Dose Status   acetaminophen (Tylenol) 325 mg tablet [id number] Yes Take 2 tablets (650 mg) by mouth every 6 hours if needed for mild pain (1 - 3). ANGELITO Blevins-CNP  Active   aspirin 81 mg EC tablet [id number] Yes Take 1 tablet (81 mg) by mouth once daily. Historical Provider, MD  Active   atorvastatin (Lipitor) 20 mg tablet [id number] Yes Take 1 tablet (20 mg) by mouth once daily. Danielito Roach DO  Active   blood pressure monitor kit [id number] Yes Use as directed to monitor blood pressure once daily Cristina Wharton MD  Active   calcium carb/vit D3/minerals (CALCIUM-VITAMIN D ORAL) [id number] Yes Take 1 tablet by mouth once daily. Historical Provider, MD  Active   carvedilol (Coreg) 6.25 mg tablet [id number] Yes Take 1 tablet (6.25 mg) by mouth 2 times a day. ANGELITO Ferrara-CNP  Active   dapagliflozin propanediol (Farxiga) 10 mg [id number] Yes Take 1 tablet (10 mg) by mouth once daily. Cristina Wharton MD  Active   famotidine (Pepcid) 20 mg tablet [id number] Yes Take 1 tablet (20 mg) by mouth 2 times a day. Danielito Roach DO  Active   furosemide (Lasix) 40 mg tablet [id number] Yes Take 1 tablet (40 mg) by mouth once daily. Cristina Wharton MD  Active   gabapentin (Neurontin) 100 mg capsule [id number] Yes TAKE 2 CAPSULES BY MOUTH 3 TIMES A DAY Danielito Rocah DO  Active   losartan (Cozaar) 50 mg tablet [id number] Yes Take 1 tablet (50 mg) by mouth once daily. Cristina Wharton MD  Active   multivitamin with minerals iron-free (Centrum Silver) [id number] Yes Take 1 tablet by mouth once daily. Historical Provider, MD  Active   oxybutynin XL (Ditropan-XL) 10 mg 24 hr tablet [id number]  Take 1 tablet (10 mg) by mouth once daily. Do not crush, chew, or split.   Patient taking differently: Take 5 mg by mouth 3 times a day. Do not crush, chew, or split.    Historical Provider, MD  Active   polyethylene glycol (Glycolax, Miralax) 17 gram packet  [id number]  Take 17 g by mouth once daily.   Patient not taking: Reported on 5/7/2025    Penelope Mcgee, APRN-CNP  Active   potassium chloride CR 20 mEq ER tablet [id number] Yes Take 1 tablet (20 mEq) by mouth once daily. Do not crush or chew. Cristina Wharton MD  Active    Patient not taking:   Discontinued 05/07/25 1107                                 Medication compliance: Yes   Uses pill box/organizer: Yes    Carries medication list: No     Blood Pressure Management  History of Hypertension: Yes   Medication Changes: Yes   Resting BP:   WNL    Heart Failure Management  Hx of Heart Failure: Yes;   Type (selection): HFrEF  Most recent EF: 35    Onset of heart failure diagnosis: 9/2024  Last heart failure hospitalization: 10/2024  Number of HF admissions per year: 1  Symptoms: Fatigue at rest, Fatigue with exertion, Shortness of breath, and LE edema  Is there a family Hx of HF: Yes   Does patient obtain daily weight No     KCCQ survey: Pre:   Post:     Heart Failure Reassessment: In progress  Heart Failure Goals: Able to verbalize signs and symptoms of fluid retention and when to contact MD, Adhere to proper fluid restrictions, Adapt a low sodium diet and verbalize guidelines, and Obtain daily weight and verbalize proper method of obtaining weights  Smoking/Tobacco Assessment  Tobacco Use History[1]    Other Core Component Plan  Goal Status: In progress  Other Core Component Goals: Medication compliance and Achieve resting BP of < 130/80 by discharge  Other Core Component Interventions/Education:     Individual Patient Goals:    Shower without assistance by discharge  Be able to get out of house and be more independent    Goal Status: In progress    Staff Comments:  Ms. Montelongo has only attended 4 cardiac rehab exercise sessions since beginning the program.  She has begun to make slow progress on her outcomes and goals.  She is very deconditioned.  No cardiac complaints voiced.    Rehab Staff Signature: Ofe SWEENEY  MS Linette, CCRP             [1]   Social History  Tobacco Use   Smoking Status Never    Passive exposure: Never   Smokeless Tobacco Never

## 2025-05-28 ENCOUNTER — PHARMACY VISIT (OUTPATIENT)
Dept: PHARMACY | Facility: CLINIC | Age: 75
End: 2025-05-28
Payer: MEDICARE

## 2025-05-28 ENCOUNTER — TELEPHONE (OUTPATIENT)
Dept: PRIMARY CARE | Facility: CLINIC | Age: 75
End: 2025-05-28
Payer: MEDICARE

## 2025-05-28 VITALS
SYSTOLIC BLOOD PRESSURE: 134 MMHG | WEIGHT: 230 LBS | HEART RATE: 64 BPM | TEMPERATURE: 97.7 F | DIASTOLIC BLOOD PRESSURE: 95 MMHG | HEIGHT: 63 IN | BODY MASS INDEX: 40.75 KG/M2 | RESPIRATION RATE: 17 BRPM | OXYGEN SATURATION: 97 %

## 2025-05-28 LAB
ALBUMIN SERPL BCP-MCNC: 3.4 G/DL (ref 3.4–5)
ANION GAP SERPL CALCULATED.3IONS-SCNC: 10 MMOL/L (ref 10–20)
ATRIAL RATE: 70 BPM
BUN SERPL-MCNC: 38 MG/DL (ref 6–23)
CALCIUM SERPL-MCNC: 9.3 MG/DL (ref 8.6–10.3)
CHLORIDE SERPL-SCNC: 105 MMOL/L (ref 98–107)
CO2 SERPL-SCNC: 29 MMOL/L (ref 21–32)
CREAT SERPL-MCNC: 1.15 MG/DL (ref 0.5–1.05)
EGFRCR SERPLBLD CKD-EPI 2021: 49 ML/MIN/1.73M*2
ERYTHROCYTE [DISTWIDTH] IN BLOOD BY AUTOMATED COUNT: 13.1 % (ref 11.5–14.5)
GLUCOSE SERPL-MCNC: 85 MG/DL (ref 74–99)
HCT VFR BLD AUTO: 35.3 % (ref 36–46)
HGB BLD-MCNC: 11.5 G/DL (ref 12–16)
HOLD SPECIMEN: NORMAL
MCH RBC QN AUTO: 31 PG (ref 26–34)
MCHC RBC AUTO-ENTMCNC: 32.6 G/DL (ref 32–36)
MCV RBC AUTO: 95 FL (ref 80–100)
NRBC BLD-RTO: 0 /100 WBCS (ref 0–0)
P AXIS: 48 DEGREES
PHOSPHATE SERPL-MCNC: 3.7 MG/DL (ref 2.5–4.9)
PLATELET # BLD AUTO: 172 X10*3/UL (ref 150–450)
POTASSIUM SERPL-SCNC: 3.6 MMOL/L (ref 3.5–5.3)
PR INTERVAL: 208 MS
Q ONSET: 204 MS
QRS COUNT: 12 BEATS
QRS DURATION: 154 MS
QT INTERVAL: 442 MS
QTC CALCULATION(BAZETT): 477 MS
QTC FREDERICIA: 465 MS
R AXIS: -65 DEGREES
RBC # BLD AUTO: 3.71 X10*6/UL (ref 4–5.2)
SODIUM SERPL-SCNC: 140 MMOL/L (ref 136–145)
T AXIS: 29 DEGREES
T OFFSET: 425 MS
T4 FREE SERPL-MCNC: 0.89 NG/DL (ref 0.61–1.12)
VENTRICULAR RATE: 70 BPM
WBC # BLD AUTO: 5.3 X10*3/UL (ref 4.4–11.3)

## 2025-05-28 PROCEDURE — 99222 1ST HOSP IP/OBS MODERATE 55: CPT

## 2025-05-28 PROCEDURE — 2500000001 HC RX 250 WO HCPCS SELF ADMINISTERED DRUGS (ALT 637 FOR MEDICARE OP): Performed by: INTERNAL MEDICINE

## 2025-05-28 PROCEDURE — 2500000005 HC RX 250 GENERAL PHARMACY W/O HCPCS: Performed by: INTERNAL MEDICINE

## 2025-05-28 PROCEDURE — 80069 RENAL FUNCTION PANEL: CPT | Performed by: INTERNAL MEDICINE

## 2025-05-28 PROCEDURE — 84439 ASSAY OF FREE THYROXINE: CPT

## 2025-05-28 PROCEDURE — 97116 GAIT TRAINING THERAPY: CPT | Mod: GP

## 2025-05-28 PROCEDURE — 2500000004 HC RX 250 GENERAL PHARMACY W/ HCPCS (ALT 636 FOR OP/ED): Mod: JZ | Performed by: INTERNAL MEDICINE

## 2025-05-28 PROCEDURE — 2500000002 HC RX 250 W HCPCS SELF ADMINISTERED DRUGS (ALT 637 FOR MEDICARE OP, ALT 636 FOR OP/ED): Performed by: INTERNAL MEDICINE

## 2025-05-28 PROCEDURE — 97166 OT EVAL MOD COMPLEX 45 MIN: CPT | Mod: GO

## 2025-05-28 PROCEDURE — RXMED WILLOW AMBULATORY MEDICATION CHARGE

## 2025-05-28 PROCEDURE — 99239 HOSP IP/OBS DSCHRG MGMT >30: CPT | Performed by: INTERNAL MEDICINE

## 2025-05-28 PROCEDURE — 36415 COLL VENOUS BLD VENIPUNCTURE: CPT | Performed by: INTERNAL MEDICINE

## 2025-05-28 PROCEDURE — 85027 COMPLETE CBC AUTOMATED: CPT | Performed by: INTERNAL MEDICINE

## 2025-05-28 PROCEDURE — 97161 PT EVAL LOW COMPLEX 20 MIN: CPT | Mod: GP

## 2025-05-28 PROCEDURE — 97535 SELF CARE MNGMENT TRAINING: CPT | Mod: GO

## 2025-05-28 RX ORDER — CARVEDILOL 3.12 MG/1
3.12 TABLET ORAL 2 TIMES DAILY
Qty: 60 TABLET | Refills: 0 | Status: SHIPPED | OUTPATIENT
Start: 2025-05-28 | End: 2025-06-27

## 2025-05-28 RX ORDER — OXYBUTYNIN CHLORIDE 5 MG/1
10 TABLET ORAL DAILY
Start: 2025-05-28 | End: 2025-06-27

## 2025-05-28 RX ADMIN — Medication 21 PERCENT: at 08:31

## 2025-05-28 RX ADMIN — GABAPENTIN 200 MG: 100 CAPSULE ORAL at 09:24

## 2025-05-28 RX ADMIN — CARVEDILOL 3.12 MG: 3.12 TABLET, FILM COATED ORAL at 09:24

## 2025-05-28 RX ADMIN — ASPIRIN 81 MG: 81 TABLET, COATED ORAL at 09:24

## 2025-05-28 RX ADMIN — ATORVASTATIN CALCIUM 20 MG: 20 TABLET, FILM COATED ORAL at 09:24

## 2025-05-28 RX ADMIN — DAPAGLIFLOZIN 10 MG: 10 TABLET, FILM COATED ORAL at 09:24

## 2025-05-28 RX ADMIN — Medication 1 TABLET: at 09:24

## 2025-05-28 RX ADMIN — GABAPENTIN 200 MG: 100 CAPSULE ORAL at 01:52

## 2025-05-28 RX ADMIN — HEPARIN SODIUM 5000 UNITS: 5000 INJECTION INTRAVENOUS; SUBCUTANEOUS at 01:52

## 2025-05-28 RX ADMIN — HEPARIN SODIUM 5000 UNITS: 5000 INJECTION INTRAVENOUS; SUBCUTANEOUS at 09:24

## 2025-05-28 RX ADMIN — OXYBUTYNIN CHLORIDE 5 MG: 5 TABLET ORAL at 09:24

## 2025-05-28 RX ADMIN — FUROSEMIDE 40 MG: 40 TABLET ORAL at 09:24

## 2025-05-28 RX ADMIN — FAMOTIDINE 20 MG: 20 TABLET, FILM COATED ORAL at 09:24

## 2025-05-28 SDOH — ECONOMIC STABILITY: INCOME INSECURITY: IN THE PAST 12 MONTHS HAS THE ELECTRIC, GAS, OIL, OR WATER COMPANY THREATENED TO SHUT OFF SERVICES IN YOUR HOME?: NO

## 2025-05-28 SDOH — ECONOMIC STABILITY: FOOD INSECURITY: HOW HARD IS IT FOR YOU TO PAY FOR THE VERY BASICS LIKE FOOD, HOUSING, MEDICAL CARE, AND HEATING?: SOMEWHAT HARD

## 2025-05-28 SDOH — SOCIAL STABILITY: SOCIAL INSECURITY: WITHIN THE LAST YEAR, HAVE YOU BEEN AFRAID OF YOUR PARTNER OR EX-PARTNER?: NO

## 2025-05-28 SDOH — HEALTH STABILITY: PHYSICAL HEALTH: ON AVERAGE, HOW MANY DAYS PER WEEK DO YOU ENGAGE IN MODERATE TO STRENUOUS EXERCISE (LIKE A BRISK WALK)?: 0 DAYS

## 2025-05-28 SDOH — SOCIAL STABILITY: SOCIAL INSECURITY: ARE YOU MARRIED, WIDOWED, DIVORCED, SEPARATED, NEVER MARRIED, OR LIVING WITH A PARTNER?: DIVORCED

## 2025-05-28 SDOH — ECONOMIC STABILITY: HOUSING INSECURITY: IN THE LAST 12 MONTHS, WAS THERE A TIME WHEN YOU WERE NOT ABLE TO PAY THE MORTGAGE OR RENT ON TIME?: NO

## 2025-05-28 SDOH — HEALTH STABILITY: MENTAL HEALTH: HOW OFTEN DO YOU HAVE A DRINK CONTAINING ALCOHOL?: NEVER

## 2025-05-28 SDOH — HEALTH STABILITY: MENTAL HEALTH: HOW MANY DRINKS CONTAINING ALCOHOL DO YOU HAVE ON A TYPICAL DAY WHEN YOU ARE DRINKING?: PATIENT DOES NOT DRINK

## 2025-05-28 SDOH — SOCIAL STABILITY: SOCIAL NETWORK: HOW OFTEN DO YOU ATTEND CHURCH OR RELIGIOUS SERVICES?: NEVER

## 2025-05-28 SDOH — ECONOMIC STABILITY: TRANSPORTATION INSECURITY: IN THE PAST 12 MONTHS, HAS LACK OF TRANSPORTATION KEPT YOU FROM MEDICAL APPOINTMENTS OR FROM GETTING MEDICATIONS?: NO

## 2025-05-28 SDOH — ECONOMIC STABILITY: FOOD INSECURITY: WITHIN THE PAST 12 MONTHS, YOU WORRIED THAT YOUR FOOD WOULD RUN OUT BEFORE YOU GOT THE MONEY TO BUY MORE.: NEVER TRUE

## 2025-05-28 SDOH — HEALTH STABILITY: MENTAL HEALTH: HOW OFTEN DO YOU HAVE SIX OR MORE DRINKS ON ONE OCCASION?: NEVER

## 2025-05-28 SDOH — ECONOMIC STABILITY: HOUSING INSECURITY: AT ANY TIME IN THE PAST 12 MONTHS, WERE YOU HOMELESS OR LIVING IN A SHELTER (INCLUDING NOW)?: NO

## 2025-05-28 SDOH — ECONOMIC STABILITY: FOOD INSECURITY: WITHIN THE PAST 12 MONTHS, THE FOOD YOU BOUGHT JUST DIDN'T LAST AND YOU DIDN'T HAVE MONEY TO GET MORE.: NEVER TRUE

## 2025-05-28 SDOH — HEALTH STABILITY: PHYSICAL HEALTH: ON AVERAGE, HOW MANY MINUTES DO YOU ENGAGE IN EXERCISE AT THIS LEVEL?: 0 MIN

## 2025-05-28 SDOH — SOCIAL STABILITY: SOCIAL INSECURITY: WITHIN THE LAST YEAR, HAVE YOU BEEN HUMILIATED OR EMOTIONALLY ABUSED IN OTHER WAYS BY YOUR PARTNER OR EX-PARTNER?: NO

## 2025-05-28 SDOH — SOCIAL STABILITY: SOCIAL NETWORK
DO YOU BELONG TO ANY CLUBS OR ORGANIZATIONS SUCH AS CHURCH GROUPS, UNIONS, FRATERNAL OR ATHLETIC GROUPS, OR SCHOOL GROUPS?: NO

## 2025-05-28 SDOH — SOCIAL STABILITY: SOCIAL NETWORK: HOW OFTEN DO YOU ATTEND MEETINGS OF THE CLUBS OR ORGANIZATIONS YOU BELONG TO?: NEVER

## 2025-05-28 SDOH — SOCIAL STABILITY: SOCIAL NETWORK: HOW OFTEN DO YOU GET TOGETHER WITH FRIENDS OR RELATIVES?: THREE TIMES A WEEK

## 2025-05-28 SDOH — ECONOMIC STABILITY: HOUSING INSECURITY: IN THE PAST 12 MONTHS, HOW MANY TIMES HAVE YOU MOVED WHERE YOU WERE LIVING?: 0

## 2025-05-28 ASSESSMENT — COGNITIVE AND FUNCTIONAL STATUS - GENERAL
STANDING UP FROM CHAIR USING ARMS: A LITTLE
WALKING IN HOSPITAL ROOM: A LITTLE
PERSONAL GROOMING: A LITTLE
MOVING FROM LYING ON BACK TO SITTING ON SIDE OF FLAT BED WITH BEDRAILS: A LITTLE
TURNING FROM BACK TO SIDE WHILE IN FLAT BAD: A LITTLE
TOILETING: A LITTLE
MOBILITY SCORE: 17
HELP NEEDED FOR BATHING: A LITTLE
DRESSING REGULAR UPPER BODY CLOTHING: A LITTLE
CLIMB 3 TO 5 STEPS WITH RAILING: A LOT
DAILY ACTIVITIY SCORE: 19
MOVING TO AND FROM BED TO CHAIR: A LITTLE
DRESSING REGULAR LOWER BODY CLOTHING: A LITTLE

## 2025-05-28 ASSESSMENT — ACTIVITIES OF DAILY LIVING (ADL)
HOME_MANAGEMENT_TIME_ENTRY: 13
LACK_OF_TRANSPORTATION: NO
BATHING_ASSISTANCE: MINIMAL
ADL_ASSISTANCE: INDEPENDENT
LACK_OF_TRANSPORTATION: NO

## 2025-05-28 ASSESSMENT — PAIN SCALES - GENERAL
PAINLEVEL_OUTOF10: 0 - NO PAIN
PAINLEVEL_OUTOF10: 0 - NO PAIN
PAINLEVEL_OUTOF10: 2

## 2025-05-28 ASSESSMENT — LIFESTYLE VARIABLES
SKIP TO QUESTIONS 9-10: 1
AUDIT-C TOTAL SCORE: 0

## 2025-05-28 ASSESSMENT — ENCOUNTER SYMPTOMS
NEAR-SYNCOPE: 1
LIGHT-HEADEDNESS: 1

## 2025-05-28 ASSESSMENT — PAIN - FUNCTIONAL ASSESSMENT
PAIN_FUNCTIONAL_ASSESSMENT: 0-10
PAIN_FUNCTIONAL_ASSESSMENT: 0-10

## 2025-05-28 NOTE — CARE PLAN
Problem: Mobility  Goal: Bed mobility including supine to sit and sit to supine independently.  Outcome: Progressing  Goal: Transfers including sit to stand and stand to sit modified independently.  Outcome: Progressing  Goal: Ambulate 60 feet with rolling walker and supervision.  Outcome: Progressing

## 2025-05-28 NOTE — PROGRESS NOTES
05/28/25 1025   Physical Activity   On average, how many days per week do you engage in moderate to strenuous exercise (like a brisk walk)? 0 days   On average, how many minutes do you engage in exercise at this level? 0 min   Financial Resource Strain   How hard is it for you to pay for the very basics like food, housing, medical care, and heating? Somewhat   Housing Stability   In the last 12 months, was there a time when you were not able to pay the mortgage or rent on time? N   In the past 12 months, how many times have you moved where you were living? 0   At any time in the past 12 months, were you homeless or living in a shelter (including now)? N   Transportation Needs   In the past 12 months, has lack of transportation kept you from medical appointments or from getting medications? no   In the past 12 months, has lack of transportation kept you from meetings, work, or from getting things needed for daily living? No   Food Insecurity   Within the past 12 months, you worried that your food would run out before you got the money to buy more. Never true   Within the past 12 months, the food you bought just didn't last and you didn't have money to get more. Never true   Stress   Do you feel stress - tense, restless, nervous, or anxious, or unable to sleep at night because your mind is troubled all the time - these days? Not at all   Social Connections   In a typical week, how many times do you talk on the phone with family, friends, or neighbors? More than 3   How often do you get together with friends or relatives? Three times   How often do you attend Episcopalian or Mandaeism services? Never   Do you belong to any clubs or organizations such as Episcopalian groups, unions, fraternal or athletic groups, or school groups? No   How often do you attend meetings of the clubs or organizations you belong to? Never   Are you , , , , never , or living with a partner?    Intimate  Partner Violence   Within the last year, have you been afraid of your partner or ex-partner? No   Within the last year, have you been humiliated or emotionally abused in other ways by your partner or ex-partner? No   Within the last year, have you been kicked, hit, slapped, or otherwise physically hurt by your partner or ex-partner? No   Within the last year, have you been raped or forced to have any kind of sexual activity by your partner or ex-partner? No   Alcohol Use   Q1: How often do you have a drink containing alcohol? Never   Q2: How many drinks containing alcohol do you have on a typical day when you are drinking? None   Q3: How often do you have six or more drinks on one occasion? Never   Utilities   In the past 12 months has the electric, gas, oil, or water company threatened to shut off services in your home? No   Health Literacy   How often do you need to have someone help you when you read instructions, pamphlets, or other written material from your doctor or pharmacy? Never   Follow-Ups   We make community resources available to all of our patients to assist with everyday needs. We may be able to connect you with those resources. Would you be interested? N

## 2025-05-28 NOTE — CARE PLAN
Problem: Fall/Injury  Goal: Not fall by end of shift  Outcome: Progressing  Goal: Be free from injury by end of the shift  Outcome: Progressing  Goal: Verbalize understanding of personal risk factors for fall in the hospital  Outcome: Progressing  Goal: Verbalize understanding of risk factor reduction measures to prevent injury from fall in the home  Outcome: Progressing  Goal: Use assistive devices by end of the shift  Outcome: Progressing  Goal: Pace activities to prevent fatigue by end of the shift  Outcome: Progressing     Problem: Pain - Adult  Goal: Verbalizes/displays adequate comfort level or baseline comfort level  Outcome: Progressing     Problem: Safety - Adult  Goal: Free from fall injury  Outcome: Progressing     Problem: Discharge Planning  Goal: Discharge to home or other facility with appropriate resources  Outcome: Progressing     Problem: Chronic Conditions and Co-morbidities  Goal: Patient's chronic conditions and co-morbidity symptoms are monitored and maintained or improved  Outcome: Progressing     Problem: Nutrition  Goal: Nutrient intake appropriate for maintaining nutritional needs  Outcome: Progressing   The patient's goals for the shift include no syncope    The clinical goals for the shift include no falls    Over the shift, the patient did not make progress toward the following goals. Barriers to progression include . Recommendations to address these barriers include .

## 2025-05-28 NOTE — PROGRESS NOTES
TCC met with patient. Assessment complete. Patient lives in her own home and her daughter, son in law and grandchild live with her. Patient has a ramp outside the home. Patient does not use the steps inside the home. Patient uses a rollator. Patient reports a fall on 3/17/2025. Patient does shopping online. Patient receives Meals on Wheels. Patient follows Dr. Danielito Roach. Patient fills prescriptions at [facility] in [facility]. At the time of discharge, patient will return home with no skilled services. Patient declining [facility]. Will follow.      05/28/25 1026   Discharge Planning   Living Arrangements Children   Support Systems Children;Family members   Type of Residence Private residence   Home or Post Acute Services None   Expected Discharge Disposition Home   Does the patient need discharge transport arranged? No   Financial Resource Strain   How hard is it for you to pay for the very basics like food, housing, medical care, and heating? Somewhat   Housing Stability   In the last 12 months, was there a time when you were not able to pay the mortgage or rent on time? N   In the past 12 months, how many times have you moved where you were living? 0   At any time in the past 12 months, were you homeless or living in a shelter (including now)? N   Transportation Needs   In the past 12 months, has lack of transportation kept you from medical appointments or from getting medications? no   In the past 12 months, has lack of transportation kept you from meetings, work, or from getting things needed for daily living? No

## 2025-05-28 NOTE — CARE PLAN
Problem: Fall/Injury  Goal: Not fall by end of shift  Outcome: Progressing  Goal: Be free from injury by end of the shift  Outcome: Progressing  Goal: Verbalize understanding of personal risk factors for fall in the hospital  Outcome: Progressing  Goal: Verbalize understanding of risk factor reduction measures to prevent injury from fall in the home  Outcome: Progressing  Goal: Use assistive devices by end of the shift  Outcome: Progressing  Goal: Pace activities to prevent fatigue by end of the shift  Outcome: Progressing   The patient's goals for the shift include no syncope    The clinical goals for the shift include maintain safety

## 2025-05-28 NOTE — PROGRESS NOTES
Physical Therapy    Physical Therapy Evaluation & Treatment    Patient Name: Angela Montelongo  MRN: [id number]  Department: [facility]  Room: [address] [place], [place] [address]  Today's Date: 5/28/2025   Time Calculation  Start Time: 0857  Stop Time: 0937  Time Calculation (min): 40 min    Assessment/Plan   PT Assessment  PT Assessment Results: Decreased strength, Decreased endurance, Impaired balance, Decreased mobility, Decreased safety awareness  Rehab Prognosis: Good  Barriers to Discharge Home: No anticipated barriers  Evaluation/Treatment Tolerance: Patient limited by fatigue  End of Session Communication: Bedside nurse  Assessment Comment: 74 year old female presents with decline from baseline functional mobility, impaired balance, decreased strength chiqui LE, and decreased tolerance to activity;  patient would benefit from skilled physical therapy services to safely maximize functional mobility to modified independent levels.  End of Session Patient Position: Bed, 3 rail up, Alarm on   IP OR SWING BED PT PLAN  Inpatient or Swing Bed: Inpatient  PT Plan  Treatment/Interventions: Bed mobility, Transfer training, Gait training, Balance training, Strengthening, Endurance training, Therapeutic exercise, Therapeutic activity  PT Plan: Ongoing PT  PT Frequency: 4 times per week  PT Discharge Recommendations: Low intensity level of continued care  PT Recommended Transfer Status: Assist x1  PT - OK to Discharge: Yes (with skilled physical therapy services at next level of care.)      Subjective     PT Visit Info:  PT Received On: 05/28/25  General Visit Information:  General  Reason for Referral: Impaired mobility with syncope, cardiogenic  Past Medical History Relevant to Rehab: CHF, EF 30%, HTN, CKD, hyperlipid, GERD, LE embolism, NSTEMI, bradycardia, DVT, OA, bursitis, gout, migraine HA, cardiac cath, CTR, choley, TKA  Co-Treatment: OT  Co-Treatment Reason: Limited tolerance to activity  Prior to Session Communication: Bedside nurse  Patient  Position Received: Bed, 3 rail up, Alarm off, not on at start of session  General Comment: 74 year old female admit from home with dizziness.  Home Living:  Home Living  Type of Home: House  Lives With:  (Daughter and son-in-law)  Home Adaptive Equipment: Walker rolling or standard (adjustable bed, wheelchair, lift chair)  Home Layout: One level  Home Access: Ramped entrance  Bathroom Shower/Tub: Walk-in shower  Bathroom Equipment: Grab bars in shower  Prior Level of Function:  Prior Function Per Pt/Caregiver Report  Ambulatory Assistance:  (Modified independent with rollator versus FWW)  Precautions:  Precautions  Medical Precautions: Fall precautions     Date/Time Vitals Session Patient Position Pulse Resp SpO2 BP MAP (mmHg)    05/28/25 0831 --  --  --  --  98 %  --  --     05/28/25 0845 --  Lying  76  --  --  108/62  77     05/28/25 0850 --  Sitting  82  --  --  158/75  94     05/28/25 0855 --  Standing  64  --  --  134/95  --     05/28/25 0857 --  Standing  64  --  97 %  134/95  104                Objective   Pain:  Pain Assessment  Pain Assessment: 0-10  0-10 (Numeric) Pain Score: 0 - No pain (Generalized soreness)  Cognition:  Cognition  Overall Cognitive Status: Within Functional Limits    General Assessments:                  Activity Tolerance  Endurance: Decreased tolerance for upright activites  Activity Tolerance Comments: Fatigue    Sensation  Sensation Comment: Patient reports chronic numbness martell LE feet    Strength  Strength Comments: Martell LE strength assessed via function  Coordination  Movements are Fluid and Coordinated: No  Lower Body Coordination: Slower rate of movement martell LE    Static Sitting Balance  Static Sitting-Balance Support: No upper extremity supported  Static Sitting-Level of Assistance: Close supervision  Static Sitting-Comment/Number of Minutes: Supervision with balance while sitting on side of bed    Static Standing Balance  Static Standing-Balance Support: Bilateral upper  extremity supported  Static Standing-Level of Assistance: Close supervision  Static Standing-Comment/Number of Minutes: Supervision with balance during static standing with rolling walker  Functional Assessments:  Bed Mobility  Bed Mobility: Yes  Bed Mobility 1  Bed Mobility 1: Supine to sitting  Level of Assistance 1: Close supervision  Bed Mobility Comments 1: Increased time and effort required to achieve supine to sit  Bed Mobility 2  Bed Mobility  2: Sitting to supine  Level of Assistance 2: Close supervision  Bed Mobility Comments 2: Increased effort required to lift chiqui LE into bed    Transfers  Transfer: Yes  Transfer 1  Transfer From 1: Sit to  Transfer to 1: Stand  Technique 1: Sit to stand  Transfer Device 1: Walker  Transfer Level of Assistance 1: Minimum assistance  Trials/Comments 1: Assist with trunk support and balance during sit ot stand from elevated sitting surface  Transfers 2  Transfer From 2: Stand to  Transfer to 2: Sit  Technique 2: Stand to sit  Transfer Device 2: Walker  Transfer Level of Assistance 2: Close supervision  Trials/Comments 2: Supervision with balance;  decreased eccentric control    Ambulation/Gait Training  Ambulation/Gait Training Performed: Yes  Ambulation/Gait Training 1  Surface 1: Level tile  Device 1: Rolling walker  Assistance 1: Minimum assistance  Comments/Distance (ft) 1: 12 feet x 2 with rollator and assist with balance;  patient ambulates with slow fely, non-reciprocating gait pattern, decreased step length chiqui LE, wide base of support, and forward flexed posture.    Stairs  Stairs: No  Extremity/Trunk Assessments:  RLE   RLE : Exceptions to WFL  Strength RLE  R Hip Flexion: 3-/5  R Knee Extension: 4-/5  R Ankle Dorsiflexion: 4-/5  LLE   LLE : Exceptions to WFL  Strength LLE  L Hip Flexion: 3-/5  L Knee Extension: 4-/5  L Ankle Dorsiflexion: 4-/5  Treatments:                 Bed Mobility  Bed Mobility: Yes  Bed Mobility 1  Bed Mobility 1: Supine to  sitting  Level of Assistance 1: Close supervision  Bed Mobility Comments 1: Increased time and effort required to achieve supine to sit  Bed Mobility 2  Bed Mobility  2: Sitting to supine  Level of Assistance 2: Close supervision  Bed Mobility Comments 2: Increased effort required to lift chiqui LE into bed    Ambulation/Gait Training  Ambulation/Gait Training Performed: Yes  Ambulation/Gait Training 1  Surface 1: Level tile  Device 1: Rolling walker  Assistance 1: Minimum assistance  Comments/Distance (ft) 1: 12 feet x 2 with rollator and assist with balance;  patient ambulates with slow fely, non-reciprocating gait pattern, decreased step length chiqui LE, wide base of support, and forward flexed posture.  Transfers  Transfer: Yes  Transfer 1  Transfer From 1: Sit to  Transfer to 1: Stand  Technique 1: Sit to stand  Transfer Device 1: Walker  Transfer Level of Assistance 1: Minimum assistance  Trials/Comments 1: Assist with trunk support and balance during sit ot stand from elevated sitting surface  Transfers 2  Transfer From 2: Stand to  Transfer to 2: Sit  Technique 2: Stand to sit  Transfer Device 2: Walker  Transfer Level of Assistance 2: Close supervision  Trials/Comments 2: Supervision with balance;  decreased eccentric control    Stairs  Stairs: No       Outcome Measures:  [facility] Basic Mobility  Turning from your back to your side while in a flat bed without using bedrails: A little  Moving from lying on your back to sitting on the side of a flat bed without using bedrails: A little  Moving to and from bed to chair (including a wheelchair): A little  Standing up from a chair using your arms (e.g. wheelchair or bedside chair): A little  To walk in hospital room: A little  Climbing 3-5 steps with railing: A lot  Basic Mobility - Total Score: 17    Encounter Problems       Encounter Problems (Active)       Mobility       Bed mobility including supine to sit and sit to supine independently. (Progressing)        Start:  05/28/25    Expected End:  06/11/25            Transfers including sit to stand and stand to sit modified independently. (Progressing)       Start:  05/28/25    Expected End:  06/11/25            Ambulate 60 feet with rolling walker and supervision. (Progressing)       Start:  05/28/25    Expected End:  06/11/25               Pain - Adult              Education Documentation  Mobility Training, taught by Ruben Ellis PT at 5/28/2025 10:00 AM.  Learner: Patient  Readiness: Acceptance  Method: Explanation, Demonstration  Response: Needs Reinforcement  Comment: Educated patient regarding acute skilled physical therapy services plan of care    Education Comments  No comments found.

## 2025-05-28 NOTE — TELEPHONE ENCOUNTER
Patient is active with [facility], followed by Torie Iraheta NP. Patient admitted to [facility] 5/27/25. Per H/P: Presented to ED c/o near syncope. Patient has HFrEF and goes to cardiac rehab. Patient stated she was feeling dizzy when she was on the bus on her way to cardiac rehab. Patient stated she thought she was going to pass out. Denied LOC. CT head noted no acute intracranial abnormality. Echo done 1/2025 noted EF of 30-35%. Cardiology consulted. [facility] will monitor hospitalization and discharge plan.

## 2025-05-28 NOTE — DISCHARGE SUMMARY
Discharge Diagnosis  Syncope, cardiogenic           Issues Requiring Follow-Up  Near syncope  Congestive heart failure with systolic dysfunction  Chronic kidney disease  GERD  Hypertension  Morbid obesity      Discharge Meds     Medication List      CHANGE how you take these medications     carvedilol 3.125 mg tablet; Commonly known as: Coreg; Take 1 tablet   (3.125 mg) by mouth 2 times a day. Hold for systolic blood pressure less   than 100 mmhg or heart rate less than 60; What changed: medication   strength, how much to take, additional instructions   oxyBUTYnin 5 mg tablet; Commonly known as: Ditropan; Take 2 tablets (10   mg) by mouth early in the morning..; What changed: when to take this   potassium chloride CR 20 mEq ER tablet; Commonly known as: Klor-Con M20;   Take 1 tablet (20 mEq) by mouth once daily. Do not crush or chew.; What   changed: when to take this     CONTINUE taking these medications     aspirin 81 mg EC tablet   atorvastatin 20 mg tablet; Commonly known as: Lipitor; Take 1 tablet (20   mg) by mouth once daily.   blood pressure monitor kit; Use as directed to monitor blood pressure   once daily   CALCIUM-VITAMIN D ORAL   famotidine 20 mg tablet; Commonly known as: Pepcid; Take 1 tablet (20   mg) by mouth 2 times a day.   Farxiga 10 mg tablet; Generic drug: dapagliflozin propanediol; Take 1   tablet (10 mg) by mouth once daily.   furosemide 40 mg tablet; Commonly known as: Lasix; Take 1 tablet (40 mg)   by mouth once daily.   gabapentin 100 mg capsule; Commonly known as: Neurontin; TAKE 2 CAPSULES   BY MOUTH 3 TIMES A DAY   losartan 50 mg tablet; Commonly known as: Cozaar; Take 1 tablet (50 mg)   by mouth once daily.   lubricating eye drops ophthalmic solution   multivitamin with minerals iron-free; Commonly known as: Centrum Silver   polyethylene glycol 17 gram packet; Commonly known as: Glycolax,   Miralax; Take 17 g by mouth once daily.     STOP taking these medications     acetaminophen 325  mg tablet; Commonly known as: Tylenol   BIOTIN ORAL       Test Results Pending At Discharge  Pending Labs       No current pending labs.            Hospital Course     Patient is a 74 years old  female with past medical history of congestive heart failure with systolic dysfunction, EF 30 to 35%, hypertension, chronic kidney disease hyperlipidemia and GERD.  Patient presented to [facility] with near syncope.  Patient was found to have low blood pressure per EMS.  She was admitted to hospital for further evaluation treatment.  Patient is on the Lasix, losartan and Coreg.  Patient is intermittently bradycardic.  Dose of Coreg was decreased to 3.125 twice a day.  Patient was advised to continue Coreg with hold parameter  Patient has history of chronic kidney disease.  She has slight elevation of creatinine.  Losartan was on hold.  CT scan of the brain did not reveal acute intracranial finding patient had orthostatics.  She was negative orthostatics  Patient denied loss of consciousness.  Patient see Dr. Wharton in a regularly basis.  She was evaluated by cardiology.  Patient was evaluated by PT OT.  Physical therapy recommended low intensity level of care patient was advised to continue with the same medication.  I advised her to decrease the dose of metoprolol only.  Patient goes to cardiac rehab in regularly basis.  She was advised by cardiology not to take Lasix in the day of cardiac rehab.  Patient is clinically hemodynamic stable to get discharge after Dr. Flynn see her.      Pertinent Physical Exam At Time of Discharge  Physical Exam  General: In non acute distress, present, morbidly obese cooperating during physical exam.  HEENT: Pupils are equal and reactive to light and commendation , oral mucosa moist, no JVD oral mucosa is moist.  Cardiovascular: PMI nondisplaced  Lungs: Clear to auscultation bilaterally, no wheezing, no crackles, no dullness to percussion.  Abdomen: No hepatosplenomegaly  appreciated, soft , not tender, positive bowel sounds, positive bowel movement.  Neuro: Alert and oriented x3, strength in upper and lower extremities , sensation intact.  Psych: Patient had great insight was going on  Musculoskeletal: +2 pedal edema both lower extremities  Vascular: Pulses are intact in upper and lower extremities  Skin: No petechiae, ecchymosis or other stigmata for dermatology disease.   Outpatient Follow-Up  Future Appointments   Date Time Provider Department Center   5/30/2025  1:00 PM [facility] [id number] CARDREHB ROOM [address]   6/2/2025  1:00 PM [facility] [id number] CARDREHB ROOM [address]   6/3/2025  1:00 PM [facility] [id number] CARDREHB ROOM [address]   6/6/2025  1:00 PM [facility] [id number] CARDREHB ROOM [address]   6/9/2025  1:00 PM [facility] [id number] CARDREHB ROOM [address]   6/10/2025  1:00 PM [facility] [id number] CARDREHB ROOM [address]   6/13/2025  1:00 PM [facility] [id number] CARDREHB ROOM [address]   6/16/2025  1:00 PM [facility] [id number] CARDREHB ROOM [address]   6/17/2025  1:00 PM [facility] [id number] CARDREHB ROOM [address]   6/18/2025  3:00 PM Brook Dangelo, PharmD [id number]   6/20/2025  1:00 PM [facility] [id number] CARDREHB ROOM [address]   6/23/2025  1:00 PM [facility] [id number] CARDREHB ROOM [address]   6/24/2025  1:00 PM [facility] [id number] CARDREHB ROOM [address]   6/26/2025  1:30 PM [facility] ECHO LAB 2 TRINIC1 [facility]   6/27/2025  1:00 PM [facility] [id number] CARDREHB ROOM [address]   6/30/2025  1:00 PM [facility] [id number] CARDREHB ROOM [address]   7/1/2025  1:00 PM [facility] [id number] CARDREHB ROOM [address]   7/7/2025  1:00 PM [facility] [id number] CARDREHB ROOM [address]   7/8/2025  1:00 PM [facility] [id number] CARDREHB ROOM [address]   7/9/2025  1:00 PM [facility] [id number] [place]   7/11/2025  1:00 PM [facility] [id number] CARDREHB ROOM [address]   7/14/2025  1:00 PM [facility] [id number] CARDREHB ROOM [address]   7/15/2025   1:00 PM [facility] [id number] CARDREHB ROOM [id number] [place]   7/18/2025  1:00 PM [facility] [id number] CARDREHB ROOM [id number] [place]   7/21/2025  1:00 PM [facility] [id number] CARDREHB ROOM [id number] [place]   7/22/2025  1:00 PM [facility] [id number] CARDREHB ROOM [id number] [place]   7/25/2025  1:00 PM [facility] [id number] CARDREHB ROOM [id number] [place]   7/28/2025  1:00 PM [facility] [id number] CARDREHB ROOM [id number] [place]   7/29/2025  1:00 PM [facility] [id number] CARDREHB ROOM [id number] [place]   7/30/2025  1:15 PM Millie Hernandez MD [id number] [place]   8/1/2025  1:00 PM [facility] [id number] CARDREHB ROOM [id number] [place]   8/4/2025  1:00 PM [facility] [id number] CARDREHB ROOM [id number] [place]   8/5/2025  1:00 PM [facility] [id number] CARDREHB ROOM [id number] [place]   8/8/2025  1:00 PM [facility] [id number] CARDREHB ROOM [id number] [place]   8/11/2025  1:00 PM [facility] [id number] CARDREHB ROOM [id number] [place]   Follow-up with Dr. Wharton in 3 weeks.  Follow-up with PCP in 2 weeks.    Time spent discharging the patient 38 minutes      Kieran Espinoza MD

## 2025-05-28 NOTE — CONSULTS
Inpatient consult to Cardiology  Consult performed by: ANGELITO Ware-CNP  Consult ordered by: Kieran Espinoza MD  Reason for consult: Near syncope        History Of Present Illness:    Angela Montelongo is a 74 y.o. female presenting with near syncope. She follows with Dr. Wharton for cardiology.  Past medical history includes nonischemic cardiomyopathy with an ejection fraction of 35%, Chronic heart failure with reduced ejection fraction, obesity, hypertension, hyperlipidemia, gastroesophageal reflux disease.  She did have a diagnostic cardiac catheterization in March of this year which revealed angiographically normal coronary arteries.  The patient reports that yesterday she was on her way to cardiac rehab on public transportation and noted that her vision was getting blurry.  When she arrived to cardiac rehab she became dizzy and weak.  She called a member of the staff to come evaluate her, they took her blood pressure which the patient reports was low with systolic in the 80s.  Of note, the patient does report to me that she took all of her morning medications yesterday prior to cardiac rehab which include carvedilol, losartan and furosemide.  EMS were called and the patient was brought to [facility] emergency department.  Lab work in the emergency department revealing a sodium of 139, calcium 3.8, creatinine 1.48 and hemoglobin of 13.4.  BNP very mildly elevated at 110.  High-sensitivity troponin levels minimally elevated but in a flat pattern at 18 and 12 not consistent with acute coronary syndrome.  CT of the head with no acute intracranial abnormality.  Chest x-ray with no evidence of acute cardiopulmonary process.  The patient was admitted to the hospital on telemetry for further assessment and management.    Review of Systems   Cardiovascular:  Positive for near-syncope.   Neurological:  Positive for light-headedness.   All other systems reviewed and are negative.        Last Recorded Vitals:  Vitals:     05/28/25 0845 05/28/25 0850 05/28/25 0855 05/28/25 0857   BP: 108/62 158/75 (!) 134/95 (!) 134/95   BP Location:    Right arm   Patient Position: Lying Sitting Standing    Pulse: 76 82 64 64   Resp:       Temp:       TempSrc:       SpO2:    97%   Weight:       Height:           Last Labs:  CBC - 5/28/2025:  5:55 AM  5.3 11.5 172    35.3      CMP - 5/28/2025:  5:55 AM  9.3 6.3 26 --- 0.7   3.7 3.4 15 100      PTT - No results in last year.  _   _ _     Troponin I, High Sensitivity   Date/Time Value Ref Range Status   05/27/2025 02:47 PM 12 0 - 13 ng/L Final   05/27/2025 02:00 PM 18 (H) 0 - 13 ng/L Final   10/19/2024 02:35 PM 35 (H) 0 - 13 ng/L Final     BNP   Date/Time Value Ref Range Status   05/27/2025 02:00  (H) 0 - 99 pg/mL Final   10/17/2024 06:18 PM 3,988 (H) 0 - 99 pg/mL Final     Hemoglobin A1C   Date/Time Value Ref Range Status   02/01/2019 07:03 PM 5.3 4.0 - 6.0 % Final     Comment:     Hemoglobin A1C levels are related to mean blood glucose during the   preceding 2-3 months. The relationship table below may be used as a   general guide. Each 1% increase in HGB A1C is a reflection of an   increase in mean glucose of approximately 30 mg/dl.   Reference: Diabetes Care, volume 29, supplement 1 Jan. 2006                        HGB A1C ................. Approx. Mean Glucose   _______________________________________________   6%   ...............................  120 mg/dl   7%   ...............................  150 mg/dl   8%   ...............................  180 mg/dl   9%   ...............................  210 mg/dl   10%  ...............................  240 mg/dl  Performed at [address] [address]       LDL Calculated   Date/Time Value Ref Range Status   06/07/2022 03:01 PM 81 65 - 130 MG/DL Final   09/22/2020 03:00 PM 88 65 - 130 MG/DL Final   02/02/2019 05:20 AM 44 (L) 65 - 130 MG/DL Final      Last I/O:  No intake/output data recorded.    Past Cardiology Tests (Last 3  Years):  Echo:  Transthoracic echo (TTE) complete 02/25/2025  CONCLUSIONS:   1. The left ventricular systolic function is moderately decreased, with a visually estimated ejection fraction of 30-35%.   2. There is global hypokinesis of the left ventricle with minor regional variations.   3. Spectral Doppler shows a Grade I (impaired relaxation pattern) of left ventricular diastolic filling with normal left atrial filling pressure.   4. Trace mitral valve regurgitation.   5. Right ventricular systolic pressure is within normal limits.   6. Trace tricuspid regurgitation is visualized.    Transthoracic Echo (TTE) Complete 10/18/2024    Ejection Fractions:  EF   Date/Time Value Ref Range Status   02/25/2025 02:05 PM 33 %    10/18/2024 02:33 PM 33 %      Cath:  Cardiac Catheterization Procedure 03/13/2025  Coronary Angiography:  The coronary circulation is right dominant.     Left Main Coronary Artery:  The left main appears angiographically normal.     Left Anterior Descending Coronary Artery Distribution:  The left anterior descending artery has no significant coronary disease. Patent diagonal 1 and diagonal 2. Nonobstructive disease. TOM-3 flow.     Circumflex Coronary Artery Distribution:  The circumflex revealed a normal vessel.     Right Coronary Artery Distribution:     The RCA showed a normal vessel.      Past Medical History:  She has a past medical history of Acute embolism and thrombosis of unspecified deep veins of unspecified lower extremity (08/13/2024), Acute non-ST segment elevation myocardial infarction (Multi) (01/20/2025), Acute on chronic diastolic congestive heart failure (10/18/2024), Angioedema (01/20/2025), Bradycardia (09/02/2024), Deep venous thrombosis (Multi) (08/13/2024), Elevation of levels of liver transaminase levels, Essential hypertension (04/22/2024), History of hypertension (01/20/2025), Hyperlipidemia (10/19/2023), Other conditions influencing health status, Personal history of other  complications of pregnancy, childbirth and the puerperium, Personal history of other diseases of the circulatory system, Personal history of other diseases of the musculoskeletal system and connective tissue, Personal history of other diseases of the musculoskeletal system and connective tissue, and Personal history of other diseases of the nervous system and sense organs.    Past Surgical History:  She has a past surgical history that includes Carpal tunnel release (04/09/2013); Knee Arthroplasty (04/09/2013); Cholecystectomy (04/09/2013); and Cardiac catheterization (Left, 3/13/2025).      Social History:  She reports that she has never smoked. She has never been exposed to tobacco smoke. She has never used smokeless tobacco. She reports that she does not currently use alcohol. She reports that she does not use drugs.    Family History:  Family History[1]     Allergies:  Ciprofloxacin; Lisinopril; Cefazolin; Cephalexin; and Tetanus toxoid, adsorbed    Inpatient Medications:  Scheduled Medications[2]  PRN Medications[3]  Continuous Medications[4]  Outpatient Medications:  Current Outpatient Medications   Medication Instructions    acetaminophen (TYLENOL) 650 mg, oral, Every 6 hours PRN    aspirin 81 mg, oral, Daily    atorvastatin (LIPITOR) 20 mg, oral, Daily    BIOTIN ORAL 1 tablet, Daily    blood pressure monitor kit Use as directed to monitor blood pressure once daily    calcium carb/vit D3/minerals (CALCIUM-VITAMIN D ORAL) 1 tablet, oral, Daily    carvedilol (COREG) 6.25 mg, oral, 2 times daily    famotidine (PEPCID) 20 mg, oral, 2 times daily    Farxiga 10 mg, oral, Daily    furosemide (LASIX) 40 mg, oral, Daily    gabapentin (Neurontin) 100 mg capsule TAKE 2 CAPSULES BY MOUTH 3 TIMES A DAY    losartan (COZAAR) 50 mg, oral, Daily    lubricating eye drops ophthalmic solution 1 drop, Both Eyes, As needed    multivitamin with minerals iron-free (Centrum Silver) 1 tablet, oral, Daily    oxyBUTYnin (DITROPAN) 10  mg, Nightly    polyethylene glycol (GLYCOLAX, MIRALAX) 17 g, oral, Daily    potassium chloride CR 20 mEq ER tablet 20 mEq, oral, Daily, Do not crush or chew.       Physical Exam  Vitals reviewed.   Constitutional:       Appearance: She is obese.   HENT:      Head: Normocephalic and atraumatic.   Cardiovascular:      Rate and Rhythm: Normal rate and regular rhythm.   Pulmonary:      Comments: No conversational dyspnea noted.  Breath sounds clear but diminished throughout.  Abdominal:      General: Bowel sounds are normal.      Palpations: Abdomen is soft.   Musculoskeletal:      Right lower leg: Edema present.      Left lower leg: Edema present.      Comments: Trace bilateral lower extremity pitting edema.   Skin:     General: Skin is warm and dry.      Capillary Refill: Capillary refill takes less than 2 seconds.   Neurological:      Mental Status: She is alert and oriented to person, place, and time.   Psychiatric:         Mood and Affect: Mood normal.         Behavior: Behavior normal.           Assessment/Plan   Near Syncope: In the setting of hypotension.  The patient does report that she took all of her morning medications prior to cardiac rehab.  I have advised her to hold her carvedilol or losartan if her systolic blood pressure is less than 100 when she checks it in the morning.  Additionally I have advised her not to take furosemide on cardiac rehab days, as she states she is often not drinking enough fluid on these days to avoid incontinence while on public transportation.  Orthostatic vital signs completed this morning were negative.  Agree with reduced dose of carvedilol.  Heart failure with reduced ejection fraction: Ejection fraction of 30 to 35% on echocardiogram completed in February of this year.  The patient has a repeat echocardiogram scheduled in approximately 1 month which I have advised her to keep.  No need to repeat echocardiogram this admission.  The patient is breathing comfortably on room  air and does not appear fluid volume overloaded on exam.  Nonischemic cardiomyopathy: Continue guideline directed medical therapy with carvedilol, losartan and Farxiga.  Repeat echocardiogram scheduled for 1 month.  Hypertensive Disorder: Continue reduced dose of carvedilol, losartan can be resumed at the time of discharge.  Hyperlipidemia: Continue atorvastatin 20 mg nightly.    Impression and Plan:    5/28: As described above.  Patient with a near syncopal event while she was in route to cardiac rehab yesterday.  Reportedly her blood pressure was quite low with systolics in the 80s.  Blood pressures are improved today with last recorded at 134/95.  The patient states that her symptoms have resolved completely since admission. Orthostatic vital signs completed this morning were negative.  I advised the patient to check her blood pressure daily, and hold carvedilol and losartan if her systolic blood pressure is less than 100.  Agree with reducing carvedilol dose to 3.125 mg twice daily.  Losartan can be resumed at discharge.  Additionally, I have advised the patient to hold furosemide on days when she goes to cardiac rehab to avoid overdiuresis.  No objections from the cardiac perspective for discharge home this afternoon.  I advised the patient to follow-up with Dr. Wharton in the outpatient setting in approximately 3 to 4 weeks.    Peripheral IV 05/27/25 20 G Left;Posterior Wrist (Active)   Site Assessment Clean;Dry;Intact 05/28/25 0900   Dressing Status Clean;Dry;Occlusive 05/28/25 0900   Number of days: 1       Code Status:  Full Code        ANGELITO Ware-CNP         [1]   Family History  Family history unknown: Yes   [2]   Scheduled medications   Medication Dose Route Frequency    aspirin  81 mg oral Daily    atorvastatin  20 mg oral Daily    carvedilol  3.125 mg oral BID    dapagliflozin propanediol  10 mg oral Daily    famotidine  20 mg oral Daily    Or    famotidine  20 mg intravenous Daily     furosemide  40 mg oral Daily    gabapentin  200 mg oral q8h    heparin (porcine)  5,000 Units subcutaneous q8h    multivitamin with minerals  1 tablet oral Daily    oxyBUTYnin  5 mg oral TID    oxygen   inhalation Continuous - Inhalation    polyethylene glycol  17 g oral Daily   [3]   PRN medications   Medication    acetaminophen    Or    acetaminophen    Or    acetaminophen    acetaminophen    Or    acetaminophen    Or    acetaminophen    melatonin    ondansetron    Or    ondansetron   [4]   Continuous Medications   Medication Dose Last Rate

## 2025-05-28 NOTE — PROGRESS NOTES
Occupational Therapy    Evaluation/Treatment    Patient Name: Angela Montelongo  MRN: [id number]  Department: [address]  Room: [address] [place] [address]  Today's Date: 05/28/25  Time Calculation  Start Time: 0846  Stop Time: 0928  Time Calculation (min): 42 min       Assessment:  OT Assessment: Pt presents on eval with generalized weakness,  decreased activity tolerance, and impaired standing balance affecting self-care and functional transfers/mobility. Pt will benefit from continued skilled OT to address these deficits and facilitate returning to functional baseline.  Prognosis: Good  Barriers to Discharge Home: Physical needs  Physical Needs: Intermittent mobility assistance needed, Intermittent ADL assistance needed  Evaluation/Treatment Tolerance: Patient limited by fatigue  End of Session Communication: Bedside nurse  End of Session Patient Position: Bed, 3 rail up, Alarm on (Needs in reach.)  OT Assessment Results: Decreased ADL status, Decreased endurance, Decreased functional mobility  Strengths: Premorbid level of function, Support of extended family/friends  Barriers to Participation: Comorbidities    Plan:  Treatment Interventions: ADL retraining, Functional transfer training, Endurance training, Patient/family training, Equipment evaluation/education, Neuromuscular reeducation, Compensatory technique education  OT Frequency: 4 times per week  OT Discharge Recommendations: Low intensity level of continued care  Equipment Recommended upon Discharge: Wheeled walker  OT Recommended Transfer Status: Minimal assist  OT - OK to Discharge: Yes      Subjective     OT Visit Info:  OT Received On: 05/28/25    General Visit Info:   General  Reason for Referral: Impaired ADL's/mobility with syncope, cardiogenic  Referred By: Kieran Espinoza MD  Past Medical History Relevant to Rehab: CHF, EF 30%, HTN, CKD, hyperlipid, GERD, LE embolism, NSTEMI, bradycardia, DVT, OA, bursitis, gout, migraine HA, cardiac cath, CTR, choley,  TKA  Family/Caregiver Present: No  Co-Treatment: PT  Co-Treatment Reason: Partial co-eval d/t limited activity tolerance  Prior to Session Communication: Bedside nurse  Patient Position Received: Bed, 3 rail up, Alarm off, not on at start of session  General Comment: 74 year old female admit from home with dizziness.     Precautions:  Hearing/Visual Limitations: mildly [place]  Medical Precautions: Fall precautions    Pain:  Pain Assessment  Pain Assessment: 0-10  0-10 (Numeric) Pain Score: 2  Pain Type: Chronic pain  Pain Location: Generalized  Pain Interventions: Ambulation/increased activity    Objective     Cognition:  Overall Cognitive Status: Within Functional Limits  Orientation Level: Oriented X4  Cognition Comments: Pt is pleasant and talkative throughout.  Insight: Within function limits    Home Living:  Type of Home: House  Lives With: Adult children, Other (Comment) (Daughter and son-in-law)  Home Adaptive Equipment: Walker rolling or standard, Other (Comment) (adjustable bed, wheelchair, lift chair)  Home Layout: One level  Home Access: Ramped entrance  Bathroom Shower/Tub: Walk-in shower  Bathroom Toilet: Standard  Bathroom Equipment: Grab bars in shower    Prior Function:  Level of [place]: Independent with ADLs and functional transfers, Needs assistance with homemaking  Receives Help From: Family  ADL Assistance: Independent  Homemaking Assistance: Needs assistance (family completes)  Ambulatory Assistance: Independent (Modified independent with rollator versus FWW)  Vocational: Retired  Hand Dominance: Right  Prior Function Comments: Pt uses public transportation or family provides.    ADL:  Eating Assistance: Independent  Grooming Assistance: Minimal  Grooming Deficit: Steadying, Standing with assistive device  Bathing Assistance: Minimal  UE Dressing Assistance: Stand by  UE Dressing Deficit: Setup  LE Dressing Assistance: Minimal  LE Dressing Deficit: Steadying, Pull up over hips, Other  (Comment) (Pt able to don bilateral socks with setup sitting EOB, but requires min A in standing when donning underwear/pants.)  Toileting Assistance with Device: Minimal    Activity Tolerance:  Activity Tolerance Comments: Fair- to Fair    Bed Mobility/Transfers: Bed Mobility  Bed Mobility:  (Pt completed supine<>sit in bed with close S and increased time to complete.)    Transfers  Transfer:  (Pt completed sit<>stand with min A for balance/safety.)    Functional Mobility:  Functional Mobility  Functional Mobility Performed:  (Pt tolerated functional mobility for a short household distance using her rollator with min A for balance/safety and verbal cues as needed due to obstacles in the room.)    Sitting Balance:  Static Sitting Balance  Static Sitting-Balance Support: Bilateral upper extremity supported, Feet supported  Static Sitting-Level of Assistance: Distant supervision    Standing Balance:  Static Standing Balance  Static Standing-Balance Support: Bilateral upper extremity supported  Static Standing-Level of Assistance: Minimum assistance    Therapy/Activity: Therapeutic Activity  Therapeutic Activity Performed:  (See bed mobility, transfers, LB dressing, functional mobility, and static sitting/standing balance activity with verbal cues throughout.)    Sensation:  Sensation Comment: BUE's WFL    Strength:  Strength Comments: BUE's WFL    Coordination:  Coordination Comment: BUE's WFL grossly     Hand Function:  Hand Function  Gross Grasp: Functional  Coordination: Functional      Outcome Measures: [facility] Daily Activity  Putting on and taking off regular lower body clothing: A little  Bathing (including washing, rinsing, drying): A little  Putting on and taking off regular upper body clothing: A little  Toileting, which includes using toilet, bedpan or urinal: A little  Taking care of personal grooming such as brushing teeth: A little  Eating Meals: None  Daily Activity - Total Score: 19    Education  Documentation  Body Mechanics, taught by Daryl Eldridge Jr., OT at 5/28/2025 11:22 AM.  Learner: Patient  Readiness: Acceptance  Method: Explanation  Response: Verbalizes Understanding  Comment: Education and verbal cues provided throughout eval.    ADL Training, taught by Daryl Eldridge Jr., OT at 5/28/2025 11:22 AM.  Learner: Patient  Readiness: Acceptance  Method: Explanation  Response: Verbalizes Understanding  Comment: Education and verbal cues provided throughout eval.    Education Comments  No comments found.    Goals:  Encounter Problems       Encounter Problems (Active)       OT Goals       Pt will complete all ADL's with mod indep/indep using adaptive equipment as needed. (Progressing)       Start:  05/28/25    Expected End:  06/28/25            Pt will complete all functional transfers and mobility with mod indep using her rollator. (Progressing)       Start:  05/28/25    Expected End:  06/28/25            Pt will tolerate functional mobility for a household distance using her rollator with mod indep. (Progressing)       Start:  05/28/25    Expected End:  06/28/25

## 2025-05-28 NOTE — PROGRESS NOTES
Angela Montelongo is a 74 y.o. female on day 1 of admission presenting with Syncope, cardiogenic.      Subjective   Patient was admitted with a near syncope.  She denied chest pain or shortness of breath.  Patient denied dizziness.     Objective     Last Recorded Vitals  BP (!) 134/95 (Patient Position: Standing)   Pulse 64   Temp 36.5 °C (97.7 °F) (Oral)   Resp 17   Wt 104 kg (230 lb)   SpO2 98%   Intake/Output last 3 Shifts:  No intake or output data in the 24 hours ending 05/28/25 0923    Admission Weight  Weight: 104 kg (230 lb) (05/27/25 1355)    Daily Weight  05/27/25 : 104 kg (230 lb)    Image Results  ECG 12 lead  Sinus rhythm with Premature atrial complexes  Left axis deviation  Nonspecific intraventricular block  Cannot rule out Septal infarct (cited on or before 17-OCT-2024)  Abnormal ECG  No significant change was found  When compared with ECG of 27-MAY-2025 14:01, (unconfirmed)    Confirmed by Bora Delgado ([id number]) on 5/28/2025 8:09:10 AM      Physical Exam    General:  cooperating during physical exam.  HEENT: Pupils are equal and reactive to light and commendation , oral mucosa moist, no JVD oral mucosa is moist.  Cardiovascular: PMI nondisplaced  Lungs: Clear to auscultation bilaterally, no wheezing, no crackles, no dullness to percussion.  Abdomen: No hepatosplenomegaly appreciated, soft , not tender, positive bowel sounds, positive bowel movement.  Neuro: Alert and oriented x3, strength in upper and lower extremities , sensation intact.  Psych: Patient had great insight was going on  Musculoskeletal: +1 pedal edema both lower extremities  Vascular: Pulses are intact in upper and lower extremities  Skin: No petechiae, ecchymosis or other stigmata for dermatology disease.       Assessment and plan    Near syncope  History of heart failure with reduced EF  Orthostatics negative  Most likely secondary to hypotension  CT brain no acute intracranial finding  Waiting for cardiology to see her today  chronic CHF systolic dysfunction  From last 2D echo    Patient had EF 30 to 35%.  Continue with Coreg Farxiga and Lasix  Waiting for cardiology to see her today.  CHF navigator    Hypertension  Continue with current medication.    Acute kidney injury  Creatinine improved today.    GERD    Morbid obesity  BMI 40.7  Advised regarding diet    Deconditioning  PT OT to see her  Fall precaution  Ambulate with assistant.    Waiting for cardiology to see her today.  Check CBC and BMP in a.m.      Kieran Espinoza MD

## 2025-05-29 NOTE — TELEPHONE ENCOUNTER
"JAYLON Vogt - Patient discharged home 5/28/25. Phoned patient and offered to schedule a [facility] post acute visit. Offered visits on 6/6/25 and 6/11/25, she declined a visit at this time. She stated, \"I have so much going on\". She reported she will attending cardiac rehab 3 x per week starting next week. She reported her ex  passed away in March and this week is his celebration of life. She stated, \"They told me to follow up with Dr. Roach\". Informed again that [facility] could see her for a post acute visit. She reported she will reach out to her PCP and cardiology offices next week. She reported she had medication changes while IP, advised on importance of PCP and/or cardiology follow up care and she verbalized an understanding. Your next visit is scheduled 7/9/25. Patient in agreement to contact the [facility] office as needed in the interim and if she would like a visit scheduled sooner.   "

## 2025-05-30 ENCOUNTER — APPOINTMENT (OUTPATIENT)
Dept: CARDIAC REHAB | Facility: CLINIC | Age: 75
End: 2025-05-30
Payer: MEDICARE

## 2025-05-30 NOTE — PROGRESS NOTES
Cardiac Rehabilitation 30 Day Progress Report    Name: Angela Montelongo  Medical Record Number: [id number]  YOB: 1950  Age: 74 y.o.    Today’s Date: 5/30/2025  Primary Care Physician: Danielito Roach DO  Referring Physician: Cristina Wharton MD  Program Location: [address]  Primary Diagnosis:   1. Acute systolic heart failure  Referral to Cardiac Rehab         Onset/Date of Diagnosis: 9/2024  Session #:  4  AACVPR Risk Stratification: High  Falls Risk: High  Psychosocial Assessment   Pre PHQ-9: 8  Sent PH-Q 9 to MD if score > 20: No; score < 20  Pt reported/currently experiencing stress: Yes; Stress; Severity: moderate  Patient uses stress management skills: No   History of: no history of anxiety or depression  Currently seeing a mental health provider: No  Social Support: Yes, Whom:Daughter and son in law  Quality of Life Survey: KCCQ (see Heart Failure Management below)  Learning Assessment:  Learning assessment/barriers: Physical and Transportation  Preferred learning method: Reading handout and Writing handout  Barriers: Physical limitations  Comments:  Stages of Change: action    Psychosocial Plan  Goal Status: In Progress  Psychosocial Goals: Maintain or lower PH-Q 9 score by discharge and Identify strategies for managing depression  Psychosocial Interventions/Education:   5/5/25 reviewed initial PHQ-9 score with pt at eval/      Nutrition Assessment:    Hyperlipidemia: Yes     Lipids:   Lab Results   Component Value Date    CHOL 164 06/07/2022    HDL 65 06/07/2022    TRIG 91 06/07/2022       Current Dietary Guidelines: Low sodium  Barriers to dietary change: no    Diet Habit Survey: Picture Your Plate  Pre: Initial survey given. Pending completion and return from patient.  Post: To be done at discharge.    Diabetes Assessment    Lab Results   Component Value Date    HGBA1C 5.3 02/01/2019       History of Diabetes: No    Weight Management    Height: 160 cm (5'  "3\")  Weight: 104 kg (229 lb)  BMI (Calculated): 40.58      Nutrition Plan  Goal Status: In progress  Nutrition Goals: Improve Diet Habit Survey score by 5-10 points by discharge, Adapt a low-sodium, DASH diet prior to discharge, Adapt a Mediterranean focused diet prior to discharge, and Learn how to read and interpret nutrition labels prior to discharge  Nutrition Interventions/Education:   5/16/2025  Education on cholesterol was done during today's session. Discussed with patient the different types of cholesterol, risk factors and how to make lifestyle modifications to improve levels. Handout and patient's recent lab results were given for home review. Signature Denise Lawrence RN    Exercise Assessment    Home exercise:No  Mode: NA  Frequency: NA  Duration: NA    Exercise Prescription     Exercise Prescription based on: Duke Activity Status Index (DASI)    DASI Score: 16.2   MET Score: 4.7   Frequency:  3 days/week   Mode: NuStep and Recumbent Cycle   Duration: 21 total aerobic minutes   Intensity: RPE 12-14  Target HR:  To be calculated after 6 attended sessions.  MET Level: 2  Patient wears supplemental O2: No     Modality Workload METs Duration (minutes)   1 Pre-Exercise   4:00   2 NuStep Load 1 @ 32 lugo  2 7 :00   3 Recumbent Bike Load 1 @ 45 rpm  1.3 7 :00   4 Schwinn Airdyne 0.4 Load  1.5 7 :00          6 Post-Exercise   4:00     Resistance Training: No   Home Exercise Prescription given: To be given prior to discharge from program.    Exercise Plan  Goal Status: In Progress  Exercise Goals: Increase exercise MET level by 5-10% each week, Increase total exercise duration to 30-45 minutes, and Establish a home exercise program before discharge  Exercise Interventions/Education:       Other Core Components/Risk Factor Assessment:    Medication adherence  Current Medications:   Medication Documentation Review Audit       Reviewed by Jose Francisco Montiel (Technician) on 05/27/25 at 1745      Medication Order " Taking? Sig Documenting Provider Last Dose Status   acetaminophen (Tylenol) 325 mg tablet [id number] No Take 2 tablets (650 mg) by mouth every 6 hours if needed for mild pain (1 - 3). ANGELITO Blevins-CNP Unknown Active   aspirin 81 mg EC tablet [id number] Yes Take 1 tablet (81 mg) by mouth once daily. Historical Provider, MD 5/27/2025 Morning Active   atorvastatin (Lipitor) 20 mg tablet [id number] Yes Take 1 tablet (20 mg) by mouth once daily. Danielito Roach DO 5/27/2025 Morning Active   blood pressure monitor kit [id number]  Use as directed to monitor blood pressure once daily Cristina Wharton MD  Active   calcium carb/vit D3/minerals (CALCIUM-VITAMIN D ORAL) [id number] Yes Take 1 tablet by mouth once daily. Historical Provider, MD 5/26/2025 Noon Active   carvedilol (Coreg) 6.25 mg tablet [id number] Yes Take 1 tablet (6.25 mg) by mouth 2 times a day. HALEY Ferrara 5/27/2025 Morning Active   dapagliflozin propanediol (Farxiga) 10 mg [id number] Yes Take 1 tablet (10 mg) by mouth once daily. Cristina Wharton MD 5/27/2025 Morning Active   famotidine (Pepcid) 20 mg tablet [id number] Yes Take 1 tablet (20 mg) by mouth 2 times a day. Danielito Roach DO 5/27/2025 Morning Active   furosemide (Lasix) 40 mg tablet [id number] Yes Take 1 tablet (40 mg) by mouth once daily. Cristina Wharton MD 5/27/2025 Morning Active   gabapentin (Neurontin) 100 mg capsule [id number] Yes TAKE 2 CAPSULES BY MOUTH 3 TIMES A DAY Danielito Roach DO 5/27/2025 Morning Active   losartan (Cozaar) 50 mg tablet [id number] Yes Take 1 tablet (50 mg) by mouth once daily. Cristina Wharton MD 5/27/2025 Morning Active   lubricating eye drops ophthalmic solution [id number] No Administer 1 drop into both eyes if needed for dry eyes. Historical Provider, MD Unknown Active   multivitamin with minerals iron-free (Centrum Silver) [id number] Yes Take 1 tablet by mouth once daily. Historical Provider, MD 5/27/2025 Morning Active   oxyBUTYnin (Ditropan) 5  mg tablet [id number] Yes Take 2 tablets (10 mg) by mouth once daily at bedtime. Historical Provider, MD 5/26/2025 Bedtime Active   polyethylene glycol (Glycolax, Miralax) 17 gram packet [id number] No Take 17 g by mouth once daily.   Patient not taking: Reported on 5/27/2025    Penelope TERESSA Mcgee, APRN-CNP Not Taking Active   potassium chloride CR 20 mEq ER tablet [id number] Yes Take 1 tablet (20 mEq) by mouth once daily. Do not crush or chew.   Patient taking differently: Take 1 tablet (20 mEq) by mouth 2 times a day. Do not crush or chew.    Cristina Wharton MD 5/27/2025 Morning Active                                 Medication compliance: Yes   Uses pill box/organizer: Yes    Carries medication list: No     Blood Pressure Management  History of Hypertension: Yes   Medication Changes: Yes   Resting BP:   WNL    Heart Failure Management  Hx of Heart Failure: Yes;   Type (selection): HFrEF  Most recent EF: 35    Onset of heart failure diagnosis: 9/2024  Last heart failure hospitalization: 10/2024  Number of HF admissions per year: 1  Symptoms: Fatigue at rest, Fatigue with exertion, Shortness of breath, and LE edema  Is there a family Hx of HF: Yes   Does patient obtain daily weight No     KCCQ survey: Pre:   Post:     Heart Failure Reassessment: In progress  Heart Failure Goals: Able to verbalize signs and symptoms of fluid retention and when to contact MD, Adhere to proper fluid restrictions, Adapt a low sodium diet and verbalize guidelines, and Obtain daily weight and verbalize proper method of obtaining weights  Smoking/Tobacco Assessment  Tobacco Use History[1]    Other Core Component Plan  Goal Status: In progress  Other Core Component Goals: Medication compliance and Achieve resting BP of < 130/80 by discharge  Other Core Component Interventions/Education:     Individual Patient Goals:    Shower without assistance by discharge  Be able to get out of house and be more independent    Goal Status: In progress    Staff  Comments:  Ms. Montelongo has only attended 4 cardiac rehab exercise sessions since beginning the program.  She has begun to make slow progress on her outcomes and goals.  She is very deconditioned.  No cardiac complaints voiced.    Rehab Staff Signature: Ofe Sue MS, CCRP             [1]   Social History  Tobacco Use   Smoking Status Never    Passive exposure: Never   Smokeless Tobacco Never

## 2025-06-02 ENCOUNTER — CLINICAL SUPPORT (OUTPATIENT)
Dept: CARDIAC REHAB | Facility: CLINIC | Age: 75
End: 2025-06-02
Payer: MEDICARE

## 2025-06-02 DIAGNOSIS — I50.21 ACUTE SYSTOLIC HEART FAILURE: ICD-10-CM

## 2025-06-02 PROCEDURE — 93798 PHYS/QHP OP CAR RHAB W/ECG: CPT | Performed by: INTERNAL MEDICINE

## 2025-06-03 ENCOUNTER — CLINICAL SUPPORT (OUTPATIENT)
Dept: CARDIAC REHAB | Facility: CLINIC | Age: 75
End: 2025-06-03
Payer: MEDICARE

## 2025-06-03 DIAGNOSIS — I50.21 ACUTE SYSTOLIC HEART FAILURE: ICD-10-CM

## 2025-06-03 PROCEDURE — 93798 PHYS/QHP OP CAR RHAB W/ECG: CPT | Performed by: INTERNAL MEDICINE

## 2025-06-05 DIAGNOSIS — I50.41 ACUTE COMBINED SYSTOLIC AND DIASTOLIC HEART FAILURE: Primary | ICD-10-CM

## 2025-06-06 ENCOUNTER — CLINICAL SUPPORT (OUTPATIENT)
Dept: CARDIAC REHAB | Facility: CLINIC | Age: 75
End: 2025-06-06
Payer: MEDICARE

## 2025-06-06 DIAGNOSIS — I50.21 ACUTE SYSTOLIC HEART FAILURE: ICD-10-CM

## 2025-06-06 PROCEDURE — 93798 PHYS/QHP OP CAR RHAB W/ECG: CPT | Performed by: INTERNAL MEDICINE

## 2025-06-06 NOTE — PROGRESS NOTES
Angela TERESSA Montelongo  1950  [id number]  74 y.o.    [facility] [id number]ARD[address]      Cardiac/Pulmonary Rehab Nutrition Education    6/6/2025  Pt has to rely on others to pick out her foods for her due to not driving and being able to get to the store. Sometimes they are not brining foods that are healthy for her. She is also on a tight budget restriction. Encouraged pt to contact [facility] on [facility] to see if she was eligible for their  program. Also discussed multiple free produce locations for her to consider getting healthier food options from each week/month. Label reading education was done with program's RDN during today's visit. Label Reading Highlights handout was given with information discussed during appointment and to assist in review of label contents at home.      Signature Angela Leavitt RDN, LD

## 2025-06-09 ENCOUNTER — APPOINTMENT (OUTPATIENT)
Dept: CARDIAC REHAB | Facility: CLINIC | Age: 75
End: 2025-06-09
Payer: MEDICARE

## 2025-06-10 ENCOUNTER — CLINICAL SUPPORT (OUTPATIENT)
Dept: CARDIAC REHAB | Facility: CLINIC | Age: 75
End: 2025-06-10
Payer: MEDICARE

## 2025-06-10 DIAGNOSIS — I50.21 ACUTE SYSTOLIC HEART FAILURE: ICD-10-CM

## 2025-06-10 PROCEDURE — 93798 PHYS/QHP OP CAR RHAB W/ECG: CPT | Performed by: INTERNAL MEDICINE

## 2025-06-10 NOTE — PROGRESS NOTES
Angela Montelongo  1950  [id number]  74 y.o.    [facility] [id number]ERNARD[address]      Cardiac/Pulmonary Rehab Nutrition Education    6/10/2025  Gave and reviewed PYP with pt. PYP score 57. Encouraged pt to add whole grains and fatty fish. Decrease whole dairy products and processed foods. Gave handout on [facility] [employer] to help patient with additional ways of getting food due to limited income. Pt has not reached out to [facility] on aging yet. Encouraged to contact them regarding additional services available to her. No questions at this time. Encouraged to further review at home and follow up with questions as needed.     Signature Angela Leavitt, SU, LD

## 2025-06-12 ENCOUNTER — HOSPITAL ENCOUNTER (OUTPATIENT)
Dept: RADIOLOGY | Facility: CLINIC | Age: 75
Discharge: HOME | End: 2025-06-12
Payer: MEDICARE

## 2025-06-12 ENCOUNTER — OFFICE VISIT (OUTPATIENT)
Dept: PRIMARY CARE | Facility: CLINIC | Age: 75
End: 2025-06-12
Payer: MEDICARE

## 2025-06-12 VITALS
HEART RATE: 84 BPM | RESPIRATION RATE: 18 BRPM | OXYGEN SATURATION: 97 % | TEMPERATURE: 97 F | SYSTOLIC BLOOD PRESSURE: 86 MMHG | BODY MASS INDEX: 40.74 KG/M2 | HEIGHT: 63 IN | DIASTOLIC BLOOD PRESSURE: 58 MMHG

## 2025-06-12 DIAGNOSIS — K64.4 EXTERNAL HEMORRHOIDS: ICD-10-CM

## 2025-06-12 DIAGNOSIS — M54.2 NECK PAIN: Primary | ICD-10-CM

## 2025-06-12 DIAGNOSIS — R35.0 URINARY FREQUENCY: ICD-10-CM

## 2025-06-12 DIAGNOSIS — I95.9 HYPOTENSION, UNSPECIFIED HYPOTENSION TYPE: Primary | ICD-10-CM

## 2025-06-12 DIAGNOSIS — M54.2 NECK PAIN: ICD-10-CM

## 2025-06-12 LAB
POC APPEARANCE, URINE: CLEAR
POC BILIRUBIN, URINE: NEGATIVE
POC BLOOD, URINE: NEGATIVE
POC COLOR, URINE: YELLOW
POC GLUCOSE, URINE: ABNORMAL MG/DL
POC KETONES, URINE: NEGATIVE MG/DL
POC LEUKOCYTES, URINE: NEGATIVE
POC NITRITE,URINE: NEGATIVE
POC PH, URINE: 7 PH
POC PROTEIN, URINE: NEGATIVE MG/DL
POC SPECIFIC GRAVITY, URINE: 1.01
POC UROBILINOGEN, URINE: 0.2 EU/DL

## 2025-06-12 PROCEDURE — 99213 OFFICE O/P EST LOW 20 MIN: CPT | Performed by: FAMILY MEDICINE

## 2025-06-12 PROCEDURE — 72050 X-RAY EXAM NECK SPINE 4/5VWS: CPT

## 2025-06-12 PROCEDURE — 72050 X-RAY EXAM NECK SPINE 4/5VWS: CPT | Performed by: STUDENT IN AN ORGANIZED HEALTH CARE EDUCATION/TRAINING PROGRAM

## 2025-06-12 PROCEDURE — 1111F DSCHRG MED/CURRENT MED MERGE: CPT | Performed by: FAMILY MEDICINE

## 2025-06-12 PROCEDURE — 1126F AMNT PAIN NOTED NONE PRSNT: CPT | Performed by: FAMILY MEDICINE

## 2025-06-12 PROCEDURE — 1036F TOBACCO NON-USER: CPT | Performed by: FAMILY MEDICINE

## 2025-06-12 PROCEDURE — 81003 URINALYSIS AUTO W/O SCOPE: CPT | Performed by: FAMILY MEDICINE

## 2025-06-12 PROCEDURE — 3074F SYST BP LT 130 MM HG: CPT | Performed by: FAMILY MEDICINE

## 2025-06-12 PROCEDURE — 3078F DIAST BP <80 MM HG: CPT | Performed by: FAMILY MEDICINE

## 2025-06-12 PROCEDURE — 1159F MED LIST DOCD IN RCRD: CPT | Performed by: FAMILY MEDICINE

## 2025-06-12 RX ORDER — LOSARTAN POTASSIUM 25 MG/1
25 TABLET ORAL DAILY
Qty: 30 TABLET | Refills: 11 | Status: SHIPPED | OUTPATIENT
Start: 2025-06-12 | End: 2026-06-12

## 2025-06-12 ASSESSMENT — PATIENT HEALTH QUESTIONNAIRE - PHQ9
SUM OF ALL RESPONSES TO PHQ9 QUESTIONS 1 AND 2: 2
2. FEELING DOWN, DEPRESSED OR HOPELESS: SEVERAL DAYS
1. LITTLE INTEREST OR PLEASURE IN DOING THINGS: SEVERAL DAYS

## 2025-06-12 ASSESSMENT — PAIN SCALES - GENERAL: PAINLEVEL_OUTOF10: 0-NO PAIN

## 2025-06-12 NOTE — PROGRESS NOTES
"Subjective   Patient ID: Angela Montelongo is a 74 y.o. female who presents for Follow-up (Pt here for follow up from [facility] 5/27/25 for low bp).    HPI     Review of Systems    Objective   BP 86/58   Pulse 84   Temp 36.1 °C (97 °F)   Resp 18   Ht 1.6 m (5' 3\")   SpO2 97%   BMI 40.74 kg/m²     Physical Exam    Assessment/Plan   Problem List Items Addressed This Visit    None  Visit Diagnoses         Codes      Hypotension, unspecified hypotension type    -  Primary I95.9    Relevant Medications    losartan (Cozaar) 25 mg tablet      Neck pain     M54.2    Relevant Orders    Referral to Physical Therapy      External hemorrhoids     K64.4    Relevant Orders    Referral to Gastroenterology               "

## 2025-06-12 NOTE — PROGRESS NOTES
"Subjective   Patient ID: Angela Montelongo is a 74 y.o. female who presents for Follow-up (Pt here for follow up from [facility] [place] 5/27/25 for low bp).    HPI     Review of Systems    Objective   BP 86/58   Pulse 84   Temp 36.1 °C (97 °F)   Resp 18   Ht 1.6 m (5' 3\")   SpO2 97%   BMI 40.74 kg/m²     Physical Exam  Constitutional:       General: She is not in acute distress.     Appearance: Normal appearance.   Cardiovascular:      Rate and Rhythm: Normal rate and regular rhythm.      Heart sounds: No murmur heard.  Pulmonary:      Breath sounds: Normal breath sounds. No wheezing.   Neurological:      Mental Status: She is alert.         Assessment/Plan   Problem List Items Addressed This Visit    None  Visit Diagnoses         Codes      Hypotension, unspecified hypotension type    -  Primary I95.9    Relevant Medications    losartan (Cozaar) 25 mg tablet             Pt to call w bp readings in 5 days.   "

## 2025-06-12 NOTE — PROGRESS NOTES
Subjective   Patient ID: Angela Montelongo is a 74 y.o. female who presents for No chief complaint on file..    HPI     Review of Systems    Objective   There were no vitals taken for this visit.    Physical Exam    Assessment/Plan

## 2025-06-13 ENCOUNTER — CLINICAL SUPPORT (OUTPATIENT)
Dept: CARDIAC REHAB | Facility: CLINIC | Age: 75
End: 2025-06-13
Payer: MEDICARE

## 2025-06-13 DIAGNOSIS — I50.21 ACUTE SYSTOLIC HEART FAILURE: ICD-10-CM

## 2025-06-13 PROCEDURE — 93798 PHYS/QHP OP CAR RHAB W/ECG: CPT | Performed by: INTERNAL MEDICINE

## 2025-06-13 NOTE — PROGRESS NOTES
Cardiac Rehab Education  Angela Montelongo   [id number]    6/13/2025  Education on portion sizes was done during today's session. Gave visual demonstration of portions. Handouts were given for home reference and review.     Signature Denise Lawrence RN

## 2025-06-14 LAB — BACTERIA UR CULT: NORMAL

## 2025-06-16 ENCOUNTER — CLINICAL SUPPORT (OUTPATIENT)
Dept: CARDIAC REHAB | Facility: CLINIC | Age: 75
End: 2025-06-16
Payer: MEDICARE

## 2025-06-16 DIAGNOSIS — I50.21 ACUTE SYSTOLIC HEART FAILURE: ICD-10-CM

## 2025-06-16 PROCEDURE — 93798 PHYS/QHP OP CAR RHAB W/ECG: CPT | Performed by: INTERNAL MEDICINE

## 2025-06-16 PROCEDURE — RXMED WILLOW AMBULATORY MEDICATION CHARGE

## 2025-06-17 ENCOUNTER — CLINICAL SUPPORT (OUTPATIENT)
Dept: CARDIAC REHAB | Facility: CLINIC | Age: 75
End: 2025-06-17
Payer: MEDICARE

## 2025-06-17 DIAGNOSIS — I50.21 ACUTE SYSTOLIC HEART FAILURE: ICD-10-CM

## 2025-06-17 PROCEDURE — 93798 PHYS/QHP OP CAR RHAB W/ECG: CPT | Performed by: INTERNAL MEDICINE

## 2025-06-18 ENCOUNTER — APPOINTMENT (OUTPATIENT)
Dept: PHARMACY | Facility: HOSPITAL | Age: 75
End: 2025-06-18
Payer: MEDICARE

## 2025-06-18 DIAGNOSIS — I50.41 ACUTE COMBINED SYSTOLIC AND DIASTOLIC HEART FAILURE: ICD-10-CM

## 2025-06-18 NOTE — PROGRESS NOTES
Pharmacist Clinic: Cardiology Management    Angela Montelongo is a 75 y.o. female was referred to Clinical Pharmacy Team for heart failure/cardiomyopathy management.     Referring Provider: Cristina Wharton MD    THIS IS A FOLLOW UP PATIENT APPOINTMENT. AT LAST VISIT ON 3/18/2025 WITH PHARMACIST (Brook Dangelo).    Appointment was completed by the patient who was reached at .    REVIEW OF PAST APPNT (IF APPLICABLE):   Today's appointment was 3 month follow up regarding heart failure pharmacotherapy.  Angela reports dyspnea on exertion that varies day to day, often has to take breaks throughout activities. No increased SOB with lying flat or dry/hacking cough. Reports persistent leg swelling after knee replacement, no acute change at today's appointment.   Previous dizziness has improved with decreased dosage of carvedilol, patient endorses some dizziness with position changes but this resolves quickly. Discussed home BP monitoring, which patient is agreeable to. Will send prescription for BP cuff for patient to monitor.  Is still experiencing increased urination, denies current dysuria and recently had urinalysis completed after PCP appointment which did not indicate acute infection. She previously received Botox injections due to urinary incontinence but is overdue, recommended discussion with her provider regarding this.   She inquired about taking biotin for recently noted hair loss, no drug interactions found with current medications. Did advise discussion with her providers prior to taking this as biotin may interfere with some laboratory tests.  Angela also endorses a fall last night after losing her balance while preparing food. Fell on her backside and endorses slightly bumping her head. No loss of consciousness. She did call the fire department to help her up. Endorses a slight headache and aches in her back/neck this morning, but often awakes with a headache. Took Tylenol and endorses resolution of  symptoms. No changes in vision or dizziness.  Highly recommended patient call her PCP regarding fall yesterday due to head strike and subsequent headache, to determine if further evaluation is needed. Patient confirms understanding.   No changes today, will follow up in 3 months.    Allergies   Allergen Reactions    Ciprofloxacin Anaphylaxis, Unknown and Swelling    Lisinopril Anaphylaxis, Angioedema, Swelling, Unknown and Other    Cefazolin Angioedema and Swelling    Cephalexin Unknown    Tetanus Toxoid, Adsorbed Unknown       Past Medical History:   Diagnosis Date    Acute embolism and thrombosis of unspecified deep veins of unspecified lower extremity 08/13/2024    Acute non-ST segment elevation myocardial infarction (Multi) 01/20/2025    Acute on chronic diastolic congestive heart failure 10/18/2024    Angioedema 01/20/2025    Bradycardia 09/02/2024    Deep venous thrombosis (Multi) 08/13/2024    Elevation of levels of liver transaminase levels     ALT (SGPT) level raised    Essential hypertension 04/22/2024    History of hypertension 01/20/2025    Hyperlipidemia 10/19/2023    Other conditions influencing health status     Osteoarthritis    Personal history of other complications of pregnancy, childbirth and the puerperium     History of ectopic pregnancy    Personal history of other diseases of the circulatory system     History of hypertension    Personal history of other diseases of the musculoskeletal system and connective tissue     History of bursitis    Personal history of other diseases of the musculoskeletal system and connective tissue     Personal history of gout    Personal history of other diseases of the nervous system and sense organs     History of migraine headaches       Current Outpatient Medications on File Prior to Visit   Medication Sig Dispense Refill    aspirin 81 mg EC tablet Take 1 tablet (81 mg) by mouth once daily.      atorvastatin (Lipitor) 20 mg tablet Take 1 tablet (20 mg) by  mouth once daily. 30 tablet 11    blood pressure monitor kit Use as directed to monitor blood pressure once daily 1 kit 0    calcium carb/vit D3/minerals (CALCIUM-VITAMIN D ORAL) Take 1 tablet by mouth once daily.      carvedilol (Coreg) 3.125 mg tablet Take 1 tablet (3.125 mg) by mouth 2 times a day. Hold for systolic blood pressure less than 100 mmhg or heart rate less than 60 60 tablet 0    dapagliflozin propanediol (Farxiga) 10 mg tablet Take 1 tablet (10 mg) by mouth once daily. 90 tablet 3    famotidine (Pepcid) 20 mg tablet Take 1 tablet (20 mg) by mouth 2 times a day. 180 tablet 3    furosemide (Lasix) 40 mg tablet Take 1 tablet (40 mg) by mouth once daily. 90 tablet 3    gabapentin (Neurontin) 100 mg capsule TAKE 2 CAPSULES BY MOUTH 3 TIMES A  capsule 0    losartan (Cozaar) 25 mg tablet Take 1 tablet (25 mg) by mouth once daily. 30 tablet 11    lubricating eye drops ophthalmic solution Administer 1 drop into both eyes if needed for dry eyes.      multivitamin with minerals iron-free (Centrum Silver) Take 1 tablet by mouth once daily.      oxyBUTYnin (Ditropan) 5 mg tablet Take 2 tablets (10 mg) by mouth early in the morning..      polyethylene glycol (Glycolax, Miralax) 17 gram packet Take 17 g by mouth once daily.      potassium chloride CR 20 mEq ER tablet Take 1 tablet (20 mEq) by mouth once daily. Do not crush or chew.      [DISCONTINUED] losartan (Cozaar) 50 mg tablet Take 1 tablet (50 mg) by mouth once daily. 90 tablet 0     No current facility-administered medications on file prior to visit.         RELEVANT LAB RESULTS:  Lab Results   Component Value Date    BILITOT 0.7 05/27/2025    CALCIUM 9.3 05/28/2025    CO2 29 05/28/2025     05/28/2025    CREATININE 1.15 (H) 05/28/2025    GLUCOSE 85 05/28/2025    ALKPHOS 100 05/27/2025    K 3.6 05/28/2025    PROT 6.3 (L) 05/27/2025     05/28/2025    AST 26 05/27/2025    ALT 15 05/27/2025    BUN 38 (H) 05/28/2025    ANIONGAP 10 05/28/2025     "MG 1.64 10/19/2024    PHOS 3.7 05/28/2025    ALBUMIN 3.4 05/28/2025    GFRF 80 07/24/2023     Lab Results   Component Value Date    TRIG 91 06/07/2022    CHOL 164 06/07/2022    LDLCALC 81 06/07/2022    HDL 65 06/07/2022     No results found for: \"BMCBC\", \"CBCDIF\"     PHARMACEUTICAL ASSESSMENT:    MEDICATION RECONCILIATION    Was a medication reconciliation completed at today's appointment? No  Home Pharmacy Reviewed? Yes, describe: [facility], [facility]    Added:  - None    Changed:  - None    Removed:  - None    Drug Interactions? Yes, describe: Oxybutynin may enhance the ulcerogenic effect of potassium. Recommended to avoid combination. Monitor for signs/symptoms of ulcer. Patient previously denied symptoms at last visit.     Medication Documentation Review Audit       Reviewed by Estela Graham MA (Medical Assistant) on 06/12/25 at 1302      Medication Order Taking? Sig Documenting Provider Last Dose Status   aspirin 81 mg EC tablet [id number] Yes Take 1 tablet (81 mg) by mouth once daily. Historical Provider, MD  Active   atorvastatin (Lipitor) 20 mg tablet [id number] Yes Take 1 tablet (20 mg) by mouth once daily. Danielito Roach,   Active   blood pressure monitor kit [id number] Yes Use as directed to monitor blood pressure once daily Cristina Wharton MD  Active   calcium carb/vit D3/minerals (CALCIUM-VITAMIN D ORAL) [id number] Yes Take 1 tablet by mouth once daily. Historical Provider, MD  Active   carvedilol (Coreg) 3.125 mg tablet [id number] Yes Take 1 tablet (3.125 mg) by mouth 2 times a day. Hold for systolic blood pressure less than 100 mmhg or heart rate less than 60 Kieran Espinoza MD  Active   dapagliflozin propanediol (Farxiga) 10 mg [id number] Yes Take 1 tablet (10 mg) by mouth once daily. Cristina Wharton MD  Active   famotidine (Pepcid) 20 mg tablet [id number] Yes Take 1 tablet (20 mg) by mouth 2 times a day. Danielito Roach DO  Active   furosemide (Lasix) 40 mg tablet [id number] Yes " Take 1 tablet (40 mg) by mouth once daily. Cristina Wharton MD  Active   gabapentin (Neurontin) 100 mg capsule [id number] Yes TAKE 2 CAPSULES BY MOUTH 3 TIMES A DAY Danielito Roach DO  Active   losartan (Cozaar) 50 mg tablet [id number] Yes Take 1 tablet (50 mg) by mouth once daily. Cristina Wharton MD  Active   lubricating eye drops ophthalmic solution [id number] Yes Administer 1 drop into both eyes if needed for dry eyes. Historical Provider, MD  Active   multivitamin with minerals iron-free (Centrum Silver) [id number] Yes Take 1 tablet by mouth once daily. Historical Provider, MD  Active   oxyBUTYnin (Ditropan) 5 mg tablet [id number] Yes Take 2 tablets (10 mg) by mouth early in the morning.. Kieran Espinoza MD  Active   polyethylene glycol (Glycolax, Miralax) 17 gram packet [id number] Yes Take 17 g by mouth once daily. Penelope Mcgee APRN-CNP  Active   potassium chloride CR 20 mEq ER tablet [id number] Yes Take 1 tablet (20 mEq) by mouth once daily. Do not crush or chew. Cristina Wharton MD  Active                    DISEASE MANAGEMENT ASSESSMENT:     CHF ASSESSMENT     Symptom/Staging:  -Most recent ejection fraction: 30-35%  -NYHA Stage: Unknown    Results for orders placed during the hospital encounter of 10/17/24    Transthoracic Echo (TTE) Complete    [facility]  [address] [id number]  Phone [phone]    TRANSTHORACIC ECHOCARDIOGRAM REPORT    Patient Name:      BRUNA Burns Physician:    [id number] Cristina Wharton MD  Study Date:        10/18/2024            Ordering Provider:    [id number] CHETAN GARCIA  MRN/PID:           [id number]              Fellow:  Accession#:        [id number]          Nurse:  Date of Birth/Age: 1950 / 74 years  Sonographer:          Audrey Barger RDCS  Gender:            F                     Additional Staff:     Erlinda Merino RDCS  Height:            160.02 cm             Admit Date:  Weight:            117.94 kg              Admission Status:     Inpatient -  Routine  BSA / BMI:         2.16 m2 / 46.06 kg/m2 Department Location:  [facility]  Blood Pressure: 131 /65 mmHg    Study Type:    TRANSTHORACIC ECHO (TTE) COMPLETE  Diagnosis/ICD: Acute on chronic diastolic (congestive) heart failure  (CHF)-I50.33  Indication:    CHF, SOB  CPT Codes:     Echo Complete w Full Doppler-[id number]    Patient History:  Pertinent History: HLD, bradycardia, HTN, CHF.    Study Detail: The following Echo studies were performed: 2D, M-Mode, Doppler and  color flow. Technically challenging study due to body habitus,  patient lying in supine position and prominent lung artifact.  Definity used as a contrast agent for endocardial border  definition. Total contrast used for this procedure was 4 mL via IV  push.      PHYSICIAN INTERPRETATION:  Left Ventricle: The left ventricular systolic function is moderately decreased, with a visually estimated ejection fraction of 30-35%. There are no regional wall motion abnormalities. The left ventricular cavity size is normal. Abnormal (paradoxical) septal motion, consistent with an intraventricular conduction delay. Spectral Doppler shows a normal pattern of left ventricular diastolic filling.  Left Atrium: The left atrium is normal in size.  Right Ventricle: The right ventricle was not well visualized. There is reduced right ventricular systolic function.  Right Atrium: The right atrium is normal in size.  Aortic Valve: The aortic valve is trileaflet. The aortic valve dimensionless index is 0.78. There is no evidence of aortic valve regurgitation. The peak instantaneous gradient of the aortic valve is 7.2 mmHg. The mean gradient of the aortic valve is 4.0 mmHg.  Mitral Valve: The mitral valve is normal in structure. There is trace mitral valve regurgitation.  Tricuspid Valve: The tricuspid valve was not well visualized. There is trace tricuspid regurgitation. The right ventricular systolic pressure is unable to  be estimated.  Pulmonic Valve: The pulmonic valve is not well visualized. There is physiologic pulmonic valve regurgitation.  Pericardium: No pericardial effusion noted.  Aorta: The aortic root is normal.  Systemic Veins: The inferior vena cava appears normal in size, with IVC inspiratory collapse less than 50%.      CONCLUSIONS:  1. Poorly visualized anatomical structures due to suboptimal image quality.  2. The left ventricular systolic function is moderately decreased, with a visually estimated ejection fraction of 30-35%.  3. There is reduced right ventricular systolic function.  4. Trace mitral valve regurgitation.  5. Trace tricuspid regurgitation is visualized.  6. Limited images quality. Left ventricular ejection fraction appears reduced at 30 to 35% with interventricular conduction delay.    QUANTITATIVE DATA SUMMARY:    LA VOLUME:                   Normal Ranges:  LA Vol A4C:        69.7 ml   (22+/-6mL/m2)  LA Vol Index A4C:  32.2ml/m2  LA Area A4C:       23.0 cm2  LA Major Axis A4C: 6.4 cm  LA Vol A4C:        64.9 ml      M-MODE MEASUREMENTS:         Normal Ranges:  Ao Root:             2.90 cm (2.0-3.7cm)  LAs:                 4.00 cm (2.7-4.0cm)      AORTA MEASUREMENTS:         Normal Ranges:  Ao Sinus, d:        2.92 cm (2.1-3.5cm)  Ao STJ, d:          2.61 cm (1.7-3.4cm)  Asc Ao, d:          3.40 cm (2.1-3.4cm)      LV SYSTOLIC FUNCTION BY 2D PLANIMETRY (MOD):  Normal Ranges:  EF-A4C View:    38 % (>=55%)  EF-Visual:      33 %  LV EF Reported: 33 %      LV DIASTOLIC FUNCTION:           Normal Ranges:  MV Peak E:             0.73 m/s  (0.7-1.2 m/s)  MV Peak A:             0.92 m/s  (0.42-0.7 m/s)  E/A Ratio:             0.79      (1.0-2.2)  MV e'                  0.066 m/s (>8.0)  MV lateral e'          0.07 m/s  MV medial e'           0.06 m/s  E/e' Ratio:            11.06     (<8.0)      MITRAL VALVE:         Normal Ranges:  MV DT:        94 msec (150-240msec)      AORTIC VALVE:                      Normal Ranges:  AoV Vmax:                1.34 m/s (<=1.7m/s)  AoV Peak P.2 mmHg (<20mmHg)  AoV Mean P.0 mmHg (1.7-11.5mmHg)  LVOT Max Alex:            1.07 m/s (<=1.1m/s)  AoV VTI:                 25.00 cm (18-25cm)  LVOT VTI:                19.50 cm  LVOT Diameter:           2.40 cm  (1.8-2.4cm)  AoV Area, VTI:           3.53 cm2 (2.5-5.5cm2)  AoV Area,Vmax:           3.61 cm2 (2.5-4.5cm2)  AoV Dimensionless Index: 0.78      RIGHT VENTRICLE:  TAPSE: 14.7 mm  RV s'  0.12 m/s      TRICUSPID VALVE/RVSP:         Normal Ranges:  IVC Diam:             1.59 cm      [id number] Cristina Wharton MD  Electronically signed on 10/19/2024 at 4:28:53 PM        ** Final **      Guideline-Directed Medical Therapy:  -ARNI: No   -If no, then ACEi/ARB?: Yes, describe: Losartan 25 mg every day  (listed history of angioedema/anaphylaxis with lisinopril)  -Beta Blocker: Yes, describe: Carvedilol 3.125 mg BID  -MRA: No - discontinued due to hypotension  -SGLT2i: Yes, describe: Farxiga 10 mg every day     Secondary Prevention:  -The ASCVD Risk score (Liseth RANDALL, et al., 2019) failed to calculate for the following reasons:    Risk score cannot be calculated because patient has a medical history suggesting prior/existing ASCVD   -Aspirin 81mg? no  -Statin?: Yes, describe: Atorvastatin 20 mg every day   -HTN?: Yes, describe: 86/58 as of 2025    CURRENT PHARMACOTHERAPY:   Carvedilol 3.125 mg BID - confirms taking decreased dosage  Farxiga 10 mg every day   Furosemide 40 mg every day   Losartan 25 mg every day - confirms taking decreased dosage; reports she is not taking this the days of cardiac rehab  Potassium chloride 20 mEq 1 tablet every day     Affordability: [facility] PAP for Farxiga active through 2026  Adherence/Compliance: see above  Adverse Effects: improvement in dizziness/lightheadedness since losartan dose was decreased. Some lightheadedness if she stands too quickly but this resolves in a few  moments.    Monitoring Weights at Home: Yes, however not daily  Home Weight Recordings: ~228-230 lbs (denies acute fluctuations >3 lbs/day or >5 lbs/week)     Past In Office Weight Readings:   Wt Readings from Last 6 Encounters:   05/27/25 104 kg (230 lb)   05/07/25 104 kg (230 lb)   05/05/25 104 kg (229 lb)   04/10/25 104 kg (229 lb)   03/06/25 104 kg (230 lb)   01/20/25 109 kg (240 lb)       Monitoring Blood Pressure at Home: Yes  Home BP Recordings: after medications averaging 120-124/70-80, HR 70-80    Past In Office BP Readings:   BP Readings from Last 6 Encounters:   06/12/25 86/58   05/28/25 (!) 134/95   05/12/25 94/60   05/07/25 100/50   04/10/25 130/59   03/13/25 105/55       HEALTH MANAGEMENT    Maintaining fluid restriction (<2 L/day): N/A  Edema/swelling: Yes, some left leg swelling which has been chronic for patient after knee replacement. Patient reports improvement recently.  Shortness of breath: Yes, only with exertion which varies day to day, sometimes occurs with cooking/cleaning and must sit to take breaks during this. Endurance is improving as she has been doing cardiac rehab.   Trouble sleeping/lying down: Yes, some chest discomfort when reclining after cardiac rehab but none when lying flat.  Dry/hacking cough: No  Recent Hospitalizations: Yes, describe: 5/27-5/28/2025    EDUCATION/COUNSELING:   - Counseled patient on MOA, expectations, duration of therapy, contraindications, administration, and monitoring parameters  - Counseled patient on lifestyle modifications that can decrease your risk of having complications (smoking cessation, losing weight, daily weights, vaccines)  - Counseled patient on fluid intake and weight management. Recommended to not consume more than 2 liters of fliuids per day. If they have gained more than 2-3 pounds within a 24 hours period (or 5 pounds in a week), contact their cardiologist  - Answered all patient questions and concerns        DISCUSSION/NOTES:   Today's  appointment was 4 month follow up regarding heart failure pharmacotherapy.  Angela reports improvement in dizziness/lightheadedness since her dosage of losartan was decreased. She has some dizziness if standing too quickly but this resolves in a few moments.   We discussed monitoring her BP at home, she stated she is currently monitoring in the morning and evening at dinnertime.   She also noted she has some chest discomfort when sitting in her recliner after cardiac rehab, notes she has not been taking losartan those days as she was told not to. Is not exacerbated by lying flat. Recommended discussion with Dr. Wharton regarding this.   No medication changes today, will follow up in 3 months.    ASSESSMENT:    Assessment/Plan   Problem List Items Addressed This Visit       Acute combined systolic and diastolic heart failure    Angela is prescribed 3/4 GDMT. No titration today due to previous hypotension with MRA and increased doses of carvedilol/losartan.         Relevant Orders    Referral to Clinical Pharmacy       RECOMMENDATIONS/PLAN:    Continue:  Carvedilol 3.125 mg BID   Farxiga 10 mg every day   Furosemide 40 mg every day   Losartan 25 mg every day   Potassium chloride 20 mEq 1 tablet every day   Follow up: 3 months    Last Appnt with Referring Provider: 4/10/2025  Next Appnt with Referring Provider: To be scheduled; reminded patient to schedule follow up  Clinical Pharmacist follow up: 9/23/2025  VAF/Application Expiration: Yes    Date: 2/18/2026  Type of Encounter: Lilibeth Dangelo PharmD    Verbal consent to manage patient's drug therapy was obtained from the patient . They were informed they may decline to participate or withdraw from participation in pharmacy services at any time.    Continue all meds under the continuation of care with the referring provider and clinical pharmacy team.

## 2025-06-18 NOTE — ASSESSMENT & PLAN NOTE
Angela is prescribed 3/4 GDMT. No titration today due to previous hypotension with MRA and increased doses of carvedilol/losartan.

## 2025-06-18 NOTE — Clinical Note
Angela Smith Dr. reports improvement in dizziness/lightheadedness since her dosage of losartan was decreased. She has some dizziness if standing too quickly but this resolves in a few moments.  She has noted she has some chest discomfort when sitting in her recliner after cardiac rehab, notes she has not been taking losartan those days as she was told not to due to hypotension. She notes this is not exacerbated when lying flat, and mostly occurs at rest. I recommend she discuss this with you, which she is agreeable to. No medication changes today, will follow up in 3 months.

## 2025-06-19 ENCOUNTER — PHARMACY VISIT (OUTPATIENT)
Dept: PHARMACY | Facility: CLINIC | Age: 75
End: 2025-06-19
Payer: MEDICARE

## 2025-06-19 RX ORDER — CARVEDILOL 3.12 MG/1
3.12 TABLET ORAL 2 TIMES DAILY
Qty: 180 TABLET | Refills: 3 | Status: SHIPPED | OUTPATIENT
Start: 2025-06-19 | End: 2026-06-14

## 2025-06-20 ENCOUNTER — TELEPHONE (OUTPATIENT)
Dept: CARDIOLOGY | Facility: CLINIC | Age: 75
End: 2025-06-20

## 2025-06-20 ENCOUNTER — CLINICAL SUPPORT (OUTPATIENT)
Dept: CARDIAC REHAB | Facility: CLINIC | Age: 75
End: 2025-06-20
Payer: MEDICARE

## 2025-06-20 DIAGNOSIS — I50.21 ACUTE SYSTOLIC HEART FAILURE: ICD-10-CM

## 2025-06-20 PROCEDURE — 93798 PHYS/QHP OP CAR RHAB W/ECG: CPT | Performed by: INTERNAL MEDICINE

## 2025-06-20 NOTE — PROGRESS NOTES
Cardiac Rehab Education  Angela Montelongo   [phone]    6/20/2025  Exercise prescription and maintenance education was done during today's session. Discussed FITT principle, reviewed perceived exertion scale, and patient's THR was given for home practice with instructions on how to obtain. Handouts were given for personal reference. Home exercise prescription is to be given prior to discharge from program.      Signature Denise Lawrence RN

## 2025-06-20 NOTE — TELEPHONE ENCOUNTER
Called pt to advise provider would like to see her 1 week after scheduled echo. Left message to call office back and schedule follow up at earliest convenience.

## 2025-06-21 DIAGNOSIS — I10 ESSENTIAL HYPERTENSION: ICD-10-CM

## 2025-06-23 ENCOUNTER — DOCUMENTATION (OUTPATIENT)
Dept: CARDIAC REHAB | Facility: CLINIC | Age: 75
End: 2025-06-23

## 2025-06-23 ENCOUNTER — CLINICAL SUPPORT (OUTPATIENT)
Dept: CARDIAC REHAB | Facility: CLINIC | Age: 75
End: 2025-06-23
Payer: MEDICARE

## 2025-06-23 DIAGNOSIS — I50.21 ACUTE SYSTOLIC HEART FAILURE: ICD-10-CM

## 2025-06-23 PROCEDURE — 93798 PHYS/QHP OP CAR RHAB W/ECG: CPT | Performed by: INTERNAL MEDICINE

## 2025-06-23 NOTE — PROGRESS NOTES
Cardiac Rehabilitation 60 Day Progress Report    Name: Angela Montelongo  Medical Record Number: [id number]  YOB: 1950  Age: 75 y.o.    Today’s Date: 6/23/2025  Primary Care Physician: Danielito Roach DO  Referring Physician: Cristina Wharton MD  Program Location: [address]  Primary Diagnosis:   1. Acute systolic heart failure  Referral to Cardiac Rehab         Onset/Date of Diagnosis: 9/2024  Session #:  13  AACVPR Risk Stratification: High  Falls Risk: High  Psychosocial Assessment   Pre PHQ-9: 8  Sent PH-Q 9 to MD if score > 20: No; score < 20  Pt reported/currently experiencing stress: Yes; Stress; Severity: moderate  Patient uses stress management skills: No   History of: no history of anxiety or depression  Currently seeing a mental health provider: No  Social Support: Yes, Whom:Daughter and son in law  Quality of Life Survey: KCCQ (see Heart Failure Management below)  Learning Assessment:  Learning assessment/barriers: Physical and Transportation  Preferred learning method: Reading handout and Writing handout  Barriers: Physical limitations  Comments:  Stages of Change: action    Psychosocial Plan  Goal Status: In Progress  Psychosocial Goals: Maintain or lower PH-Q 9 score by discharge and Identify strategies for managing depression  Psychosocial Interventions/Education:   5/5/25 reviewed initial PHQ-9 score with pt at [facility]/  6/23/25 Limited support system. Pt uses [employer] to get to appointments/    Nutrition Assessment:    Hyperlipidemia: Yes     Lipids:   Lab Results   Component Value Date    CHOL 164 06/07/2022    HDL 65 06/07/2022    TRIG 91 06/07/2022       Current Dietary Guidelines: Low sodium  Barriers to dietary change: no    Diet Habit Survey: Picture Your Plate  Pre: 57  Post: To be done at discharge.    Diabetes Assessment    Lab Results   Component Value Date    HGBA1C 5.3 02/01/2019       History of Diabetes: No    Weight Management    Height: 160  "cm (5' 3\")  Weight: 230 lbs  BMI (Calculated): 40.58      Nutrition Plan  Goal Status: In progress  Nutrition Goals: Improve Diet Habit Survey score by 5-10 points by discharge, Adapt a low-sodium, DASH diet prior to discharge, Adapt a Mediterranean focused diet prior to discharge, and Learn how to read and interpret nutrition labels prior to discharge  Nutrition Interventions/Education:   5/16/2025  Education on cholesterol was done during today's session. Discussed with patient the different types of cholesterol, risk factors and how to make lifestyle modifications to improve levels. Handout and patient's recent lab results were given for home review. Josef Lawrence, RN  6/6/2025  Pt has to rely on others to pick out her foods for her due to not driving and being able to get to the store. Sometimes they are not brining foods that are healthy for her. She is also on a tight budget restriction. Encouraged pt to contact [facility] on [facility] to see if she was eligible for their  program. Also discussed multiple free produce locations for her to consider getting healthier food options from each week/month. Label reading education was done with program's RDN during today's visit. Label Reading Highlights handout was given with information discussed during appointment and to assist in review of label contents at home.  Josef Leavitt RDN, LD   6/10/2025  Gave and reviewed PYP with pt. PYP score 57. Encouraged pt to add whole grains and fatty fish. Decrease whole dairy products and processed foods. Gave handout on [facility] Food Pantries to help patient with additional ways of getting food due to limited income. Pt has not reached out to [facility] on [facility] yet. Encouraged to contact them regarding additional services available to her. No questions at this time. Encouraged to further review at home and follow up with questions as needed. Josef SPEAR" SU Leavitt, DENISSE  6/13/2025  Education on portion sizes was done during today's session. Gave visual demonstration of portions. Handouts were given for home reference and review. Signature Denise Lawrence RN     Exercise Assessment    Home exercise:No  Mode: NA  Frequency: NA  Duration: NA    Exercise Prescription     Exercise Prescription based on: current ex rx    DASI Score: 16.2   MET Score: 4.7   Frequency:  3 days/week   Mode: NuStep and Recumbent Cycle   Duration: 21 total aerobic minutes   Intensity: RPE 12-14  Target HR:  To be calculated after 6 attended sessions.  MET Level: 2.3  Patient wears supplemental O2: No     Modality Workload METs Duration (minutes)   1 Pre-Exercise   4:00   2 NuStep Load 3 @ 48 lugo  2.3 30 :00   6 Post-Exercise   4:00     Resistance Training: No   Home Exercise Prescription given: To be given prior to discharge from program.    Exercise Plan  Goal Status: In Progress  Exercise Goals: Increase exercise MET level by 5-10% each week, Increase total exercise duration to 30-45 minutes, and Establish a home exercise program before discharge  Exercise Interventions/Education:   6/20/2025  Exercise prescription and maintenance education was done during today's session. Discussed FITT principle, reviewed perceived exertion scale, and patient's THR was given for home practice with instructions on how to obtain. Handouts were given for personal reference. Home exercise prescription is to be given prior to discharge from program.  Signature Denise Lawrence RN    Other Core Components/Risk Factor Assessment:    Medication adherence  Current Medications:   Medication Documentation Review Audit       Reviewed by Estela Graham MA (Medical Assistant) on 06/12/25 at 1302      Medication Order Taking? Sig Documenting Provider Last Dose Status   aspirin 81 mg EC tablet [id number] Yes Take 1 tablet (81 mg) by mouth once daily. Historical Provider, MD  Active   atorvastatin (Lipitor) 20  mg tablet [id number] Yes Take 1 tablet (20 mg) by mouth once daily. Danielito Roach DO  Active   blood pressure monitor kit [id number] Yes Use as directed to monitor blood pressure once daily Cristina Wharton MD  Active   calcium carb/vit D3/minerals (CALCIUM-VITAMIN D ORAL) [id number] Yes Take 1 tablet by mouth once daily. Historical Provider, MD  Active   carvedilol (Coreg) 3.125 mg tablet [id number] Yes Take 1 tablet (3.125 mg) by mouth 2 times a day. Hold for systolic blood pressure less than 100 mmhg or heart rate less than 60 Kieran Espinoza MD  Active   dapagliflozin propanediol (Farxiga) 10 mg [id number] Yes Take 1 tablet (10 mg) by mouth once daily. Cristina Wharton MD  Active   famotidine (Pepcid) 20 mg tablet [id number] Yes Take 1 tablet (20 mg) by mouth 2 times a day. Danielito Roach DO  Active   furosemide (Lasix) 40 mg tablet [id number] Yes Take 1 tablet (40 mg) by mouth once daily. Cristina Wharton MD  Active   gabapentin (Neurontin) 100 mg capsule [id number] Yes TAKE 2 CAPSULES BY MOUTH 3 TIMES A DAY Danielito Roach DO  Active   losartan (Cozaar) 50 mg tablet [id number] Yes Take 1 tablet (50 mg) by mouth once daily. Cristina Wharton MD  Active   lubricating eye drops ophthalmic solution [id number] Yes Administer 1 drop into both eyes if needed for dry eyes. Historical Provider, MD  Active   multivitamin with minerals iron-free (Centrum Silver) [id number] Yes Take 1 tablet by mouth once daily. Historical Provider, MD  Active   oxyBUTYnin (Ditropan) 5 mg tablet [id number] Yes Take 2 tablets (10 mg) by mouth early in the morning.. Kieran Espinoza MD  Active   polyethylene glycol (Glycolax, Miralax) 17 gram packet [id number] Yes Take 17 g by mouth once daily. Penelope Mcgee, APRN-CNP  Active   potassium chloride CR 20 mEq ER tablet [id number] Yes Take 1 tablet (20 mEq) by mouth once daily. Do not crush or chew. Cristina Wharton MD  Active                                 Medication compliance: Yes   Uses  pill box/organizer: Yes    Carries medication list: No     Blood Pressure Management  History of Hypertension: Yes   Medication Changes: Yes   Resting BP:   WNL    Heart Failure Management  Hx of Heart Failure: Yes;   Type (selection): HFrEF  Most recent EF: 35    Onset of heart failure diagnosis: 9/2024  Last heart failure hospitalization: 10/2024  Number of HF admissions per year: 1  Symptoms: Fatigue at rest, Fatigue with exertion, Shortness of breath, and LE edema  Is there a family Hx of HF: Yes   Does patient obtain daily weight No     KCCQ survey: Pre:   Post:     Heart Failure Reassessment: In progress  Heart Failure Goals: Able to verbalize signs and symptoms of fluid retention and when to contact MD, Adhere to proper fluid restrictions, Adapt a low sodium diet and verbalize guidelines, and Obtain daily weight and verbalize proper method of obtaining weights  Smoking/Tobacco Assessment    Tobacco Use History[1]    Other Core Component Plan  Goal Status: In progress  Other Core Component Goals: Medication compliance and Achieve resting BP of < 130/80 by discharge  Other Core Component Interventions/Education:   6/23/25 Pt encouraged to label read for sodium and to keep below 1500 mg day/mk  Individual Patient Goals:    Shower without assistance by discharge  Be able to get out of house and be more independent    Goal Status: In progress    Staff Comments:  Ms Montelongo has been attending rehab 3 days a week as planned. No ST depression noted on telemetry monitor.   Occassional PAC noted. Systolic blood pressures have been good since pt was instructed to not take diuretic on rehab days. Will continue to assess. Assistance is needed for equipment transfer.                [1]   Social History  Tobacco Use   Smoking Status Never    Passive exposure: Never   Smokeless Tobacco Never

## 2025-06-24 ENCOUNTER — APPOINTMENT (OUTPATIENT)
Dept: CARDIAC REHAB | Facility: CLINIC | Age: 75
End: 2025-06-24
Payer: MEDICARE

## 2025-06-26 ENCOUNTER — HOSPITAL ENCOUNTER (OUTPATIENT)
Dept: CARDIOLOGY | Facility: HOSPITAL | Age: 75
Discharge: HOME | End: 2025-06-26
Payer: MEDICARE

## 2025-06-26 DIAGNOSIS — I50.21 ACUTE SYSTOLIC HEART FAILURE: ICD-10-CM

## 2025-06-26 PROCEDURE — 93306 TTE W/DOPPLER COMPLETE: CPT

## 2025-06-26 PROCEDURE — 93306 TTE W/DOPPLER COMPLETE: CPT | Performed by: INTERNAL MEDICINE

## 2025-06-26 RX ORDER — POTASSIUM CHLORIDE 20 MEQ/1
20 TABLET, EXTENDED RELEASE ORAL 2 TIMES DAILY
Qty: 180 TABLET | Refills: 3 | Status: SHIPPED | OUTPATIENT
Start: 2025-06-26 | End: 2026-06-21

## 2025-06-27 ENCOUNTER — CLINICAL SUPPORT (OUTPATIENT)
Dept: CARDIAC REHAB | Facility: CLINIC | Age: 75
End: 2025-06-27
Payer: MEDICARE

## 2025-06-27 DIAGNOSIS — I50.21 ACUTE SYSTOLIC HEART FAILURE: ICD-10-CM

## 2025-06-27 LAB
AORTIC VALVE MEAN GRADIENT: 4 MMHG
AORTIC VALVE PEAK VELOCITY: 1.23 M/S
AV PEAK GRADIENT: 6 MMHG
AVA (PEAK VEL): 2.49 CM2
AVA (VTI): 2.44 CM2
EJECTION FRACTION APICAL 4 CHAMBER: 30
EJECTION FRACTION: 38 %
LEFT ATRIUM VOLUME AREA LENGTH INDEX BSA: 18.7 ML/M2
LEFT VENTRICULAR OUTFLOW TRACT DIAMETER: 2 CM
MITRAL VALVE E/A RATIO: 0.39
RIGHT VENTRICLE FREE WALL PEAK S': 8.38 CM/S
TRICUSPID ANNULAR PLANE SYSTOLIC EXCURSION: 1.6 CM

## 2025-06-27 PROCEDURE — 93798 PHYS/QHP OP CAR RHAB W/ECG: CPT | Performed by: INTERNAL MEDICINE

## 2025-06-27 NOTE — PROGRESS NOTES
Cardiac Rehab Education  Angela Montelongo   [id number]    6/27/2025  Effects of stress on the body, risks of unmanaged stress and techniques to minimize anxieties were all discussed in today's session. Handout was given for reference with resources to  Behavioral Health Services should patient need further assistance with stress management. Patient encouraged to notify staff if stressors increase and additional help is required.      Signature Denise Lawrence RN

## 2025-06-30 ENCOUNTER — APPOINTMENT (OUTPATIENT)
Dept: CARDIAC REHAB | Facility: CLINIC | Age: 75
End: 2025-06-30
Payer: MEDICARE

## 2025-06-30 DIAGNOSIS — M46.46 DISCITIS OF LUMBAR REGION: ICD-10-CM

## 2025-06-30 DIAGNOSIS — I10 ESSENTIAL HYPERTENSION: ICD-10-CM

## 2025-06-30 NOTE — TELEPHONE ENCOUNTER
Please send the attached prescription to the patient's pharmacy as soon as possible. Thank you!    Requested Prescriptions     Pending Prescriptions Disp Refills    furosemide (Lasix) 40 mg tablet 90 tablet 3     Sig: Take 1 tablet (40 mg) by mouth once daily.    potassium chloride CR 20 mEq ER tablet 180 tablet 3     Sig: Take 1 tablet (20 mEq) by mouth 2 times a day. Do not crush or chew.

## 2025-07-01 ENCOUNTER — TELEPHONE (OUTPATIENT)
Facility: CLINIC | Age: 75
End: 2025-07-01
Payer: MEDICARE

## 2025-07-01 ENCOUNTER — CLINICAL SUPPORT (OUTPATIENT)
Dept: CARDIAC REHAB | Facility: CLINIC | Age: 75
End: 2025-07-01
Payer: MEDICARE

## 2025-07-01 DIAGNOSIS — I50.21 ACUTE SYSTOLIC HEART FAILURE: ICD-10-CM

## 2025-07-01 PROCEDURE — 93798 PHYS/QHP OP CAR RHAB W/ECG: CPT | Performed by: INTERNAL MEDICINE

## 2025-07-01 RX ORDER — POTASSIUM CHLORIDE 20 MEQ/1
20 TABLET, EXTENDED RELEASE ORAL 2 TIMES DAILY
Qty: 180 TABLET | Refills: 3 | Status: SHIPPED | OUTPATIENT
Start: 2025-07-01 | End: 2026-06-26

## 2025-07-01 RX ORDER — FUROSEMIDE 40 MG/1
40 TABLET ORAL DAILY
Qty: 90 TABLET | Refills: 3 | Status: SHIPPED | OUTPATIENT
Start: 2025-07-01 | End: 2026-07-01

## 2025-07-01 NOTE — TELEPHONE ENCOUNTER
----- Message from Cristina Wharton sent at 6/30/2025 12:55 PM EDT -----  Tell patient that her echocardiogram has improved.  She does not need defibrillator for now.  To continue taking her medication.  Let me see her as scheduled  ----- Message -----  From: Luther, Syngo - Cardiology Results In  Sent: 6/27/2025   8:51 AM EDT  To: Cristina Wharton MD

## 2025-07-01 NOTE — TELEPHONE ENCOUNTER
Called patient advised her echocardiogram has improved.  She does not need defibrillator for now.  To continue taking her medication.   Patient will be in office 7/3

## 2025-07-03 ENCOUNTER — APPOINTMENT (OUTPATIENT)
Dept: CARDIOLOGY | Facility: CLINIC | Age: 75
End: 2025-07-03
Payer: MEDICARE

## 2025-07-03 VITALS
SYSTOLIC BLOOD PRESSURE: 120 MMHG | BODY MASS INDEX: 40.39 KG/M2 | DIASTOLIC BLOOD PRESSURE: 52 MMHG | RESPIRATION RATE: 16 BRPM | OXYGEN SATURATION: 94 % | HEART RATE: 74 BPM | WEIGHT: 228 LBS

## 2025-07-03 DIAGNOSIS — R00.1 BRADYCARDIA: ICD-10-CM

## 2025-07-03 DIAGNOSIS — I50.41 ACUTE COMBINED SYSTOLIC AND DIASTOLIC HEART FAILURE: Primary | ICD-10-CM

## 2025-07-03 DIAGNOSIS — Z86.79 HISTORY OF HYPERTENSION: ICD-10-CM

## 2025-07-03 DIAGNOSIS — E78.2 MIXED HYPERLIPIDEMIA: ICD-10-CM

## 2025-07-03 DIAGNOSIS — I10 ESSENTIAL HYPERTENSION: ICD-10-CM

## 2025-07-03 DIAGNOSIS — R55 SYNCOPE, CARDIOGENIC: ICD-10-CM

## 2025-07-03 DIAGNOSIS — I50.33 ACUTE ON CHRONIC DIASTOLIC CONGESTIVE HEART FAILURE: ICD-10-CM

## 2025-07-03 DIAGNOSIS — I21.4 ACUTE NON-ST SEGMENT ELEVATION MYOCARDIAL INFARCTION (MULTI): ICD-10-CM

## 2025-07-03 PROCEDURE — 1159F MED LIST DOCD IN RCRD: CPT | Performed by: INTERNAL MEDICINE

## 2025-07-03 PROCEDURE — 1160F RVW MEDS BY RX/DR IN RCRD: CPT | Performed by: INTERNAL MEDICINE

## 2025-07-03 PROCEDURE — 99214 OFFICE O/P EST MOD 30 MIN: CPT | Performed by: INTERNAL MEDICINE

## 2025-07-03 PROCEDURE — 3078F DIAST BP <80 MM HG: CPT | Performed by: INTERNAL MEDICINE

## 2025-07-03 PROCEDURE — 1126F AMNT PAIN NOTED NONE PRSNT: CPT | Performed by: INTERNAL MEDICINE

## 2025-07-03 PROCEDURE — 3074F SYST BP LT 130 MM HG: CPT | Performed by: INTERNAL MEDICINE

## 2025-07-03 ASSESSMENT — ENCOUNTER SYMPTOMS
LOSS OF SENSATION IN FEET: 0
OCCASIONAL FEELINGS OF UNSTEADINESS: 0
DEPRESSION: 0

## 2025-07-03 ASSESSMENT — PATIENT HEALTH QUESTIONNAIRE - PHQ9
2. FEELING DOWN, DEPRESSED OR HOPELESS: NOT AT ALL
SUM OF ALL RESPONSES TO PHQ9 QUESTIONS 1 AND 2: 0
1. LITTLE INTEREST OR PLEASURE IN DOING THINGS: NOT AT ALL

## 2025-07-03 ASSESSMENT — PAIN SCALES - GENERAL: PAINLEVEL_OUTOF10: 0-NO PAIN

## 2025-07-03 NOTE — PROGRESS NOTES
[facility] [place]    Interventional Cardiology    History of present illness:  This is a very pleasant 74-year-old female with history of heart failure severe LV systolic dysfunction ejection fraction of 30 to 35%, obesity, hypertension.  Patient underwent cardiac catheterization by myself in March 13, 2025 that showed no significant coronary artery disease.  Echocardiogram February 25, 2025 showed ejection fraction of 30 to 35%.  Repeated echocardiogram in June 26, 2025 showed ejection fraction of 35 to 40%, normal RV function.  Mild mitral regurgitation.  Patient denies having any chest pain or shortness of breath.  NYHA class I-II.  Denies having nausea vomiting diaphoresis lower extremity edema orthopnea or PND.  Reports her blood pressure occasionally running low and she has been taking her furosemide 5 times a week.      Medical History[1]    Surgical History[2]    Allergies[3]     reports that she has never smoked. She has never been exposed to tobacco smoke. She has never used smokeless tobacco. She reports that she does not currently use alcohol. She reports that she does not use drugs.    Family History[4]    Patient's Medications   New Prescriptions    No medications on file   Previous Medications    ASPIRIN 81 MG EC TABLET    Take 1 tablet (81 mg) by mouth once daily.    ATORVASTATIN (LIPITOR) 20 MG TABLET    Take 1 tablet (20 mg) by mouth once daily.    BLOOD PRESSURE MONITOR KIT    Use as directed to monitor blood pressure once daily    CALCIUM CARB/VIT D3/MINERALS (CALCIUM-VITAMIN D ORAL)    Take 1 tablet by mouth once daily.    CARVEDILOL (COREG) 3.125 MG TABLET    Take 1 tablet (3.125 mg) by mouth 2 times a day. Hold for systolic blood pressure less than 100 mmhg or heart rate less than 60    DAPAGLIFLOZIN PROPANEDIOL (FARXIGA) 10 MG TABLET    Take 1 tablet (10 mg) by mouth once daily.    FAMOTIDINE (PEPCID) 20 MG TABLET    Take 1 tablet (20 mg) by mouth  2 times a day.    FUROSEMIDE (LASIX) 40 MG TABLET    Take 1 tablet (40 mg) by mouth once daily.    GABAPENTIN (NEURONTIN) 100 MG CAPSULE    TAKE 2 CAPSULES BY MOUTH 3 TIMES A DAY    LOSARTAN (COZAAR) 25 MG TABLET    Take 1 tablet (25 mg) by mouth once daily.    LUBRICATING EYE DROPS OPHTHALMIC SOLUTION    Administer 1 drop into both eyes if needed for dry eyes.    MULTIVITAMIN WITH MINERALS IRON-FREE (CENTRUM SILVER)    Take 1 tablet by mouth once daily.    OXYBUTYNIN (DITROPAN) 5 MG TABLET    Take 2 tablets (10 mg) by mouth early in the morning..    POLYETHYLENE GLYCOL (GLYCOLAX, MIRALAX) 17 GRAM PACKET    Take 17 g by mouth once daily.    POTASSIUM CHLORIDE CR 20 MEQ ER TABLET    Take 1 tablet (20 mEq) by mouth 2 times a day. Do not crush or chew.   Modified Medications    No medications on file   Discontinued Medications    No medications on file       Objective   Physical Exam  General: Patient in no acute distress   HEENT: Atraumatic normocephalic.  Neck: Supple, jugular venous pressure within normal limit.  No bruits  Lungs: Clear to auscultation bilaterally  Cardiovascular: Regular rate and rhythm, normal heart sounds, no murmurs rubs or gallops  Abdomen: Soft nontender nondistended.  Normal bowel sounds.  Extremities: Warm to touch, no edema.    Lab Review   Hospital Outpatient Visit on 06/26/2025   Component Date Value    AV pk jeffery 06/26/2025 1.23     LVOT diam 06/26/2025 2.00     AV mn grad 06/26/2025 4     MV E/A ratio 06/26/2025 0.39     Tricuspid annular plane * 06/26/2025 1.6     LA vol index A/L 06/26/2025 18.7     LV EF 06/26/2025 38     RV free wall pk S' 06/26/2025 8.38     AV pk grad 06/26/2025 6     Aortic Valve Area by Con* 06/26/2025 2.44     Aortic Valve Area by Con* 06/26/2025 2.49     LV A4C EF 06/26/2025 30.0    Office Visit on 06/12/2025   Component Date Value    POC Color, Urine 06/12/2025 Yellow     POC Appearance, Urine 06/12/2025 Clear     POC Glucose, Urine 06/12/2025 500 (3+) (A)      POC Bilirubin, Urine 06/12/2025 NEGATIVE     POC Ketones, Urine 06/12/2025 NEGATIVE     POC Specific Gravity, Ur* 06/12/2025 1.010     POC Blood, Urine 06/12/2025 NEGATIVE     POC PH, Urine 06/12/2025 7.0     POC Protein, Urine 06/12/2025 NEGATIVE     POC Urobilinogen, Urine 06/12/2025 0.2     Poc Nitrite, Urine 06/12/2025 NEGATIVE     POC Leukocytes, Urine 06/12/2025 NEGATIVE     CULTURE, URINE, ROUTINE 06/12/2025 SEE NOTE    Admission on 05/27/2025, Discharged on 05/28/2025   Component Date Value    WBC 05/27/2025 6.0     nRBC 05/27/2025 0.0     RBC 05/27/2025 4.28     Hemoglobin 05/27/2025 13.4     Hematocrit 05/27/2025 39.9     MCV 05/27/2025 93     MCH 05/27/2025 31.3     MCHC 05/27/2025 33.6     RDW 05/27/2025 13.0     Platelets 05/27/2025 214     Neutrophils % 05/27/2025 69.0     Immature Granulocytes %,* 05/27/2025 0.2     Lymphocytes % 05/27/2025 18.4     Monocytes % 05/27/2025 8.3     Eosinophils % 05/27/2025 3.8     Basophils % 05/27/2025 0.3     Neutrophils Absolute 05/27/2025 4.17     Immature Granulocytes Ab* 05/27/2025 0.01     Lymphocytes Absolute 05/27/2025 1.11     Monocytes Absolute 05/27/2025 0.50     Eosinophils Absolute 05/27/2025 0.23     Basophils Absolute 05/27/2025 0.02     Glucose 05/27/2025 131 (H)     Sodium 05/27/2025 139     Potassium 05/27/2025 3.8     Chloride 05/27/2025 104     Bicarbonate 05/27/2025 26     Anion Gap 05/27/2025 13     Urea Nitrogen 05/27/2025 42 (H)     Creatinine 05/27/2025 1.48 (H)     eGFR 05/27/2025 37 (L)     Calcium 05/27/2025 9.6     Albumin 05/27/2025 3.9     Alkaline Phosphatase 05/27/2025 100     Total Protein 05/27/2025 6.3 (L)     AST 05/27/2025 26     Bilirubin, Total 05/27/2025 0.7     ALT 05/27/2025 15     BNP 05/27/2025 110 (H)     Troponin I, High Sensiti* 05/27/2025 18 (H)     Color, Urine 05/27/2025 Colorless (N)     Appearance, Urine 05/27/2025 Clear     Specific Gravity, Urine 05/27/2025 1.007     pH, Urine 05/27/2025 5.5     Protein, Urine  05/27/2025 NEGATIVE     Glucose, Urine 05/27/2025 300 (3+) (A)     Blood, Urine 05/27/2025 NEGATIVE     Ketones, Urine 05/27/2025 NEGATIVE     Bilirubin, Urine 05/27/2025 NEGATIVE     Urobilinogen, Urine 05/27/2025 Normal     Nitrite, Urine 05/27/2025 NEGATIVE     Leukocyte Esterase, Urine 05/27/2025 NEGATIVE     Extra Tube 05/27/2025 Hold for add-ons.     Ventricular Rate 05/27/2025 63     Atrial Rate 05/27/2025 63     AL Interval 05/27/2025 192     QRS Duration 05/27/2025 158     QT Interval 05/27/2025 462     QTC Calculation(Bazett) 05/27/2025 472     P Axis 05/27/2025 31     R Axis 05/27/2025 -67     T Axis 05/27/2025 14     QRS Count 05/27/2025 10     Q Onset 05/27/2025 201     P Onset 05/27/2025 105     P Offset 05/27/2025 152     T Offset 05/27/2025 432     QTC Fredericia 05/27/2025 469     Ventricular Rate 05/27/2025 70     Atrial Rate 05/27/2025 70     AL Interval 05/27/2025 208     QRS Duration 05/27/2025 154     QT Interval 05/27/2025 442     QTC Calculation(Bazett) 05/27/2025 477     P Axis 05/27/2025 48     R Axis 05/27/2025 -65     T [place] 05/27/2025 29     QRS Count 05/27/2025 12     Q Onset 05/27/2025 204     T Offset 05/27/2025 425     QTC Fredericia 05/27/2025 465     Troponin I, High Sensiti* 05/27/2025 12     Thyroid Stimulating Horm* 05/27/2025 6.41 (H)     WBC 05/28/2025 5.3     nRBC 05/28/2025 0.0     RBC 05/28/2025 3.71 (L)     Hemoglobin 05/28/2025 11.5 (L)     Hematocrit 05/28/2025 35.3 (L)     MCV 05/28/2025 95     MCH 05/28/2025 31.0     MCHC 05/28/2025 32.6     RDW 05/28/2025 13.1     Platelets 05/28/2025 172     Glucose 05/28/2025 85     Sodium 05/28/2025 140     Potassium 05/28/2025 3.6     Chloride 05/28/2025 105     Bicarbonate 05/28/2025 29     Anion Gap 05/28/2025 10     Urea Nitrogen 05/28/2025 38 (H)     Creatinine 05/28/2025 1.15 (H)     eGFR 05/28/2025 49 (L)     Calcium 05/28/2025 9.3     Phosphorus 05/28/2025 3.7     Albumin 05/28/2025 3.4     Thyroxine, Free 05/28/2025 0.89          Assessment/Plan   Problem List[5]     This is a very pleasant 74-year-old female with history of heart failure severe LV systolic dysfunction ejection fraction of 30 to 35%, obesity, hypertension.  Patient underwent cardiac catheterization by myself in March 13, 2025 that showed no significant coronary artery disease.  Echocardiogram February 25, 2025 showed ejection fraction of 30 to 35%.  Repeated echocardiogram in June 26, 2025 showed ejection fraction of 35 to 40%, normal RV function.  Mild mitral regurgitation.  Patient denies having any chest pain or shortness of breath.  NYHA class I-II.  Denies having nausea vomiting diaphoresis lower extremity edema orthopnea or PND.  Reports her blood pressure occasionally running low and she has been taking her furosemide 5 times a week.      At this point patient opted medical therapy for heart failure systolic dysfunction and ischemic cardiomyopathy.  Repeated echo ejection fraction of 35 to 40%.  No indication for ICD.  We will continue current treatment.  Reviewed patient labs.  Her creatinine is 1.15 BUN 38.  Last BMP was 110 in May 27, 2025.  Has mild anemia with hemoglobin of 11.5.  I will continue current medications.  We will follow-up in 4 months from now.  Discussed with her lifestyle modification.  \Continue losartan, Farxiga 10 mg oral daily, carvedilol 3.125 mg oral twice daily and atorvastatin 20 mg oral daily and addition of aspirin 81 mg oral daily.  We will follow-up in 4 months.      Cristina Wharton MD              [1]   Past Medical History:  Diagnosis Date    Acute embolism and thrombosis of unspecified deep veins of unspecified lower extremity 08/13/2024    Acute non-ST segment elevation myocardial infarction (Multi) 01/20/2025    Acute on chronic diastolic congestive heart failure 10/18/2024    Angioedema 01/20/2025    Bradycardia 09/02/2024    Deep venous thrombosis (Multi) 08/13/2024    Elevation of levels of liver transaminase levels     ALT  (SGPT) level raised    Essential hypertension 04/22/2024    History of hypertension 01/20/2025    Hyperlipidemia 10/19/2023    Other conditions influencing health status     Osteoarthritis    Personal history of other complications of pregnancy, childbirth and the puerperium     History of ectopic pregnancy    Personal history of other diseases of the circulatory system     History of hypertension    Personal history of other diseases of the musculoskeletal system and connective tissue     History of bursitis    Personal history of other diseases of the musculoskeletal system and connective tissue     Personal history of gout    Personal history of other diseases of the nervous system and sense organs     History of migraine headaches   [2]   Past Surgical History:  Procedure Laterality Date    CARDIAC CATHETERIZATION Left 3/13/2025    Procedure: Left Heart Cath;  Surgeon: Cristina Wharton MD;  Location: [facility];  Service: Cardiovascular;  Laterality: Left;  no pre auth required    CARPAL TUNNEL RELEASE  04/09/2013    Neuroplasty Median Nerve At Carpal Tunnel    CHOLECYSTECTOMY  04/09/2013    Cholecystectomy    KNEE ARTHROPLASTY  04/09/2013    Knee Arthroplasty   [3]   Allergies  Allergen Reactions    Ciprofloxacin Anaphylaxis, Unknown and Swelling    Lisinopril Anaphylaxis, Angioedema, Swelling, Unknown and Other    Cefazolin Angioedema and Swelling    Cephalexin Unknown    Tetanus Toxoid, Adsorbed Unknown   [4]   Family History  Family history unknown: Yes   Problem Relation Name Age of Onset    Family history unknown: Yes    Cancer Mother  60 - 69    Cancer Sister  60 - 69    Heart attack Father  50 - 59    Heart disease Father  50 - 59   [5]   Patient Active Problem List  Diagnosis    Hyperlipidemia    Shoulder arthritis    Essential hypertension    Nausea    Constipation    Claw toe    Gout    Chronic gout without tophus    Gastroesophageal reflux disease    Esophageal reflux    Endometrial  hyperplasia    Hyponatremia    CELINA (acute kidney injury)    Acute cystitis without hematuria    Weakness    Bradycardia    Chronic low back pain    Shortness of breath    Acute on chronic diastolic congestive heart failure    Hypomagnesemia    Acute hypoxic respiratory failure    Osteomyelitis of vertebra, thoracolumbar region (Multi)    Degenerative lumbar spinal stenosis    Degenerative spondylolisthesis    Left foot drop    Hypokalemia    Overactive bladder    Acute embolism and thrombosis of unspecified deep veins of unspecified lower extremity    Acute non-ST segment elevation myocardial infarction (Multi)    Acute kidney failure, unspecified    Anemia due to blood loss    Angioedema    Carpal tunnel syndrome    Chronic kidney disease, unspecified    Chronic pain of both shoulders    Diarrhea of presumed infectious origin    Hand numbness    Vitamin D deficiency    Urinary tract infection    Trochanteric bursitis of right hip    Syncope and collapse    Subclinical hypothyroidism    Squamous cell carcinoma of skin, unspecified    Skin sensation disturbance    Sciatica    Rotator cuff syndrome of left shoulder    History of hypertension    Acute combined systolic and diastolic heart failure    Syncope, cardiogenic

## 2025-07-07 ENCOUNTER — CLINICAL SUPPORT (OUTPATIENT)
Dept: CARDIAC REHAB | Facility: CLINIC | Age: 75
End: 2025-07-07
Payer: MEDICARE

## 2025-07-07 DIAGNOSIS — I50.21 ACUTE SYSTOLIC HEART FAILURE: ICD-10-CM

## 2025-07-07 PROCEDURE — 93798 PHYS/QHP OP CAR RHAB W/ECG: CPT | Performed by: INTERNAL MEDICINE

## 2025-07-08 ENCOUNTER — TELEPHONE (OUTPATIENT)
Dept: PRIMARY CARE | Facility: CLINIC | Age: 75
End: 2025-07-08

## 2025-07-08 ENCOUNTER — CLINICAL SUPPORT (OUTPATIENT)
Dept: CARDIAC REHAB | Facility: CLINIC | Age: 75
End: 2025-07-08
Payer: MEDICARE

## 2025-07-08 DIAGNOSIS — I50.21 ACUTE SYSTOLIC HEART FAILURE: ICD-10-CM

## 2025-07-08 PROCEDURE — 93798 PHYS/QHP OP CAR RHAB W/ECG: CPT | Performed by: INTERNAL MEDICINE

## 2025-07-09 ENCOUNTER — OFFICE VISIT (OUTPATIENT)
Dept: PRIMARY CARE | Facility: CLINIC | Age: 75
End: 2025-07-09
Payer: MEDICARE

## 2025-07-09 VITALS
RESPIRATION RATE: 18 BRPM | DIASTOLIC BLOOD PRESSURE: 58 MMHG | TEMPERATURE: 98.2 F | OXYGEN SATURATION: 96 % | WEIGHT: 230 LBS | SYSTOLIC BLOOD PRESSURE: 130 MMHG | HEART RATE: 64 BPM | BODY MASS INDEX: 40.74 KG/M2

## 2025-07-09 DIAGNOSIS — I10 ESSENTIAL HYPERTENSION: Primary | ICD-10-CM

## 2025-07-09 DIAGNOSIS — E78.5 HYPERLIPIDEMIA, UNSPECIFIED HYPERLIPIDEMIA TYPE: ICD-10-CM

## 2025-07-09 DIAGNOSIS — K21.9 GASTROESOPHAGEAL REFLUX DISEASE WITHOUT ESOPHAGITIS: ICD-10-CM

## 2025-07-09 DIAGNOSIS — I50.41 ACUTE COMBINED SYSTOLIC AND DIASTOLIC HEART FAILURE: ICD-10-CM

## 2025-07-09 DIAGNOSIS — N32.81 OVERACTIVE BLADDER: ICD-10-CM

## 2025-07-09 PROCEDURE — 1126F AMNT PAIN NOTED NONE PRSNT: CPT | Performed by: NURSE PRACTITIONER

## 2025-07-09 PROCEDURE — 3078F DIAST BP <80 MM HG: CPT | Performed by: NURSE PRACTITIONER

## 2025-07-09 PROCEDURE — 99349 HOME/RES VST EST MOD MDM 40: CPT | Performed by: NURSE PRACTITIONER

## 2025-07-09 PROCEDURE — 1160F RVW MEDS BY RX/DR IN RCRD: CPT | Performed by: NURSE PRACTITIONER

## 2025-07-09 PROCEDURE — 1159F MED LIST DOCD IN RCRD: CPT | Performed by: NURSE PRACTITIONER

## 2025-07-09 PROCEDURE — 3075F SYST BP GE 130 - 139MM HG: CPT | Performed by: NURSE PRACTITIONER

## 2025-07-09 RX ORDER — OXYBUTYNIN CHLORIDE 5 MG/1
10 TABLET ORAL DAILY
Qty: 180 TABLET | Refills: 3 | Status: SHIPPED | OUTPATIENT
Start: 2025-07-09 | End: 2026-07-09

## 2025-07-09 ASSESSMENT — ENCOUNTER SYMPTOMS
SLEEP DISTURBANCE: 1
FREQUENCY: 1
NUMBNESS: 1
HEADACHES: 1
FATIGUE: 1
ACTIVITY CHANGE: 1
CONSTIPATION: 1
HEMATOLOGIC/LYMPHATIC NEGATIVE: 1
NECK STIFFNESS: 1
ABDOMINAL PAIN: 1
EYES NEGATIVE: 1
BACK PAIN: 1
ENDOCRINE NEGATIVE: 1
LIGHT-HEADEDNESS: 1
COUGH: 1
SHORTNESS OF BREATH: 1
NECK PAIN: 1
DIZZINESS: 1
WEAKNESS: 1
ARTHRALGIAS: 1

## 2025-07-09 ASSESSMENT — PAIN SCALES - GENERAL: PAINLEVEL_OUTOF10: 0-NO PAIN

## 2025-07-09 NOTE — PROGRESS NOTES
Subjective   Patient ID: Angela Montelongo is a 75 y.o. female who is being seen for routine 2 month house calls follow up.    HPI Pt seen in single family home accompanied by family who remained in other area of the home. PMHx:  Osteomyelitis of spine, HTN, HLD, Hypokalemia, GERD, Constipation, gout, OA, CELINA, SOB, DJD, Left foot drop, hyponatremia. Pt seen sitting in lift chair with legs dependent. Pt alert and oriented times three and cooperative with examination. Pt admitted to ER 5/27 with hypotension. Pts antihypertensives were decreased in dosage. Pt has since seen Dr. Roach and Dr. Wharton for a follow up. Pt still active with cardiac rehab. Pt will be starting PT for neck and back pain which she starts next week. Pt reports feeling pretty well overall. Pt reports energy is still low and low stamina hoping it will come back. Pt appetite is reported as too good and is still active with meals on wheels. Pt reports sleep as being difficult to fall asleep due to family coming in late at night and causing her to stay awake. Pt sleeping in bed. Pt denies fever, chills, or night sweats. Pt admits to sinus headaches and lightheadedness when standing too quickly. Pt admits to RIGGS and dry non productive cough. Pt denies CP palpitations. Pt with c/o hemorrhoids that are hindering her bowel movements and is taking a stool softener. Pt planning to see GI. Pt denies N/V/D with complaints of constipation with a bowel movement every 2 to 3 days. Pt with bilat lower extremity edema and elevates legs when sitting. Pt with bilat knee pain and all over joint pain. Pt ambulates with walker and denies recent falls. Pt continues to live with family who assists pt with some ADL's and meals. Pt does own medications. Pt with no other concerns or complaints.   Home Visit medically necessary due to: pt has chronic condition that makes access to a traditional office visit very difficult, illness or condition that results in activity  limitation or restriction that impacts the ability to leave home such as; unsteady gait/ poor condition.      Review of Systems   Constitutional:  Positive for activity change and fatigue.   HENT:  Positive for congestion.    Eyes: Negative.    Respiratory:  Positive for cough (DRY) and shortness of breath (RIGGS).    Cardiovascular:  Positive for leg swelling.   Gastrointestinal:  Positive for abdominal pain and constipation.   Endocrine: Negative.    Genitourinary:  Positive for frequency and urgency.   Musculoskeletal:  Positive for arthralgias (knees, hips), back pain, gait problem, neck pain and neck stiffness.   Skin: Negative.    Allergic/Immunologic: Positive for environmental allergies.   Neurological:  Positive for dizziness, weakness, light-headedness, numbness (feet) and headaches.   Hematological: Negative.    Psychiatric/Behavioral:  Positive for sleep disturbance.        Objective   /58 (BP Location: Right arm, Patient Position: Sitting, BP Cuff Size: Adult)   Pulse 64   Temp 36.8 °C (98.2 °F) (Temporal)   Resp 18   Wt 104 kg (230 lb)   SpO2 96%   BMI 40.74 kg/m²     Current Medications[1]      Physical Exam  Constitutional:       Appearance: Normal appearance. She is obese.   HENT:      Head: Normocephalic and atraumatic.      Nose: Nose normal.      Mouth/Throat:      Mouth: Mucous membranes are moist.      Pharynx: Oropharynx is clear.   Eyes:      Extraocular Movements: Extraocular movements intact.      Conjunctiva/sclera: Conjunctivae normal.      Pupils: Pupils are equal, round, and reactive to light.   Cardiovascular:      Rate and Rhythm: Normal rate. Rhythm irregular.      Pulses: Normal pulses.      Heart sounds: Normal heart sounds.   Pulmonary:      Effort: Pulmonary effort is normal.      Breath sounds: Examination of the left-lower field reveals decreased breath sounds. Decreased breath sounds present.   Abdominal:      General: Bowel sounds are normal.      Palpations:  Abdomen is soft.   Musculoskeletal:         General: Swelling (knees) present.      Cervical back: Normal range of motion and neck supple. Tenderness present.      Right lower le+ Edema present.      Left lower le+ Edema present.   Skin:     General: Skin is warm and dry.      Capillary Refill: Capillary refill takes less than 2 seconds.   Neurological:      Mental Status: She is alert and oriented to person, place, and time.      Motor: Weakness present.      Gait: Gait abnormal.   Psychiatric:         Mood and Affect: Mood normal.         Behavior: Behavior normal.         Thought Content: Thought content normal.         Judgment: Judgment normal.       Problem List[2]      Assessment/Plan   Diagnoses and all orders for this visit:  Essential hypertension  Comments:  Controlled on Losartan and Coreg.  Continue Losartan 25 mg po daily.   Continue Coreg 3.125 mg po twice daily.  Overactive bladder  Comments:  Controlled on Oxybuynin  Continue Oxybutynin 10 mg po daily.  Orders:  -     oxyBUTYnin (Ditropan) 5 mg tablet; Take 2 tablets (10 mg) by mouth early in the morning..  Hyperlipidemia, unspecified hyperlipidemia type  Comments:  Controlled on Lipitor.  Continue Lipitor 20 mg po daily.  Acute combined systolic and diastolic heart failure  Comments:  Stable  Continue Fariga 10 mg po daily.  Continue Lasix 40 mg po daily.   Limit sodium intake.  Gastroesophageal reflux disease without esophagitis  Comments:  Controlled on Pepcid.  Continue Pepcid 20 mg po twice daily.    Follow up in 2 months or prn.  Torie Iraheta, APRN-CNP          [1]   Current Outpatient Medications:     aspirin 81 mg EC tablet, Take 1 tablet (81 mg) by mouth once daily., Disp: , Rfl:     atorvastatin (Lipitor) 20 mg tablet, Take 1 tablet (20 mg) by mouth once daily., Disp: 30 tablet, Rfl: 11    blood pressure monitor kit, Use as directed to monitor blood pressure once daily, Disp: 1 kit, Rfl: 0    calcium carb/vit D3/minerals  (CALCIUM-VITAMIN D ORAL), Take 1 tablet by mouth once daily., Disp: , Rfl:     carvedilol (Coreg) 3.125 mg tablet, Take 1 tablet (3.125 mg) by mouth 2 times a day. Hold for systolic blood pressure less than 100 mmhg or heart rate less than 60, Disp: 180 tablet, Rfl: 3    dapagliflozin propanediol (Farxiga) 10 mg tablet, Take 1 tablet (10 mg) by mouth once daily., Disp: 90 tablet, Rfl: 3    famotidine (Pepcid) 20 mg tablet, Take 1 tablet (20 mg) by mouth 2 times a day., Disp: 180 tablet, Rfl: 3    furosemide (Lasix) 40 mg tablet, Take 1 tablet (40 mg) by mouth once daily., Disp: 90 tablet, Rfl: 3    gabapentin (Neurontin) 100 mg capsule, TAKE 2 CAPSULES BY MOUTH 3 TIMES A DAY, Disp: 540 capsule, Rfl: 0    losartan (Cozaar) 25 mg tablet, Take 1 tablet (25 mg) by mouth once daily., Disp: 30 tablet, Rfl: 11    lubricating eye drops ophthalmic solution, Administer 1 drop into both eyes if needed for dry eyes., Disp: , Rfl:     multivitamin with minerals iron-free (Centrum Silver), Take 1 tablet by mouth once daily., Disp: , Rfl:     oxyBUTYnin (Ditropan) 5 mg tablet, Take 2 tablets (10 mg) by mouth early in the morning.., Disp: 180 tablet, Rfl: 3    polyethylene glycol (Glycolax, Miralax) 17 gram packet, Take 17 g by mouth once daily., Disp: , Rfl:     potassium chloride CR 20 mEq ER tablet, Take 1 tablet (20 mEq) by mouth 2 times a day. Do not crush or chew., Disp: 180 tablet, Rfl: 3  [2]   Patient Active Problem List  Diagnosis    Hyperlipidemia    Shoulder arthritis    Essential hypertension    Nausea    Constipation    Claw toe    Gout    Chronic gout without tophus    Gastroesophageal reflux disease    Esophageal reflux    Endometrial hyperplasia    Hyponatremia    CELINA (acute kidney injury)    Acute cystitis without hematuria    Weakness    Bradycardia    Chronic low back pain    Shortness of breath    Acute on chronic diastolic congestive heart failure    Hypomagnesemia    Acute hypoxic respiratory failure     Osteomyelitis of vertebra, thoracolumbar region (Multi)    Degenerative lumbar spinal stenosis    Degenerative spondylolisthesis    Left foot drop    Hypokalemia    Overactive bladder    Acute embolism and thrombosis of unspecified deep veins of unspecified lower extremity    Acute non-ST segment elevation myocardial infarction (Multi)    Acute kidney failure, unspecified    Anemia due to blood loss    Angioedema    Carpal tunnel syndrome    Chronic kidney disease, unspecified    Chronic pain of both shoulders    Diarrhea of presumed infectious origin    Hand numbness    Vitamin D deficiency    Urinary tract infection    Trochanteric bursitis of right hip    Syncope and collapse    Subclinical hypothyroidism    Squamous cell carcinoma of skin, unspecified    Skin sensation disturbance    Sciatica    Rotator cuff syndrome of left shoulder    History of hypertension    Acute combined systolic and diastolic heart failure    Syncope, cardiogenic

## 2025-07-11 ENCOUNTER — CLINICAL SUPPORT (OUTPATIENT)
Dept: CARDIAC REHAB | Facility: CLINIC | Age: 75
End: 2025-07-11
Payer: MEDICARE

## 2025-07-11 DIAGNOSIS — I50.21 ACUTE SYSTOLIC HEART FAILURE: ICD-10-CM

## 2025-07-11 PROCEDURE — 93798 PHYS/QHP OP CAR RHAB W/ECG: CPT | Performed by: INTERNAL MEDICINE

## 2025-07-11 NOTE — PROGRESS NOTES
Cardiac Rehab Education  Angela TERESSA Ryleyhaileymiryam   [phone]    7/11/2025  Cardiovascular changes with exercise were discussed in today's session. Both immediate and long term effects of exercise were covered and the importance of cooling down post workout was reviewed.      Josef Lawrence RN

## 2025-07-14 ENCOUNTER — CLINICAL SUPPORT (OUTPATIENT)
Dept: CARDIAC REHAB | Facility: CLINIC | Age: 75
End: 2025-07-14
Payer: MEDICARE

## 2025-07-14 DIAGNOSIS — I50.21 ACUTE SYSTOLIC HEART FAILURE: ICD-10-CM

## 2025-07-14 PROCEDURE — 93798 PHYS/QHP OP CAR RHAB W/ECG: CPT | Performed by: INTERNAL MEDICINE

## 2025-07-15 ENCOUNTER — APPOINTMENT (OUTPATIENT)
Dept: CARDIAC REHAB | Facility: CLINIC | Age: 75
End: 2025-07-15
Payer: MEDICARE

## 2025-07-15 ENCOUNTER — DOCUMENTATION (OUTPATIENT)
Dept: CARDIAC REHAB | Facility: CLINIC | Age: 75
End: 2025-07-15
Payer: MEDICARE

## 2025-07-15 NOTE — PROGRESS NOTES
Cardiac Rehabilitation 90 Day Progress Report    Name: Angela Montelongo  Medical Record Number: [id number]  YOB: 1950  Age: 75 y.o.    Today’s Date: 7/15/2025  Primary Care Physician: Danielito Roach DO  Referring Physician: Cristina Wharton MD  Program Location: [address]  Primary Diagnosis:   1. Acute systolic heart failure  Referral to Cardiac Rehab         Onset/Date of Diagnosis: 9/2024  Session #:  19  AACVPR Risk Stratification: High  Falls Risk: High  Psychosocial Assessment   Pre PHQ-9: 8  Sent PH-Q 9 to MD if score > 20: No; score < 20  Pt reported/currently experiencing stress: Yes; Stress; Severity: moderate  Patient uses stress management skills: No   History of: no history of anxiety or depression  Currently seeing a mental health provider: No  Social Support: Yes, Whom:Daughter and son in law  Quality of Life Survey: KCCQ (see Heart Failure Management below)  Learning Assessment:  Learning assessment/barriers: Physical and Transportation  Preferred learning method: Reading handout and Writing handout  Barriers: Physical limitations  Comments:  Stages of Change: action    Psychosocial Plan  Goal Status: In Progress  Psychosocial Goals: Maintain or lower PH-Q 9 score by discharge and Identify strategies for managing depression  Psychosocial Interventions/Education:   5/5/25 reviewed initial PHQ-9 score with pt at [facility]/  6/23/25 [facility] support system. Pt uses [employer] to get to appointments/  6/27/2025  Effects of stress on the body, risks of unmanaged stress and techniques to minimize anxieties were all discussed in today's session. Handout was given for reference with resources to  Behavioral Health Services should patient need further assistance with stress management. Patient encouraged to notify staff if stressors increase and additional help is required.  Signature Denise Lawrence, TIMOTHY      Nutrition Assessment:    Hyperlipidemia: Yes     Lipids:  "  Lab Results   Component Value Date    CHOL 164 06/07/2022    HDL 65 06/07/2022    TRIG 91 06/07/2022       Current Dietary Guidelines: Low sodium  Barriers to dietary change: no    Diet Habit Survey: Picture Your Plate  Pre: 57  Post: To be done at discharge.    Diabetes Assessment    Lab Results   Component Value Date    HGBA1C 5.3 02/01/2019       History of Diabetes: No    Weight Management    Height: 160 cm (5' 3\")  Weight: 230 lbs  BMI (Calculated): 40.58      Nutrition Plan  Goal Status: In progress  Nutrition Goals: Improve Diet Habit Survey score by 5-10 points by discharge, Adapt a low-sodium, DASH diet prior to discharge, Adapt a Mediterranean focused diet prior to discharge, and Learn how to read and interpret nutrition labels prior to discharge  Nutrition Interventions/Education:   5/16/2025  Education on cholesterol was done during today's session. Discussed with patient the different types of cholesterol, risk factors and how to make lifestyle modifications to improve levels. Handout and patient's recent lab results were given for home review. Signature Denise Lawrence RN  6/6/2025  Pt has to rely on others to pick out her foods for her due to not driving and being able to get to the store. Sometimes they are not brining foods that are healthy for her. She is also on a tight budget restriction. Encouraged pt to contact [facility] on Aging to see if she was eligible for their  program. Also discussed multiple free produce locations for her to consider getting healthier food options from each week/month. Label reading education was done with program's RDN during today's visit. Label Reading Highlights handout was given with information discussed during appointment and to assist in review of label contents at home.  Signature Angela Leavitt RDN, LD   6/10/2025  Gave and reviewed PYP with pt. PYP score 57. Encouraged pt to add whole grains and fatty fish. Decrease whole dairy " products and processed foods. Gave handout on [facility] [employer] to help patient with additional ways of getting food due to limited income. Pt has not reached out to [facility] on aging yet. Encouraged to contact them regarding additional services available to her. No questions at this time. Encouraged to further review at home and follow up with questions as needed. Josef Leavitt RDN, LD  6/13/2025  Education on portion sizes was done during today's session. Gave visual demonstration of portions. Handouts were given for home reference and review. Josef Lawrence RN       Exercise Assessment    Home exercise:No  Mode: NA  Frequency: NA  Duration: NA    Exercise Prescription     Exercise Prescription based on: current ex rx    DASI Score: 16.2   MET Score: 4.7   Frequency:  3 days/week   Mode: NuStep and Recumbent Cycle   Duration: 33 total aerobic minutes   Intensity: RPE 12-14  Target HR:  To be calculated after 6 attended sessions.  MET Level: 2.3  Patient wears supplemental O2: No     Modality Workload METs Duration (minutes)   1 Pre-Exercise   4:00   2 NuStep Load 4 @ 50 lugo  2.3 33 :00   6 Post-Exercise   4:00     Resistance Training: No   Home Exercise Prescription given: yes    Exercise Plan  Goal Status: In Progress  Exercise Goals: Increase exercise MET level by 5-10% each week, Increase total exercise duration to 30-45 minutes, and Establish a home exercise program before discharge  Exercise Interventions/Education:   6/20/2025  Exercise prescription and maintenance education was done during today's session. Discussed FITT principle, reviewed perceived exertion scale, and patient's THR was given for home practice with instructions on how to obtain. Handouts were given for personal reference. Home exercise prescription is to be given prior to discharge from program.  Josef Lawrence RN  7/7/25 adjusted THR/MS  7/11/2025  Cardiovascular changes with  exercise were discussed in today's session. Both immediate and long term effects of exercise were covered and the importance of cooling down post workout was reviewed.  Signature Denise Lawrence RN      Other Core Components/Risk Factor Assessment:    Medication adherence  Current Medications:   Medication Documentation Review Audit       Reviewed by Cristina Wharton MD (Physician) on 07/13/25 at 1950      Medication Order Taking? Sig Documenting Provider Last Dose Status   aspirin 81 mg EC tablet [id number] Yes Take 1 tablet (81 mg) by mouth once daily. Historical Provider, MD  Active   atorvastatin (Lipitor) 20 mg tablet [id number] Yes Take 1 tablet (20 mg) by mouth once daily. Danielito Roach,   Active   blood pressure monitor kit [id number] Yes Use as directed to monitor blood pressure once daily Cristina Wharton MD  Active   calcium carb/vit D3/minerals (CALCIUM-VITAMIN D ORAL) [id number] Yes Take 1 tablet by mouth once daily. Historical Provider, MD  Active   carvedilol (Coreg) 3.125 mg tablet [id number] Yes Take 1 tablet (3.125 mg) by mouth 2 times a day. Hold for systolic blood pressure less than 100 mmhg or heart rate less than 60 Cristina Wharton MD  Active   dapagliflozin propanediol (Farxiga) 10 mg tablet [id number] Yes Take 1 tablet (10 mg) by mouth once daily. Cristina Wharton MD  Active   famotidine (Pepcid) 20 mg tablet [id number] Yes Take 1 tablet (20 mg) by mouth 2 times a day. Danielito Roach DO  Active   furosemide (Lasix) 40 mg tablet [id number] Yes Take 1 tablet (40 mg) by mouth once daily. Cristina Wharton MD  Active   gabapentin (Neurontin) 100 mg capsule [id number] Yes TAKE 2 CAPSULES BY MOUTH 3 TIMES A DAY Danielito Roach DO  Active   losartan (Cozaar) 25 mg tablet [id number] Yes Take 1 tablet (25 mg) by mouth once daily. Danielito Roach DO  Active   lubricating eye drops ophthalmic solution [id number] Yes Administer 1 drop into both eyes if needed for dry eyes. Historical Provider, MD   Active   multivitamin with minerals iron-free (Centrum Silver) [id number] Yes Take 1 tablet by mouth once daily. Historical Provider, MD  Active   oxyBUTYnin (Ditropan) 5 mg tablet [id number]  Take 2 tablets (10 mg) by mouth early in the morning.. Torie Iraheta, APRN-CNP  Active   polyethylene glycol (Glycolax, Miralax) 17 gram packet [id number] Yes Take 17 g by mouth once daily. Penelope Mcgee APRN-CNP  Active   potassium chloride CR 20 mEq ER tablet [id number] Yes Take 1 tablet (20 mEq) by mouth 2 times a day. Do not crush or chew. Cristina Wharton MD  Active                                 Medication compliance: Yes   Uses pill box/organizer: Yes    Carries medication list: No     Blood Pressure Management  History of Hypertension: Yes   Medication Changes: Yes   Resting BP:   WNL    Heart Failure Management  Hx of Heart Failure: Yes;   Type (selection): HFrEF  Most recent EF: 35    Onset of heart failure diagnosis: 9/2024  Last heart failure hospitalization: 10/2024  Number of HF admissions per year: 1  Symptoms: Fatigue at rest, Fatigue with exertion, Shortness of breath, and LE edema  Is there a family Hx of HF: Yes   Does patient obtain daily weight No     KCCQ survey: Pre:   Post:     Heart Failure Reassessment: In progress  Heart Failure Goals: Able to verbalize signs and symptoms of fluid retention and when to contact MD, Adhere to proper fluid restrictions, Adapt a low sodium diet and verbalize guidelines, and Obtain daily weight and verbalize proper method of obtaining weights  Smoking/Tobacco Assessment    Tobacco Use History[1]    Other Core Component Plan  Goal Status: In progress  Other Core Component Goals: Medication compliance and Achieve resting BP of < 130/80 by discharge  Other Core Component Interventions/Education:   6/23/25 Pt encouraged to label read for sodium and to keep below 1500 mg day/mk      Individual Patient Goals:    Shower without assistance by discharge  Be able to get out of house  and be more independent    Goal Status: In progress    Staff Comments:  Ms. Montelongo remains adherent to her cardiac rehab exercise sessions. No ST depression noted on telemetry monitor. Progress on her outcomes and goals remains steady.  Assistance is needed for equipment transfer. No cardiac complaints voiced.      Staff Signature:  Ofe Sue MS, AACVPR-CCRP         [1]   Social History  Tobacco Use   Smoking Status Never    Passive exposure: Never   Smokeless Tobacco Never

## 2025-07-18 ENCOUNTER — CLINICAL SUPPORT (OUTPATIENT)
Dept: CARDIAC REHAB | Facility: CLINIC | Age: 75
End: 2025-07-18
Payer: MEDICARE

## 2025-07-18 ENCOUNTER — EVALUATION (OUTPATIENT)
Dept: PHYSICAL THERAPY | Facility: CLINIC | Age: 75
End: 2025-07-18
Payer: MEDICARE

## 2025-07-18 DIAGNOSIS — M54.2 PAIN, NECK: Primary | ICD-10-CM

## 2025-07-18 DIAGNOSIS — I50.21 ACUTE SYSTOLIC HEART FAILURE: ICD-10-CM

## 2025-07-18 PROCEDURE — 97162 PT EVAL MOD COMPLEX 30 MIN: CPT | Mod: GP | Performed by: PHYSICAL THERAPIST

## 2025-07-18 PROCEDURE — 93798 PHYS/QHP OP CAR RHAB W/ECG: CPT | Performed by: INTERNAL MEDICINE

## 2025-07-18 PROCEDURE — 97110 THERAPEUTIC EXERCISES: CPT | Mod: GP | Performed by: PHYSICAL THERAPIST

## 2025-07-18 ASSESSMENT — PAIN - FUNCTIONAL ASSESSMENT: PAIN_FUNCTIONAL_ASSESSMENT: 0-10

## 2025-07-18 ASSESSMENT — PAIN SCALES - GENERAL: PAINLEVEL_OUTOF10: 4

## 2025-07-18 NOTE — PROGRESS NOTES
Physical Therapy  Physical Therapy Orthopedic Evaluation    Patient Name: Angela Montelongo  MRN: [id number]  Today's Date: 7/18/2025  Time Calculation  Start Time: 1400  Stop Time: 1445  Time Calculation (min): 45 min  PT Evaluation Time Entry  PT Evaluation (Moderate) Time Entry: 25  PT Therapeutic Procedures Time Entry  Therapeutic Exercise Time Entry: 12  Manual Therapy Time Entry: 6       Insurance:  Payor: MEDICARE / Plan: MEDICARE PART A AND B / Product Type: *No Product type* /   Number of Treatments Authorized: 1/MN  Certification Period Start Date: 07/18/25  Certification Period End Date: 10/16/25    Current Problem  Problem List Items Addressed This Visit           ICD-10-CM       Musculoskeletal and Injuries    Pain, neck - Primary M54.2    Relevant Orders    Follow Up In Physical Therapy        General:  General  Reason for Referral: Neck pain to B post scap, L UT  Referred By: GLENIS Roach  Past Medical History Relevant to Rehab: cardiac rehab currently for L sided heart failure, neuropathy feet, headaches (chronic), OA, UTI, heart disease, high BP, 5 L knee surgeries with hx of infection    Precautions:   Precautions  Precautions Comment: moderate fall risk  Rehab Fall Risk: Low: Age 65 or older, Low: Multiple or current diagnoses, Moderate: Unsteady gait/use of assistive device/apparent weakness or functionality challenged, and Moderate: Fall within last 6 months - discussion of safe environment     Medical History Form: Reviewed (scanned into chart)    Subjective:   Subjective   Chief Complaint: Patient reports neck pain that radiates between down to between the scapulas. Pain has been intermittent for years but had a flare up that started in June. This pain has been improving since then, and she feels like she's back to her baseline pain levels prior to flare up. She has n/t that radiates down into both hands but has had this since prior to neck pain flare up. She has a history of headaches that have  increased in frequency during this flare up. She is currently undergoing cardiac rehab.    Hx of 5 L knee surgeries with difficulty walking and transitions. L sided heart failure. Ambulates all the time with rollator when out and FWW while at home. Has adjustable bed and lift at home.    Pain:  Pain Assessment: 0-10  0-10 (Numeric) Pain Score: 4 (10 at worst)  Pain Type: Acute pain  Pain Location: Neck  Pain Radiating Towards: between scapulas       Relevant Information (PMH & Previous Tests/Imaging):   6/12/25 X-ray: Moderate multilevel spondylosis with areas of mild to moderate  left-sided bony neural foraminal narrowing. Grade 1 multilevel anterolisthesis(C3-C4, C4-C5, and C5-C6).      Prior Level of Function (PLOF)  Patient previously independent with all ADLs  Exercise/Physical Activity: N/a  Work/School: retired    Patients Living Environment: Reviewed and no concern    Primary Language: English    Patient's Goal(s) for Therapy: Decrease pain levels    Personal factors/comorbidities affecting outcomes: see PMH above    Red Flags: Do you have any of the following? Yes - dizziness, bowl/bladder problems, numbness  Fever/chills, unexplained weight changes, dizziness/fainting, unexplained change in bowel or bladder functions, unexplained malaise or muscle weakness, night pain/sweats, numbness or tingling    Objective:    Pt presents with moderately FHP with rounded shoulders; wide Rollator used during sessions and for transitions     Cervical AROM in degrees:    Flexion 20 pain across shoulders    Extension 39    Lateral flexion (R,L) 35, 15    Rotation (R,L) 60, 45    Mod restriction in cervical retraction  Cervical retraction + ext: dizziness     R,L shld ROM severely limited (~60* shld abd R/L)    Shld MMT not tested    (+) relief with STM to cervical region     Outcome Measures:  Other Measures  Neck Disability Index: 14     EDUCATION: Home exercise program, plan of care, activity modifications, pain  management, and injury pathology  Outpatient Education  Individual(s) Educated: Patient  Education Provided: Anatomy, POC, Home Exercise Program  Patient Response to Education: Patient/Caregiver Verbalized Understanding of Information  Education Comment: Access Code: [email or web address]  URL: [email or web address]  Date: 07/18/2025  Prepared by: Lalitha Seth    Exercises  - Seated Cervical Retraction  - 2-3 x daily - 7 x weekly - 1 sets - 10 reps  - Seated Scapular Retraction  - 2 x daily - 7 x weekly - 1 sets - 10 reps - 2 hold  - Seated Shoulder Rolls  - 2 x daily - 7 x weekly - 1 sets - 10 reps  - Shoulder Flexion Overhead with Dowel  - 2 x daily - 7 x weekly - 1 sets - 10 reps    Treatment Performed:  Therapeutic Exercise  Therapeutic Exercise Activity 1: HEP performed 1x through  Therapeutic Exercise Activity 2: Reviewed exercises pt currently performing at home (cervical ROM, shld rolls, etc)    Manual Therapy  Manual Therapy Activity 1: STM to UT, rhomboids, levator scap, paraspinals    Assessment: Patient is 75 year old who presents to physical therapy with signs and symptoms consistent with cervicalgia with headaches and pain that radiates between the scapulas. Patient has decreased cervical ROM limiting functional mobility and ADLs. Pt would benefit from skilled physical therapy in order to address the stated deficits and return to daily tasks with reduced pain and improved function.    Clinical Presentation: Evolving with changing characteristics  Personal Factors: BMI > 40, Transportation, Comorbidities, and Diabetes    Plan:  Treatment/Interventions: Cryotherapy, Education/ Instruction, Gait training, Manual therapy, Neuromuscular re-education, Self care/ home management, Therapeutic activities, Therapeutic exercises  PT Plan: Skilled PT (Cervical retraction, neck mobilty, shld mobility, shld strength)  PT Frequency: 1 time per week  Duration: 8-10 weeks, reducing frequency as goals are  met  Onset Date: 06/15/25  Certification Period Start Date: 07/18/25  Certification Period End Date: 10/16/25  Number of Treatments Authorized: 1/MN  Rehab Potential: Good  Plan of Care Agreement: Patient       Screening  Frequency  Date Last Completed   Spiritual and Cultural Beliefs   Screening  each visit or episode of care 7/3/2025   Falls Risk Screening  every ambulatory visit 7/18/2025  3:38 PM   Pain Screening  annually at primary care visit  7/9/2025   Domestic Violence screening  annually at primary care visit 7/3/2025   Elder Abuse Screening  annually at primary care visit    Depression Screening  annually in the primary care setting 7/3/2025   Suicide Risk Screening  annually in the primary care setting 5/27/2025   Nutrition and Food Insecurity   Screening  at least annually at primary care visit  5/28/2025   Key Learner  annually in the primary care setting 7/3/2025   Drug Screen  7/3/2025  2:07 PM   Alcohol Screen  7/3/2025  2:07 PM   Advance Directive  7/3/2025         The supervising therapist was present for the entire session and made all clinical decisions.      Goals: Set and discussed today  Active       Neck Pain       STG/LTG       Start:  07/18/25    Expected End:  10/16/25       STG  1) Patient will improve NDI score by 5% to show an improvement in function in 4 weeks.  2) Patient will be able to perform ADLs without pain exceeding 5/10 on the VAS in 4 weeks.  3) Patient will be independent with HEP in 3 visits in order to allow for improved function with home and community tasks.  LTG  1) Patient will improve scapular strength to 4+/5 in order to reduce compensatory guarding in upper trapezius and greater functional use of upper extremities in 8 weeks.   2) Patient will be able to perform ADLs without pain exceeding 3/10 on the VAS in 8 weeks.  3) Patient will improve left cervical rotation ROM by 10 degrees within 8 weeks to improve QOL by discharge.              Plan of care was developed  with input and agreement by the patient      BRADLEY STEVENSON

## 2025-07-18 NOTE — PROGRESS NOTES
Cardiac Rehab Education  Angela TERESSA Musamiryam   [id number]    7/18/2025  Education on sodium intake was done. Discussed with patient the risks of excess levels of sodium and how to decrease dietary intake with provided list of substitutions. Handouts were given for home reference.            Signature Denise Lawrence RN

## 2025-07-21 ENCOUNTER — CLINICAL SUPPORT (OUTPATIENT)
Dept: CARDIAC REHAB | Facility: CLINIC | Age: 75
End: 2025-07-21
Payer: MEDICARE

## 2025-07-21 DIAGNOSIS — I50.21 ACUTE SYSTOLIC HEART FAILURE: ICD-10-CM

## 2025-07-21 PROCEDURE — 93798 PHYS/QHP OP CAR RHAB W/ECG: CPT | Performed by: INTERNAL MEDICINE

## 2025-07-22 ENCOUNTER — CLINICAL SUPPORT (OUTPATIENT)
Dept: CARDIAC REHAB | Facility: CLINIC | Age: 75
End: 2025-07-22
Payer: MEDICARE

## 2025-07-22 DIAGNOSIS — I50.21 ACUTE SYSTOLIC HEART FAILURE: ICD-10-CM

## 2025-07-22 PROCEDURE — 93798 PHYS/QHP OP CAR RHAB W/ECG: CPT | Performed by: INTERNAL MEDICINE

## 2025-07-25 ENCOUNTER — CLINICAL SUPPORT (OUTPATIENT)
Dept: CARDIAC REHAB | Facility: CLINIC | Age: 75
End: 2025-07-25
Payer: MEDICARE

## 2025-07-25 DIAGNOSIS — I50.21 ACUTE SYSTOLIC HEART FAILURE: ICD-10-CM

## 2025-07-25 PROCEDURE — 93798 PHYS/QHP OP CAR RHAB W/ECG: CPT | Performed by: INTERNAL MEDICINE

## 2025-07-25 NOTE — PROGRESS NOTES
Cardiac Rehab Education  Angela Montelongo   [id number]    7/25/2025  Clinical pharmacist provided information and brochure regarding  pharmacy resources, medication finances and patient support available at .,    Signature Denise Lawrence RN

## 2025-07-28 ENCOUNTER — CLINICAL SUPPORT (OUTPATIENT)
Dept: CARDIAC REHAB | Facility: CLINIC | Age: 75
End: 2025-07-28
Payer: MEDICARE

## 2025-07-28 DIAGNOSIS — I50.21 ACUTE SYSTOLIC HEART FAILURE: ICD-10-CM

## 2025-07-28 PROCEDURE — 93798 PHYS/QHP OP CAR RHAB W/ECG: CPT | Performed by: INTERNAL MEDICINE

## 2025-07-28 NOTE — PROGRESS NOTES
Cardiac Rehab Education  Angela Montelongo   [id number]    7/28/2025  Discussed home exercise. Pt has not exercise equipment or transportation at this time.     Signature Denise Lawrence RN

## 2025-07-29 ENCOUNTER — CLINICAL SUPPORT (OUTPATIENT)
Dept: CARDIAC REHAB | Facility: CLINIC | Age: 75
End: 2025-07-29
Payer: MEDICARE

## 2025-07-29 DIAGNOSIS — I50.21 ACUTE SYSTOLIC HEART FAILURE: ICD-10-CM

## 2025-07-29 PROCEDURE — 93798 PHYS/QHP OP CAR RHAB W/ECG: CPT | Performed by: INTERNAL MEDICINE

## 2025-07-30 ENCOUNTER — APPOINTMENT (OUTPATIENT)
Dept: RHEUMATOLOGY | Facility: CLINIC | Age: 75
End: 2025-07-30
Payer: MEDICARE

## 2025-08-01 ENCOUNTER — CLINICAL SUPPORT (OUTPATIENT)
Dept: CARDIAC REHAB | Facility: CLINIC | Age: 75
End: 2025-08-01
Payer: MEDICARE

## 2025-08-01 ENCOUNTER — OFFICE VISIT (OUTPATIENT)
Dept: RHEUMATOLOGY | Facility: CLINIC | Age: 75
End: 2025-08-01
Payer: MEDICARE

## 2025-08-01 VITALS — SYSTOLIC BLOOD PRESSURE: 126 MMHG | BODY MASS INDEX: 40.74 KG/M2 | WEIGHT: 230 LBS | DIASTOLIC BLOOD PRESSURE: 74 MMHG

## 2025-08-01 DIAGNOSIS — I50.21 ACUTE SYSTOLIC HEART FAILURE: ICD-10-CM

## 2025-08-01 DIAGNOSIS — M15.9 OSTEOARTHRITIS OF MULTIPLE JOINTS, UNSPECIFIED OSTEOARTHRITIS TYPE: ICD-10-CM

## 2025-08-01 DIAGNOSIS — M10.9 GOUT, UNSPECIFIED CAUSE, UNSPECIFIED CHRONICITY, UNSPECIFIED SITE: ICD-10-CM

## 2025-08-01 PROCEDURE — 3078F DIAST BP <80 MM HG: CPT | Performed by: INTERNAL MEDICINE

## 2025-08-01 PROCEDURE — 99214 OFFICE O/P EST MOD 30 MIN: CPT | Performed by: INTERNAL MEDICINE

## 2025-08-01 PROCEDURE — 93798 PHYS/QHP OP CAR RHAB W/ECG: CPT | Performed by: INTERNAL MEDICINE

## 2025-08-01 PROCEDURE — 1159F MED LIST DOCD IN RCRD: CPT | Performed by: INTERNAL MEDICINE

## 2025-08-01 PROCEDURE — 3074F SYST BP LT 130 MM HG: CPT | Performed by: INTERNAL MEDICINE

## 2025-08-01 NOTE — PROGRESS NOTES
Recheck  OA  /  Gout   Occasional gout flare with stopping medication x 1 year ago after long term care this past year.  Low poatassium, difficulty walking with low back issues,   Second ER visit.  Infection in spine, along with heart failure.  IV antibiotics.  Cardiac Rehab with some relief.    HPI - She has been off allopurinol when she was discharged from hospital 10/24 when she was in for osteomyelitis and CHF.  Every now and then, she gets mild toe pain x 2 hrs to 1d.  No gout-type flares.  All of her joints hurt.  Tylenol and gabapentin helps.  ?if any swelling wrists.  Her hands are stiffer.  She is in cardiac rehab.   AM stiffness until she does her exercises.  She had some PT for her neck.  Occ mild CP and SOB.  She is to have a colonoscopy     PE  NAD  RRR no r/m/g  CTA  No edema  No synovitis    A/P - OA and gout, off allopurinol approx 10 mo but unclear why it was stopped  No gout-type flares.  Pt to call with any flares  Check uric acid with upcoming lab draw - poss restart allopurinol 100mg daily   7/24 DEXA doesn't meet FRAX criteria  Follow up based on above

## 2025-08-01 NOTE — PROGRESS NOTES
Cardiac Rehab Education  Angela TERESSA Montelongo   [phone]    8/1/2025  Label reading education was done with program's RDN during today's visit. Label Reading Highlights handout was given with information discussed during appointment and to assist in review of label contents at home.       Signature Denise Lawrence RN

## 2025-08-04 ENCOUNTER — TREATMENT (OUTPATIENT)
Dept: PHYSICAL THERAPY | Facility: CLINIC | Age: 75
End: 2025-08-04
Payer: MEDICARE

## 2025-08-04 ENCOUNTER — CLINICAL SUPPORT (OUTPATIENT)
Dept: CARDIAC REHAB | Facility: CLINIC | Age: 75
End: 2025-08-04
Payer: MEDICARE

## 2025-08-04 DIAGNOSIS — I50.21 ACUTE SYSTOLIC HEART FAILURE: ICD-10-CM

## 2025-08-04 DIAGNOSIS — M54.2 PAIN, NECK: Primary | ICD-10-CM

## 2025-08-04 PROCEDURE — 93798 PHYS/QHP OP CAR RHAB W/ECG: CPT | Performed by: INTERNAL MEDICINE

## 2025-08-04 PROCEDURE — 97110 THERAPEUTIC EXERCISES: CPT | Mod: GP,CQ

## 2025-08-04 PROCEDURE — 97140 MANUAL THERAPY 1/> REGIONS: CPT | Mod: GP,CQ

## 2025-08-04 ASSESSMENT — PAIN SCALES - GENERAL: PAINLEVEL_OUTOF10: 6

## 2025-08-04 ASSESSMENT — PAIN - FUNCTIONAL ASSESSMENT: PAIN_FUNCTIONAL_ASSESSMENT: 0-10

## 2025-08-04 NOTE — PROGRESS NOTES
"  Physical Therapy Treatment    Patient Name: Angela Montelongo  MRN: [id number]  Today's Date: 8/4/2025  Time Calculation  Start Time: 1515  Stop Time: 1600  Time Calculation (min): 45 min  PT Therapeutic Procedures Time Entry  Therapeutic Exercise Time Entry: 30  Manual Therapy Time Entry: 15,      Current Problem  Problem List Items Addressed This Visit           ICD-10-CM    Pain, neck - Primary M54.2       Insurance:  Number of Treatments Authorized: 2/MN  Certification Period Start Date: 07/18/25  Certification Period End Date: 10/16/25      Subjective   General  Reason for Referral: Neck pain to B post scap, L UT  Referred By: GLENIS Roach  Past Medical History Relevant to Rehab: cardiac rehab currently for L sided heart failure, neuropathy feet, headaches (chronic), OA, UTI, heart disease, high BP, 5 L knee surgeries with hx of infection  General Comment: Patient feels the [employer] (at cardiac rehab) has been helpful for her arms as well.    Performing HEP?: Yes    Precautions  Precautions  Precautions Comment: moderate fall risk  Pain  Pain Assessment: 0-10  0-10 (Numeric) Pain Score: 6  Pain Type: Acute pain  Pain Location: Neck    Objective       Treatments:    Therapeutic Exercise  Therapeutic Exercise Activity 1: sitting on rollator - OTD pulleys x 3 min  Therapeutic Exercise Activity 2: OTD pulleys \"W\" ER with scap add x 2 mkin  Therapeutic Exercise Activity 3: cervical retraction x 10  Therapeutic Exercise Activity 4: retro shld rolls x 10  Therapeutic Exercise Activity 5: scap add x 10  Therapeutic Exercise Activity 6: supine CHERRY elbow dig 3\" x 15  Therapeutic Exercise Activity 7: supine passive pec stretch x 2 min  Therapeutic Exercise Activity 8: supine cervical retraction x 10  Therapeutic Exercise Activity 9: supine PVC assisted chest press (therapist assist) x 10         Manual Therapy  Manual Therapy Activity 1: STM to UT, rhomboids, levator scap, paraspinals  Manual Therapy Activity 2: gentle pec " release  Manual Therapy Activity 3: gentle SOR, cervical distraction                     OP EDUCATION:  Outpatient Education  Education Comment: Access Code: [email or web address]  URL: [email or web address]  Date: 08/04/2025  Prepared by: Natasha Wan    Exercises  - Supine Scapular Retraction  - 2 x daily - 7 x weekly - 10 reps  - Supine Chin Tuck  - 2 x daily - 7 x weekly - 10 reps  - Seated Shoulder Flexion AAROM with Pulley Behind  - 2 x daily - 7 x weekly - 30 reps    Assessment:  PT Assessment  Assessment Comment: Patient responded well to OTD pulleys - recommend for home.  She felt good after today's session.    Plan:  OP PT Plan  Treatment/Interventions: Cryotherapy, Education/ Instruction, Gait training, Manual therapy, Neuromuscular re-education, Self care/ home management, Therapeutic activities, Therapeutic exercises  PT Plan: Skilled PT (Cervical retraction, neck mobilty, shld mobility, shld strength)  PT Frequency: 1 time per week  Duration: 8-10 weeks, reducing frequency as goals are met  Onset Date: 06/15/25  Certification Period Start Date: 07/18/25  Certification Period End Date: 10/16/25  Number of Treatments Authorized: 2/MN  Rehab Potential: Good  Plan of Care Agreement: Patient    Goals:  Active       Neck Pain       STG/LTG       Start:  07/18/25    Expected End:  10/16/25       STG  1) Patient will improve NDI score by 5% to show an improvement in function in 4 weeks.  2) Patient will be able to perform ADLs without pain exceeding 5/10 on the VAS in 4 weeks.  3) Patient will be independent with HEP in 3 visits in order to allow for improved function with home and community tasks.  LTG  1) Patient will improve scapular strength to 4+/5 in order to reduce compensatory guarding in upper trapezius and greater functional use of upper extremities in 8 weeks.   2) Patient will be able to perform ADLs without pain exceeding 3/10 on the VAS in 8 weeks.  3) Patient will improve left  cervical rotation ROM by 10 degrees within 8 weeks to improve QOL by discharge.               Mabel Wan, PTA

## 2025-08-05 ENCOUNTER — CLINICAL SUPPORT (OUTPATIENT)
Dept: CARDIAC REHAB | Facility: CLINIC | Age: 75
End: 2025-08-05
Payer: MEDICARE

## 2025-08-05 DIAGNOSIS — M25.512 CHRONIC PAIN OF BOTH SHOULDERS: ICD-10-CM

## 2025-08-05 DIAGNOSIS — G89.29 CHRONIC PAIN OF BOTH SHOULDERS: ICD-10-CM

## 2025-08-05 DIAGNOSIS — M54.50 CHRONIC LOW BACK PAIN, UNSPECIFIED BACK PAIN LATERALITY, UNSPECIFIED WHETHER SCIATICA PRESENT: ICD-10-CM

## 2025-08-05 DIAGNOSIS — M54.30 SCIATICA, UNSPECIFIED LATERALITY: ICD-10-CM

## 2025-08-05 DIAGNOSIS — M25.511 CHRONIC PAIN OF BOTH SHOULDERS: ICD-10-CM

## 2025-08-05 DIAGNOSIS — G89.29 CHRONIC LOW BACK PAIN, UNSPECIFIED BACK PAIN LATERALITY, UNSPECIFIED WHETHER SCIATICA PRESENT: ICD-10-CM

## 2025-08-05 DIAGNOSIS — M70.61 TROCHANTERIC BURSITIS OF RIGHT HIP: ICD-10-CM

## 2025-08-07 RX ORDER — GABAPENTIN 100 MG/1
200 CAPSULE ORAL
Qty: 540 CAPSULE | Refills: 0 | Status: SHIPPED | OUTPATIENT
Start: 2025-08-07

## 2025-08-08 ENCOUNTER — CLINICAL SUPPORT (OUTPATIENT)
Dept: CARDIAC REHAB | Facility: CLINIC | Age: 75
End: 2025-08-08
Payer: MEDICARE

## 2025-08-08 DIAGNOSIS — I50.21 ACUTE SYSTOLIC HEART FAILURE: ICD-10-CM

## 2025-08-08 PROCEDURE — 93798 PHYS/QHP OP CAR RHAB W/ECG: CPT | Performed by: INTERNAL MEDICINE

## 2025-08-08 NOTE — PROGRESS NOTES
Cardiac Rehab Education  Angela TERESSA Ryleyhaileymiryam   [id number]    8/8/2025  Discussion of prescribed drug names, doses, side effects, and medication uses was done with patient. Patient's medication list was reviewed with patient and copy of current medication list was given to patient in addition to education handout, Medications Used for Cardiac Conditions. Patient was instructed to come back with any questions.             Signature Denise Lawrence RN

## 2025-08-11 ENCOUNTER — CLINICAL SUPPORT (OUTPATIENT)
Dept: CARDIAC REHAB | Facility: CLINIC | Age: 75
End: 2025-08-11
Payer: MEDICARE

## 2025-08-11 ENCOUNTER — DOCUMENTATION (OUTPATIENT)
Dept: CARDIAC REHAB | Facility: CLINIC | Age: 75
End: 2025-08-11
Payer: MEDICARE

## 2025-08-11 DIAGNOSIS — I50.21 ACUTE SYSTOLIC HEART FAILURE: ICD-10-CM

## 2025-08-11 PROCEDURE — 93798 PHYS/QHP OP CAR RHAB W/ECG: CPT | Performed by: INTERNAL MEDICINE

## 2025-08-12 ENCOUNTER — APPOINTMENT (OUTPATIENT)
Dept: CARDIAC REHAB | Facility: CLINIC | Age: 75
End: 2025-08-12
Payer: MEDICARE

## 2025-08-12 ENCOUNTER — CLINICAL SUPPORT (OUTPATIENT)
Dept: CARDIAC REHAB | Facility: CLINIC | Age: 75
End: 2025-08-12
Payer: MEDICARE

## 2025-08-12 DIAGNOSIS — I50.21 ACUTE SYSTOLIC HEART FAILURE: ICD-10-CM

## 2025-08-12 PROCEDURE — 93798 PHYS/QHP OP CAR RHAB W/ECG: CPT | Performed by: INTERNAL MEDICINE

## 2025-08-15 ENCOUNTER — CLINICAL SUPPORT (OUTPATIENT)
Dept: CARDIAC REHAB | Facility: CLINIC | Age: 75
End: 2025-08-15
Payer: MEDICARE

## 2025-08-15 DIAGNOSIS — I50.21 ACUTE SYSTOLIC HEART FAILURE: ICD-10-CM

## 2025-08-15 PROCEDURE — 93798 PHYS/QHP OP CAR RHAB W/ECG: CPT | Performed by: INTERNAL MEDICINE

## 2025-08-18 ENCOUNTER — CLINICAL SUPPORT (OUTPATIENT)
Dept: CARDIAC REHAB | Facility: CLINIC | Age: 75
End: 2025-08-18
Payer: MEDICARE

## 2025-08-18 DIAGNOSIS — I50.21 ACUTE SYSTOLIC HEART FAILURE: ICD-10-CM

## 2025-08-18 PROCEDURE — 93798 PHYS/QHP OP CAR RHAB W/ECG: CPT | Performed by: INTERNAL MEDICINE

## 2025-08-19 ENCOUNTER — CLINICAL SUPPORT (OUTPATIENT)
Dept: CARDIAC REHAB | Facility: CLINIC | Age: 75
End: 2025-08-19
Payer: MEDICARE

## 2025-08-19 ENCOUNTER — APPOINTMENT (OUTPATIENT)
Dept: CARDIAC REHAB | Facility: CLINIC | Age: 75
End: 2025-08-19
Payer: MEDICARE

## 2025-08-19 DIAGNOSIS — I50.21 ACUTE SYSTOLIC HEART FAILURE: ICD-10-CM

## 2025-08-19 PROCEDURE — 93798 PHYS/QHP OP CAR RHAB W/ECG: CPT | Performed by: INTERNAL MEDICINE

## 2025-08-22 ENCOUNTER — CLINICAL SUPPORT (OUTPATIENT)
Dept: CARDIAC REHAB | Facility: CLINIC | Age: 75
End: 2025-08-22
Payer: MEDICARE

## 2025-08-22 ENCOUNTER — APPOINTMENT (OUTPATIENT)
Dept: PHYSICAL THERAPY | Facility: CLINIC | Age: 75
End: 2025-08-22
Payer: MEDICARE

## 2025-08-22 DIAGNOSIS — I50.21 ACUTE SYSTOLIC HEART FAILURE: ICD-10-CM

## 2025-08-22 PROCEDURE — 93798 PHYS/QHP OP CAR RHAB W/ECG: CPT | Performed by: INTERNAL MEDICINE

## 2025-08-25 ENCOUNTER — CLINICAL SUPPORT (OUTPATIENT)
Dept: CARDIAC REHAB | Facility: CLINIC | Age: 75
End: 2025-08-25
Payer: MEDICARE

## 2025-08-25 DIAGNOSIS — I50.21 ACUTE SYSTOLIC HEART FAILURE: Primary | ICD-10-CM

## 2025-08-25 DIAGNOSIS — I50.21 ACUTE SYSTOLIC HEART FAILURE: ICD-10-CM

## 2025-08-25 PROCEDURE — 93798 PHYS/QHP OP CAR RHAB W/ECG: CPT | Performed by: INTERNAL MEDICINE

## 2025-08-26 ENCOUNTER — CLINICAL SUPPORT (OUTPATIENT)
Dept: CARDIAC REHAB | Facility: CLINIC | Age: 75
End: 2025-08-26
Payer: MEDICARE

## 2025-08-26 ENCOUNTER — APPOINTMENT (OUTPATIENT)
Dept: CARDIAC REHAB | Facility: CLINIC | Age: 75
End: 2025-08-26
Payer: MEDICARE

## 2025-08-26 ENCOUNTER — DOCUMENTATION (OUTPATIENT)
Dept: CARDIAC REHAB | Facility: CLINIC | Age: 75
End: 2025-08-26

## 2025-08-26 DIAGNOSIS — I50.21 ACUTE SYSTOLIC HEART FAILURE: ICD-10-CM

## 2025-08-26 PROCEDURE — 93798 PHYS/QHP OP CAR RHAB W/ECG: CPT | Performed by: INTERNAL MEDICINE

## 2025-08-28 ENCOUNTER — APPOINTMENT (OUTPATIENT)
Dept: PREADMISSION TESTING | Facility: HOSPITAL | Age: 75
End: 2025-08-28
Payer: MEDICARE

## 2025-08-29 ENCOUNTER — TREATMENT (OUTPATIENT)
Dept: PHYSICAL THERAPY | Facility: CLINIC | Age: 75
End: 2025-08-29
Payer: MEDICARE

## 2025-08-29 ENCOUNTER — APPOINTMENT (OUTPATIENT)
Dept: CARDIAC REHAB | Facility: CLINIC | Age: 75
End: 2025-08-29
Payer: MEDICARE

## 2025-08-29 DIAGNOSIS — M54.2 PAIN, NECK: ICD-10-CM

## 2025-08-29 PROCEDURE — 97140 MANUAL THERAPY 1/> REGIONS: CPT | Mod: GP | Performed by: PHYSICAL THERAPIST

## 2025-08-29 PROCEDURE — 97110 THERAPEUTIC EXERCISES: CPT | Mod: GP | Performed by: PHYSICAL THERAPIST

## 2025-09-04 ENCOUNTER — APPOINTMENT (OUTPATIENT)
Dept: PREADMISSION TESTING | Facility: HOSPITAL | Age: 75
End: 2025-09-04
Payer: MEDICARE

## 2025-09-23 ENCOUNTER — APPOINTMENT (OUTPATIENT)
Dept: PHARMACY | Facility: HOSPITAL | Age: 75
End: 2025-09-23
Payer: MEDICARE

## 2025-10-29 ENCOUNTER — APPOINTMENT (OUTPATIENT)
Dept: CARDIOLOGY | Facility: CLINIC | Age: 75
End: 2025-10-29
Payer: MEDICARE

## (undated) DEVICE — GUIDEWIRE, J TIP, 3 MM, 0.035 IN X 260 CM, PTFE

## (undated) DEVICE — CATHETER, OPTITORQUE, 5FR, JACKY, 3.5/ 2H/110CM, CURVED

## (undated) DEVICE — SHEATH, GLIDESHEATH, SLENDER, 5FR 10CM

## (undated) DEVICE — POSITIONER, RETENTION SYSTEM, PANNUS, 2 PADS 1 STRAP, NS

## (undated) DEVICE — CATHETER, DIAGNOSTIC, JUDKINS, RIGHT, 5 FR-JR 4.0

## (undated) DEVICE — BAND, VASCULAR, RADIAL HEMOSTAT, LONG 27CM

## (undated) DEVICE — KIT, NAMIC STANDARD LEFT HEART, CUSTOM, LWMC

## (undated) DEVICE — INTRODUCER SHEATH, GLIDESHEATH, 6FR 25CM

## (undated) DEVICE — CATHETER, DIAG, EXPO, 5FR 110CM, PIG

## (undated) DEVICE — GUIDEWIRE, ANGLE TIP,  .035 DIA, 180 CM, 3 CM TIP"

## (undated) DEVICE — COVER, EQUIPMENT, ZERO GRAVITY

## (undated) DEVICE — PACK, ANGIO P2, CUSTOM, LAKE

## (undated) DEVICE — CATHETER, INIFINITI, 5FR TG 4.0